# Patient Record
Sex: FEMALE | Race: WHITE | Employment: OTHER
[De-identification: names, ages, dates, MRNs, and addresses within clinical notes are randomized per-mention and may not be internally consistent; named-entity substitution may affect disease eponyms.]

---

## 2017-02-23 ENCOUNTER — CARE COORDINATION (OUTPATIENT)
Dept: CARE COORDINATION | Facility: CLINIC | Age: 82
End: 2017-02-23

## 2017-03-13 ENCOUNTER — OFFICE VISIT (OUTPATIENT)
Dept: FAMILY MEDICINE CLINIC | Age: 82
End: 2017-03-13
Payer: MEDICARE

## 2017-03-13 VITALS
DIASTOLIC BLOOD PRESSURE: 82 MMHG | BODY MASS INDEX: 24.07 KG/M2 | TEMPERATURE: 97.7 F | SYSTOLIC BLOOD PRESSURE: 144 MMHG | WEIGHT: 140.2 LBS

## 2017-03-13 DIAGNOSIS — I10 ESSENTIAL HYPERTENSION: Primary | ICD-10-CM

## 2017-03-13 DIAGNOSIS — E78.5 HYPERLIPIDEMIA, UNSPECIFIED HYPERLIPIDEMIA TYPE: ICD-10-CM

## 2017-03-13 DIAGNOSIS — J01.90 ACUTE SINUSITIS, RECURRENCE NOT SPECIFIED, UNSPECIFIED LOCATION: ICD-10-CM

## 2017-03-13 PROCEDURE — 99214 OFFICE O/P EST MOD 30 MIN: CPT

## 2017-03-13 RX ORDER — AMOXICILLIN 500 MG/1
500 CAPSULE ORAL 3 TIMES DAILY
Qty: 30 CAPSULE | Refills: 0 | Status: SHIPPED | OUTPATIENT
Start: 2017-03-13 | End: 2017-03-23

## 2017-03-13 ASSESSMENT — ENCOUNTER SYMPTOMS
HOARSE VOICE: 0
COUGH: 1
SINUS COMPLAINT: 1
SHORTNESS OF BREATH: 0
GASTROINTESTINAL NEGATIVE: 1
BLURRED VISION: 0
SWOLLEN GLANDS: 0
SORE THROAT: 1
SINUS PRESSURE: 1

## 2017-03-22 ENCOUNTER — CARE COORDINATION (OUTPATIENT)
Dept: CARE COORDINATION | Age: 82
End: 2017-03-22

## 2017-04-26 ENCOUNTER — CARE COORDINATION (OUTPATIENT)
Dept: CARE COORDINATION | Age: 82
End: 2017-04-26

## 2017-05-23 RX ORDER — PRAVASTATIN SODIUM 40 MG
TABLET ORAL
Qty: 90 TABLET | Refills: 3 | Status: SHIPPED | OUTPATIENT
Start: 2017-05-23 | End: 2018-07-11 | Stop reason: SDUPTHER

## 2017-06-06 ENCOUNTER — CARE COORDINATION (OUTPATIENT)
Dept: CARE COORDINATION | Age: 82
End: 2017-06-06

## 2017-07-05 LAB
BUN BLDV-MCNC: 16 MG/DL
CALCIUM SERPL-MCNC: 9.3 MG/DL
CHLORIDE BLD-SCNC: 103 MMOL/L
CO2: 27 MMOL/L
CREAT SERPL-MCNC: 0.52 MG/DL
GFR CALCULATED: 90
GLUCOSE BLD-MCNC: 93 MG/DL
POTASSIUM SERPL-SCNC: 3.7 MMOL/L
SODIUM BLD-SCNC: 141 MMOL/L

## 2017-07-12 ENCOUNTER — OFFICE VISIT (OUTPATIENT)
Dept: FAMILY MEDICINE CLINIC | Age: 82
End: 2017-07-12
Payer: MEDICARE

## 2017-07-12 VITALS
WEIGHT: 139.8 LBS | BODY MASS INDEX: 24 KG/M2 | HEART RATE: 72 BPM | DIASTOLIC BLOOD PRESSURE: 82 MMHG | SYSTOLIC BLOOD PRESSURE: 132 MMHG

## 2017-07-12 DIAGNOSIS — I10 ESSENTIAL HYPERTENSION: Chronic | ICD-10-CM

## 2017-07-12 DIAGNOSIS — M81.0 OSTEOPOROSIS: ICD-10-CM

## 2017-07-12 DIAGNOSIS — E78.5 HYPERLIPIDEMIA, UNSPECIFIED HYPERLIPIDEMIA TYPE: Chronic | ICD-10-CM

## 2017-07-12 DIAGNOSIS — K21.9 GASTROESOPHAGEAL REFLUX DISEASE WITHOUT ESOPHAGITIS: Primary | ICD-10-CM

## 2017-07-12 DIAGNOSIS — I48.0 PAROXYSMAL ATRIAL FIBRILLATION (HCC): Chronic | ICD-10-CM

## 2017-07-12 PROCEDURE — 99214 OFFICE O/P EST MOD 30 MIN: CPT

## 2017-07-12 RX ORDER — RANITIDINE HCL 75 MG
75 TABLET ORAL 2 TIMES DAILY
COMMUNITY
End: 2017-11-15

## 2017-07-17 ASSESSMENT — ENCOUNTER SYMPTOMS
SHORTNESS OF BREATH: 0
NAUSEA: 1
SORE THROAT: 0
DIARRHEA: 0
VOMITING: 1
CHOKING: 0
STRIDOR: 0
HEARTBURN: 1
BLURRED VISION: 0
BELCHING: 1
COUGH: 1
ABDOMINAL PAIN: 0
WHEEZING: 0
CONSTIPATION: 0

## 2017-07-19 DIAGNOSIS — E78.5 HYPERLIPIDEMIA, UNSPECIFIED HYPERLIPIDEMIA TYPE: ICD-10-CM

## 2017-07-19 DIAGNOSIS — I10 ESSENTIAL HYPERTENSION: ICD-10-CM

## 2017-07-19 LAB
CHOLESTEROL, TOTAL: 140 MG/DL
CHOLESTEROL/HDL RATIO: 2.4
HDLC SERPL-MCNC: 58 MG/DL (ref 35–70)
LDL CHOLESTEROL CALCULATED: 70 MG/DL (ref 0–160)
TRIGL SERPL-MCNC: 59 MG/DL
VLDLC SERPL CALC-MCNC: 82 MG/DL

## 2017-07-21 ENCOUNTER — CARE COORDINATION (OUTPATIENT)
Dept: CARE COORDINATION | Age: 82
End: 2017-07-21

## 2017-08-09 ENCOUNTER — HOSPITAL ENCOUNTER (OUTPATIENT)
Dept: GENERAL RADIOLOGY | Age: 82
Discharge: HOME | End: 2017-08-09
Payer: MEDICARE

## 2017-08-09 ENCOUNTER — HOSPITAL ENCOUNTER (OUTPATIENT)
Dept: MAMMOGRAPHY | Age: 82
Discharge: HOME | End: 2017-08-09
Payer: MEDICARE

## 2017-08-09 DIAGNOSIS — K21.9 GASTROESOPHAGEAL REFLUX DISEASE WITHOUT ESOPHAGITIS: ICD-10-CM

## 2017-08-09 DIAGNOSIS — M81.0 OSTEOPOROSIS: ICD-10-CM

## 2017-08-09 PROCEDURE — 77080 DXA BONE DENSITY AXIAL: CPT

## 2017-08-14 ENCOUNTER — CARE COORDINATION (OUTPATIENT)
Dept: CARE COORDINATION | Age: 82
End: 2017-08-14

## 2017-08-18 ENCOUNTER — HOSPITAL ENCOUNTER (OUTPATIENT)
Dept: GENERAL RADIOLOGY | Age: 82
Discharge: HOME | End: 2017-08-18
Payer: MEDICARE

## 2017-08-18 PROCEDURE — 74247 FL UGI WITH AIR CONTRAST: CPT

## 2017-08-18 PROCEDURE — 2500000003 HC RX 250 WO HCPCS

## 2017-08-18 RX ADMIN — BARIUM SULFATE 355 ML: 0.6 SUSPENSION ORAL at 08:35

## 2017-08-18 RX ADMIN — BARIUM SULFATE 135 ML: 980 POWDER, FOR SUSPENSION ORAL at 08:35

## 2017-08-22 DIAGNOSIS — K44.9 LARGE HIATAL HERNIA: Primary | ICD-10-CM

## 2017-08-23 ENCOUNTER — CARE COORDINATION (OUTPATIENT)
Dept: CARE COORDINATION | Age: 82
End: 2017-08-23

## 2017-08-29 ENCOUNTER — CARE COORDINATION (OUTPATIENT)
Dept: FAMILY MEDICINE CLINIC | Age: 82
End: 2017-08-29

## 2017-09-06 ENCOUNTER — CARE COORDINATION (OUTPATIENT)
Dept: FAMILY MEDICINE CLINIC | Age: 82
End: 2017-09-06

## 2017-09-19 ENCOUNTER — CARE COORDINATION (OUTPATIENT)
Dept: FAMILY MEDICINE CLINIC | Age: 82
End: 2017-09-19

## 2017-10-09 ENCOUNTER — CARE COORDINATION (OUTPATIENT)
Dept: FAMILY MEDICINE CLINIC | Age: 82
End: 2017-10-09

## 2017-10-23 ENCOUNTER — CARE COORDINATION (OUTPATIENT)
Dept: FAMILY MEDICINE CLINIC | Age: 82
End: 2017-10-23

## 2017-10-23 NOTE — CARE COORDINATION
Pt would like to get flu vaccine, and dtap if possible at next office visit. Appt notes marked accordingly.

## 2017-10-23 NOTE — CARE COORDINATION
Pt will be going to surgery 11/1/17, repair her hiatal hernia, Odalys Boyle will do. She has had vomiting this week. Pre-Op testing is today. We discussed the importance of hydration and nutrional value prior to surgery for optimal recover, discussed possibly Ensure drinks since she has the vomiting with food. Patients chart updated in Care Everywhere. Pt's  will be caring for her post operatively. Pt notified Bailee Foley will be taking over [facility] at [facility].     Yvette Porras RN

## 2017-11-03 ENCOUNTER — CARE COORDINATION (OUTPATIENT)
Dept: CARE COORDINATION | Age: 82
End: 2017-11-03

## 2017-11-03 RX ORDER — TRAMADOL HYDROCHLORIDE 50 MG/1
50 TABLET ORAL
COMMUNITY
Start: 2017-11-02 | End: 2018-05-23

## 2017-11-03 NOTE — CARE COORDINATION
(ATARAX) 25 MG tablet Take 1 tablet by mouth 3 times daily as needed for Itching or Anxiety , Nausea or vomiting. 9/28/16   Tiffanie Foss MD   acetaminophen (TYLENOL) 325 MG tablet Take 2 tablets by mouth every 4 hours as needed for Pain or Fever 9/18/16   David Petty MD   metoprolol (LOPRESSOR) 25 MG tablet Take 25 mg by mouth 2 times daily    Historical Provider, MD   aspirin 81 MG tablet Take 81 mg by mouth daily.     Historical Provider, MD       Future Appointments  Date Time Provider Erick Cora   11/15/2017 2:00 PM MD REZA Castellon  [facility]

## 2017-11-15 ENCOUNTER — OFFICE VISIT (OUTPATIENT)
Dept: FAMILY MEDICINE CLINIC | Age: 82
End: 2017-11-15
Payer: MEDICARE

## 2017-11-15 VITALS
HEART RATE: 56 BPM | DIASTOLIC BLOOD PRESSURE: 60 MMHG | BODY MASS INDEX: 22.88 KG/M2 | WEIGHT: 134 LBS | SYSTOLIC BLOOD PRESSURE: 130 MMHG | HEIGHT: 64 IN

## 2017-11-15 DIAGNOSIS — J01.90 ACUTE BACTERIAL SINUSITIS: ICD-10-CM

## 2017-11-15 DIAGNOSIS — I48.0 PAROXYSMAL ATRIAL FIBRILLATION (HCC): Chronic | ICD-10-CM

## 2017-11-15 DIAGNOSIS — B96.89 ACUTE BACTERIAL SINUSITIS: ICD-10-CM

## 2017-11-15 DIAGNOSIS — I10 ESSENTIAL HYPERTENSION: Primary | Chronic | ICD-10-CM

## 2017-11-15 DIAGNOSIS — Z23 IMMUNIZATION DUE: ICD-10-CM

## 2017-11-15 PROCEDURE — 99213 OFFICE O/P EST LOW 20 MIN: CPT | Performed by: FAMILY MEDICINE

## 2017-11-15 PROCEDURE — G0008 ADMIN INFLUENZA VIRUS VAC: HCPCS | Performed by: FAMILY MEDICINE

## 2017-11-15 PROCEDURE — 90688 IIV4 VACCINE SPLT 0.5 ML IM: CPT | Performed by: FAMILY MEDICINE

## 2017-11-15 PROCEDURE — 1111F DSCHRG MED/CURRENT MED MERGE: CPT | Performed by: FAMILY MEDICINE

## 2017-11-15 RX ORDER — CEFUROXIME AXETIL 250 MG/1
250 TABLET ORAL 2 TIMES DAILY
Qty: 20 TABLET | Refills: 0 | Status: SHIPPED | OUTPATIENT
Start: 2017-11-15 | End: 2017-11-25

## 2017-11-15 ASSESSMENT — ENCOUNTER SYMPTOMS
SINUS PRESSURE: 0
DIARRHEA: 0
SORE THROAT: 0
SHORTNESS OF BREATH: 0
VOMITING: 0
BACK PAIN: 0
NAUSEA: 0
COUGH: 0

## 2017-11-15 ASSESSMENT — PATIENT HEALTH QUESTIONNAIRE - PHQ9
SUM OF ALL RESPONSES TO PHQ9 QUESTIONS 1 & 2: 0
1. LITTLE INTEREST OR PLEASURE IN DOING THINGS: 0
2. FEELING DOWN, DEPRESSED OR HOPELESS: 0
SUM OF ALL RESPONSES TO PHQ QUESTIONS 1-9: 0

## 2017-11-15 NOTE — PROGRESS NOTES
Betzaida Becerra is a [de-identified] y.o. female who presents today for her medical conditions/complaints as noted below. Betzaida Becerra is c/o of Follow-Up from [facility] and Health Maintenance (flu, adacel)    Pt with routine follow up and initial evaluation with me. Sinus sx currently. HPI:     Visit Information    Have you changed or started any medications since your last visit including any over-the-counter medicines, vitamins, or herbal medicines? no   Are you having any side effects from any of your medications? -  no  Have you stopped taking any of your medications? Is so, why? -  no    Have you seen any other physician or provider since your last visit? No  Have you had any other diagnostic tests since your last visit? No  Have you been seen in the emergency room and/or had an admission to a hospital since we last saw you? No  Have you had your routine dental cleaning in the past 6 months? yes -     Have you activated your [employer] account? If not, what are your barriers?  No:      Patient Care Team:  Anupam Pruett MD as PCP - General (Family Medicine)  Ella Mayfield MD as PCP - [facility] Attributed Provider  Carlos Hamm MD as Consulting Physician (General Surgery)  Angel Simeon RN as Care Coordinator    Medical History Review  Past Medical, Family, and Social History reviewed and  contribute to the patient presenting condition    Health Maintenance   Topic Date Due    DTaP/Tdap/Td vaccine (1 - Tdap) 08/21/2011    Zostavax vaccine  Completed    DEXA (modify frequency per FRAX score)  Completed    Flu vaccine  Completed    Pneumococcal low/med risk  Completed       Past Medical History:   Diagnosis Date    GERD (gastroesophageal reflux disease)     Hypertension       Past Surgical History:   Procedure Laterality Date    BLADDER SUSPENSION  2001    CHOLECYSTECTOMY      HYSTERECTOMY  ovaries left    JOINT REPLACEMENT  left total shoulder; 1990     Family History   Problem Relation Age of Onset    Cancer Brother     Cancer Other      bone    Cancer Daughter      breast     Social History   Substance Use Topics    Smoking status: Former Smoker     Quit date: 12/17/1984    Smokeless tobacco: Never Used    Alcohol use No      Current Outpatient Prescriptions   Medication Sig Dispense Refill    cefUROXime (CEFTIN) 250 MG tablet Take 1 tablet by mouth 2 times daily for 10 days 20 tablet 0    traMADol (ULTRAM) 50 MG tablet Take 50 mg by mouth      pravastatin (PRAVACHOL) 40 MG tablet TAKE 1 TABLET DAILY 90 tablet 3    lisinopril (PRINIVIL;ZESTRIL) 20 MG tablet Take 1 tablet by mouth daily      bumetanide (BUMEX) 1 MG tablet Take 2 tablets by mouth daily      dofetilide (TIKOSYN) 250 MCG capsule Take 1 capsule by mouth 2 times daily      apixaban (ELIQUIS) 5 MG TABS tablet Take 5 mg by mouth 2 times daily      acetaminophen (TYLENOL) 325 MG tablet Take 2 tablets by mouth every 4 hours as needed for Pain or Fever 120 tablet 3    metoprolol (LOPRESSOR) 25 MG tablet Take 25 mg by mouth 2 times daily      aspirin 81 MG tablet Take 81 mg by mouth daily. No current facility-administered medications for this visit. Allergies   Allergen Reactions    Bactrim [Sulfamethoxazole-Trimethoprim]     Codeine      dizziness    Norvasc [Amlodipine Besylate]      Edema      Sertraline Hcl     Toradol [Ketorolac Tromethamine]     Morphine Nausea And Vomiting         Subjective:   Review of Systems   Constitutional: Negative for chills, diaphoresis and fever. HENT: Negative for congestion, sinus pressure and sore throat. Eyes: Negative for visual disturbance. Respiratory: Negative for cough and shortness of breath. Cardiovascular: Negative for chest pain and leg swelling. Gastrointestinal: Negative for diarrhea, nausea and vomiting. Genitourinary: Negative for dysuria, menstrual problem, urgency and vaginal discharge.    Musculoskeletal: Negative for arthralgias, back pain and

## 2017-11-17 ENCOUNTER — CARE COORDINATION (OUTPATIENT)
Dept: CARE COORDINATION | Age: 82
End: 2017-11-17

## 2017-11-28 ENCOUNTER — TELEPHONE (OUTPATIENT)
Dept: FAMILY MEDICINE CLINIC | Age: 82
End: 2017-11-28

## 2017-11-28 RX ORDER — NITROFURANTOIN 25; 75 MG/1; MG/1
100 CAPSULE ORAL 2 TIMES DAILY
Qty: 14 CAPSULE | Refills: 0 | Status: SHIPPED | OUTPATIENT
Start: 2017-11-28 | End: 2017-12-05

## 2017-12-01 ENCOUNTER — CARE COORDINATION (OUTPATIENT)
Dept: CARE COORDINATION | Age: 82
End: 2017-12-01

## 2017-12-01 NOTE — CARE COORDINATION
100 MG capsule Take 1 capsule by mouth 2 times daily for 7 days 11/28/17 12/5/17  Bhumika Cazares MD   traMADol [place]) 50 MG tablet Take 50 mg by mouth 11/2/17   Historical Provider, MD   pravastatin (PRAVACHOL) 40 MG tablet TAKE 1 TABLET DAILY 5/23/17   Blanche Forte MD   lisinopril (PRINIVIL;ZESTRIL) 20 MG tablet Take 1 tablet by mouth daily 11/9/16   Historical Provider, MD   bumetanide (BUMEX) 1 MG tablet Take 2 tablets by mouth daily 11/2/16   Historical Provider, MD   dofetilide (TIKOSYN) 250 MCG capsule Take 1 capsule by mouth 2 times daily 10/15/16   Historical Provider, MD   apixaban (ELIQUIS) 5 MG TABS tablet Take 5 mg by mouth 2 times daily    Historical Provider, MD   acetaminophen (TYLENOL) 325 MG tablet Take 2 tablets by mouth every 4 hours as needed for Pain or Fever 9/18/16   Blanche Forte MD   metoprolol (LOPRESSOR) 25 MG tablet Take 25 mg by mouth 2 times daily    Historical Provider, MD   aspirin 81 MG tablet Take 81 mg by mouth daily.     Historical Provider, MD       Future Appointments  Date Time Provider Erick Shepherd   5/18/2018 1:20 PM Halle Valentine MD [place] [id number]

## 2017-12-08 ENCOUNTER — CARE COORDINATION (OUTPATIENT)
Dept: CARE COORDINATION | Age: 82
End: 2017-12-08

## 2017-12-08 NOTE — CARE COORDINATION
5/18/2018 1:20 PM MD REZA Mccormick [facility]     General Assessment    Do you have any symptoms that are causing concern?:  No      and

## 2017-12-19 ENCOUNTER — TELEPHONE (OUTPATIENT)
Dept: FAMILY MEDICINE CLINIC | Age: 82
End: 2017-12-19

## 2017-12-28 ENCOUNTER — TELEPHONE (OUTPATIENT)
Dept: FAMILY MEDICINE CLINIC | Age: 82
End: 2017-12-28

## 2017-12-28 RX ORDER — CLONAZEPAM 0.5 MG/1
TABLET ORAL
Qty: 30 TABLET | Refills: 0 | Status: SHIPPED | OUTPATIENT
Start: 2017-12-28 | End: 2019-03-29 | Stop reason: SDUPTHER

## 2017-12-28 NOTE — TELEPHONE ENCOUNTER
The patient was calling in to see if she can get a RX for Clonazepam 0.5mg take 1 tablet PO PRN for anxiety. She says [facility] rx it for her earlier this year as a written rx and she is currently out. She would like the medication to be sent to [facility] on [address].

## 2018-01-03 ENCOUNTER — CARE COORDINATION (OUTPATIENT)
Dept: CARE COORDINATION | Age: 83
End: 2018-01-03

## 2018-01-12 ENCOUNTER — TELEPHONE (OUTPATIENT)
Dept: FAMILY MEDICINE CLINIC | Age: 83
End: 2018-01-12

## 2018-01-17 ENCOUNTER — CARE COORDINATION (OUTPATIENT)
Dept: CARE COORDINATION | Age: 83
End: 2018-01-17

## 2018-01-17 RX ORDER — M-VIT,TX,IRON,MINS/CALC/FOLIC 27MG-0.4MG
1 TABLET ORAL DAILY
Status: ON HOLD | COMMUNITY

## 2018-01-24 ENCOUNTER — CARE COORDINATION (OUTPATIENT)
Dept: CARE COORDINATION | Age: 83
End: 2018-01-24

## 2018-01-24 NOTE — CARE COORDINATION
Ambulatory Care Coordination Note  1/24/2018  CM Risk Score: 1  Larry Mortality Risk Score: 9.69    ACC: Junie Mendez RN    Summary Note: RNCC reviewed chart and previous encounters, spoke with Vibha Gonzalez regarding multivitamin and energy, Vibha Gonzalez states \" I am feeling pretty good, energy is getting better\", [facility] educated on the universal precautions of prevention of influenza, and Vibha Gonzalez verbalizes understanding, no other questions or concerns at this time. Care Coordination Interventions    Program Enrollment:  Rising Risk  Referral from Primary Care Provider:  No  Suggested Interventions and Community Resources  No Identified Needs  Fall Risk Prevention:  (Comment: Sent education for patient and )         Goals Addressed             Most Recent     Conditions and Symptoms   On track (1/24/2018)             I will schedule office visits, as directed by my provider. I will keep my appointment or reschedule if I have to cancel. I will notify my provider of any barriers to my plan of care. I will notify my provider of any symptoms that indicate a worsening of my condition. Barriers: none  Plan for overcoming my barriers: N/A  Confidence: 7/10  Anticipated Goal Completion Date: 12/6/17         Wellness Goal   On track (1/24/2018)             Patient Self-Management Goal for Health Maintenance  Goal: I will schedule a yearly preventative care visit. Barriers: none  Plan for overcoming my barriers: N/A  Confidence: 9/10  Anticipated Goal Completion Date: 9/6/17            Prior to Admission medications    Medication Sig Start Date End Date Taking? Authorizing Provider   Multiple Vitamins-Minerals (THERAPEUTIC MULTIVITAMIN-MINERALS) tablet Take 1 tablet by mouth daily    Historical Provider, MD   clonazePAM (KLONOPIN) 0.5 MG tablet Take 1 tablet PO QID PRN for Anxiety.  12/28/17   Indra Archibald MD   traMADol (ULTRAM) 50 MG tablet Take 50 mg by mouth 11/2/17   Historical Provider, MD   pravastatin (PRAVACHOL) 40 MG tablet TAKE 1 TABLET DAILY 5/23/17   Cathie Thomson MD   lisinopril (PRINIVIL;ZESTRIL) 20 MG tablet Take 1 tablet by mouth daily 11/9/16   Historical Provider, MD   bumetanide (BUMEX) 1 MG tablet Take 2 tablets by mouth daily 11/2/16   Historical Provider, MD   dofetilide (TIKOSYN) 250 MCG capsule Take 1 capsule by mouth 2 times daily 10/15/16   Historical Provider, MD   apixaban (ELIQUIS) 5 MG TABS tablet Take 5 mg by mouth 2 times daily    Historical Provider, MD   acetaminophen (TYLENOL) 325 MG tablet Take 2 tablets by mouth every 4 hours as needed for Pain or Fever 9/18/16   Cathie Thomson MD   metoprolol (LOPRESSOR) 25 MG tablet Take 25 mg by mouth 2 times daily    Historical Provider, MD   aspirin 81 MG tablet Take 81 mg by mouth daily.     Historical Provider, MD       Future Appointments  Date Time Provider Erick Cora   5/18/2018 1:20 PM MD REZA Fall [facility]     General Assessment    Do you have any symptoms that are causing concern?:  No      and

## 2018-02-26 ENCOUNTER — CARE COORDINATION (OUTPATIENT)
Dept: CARE COORDINATION | Age: 83
End: 2018-02-26

## 2018-03-26 ENCOUNTER — CARE COORDINATION (OUTPATIENT)
Dept: CARE COORDINATION | Age: 83
End: 2018-03-26

## 2018-04-26 ENCOUNTER — CARE COORDINATION (OUTPATIENT)
Dept: CARE COORDINATION | Age: 83
End: 2018-04-26

## 2018-04-30 ENCOUNTER — TELEPHONE (OUTPATIENT)
Dept: FAMILY MEDICINE CLINIC | Age: 83
End: 2018-04-30

## 2018-05-23 ENCOUNTER — OFFICE VISIT (OUTPATIENT)
Dept: FAMILY MEDICINE CLINIC | Age: 83
End: 2018-05-23
Payer: MEDICARE

## 2018-05-23 VITALS
SYSTOLIC BLOOD PRESSURE: 128 MMHG | HEART RATE: 61 BPM | WEIGHT: 139 LBS | DIASTOLIC BLOOD PRESSURE: 70 MMHG | BODY MASS INDEX: 23.86 KG/M2

## 2018-05-23 DIAGNOSIS — B96.89 ACUTE BACTERIAL SINUSITIS: ICD-10-CM

## 2018-05-23 DIAGNOSIS — E78.5 HYPERLIPIDEMIA, UNSPECIFIED HYPERLIPIDEMIA TYPE: Chronic | ICD-10-CM

## 2018-05-23 DIAGNOSIS — J01.90 ACUTE BACTERIAL SINUSITIS: ICD-10-CM

## 2018-05-23 DIAGNOSIS — I10 ESSENTIAL HYPERTENSION: Primary | Chronic | ICD-10-CM

## 2018-05-23 PROCEDURE — 99213 OFFICE O/P EST LOW 20 MIN: CPT | Performed by: FAMILY MEDICINE

## 2018-05-23 RX ORDER — CEFUROXIME AXETIL 250 MG/1
250 TABLET ORAL 2 TIMES DAILY
Qty: 20 TABLET | Refills: 0 | Status: SHIPPED | OUTPATIENT
Start: 2018-05-23 | End: 2018-06-02

## 2018-05-23 ASSESSMENT — ENCOUNTER SYMPTOMS
SORE THROAT: 0
DIARRHEA: 0
BACK PAIN: 0
VOMITING: 0
COUGH: 0
NAUSEA: 0
SHORTNESS OF BREATH: 0
SINUS PRESSURE: 0

## 2018-05-24 ENCOUNTER — CARE COORDINATION (OUTPATIENT)
Dept: CARE COORDINATION | Age: 83
End: 2018-05-24

## 2018-06-21 ENCOUNTER — CARE COORDINATION (OUTPATIENT)
Dept: CARE COORDINATION | Age: 83
End: 2018-06-21

## 2018-07-12 RX ORDER — PRAVASTATIN SODIUM 40 MG
TABLET ORAL
Qty: 90 TABLET | Refills: 3 | Status: SHIPPED | OUTPATIENT
Start: 2018-07-12 | End: 2019-01-11 | Stop reason: SDUPTHER

## 2018-07-12 NOTE — TELEPHONE ENCOUNTER
Health Maintenance   Topic Date Due    Shingles Vaccine (1 of 2 - 2 Dose Series) 06/24/1987    DTaP/Tdap/Td vaccine (1 - Tdap) 08/21/2011    Potassium monitoring  07/05/2018    Creatinine monitoring  07/05/2018    Flu vaccine (1) 09/01/2018    DEXA (modify frequency per FRAX score)  Completed    Pneumococcal low/med risk  Completed       Hemoglobin A1C (%)   Date Value   09/17/2016 5.4   07/08/2014 5.6             ( goal A1C is < 7)   No results found for: LABMICR  LDL Cholesterol (mg/dL)   Date Value   09/17/2016 83     LDL Calculated (mg/dL)   Date Value   07/05/2017 70       (goal LDL is <100)   AST (U/L)   Date Value   09/28/2016 28     ALT (U/L)   Date Value   09/28/2016 20     BUN (mg/dL)   Date Value   07/05/2017 16     BP Readings from Last 3 Encounters:   05/23/18 128/70   11/15/17 130/60   07/12/17 132/82          (goal 120/80)    All Future Testing planned in [facility]  Lab Frequency Next Occurrence       Next Visit Date:  Future Appointments  Date Time Provider Erick Shepherd   11/23/2018 2:00 PM Claus Stone MD [facility] [address] [facility]            Patient Active Problem List:     Hyperlipidemia     Hypertension     Anxiety     Gastroesophageal reflux disease without esophagitis     Atypical chest pain     Paroxysmal atrial fibrillation ([facility] [address]

## 2018-07-19 ENCOUNTER — CARE COORDINATION (OUTPATIENT)
Dept: CARE COORDINATION | Age: 83
End: 2018-07-19

## 2018-07-19 NOTE — CARE COORDINATION
Ambulatory Care Coordination Note  7/19/2018  CM Risk Score: 1  Larry Mortality Risk Score: 10.46    ACC: Nimco Mora RN    Summary Note: RNCC reviewed chart and previous encounters, spoke with Meka Bhatt for CC needs and updates on well-being. Meka Bhatt informs [address] that \" I am doing great, I was pretty sick early last month, and Kalpesh Devan called me in an antibiotic and feeling much better now. \" No other questions or concerns at this time. No changes with current medications. RNCC Plan: Will follow up for outreach for well-being, surveillance potentially            Care Coordination Interventions    Program Enrollment:  Rising Risk  Referral from Primary Care Provider:  No  Suggested Interventions and Community Resources  No Identified Needs  Fall Risk Prevention:  (Comment: Sent education for patient and )         Goals Addressed             Most Recent     Conditions and Symptoms   On track (7/19/2018)             I will schedule office visits, as directed by my provider. I will keep my appointment or reschedule if I have to cancel. I will notify my provider of any barriers to my plan of care. I will notify my provider of any symptoms that indicate a worsening of my condition. Barriers: none  Plan for overcoming my barriers: N/A  Confidence: 7/10  Anticipated Goal Completion Date: 12/6/17         Wellness Goal   On track (7/19/2018)             Patient Self-Management Goal for Health Maintenance  Goal: I will schedule a yearly preventative care visit. Barriers: none  Plan for overcoming my barriers: N/A  Confidence: 9/10  Anticipated Goal Completion Date: 9/6/17            Prior to Admission medications    Medication Sig Start Date End Date Taking?  Authorizing Provider   pravastatin (PRAVACHOL) 40 MG tablet TAKE 1 TABLET DAILY 7/12/18   Lorna Lopez MD   Multiple Vitamins-Minerals (THERAPEUTIC MULTIVITAMIN-MINERALS) tablet Take 1 tablet by mouth daily    Historical Provider, MD   clonazePAM (KLONOPIN) 0.5 MG tablet Take 1 tablet PO QID PRN for Anxiety.  12/28/17   Thiago Carvalho MD   lisinopril (PRINIVIL;ZESTRIL) 20 MG tablet Take 1 tablet by mouth daily 11/9/16   Historical Provider, MD   bumetanide (BUMEX) 1 MG tablet Take 2 tablets by mouth daily 11/2/16   Historical Provider, MD   dofetilide (TIKOSYN) 250 MCG capsule Take 1 capsule by mouth 2 times daily 10/15/16   Historical Provider, MD   apixaban (ELIQUIS) 5 MG TABS tablet Take 5 mg by mouth 2 times daily    Historical Provider, MD   acetaminophen (TYLENOL) 325 MG tablet Take 2 tablets by mouth every 4 hours as needed for Pain or Fever 9/18/16   Thee Rivera MD   metoprolol (LOPRESSOR) 25 MG tablet Take 25 mg by mouth 2 times daily    Historical Provider, MD       Future Appointments  Date Time Provider Erick Shepherd   11/23/2018 2:00 PM [facility]      and   General Assessment    Do you have any symptoms that are causing concern?:  No

## 2018-08-13 ENCOUNTER — TELEPHONE (OUTPATIENT)
Dept: FAMILY MEDICINE CLINIC | Age: 83
End: 2018-08-13

## 2018-08-17 ENCOUNTER — CARE COORDINATION (OUTPATIENT)
Dept: CARE COORDINATION | Age: 83
End: 2018-08-17

## 2018-08-17 NOTE — CARE COORDINATION
Ambulatory Care Coordination Note  8/17/2018  CM Risk Score: Luis Juarez Mortality Risk Score: 7.97    ACC: Mateo Oliveros, XOCHITL    Summary Note: RNCC reviewed chart and previous encounters, spoke with Cam Foster for updates on recent hospital admission for Ileus/SBO on 8/14/18 discharged on 8/15/18 home. Cam Foster informs [facility] that \" I am feeling better with my stuff going on, now it is my , he was in the hospital before me and went to a nursing home and now they want to discharge him, and I cannot take care of him by myself. He has parkinson's and has a hard time walking. So the SW at the facility is trying to help us get Medicaid, so that he can stay at the facility, if not then they might discharge him Sunday, and boy oh boy. What then? \" RNCC inquired of Cam Foster of how she is feeling overall, and if she would like to make an Hospital Follow up with Dr. Yo Rosenberg, and Emilydiana Kristin pleasantly denied, due to the factors surrounding her 's care right now, and would like to get him situated first.     [facility] encourages Cam Foster to call for any worsening symptoms, denies abdominal pain, nausea/vomiting and irregular bowel movements at this time. Dick castaneda. RNCC Plan: Will follow up with outreach next week for updates on Cam Foster and updates on 's disposition. Care Coordination Interventions    Program Enrollment:  Rising Risk  Referral from Primary Care Provider:  No  Suggested Interventions and Community Resources  No Identified Needs  Fall Risk Prevention:  (Comment: Sent education for patient and )         Goals Addressed             Most Recent     Conditions and Symptoms   On track (8/17/2018)             I will schedule office visits, as directed by my provider. I will keep my appointment or reschedule if I have to cancel. I will notify my provider of any barriers to my plan of care.   I will notify my provider of any symptoms that indicate a worsening of my

## 2018-08-24 ENCOUNTER — CARE COORDINATION (OUTPATIENT)
Dept: CARE COORDINATION | Age: 83
End: 2018-08-24

## 2018-09-04 ENCOUNTER — OFFICE VISIT (OUTPATIENT)
Dept: FAMILY MEDICINE CLINIC | Age: 83
End: 2018-09-04
Payer: MEDICARE

## 2018-09-04 VITALS
OXYGEN SATURATION: 99 % | SYSTOLIC BLOOD PRESSURE: 112 MMHG | HEART RATE: 77 BPM | TEMPERATURE: 99 F | DIASTOLIC BLOOD PRESSURE: 74 MMHG | WEIGHT: 137 LBS | RESPIRATION RATE: 16 BRPM | BODY MASS INDEX: 23.52 KG/M2

## 2018-09-04 DIAGNOSIS — J06.9 VIRAL URI: Primary | ICD-10-CM

## 2018-09-04 DIAGNOSIS — J04.0 LARYNGITIS: ICD-10-CM

## 2018-09-04 DIAGNOSIS — H61.22 IMPACTED CERUMEN OF LEFT EAR: ICD-10-CM

## 2018-09-04 PROCEDURE — 69210 REMOVE IMPACTED EAR WAX UNI: CPT | Performed by: NURSE PRACTITIONER

## 2018-09-04 PROCEDURE — 99213 OFFICE O/P EST LOW 20 MIN: CPT | Performed by: NURSE PRACTITIONER

## 2018-09-04 ASSESSMENT — ENCOUNTER SYMPTOMS
EYES NEGATIVE: 1
CHANGE IN BOWEL HABIT: 0
SORE THROAT: 1
ALLERGIC/IMMUNOLOGIC NEGATIVE: 1
COLOR CHANGE: 0
VOICE CHANGE: 1
VOMITING: 0
COUGH: 1
NAUSEA: 0
FACIAL SWELLING: 0
TROUBLE SWALLOWING: 1
SHORTNESS OF BREATH: 0
RHINORRHEA: 1
GASTROINTESTINAL NEGATIVE: 1
WHEEZING: 0

## 2018-09-04 NOTE — PROGRESS NOTES
impaction - removed    Eyes: Pupils are equal, round, and reactive to light. Conjunctivae are normal. Right eye exhibits no discharge. Left eye exhibits no discharge. No scleral icterus. Neck: Normal range of motion. Neck supple. No JVD present. No tracheal deviation present. Cardiovascular: Normal rate, regular rhythm, normal heart sounds and intact distal pulses. Exam reveals no gallop and no friction rub. No murmur heard. Pulmonary/Chest: Effort normal and breath sounds normal. No accessory muscle usage or stridor. No tachypnea. No respiratory distress. She has no decreased breath sounds. She has no wheezes. She has no rhonchi. She has no rales. Abdominal: Soft. Musculoskeletal: Normal range of motion. She exhibits no edema or deformity. Lymphadenopathy:     She has no cervical adenopathy. Neurological: She is alert and oriented to person, place, and time. She has normal strength. Gait normal. GCS eye subscore is 4. GCS verbal subscore is 5. GCS motor subscore is 6. Skin: Skin is warm, dry and intact. No rash noted. She is not diaphoretic. No erythema. No pallor. Psychiatric: She has a normal mood and affect. Her speech is normal and behavior is normal. Judgment and thought content normal. Cognition and memory are normal.         Assessment:         1. Viral URI    2. Impacted cerumen of left ear    3. Laryngitis        Plan:     1. Viral URI    Sx treatment including  Anti-histamine such as claritin or Zyrtec  Cough drops, cough syrup, throat sprays, tylenol  Push fluids including warm fluids  Sx should self resolve   Call if worsened or questions/concerns    2. Impacted cerumen of left ear    - ME REMOVAL IMPACTED CERUMEN INSTRUMENTATION UNILAT    3. Laryngitis    R/t URI      Discussed use, benefit, and side effects of prescribed medications. All patient questions answered. Pt voiced understanding. Reviewed health maintenance.   Instructed to continue current medications, diet and

## 2018-09-04 NOTE — PATIENT INSTRUCTIONS
Patient Education        Viral Respiratory Infection: Care Instructions  Your Care Instructions    Viruses are very small organisms. They grow in number after they enter your body. There are many types that cause different illnesses, such as colds and the mumps. The symptoms of a viral respiratory infection often start quickly. They include a fever, sore throat, and runny nose. You may also just not feel well. Or you may not want to eat much. Most viral respiratory infections are not serious. They usually get better with time and self-care. Antibiotics are not used to treat a viral infection. That's because antibiotics will not help cure a viral illness. In some cases, antiviral medicine can help your body fight a serious viral infection. Follow-up care is a key part of your treatment and safety. Be sure to make and go to all appointments, and call your doctor if you are having problems. It's also a good idea to know your test results and keep a list of the medicines you take. How can you care for yourself at home? · Rest as much as possible until you feel better. · Be safe with medicines. Take your medicine exactly as prescribed. Call your doctor if you think you are having a problem with your medicine. You will get more details on the specific medicine your doctor prescribes. · Take an over-the-counter pain medicine, such as acetaminophen (Tylenol), ibuprofen (Advil, Motrin), or naproxen (Aleve), as needed for pain and fever. Read and follow all instructions on the label. Do not give aspirin to anyone younger than 20. It has been linked to Reye syndrome, a serious illness. · Drink plenty of fluids, enough so that your urine is light yellow or clear like water. Hot fluids, such as tea or soup, may help relieve congestion in your nose and throat. If you have kidney, heart, or liver disease and have to limit fluids, talk with your doctor before you increase the amount of fluids you drink.   · Try to clear Patient Education        Laryngitis: Care Instructions  Your Care Instructions    Laryngitis is an inflammation of the voice box (larynx) that causes your voice to become raspy or hoarse. It can be short-lived or long-lasting. Most of the time, laryngitis comes on quickly and lasts as long as 2 weeks. It is caused by overuse, irritation, or infection of the vocal cords inside the larynx. Some of the most common causes are a cold, the flu, or allergies. Loud talking, shouting, cheering, or singing also can cause laryngitis. Stomach acid that backs up into the throat also can make you lose your voice. Resting your voice and taking other steps at home can help you get your voice back. Follow-up care is a key part of your treatment and safety. Be sure to make and go to all appointments, and call your doctor if you are having problems. It's also a good idea to know your test results and keep a list of the medicines you take. How can you care for yourself at home? · Follow your doctor's directions for treating the condition that caused you to lose your voice. If your doctor prescribed antibiotics, take them as directed. Do not stop taking them just because you feel better. You need to take the full course of antibiotics. · Before you use cough and cold medicines, check the label. They may not be safe for young children or for people with certain health problems. · Try to keep stomach acid from backing up into your throat. Do not eat just before bedtime. Reduce the amount of coffee and alcohol you drink, and eat healthy foods. Taking over-the-counter acid reducers can help when these steps are not enough. In some cases, you may need prescription medicine. · Rest your voice. You do not have to stop speaking, but use your voice as little as possible. Speak softly but do not whisper; whispering can bother your larynx more than speaking softly. Avoid talking on the telephone or trying to speak loudly.   · Try not to

## 2018-09-05 ENCOUNTER — TELEPHONE (OUTPATIENT)
Dept: PHARMACY | Facility: CLINIC | Age: 83
End: 2018-09-05

## 2018-09-05 DIAGNOSIS — Z79.899 ENCOUNTER FOR MEDICATION REVIEW: Primary | ICD-10-CM

## 2018-09-05 RX ORDER — POTASSIUM CHLORIDE 1500 MG/1
40 TABLET, FILM COATED, EXTENDED RELEASE ORAL
COMMUNITY
End: 2022-10-31

## 2018-09-05 RX ORDER — OYSTER SHELL CALCIUM WITH VITAMIN D 500; 200 MG/1; [IU]/1
1 TABLET, FILM COATED ORAL 2 TIMES DAILY
COMMUNITY
End: 2022-10-31 | Stop reason: SDUPTHER

## 2018-09-05 NOTE — TELEPHONE ENCOUNTER
CLINICAL PHARMACY NOTE  Post-Discharge Transitions of Care (MARIAELENA)    Non-face-to-face services provided:  Assessment and support for treatment adherence and medication management-- med rec    Subjective/Objective:  Ishan Liu is a 82 y.o. female. Patient was discharged from [facility] on 8/14/18 with a diagnosis of ileus. Patient outreach to review discharge medications and provide medication review and management. Spoke with patient    Allergies   Allergen Reactions    Bactrim [Sulfamethoxazole-Trimethoprim]     Codeine      dizziness    Magnesium-Containing Compounds      Mag ox 500 ?  Naproxen      On allergy list ? Unsure of reaction    Norvasc [Amlodipine Besylate]      Edema      Percocet [Oxycodone-Acetaminophen]     Sertraline Hcl     Toradol [Ketorolac Tromethamine]     Tramadol     Morphine Nausea And Vomiting       Discharge Medications (as per discharging medication list found in Epic):  Medications at Time of Discharge  - as of this encounter  Medication Sig.   acetaminophen (TYLENOL) 500 mg tablet   Take 250 mg by mouth every 6 (six) hours as needed for pain. Takes half of a 500 mg tablet as needed   apixaban (ELIQUIS) 5 mg tablet   Take by mouth 2 (two) times a day. bumetanide (BUMEX) 1 mg tablet   Take 1 mg by mouth daily. calcium carbonate-vitamin D3 (CALCIUM 500 + D) 500 mg(1,250mg) -200 units per tablet   Take 1 tablet by mouth 2 (two) times a day with meals. clonazePAM (KlonoPIN) 0.5 mg tablet   Take 0.5 mg by mouth nightly as needed. dofetilide (TIKOSYN) 250 MCG capsule   Take 1 capsule by mouth 2 times daily   lisinopril-hydrochlorothiazide (PRINZIDE,ZESTORETIC) 20-12.5 mg per tablet   Take 1 tablet by mouth daily. ** pt takes lisinopril 20 mg (not the combo with HCTZ)   metoprolol tartrate (LOPRESSOR) 50 mg tablet   Take 50 mg by mouth 2 (two) times a day.  - pt is taking 25 mg BID   potassium chloride (K-DUR) 10 MEQ CR tablet   Take 20 mEq by mouth 2 (two) times a day. **pt takes 40 meq once daily   pravastatin (PRAVACHOL) 20 mg tablet   Take 1 tablet by mouth nightly. **pt takes 40 mg daily   traMADol (ULTRAM) 50 mg tablet   1-2 tabs po q6h prn pain  - is not taking   metoclopramide (REGLAN) 10 mg tablet   Take 1 tablet (10 mg total) by mouth 3 (three) times a day for 14 days. - is not taking       Additional Medications:  multivitamin      CrCl cannot be calculated (Patient's most recent lab result is older than the maximum 120 days allowed. ). Assessment/Plan:    - Pt is taking medications as directed by discharging physician. Number of discrepancies: 1. Instructions per discharge list provided except per below documentation. Identified medication discrepancies/issues:     · Category 4 (1):  1. Updated med list- see above     - Identified Potential Medication Interactions: No clinically significant interactions identified via [facility] Interaction Analysis as category D or higher.    - Renal Dosing: No renal adjustments necessary.       Thank you,    Janak Mace, PharmD, [facility] Pharmacist  Direct: [phone]  Toll Free: [phone], Option 7  =============================================  CLINICAL PHARMACY NOTE   POST-DISCHARGE TELEPHONE FOLLOW-UP ADDENDUM    For Pharmacy Admin Tracking Only    TCM Call Made?: No  [facility]) Select Patient?: Yes  Total # of Interventions Recommended: 1 - Updated Order #: 1  Total # Interventions Accepted: 1  Intervention Severity:   - Level 1 Intervention Present?: No   - Level 2 #: 0   - Level 3 #: 0  Outreach Status: Review Complete  Care Coordinator Outreach to Patient?: No  Provider Contacted?: No  Time Spent (min): 30

## 2018-09-07 ENCOUNTER — CARE COORDINATION (OUTPATIENT)
Dept: CARE COORDINATION | Age: 83
End: 2018-09-07

## 2018-09-28 ENCOUNTER — CARE COORDINATION (OUTPATIENT)
Dept: CARE COORDINATION | Age: 83
End: 2018-09-28

## 2018-10-23 ENCOUNTER — CARE COORDINATION (OUTPATIENT)
Dept: CARE COORDINATION | Age: 83
End: 2018-10-23

## 2018-11-07 ENCOUNTER — CARE COORDINATION (OUTPATIENT)
Dept: CARE COORDINATION | Age: 83
End: 2018-11-07

## 2018-11-07 NOTE — CARE COORDINATION
RNCC reviewed chart and previous encounters, placed call to [facility] for CC needs, recent SBO and well-being, no answer and LVM to return call to [phone] or [phone] for CC updates, questions or concerns.

## 2018-12-03 ENCOUNTER — OFFICE VISIT (OUTPATIENT)
Dept: FAMILY MEDICINE CLINIC | Age: 83
End: 2018-12-03
Payer: MEDICARE

## 2018-12-03 VITALS
DIASTOLIC BLOOD PRESSURE: 80 MMHG | WEIGHT: 143 LBS | HEART RATE: 65 BPM | BODY MASS INDEX: 24.55 KG/M2 | SYSTOLIC BLOOD PRESSURE: 130 MMHG

## 2018-12-03 DIAGNOSIS — E78.5 HYPERLIPIDEMIA, UNSPECIFIED HYPERLIPIDEMIA TYPE: Chronic | ICD-10-CM

## 2018-12-03 DIAGNOSIS — B96.89 ACUTE BACTERIAL SINUSITIS: ICD-10-CM

## 2018-12-03 DIAGNOSIS — Z23 IMMUNIZATION DUE: ICD-10-CM

## 2018-12-03 DIAGNOSIS — J01.90 ACUTE BACTERIAL SINUSITIS: ICD-10-CM

## 2018-12-03 DIAGNOSIS — I48.0 PAROXYSMAL ATRIAL FIBRILLATION (HCC): ICD-10-CM

## 2018-12-03 DIAGNOSIS — I10 ESSENTIAL HYPERTENSION: Primary | Chronic | ICD-10-CM

## 2018-12-03 PROCEDURE — 90688 IIV4 VACCINE SPLT 0.5 ML IM: CPT | Performed by: FAMILY MEDICINE

## 2018-12-03 PROCEDURE — 99213 OFFICE O/P EST LOW 20 MIN: CPT | Performed by: FAMILY MEDICINE

## 2018-12-03 PROCEDURE — G0008 ADMIN INFLUENZA VIRUS VAC: HCPCS | Performed by: FAMILY MEDICINE

## 2018-12-03 RX ORDER — AMOXICILLIN 500 MG/1
500 CAPSULE ORAL 3 TIMES DAILY
Qty: 30 CAPSULE | Refills: 0 | Status: SHIPPED | OUTPATIENT
Start: 2018-12-03 | End: 2018-12-13

## 2018-12-03 ASSESSMENT — ENCOUNTER SYMPTOMS
DIARRHEA: 0
COUGH: 1
SORE THROAT: 1
SINUS PAIN: 1
BACK PAIN: 0
SHORTNESS OF BREATH: 0
NAUSEA: 0
SINUS PRESSURE: 1
VOMITING: 0

## 2018-12-03 ASSESSMENT — PATIENT HEALTH QUESTIONNAIRE - PHQ9
SUM OF ALL RESPONSES TO PHQ QUESTIONS 1-9: 0
SUM OF ALL RESPONSES TO PHQ9 QUESTIONS 1 & 2: 0
1. LITTLE INTEREST OR PLEASURE IN DOING THINGS: 0
2. FEELING DOWN, DEPRESSED OR HOPELESS: 0
SUM OF ALL RESPONSES TO PHQ QUESTIONS 1-9: 0

## 2018-12-10 ENCOUNTER — CARE COORDINATION (OUTPATIENT)
Dept: CARE COORDINATION | Age: 83
End: 2018-12-10

## 2019-01-08 ENCOUNTER — CARE COORDINATION (OUTPATIENT)
Dept: FAMILY MEDICINE CLINIC | Age: 84
End: 2019-01-08

## 2019-01-11 RX ORDER — PRAVASTATIN SODIUM 40 MG
TABLET ORAL
Qty: 90 TABLET | Refills: 3 | Status: SHIPPED | OUTPATIENT
Start: 2019-01-11 | End: 2019-07-09 | Stop reason: SDUPTHER

## 2019-03-01 ENCOUNTER — OFFICE VISIT (OUTPATIENT)
Dept: FAMILY MEDICINE CLINIC | Age: 84
End: 2019-03-01
Payer: MEDICARE

## 2019-03-01 VITALS
WEIGHT: 149 LBS | DIASTOLIC BLOOD PRESSURE: 70 MMHG | BODY MASS INDEX: 25.58 KG/M2 | TEMPERATURE: 98.6 F | SYSTOLIC BLOOD PRESSURE: 122 MMHG | HEART RATE: 71 BPM

## 2019-03-01 DIAGNOSIS — J01.90 ACUTE BACTERIAL SINUSITIS: Primary | ICD-10-CM

## 2019-03-01 DIAGNOSIS — B96.89 ACUTE BACTERIAL SINUSITIS: Primary | ICD-10-CM

## 2019-03-01 DIAGNOSIS — I48.0 PAROXYSMAL ATRIAL FIBRILLATION (HCC): ICD-10-CM

## 2019-03-01 DIAGNOSIS — R39.9 UTI SYMPTOMS: ICD-10-CM

## 2019-03-01 LAB
BILIRUBIN, POC: NORMAL
BLOOD URINE, POC: NORMAL
CLARITY, POC: CLEAR
COLOR, POC: YELLOW
GLUCOSE URINE, POC: NORMAL
KETONES, POC: NORMAL
LEUKOCYTE EST, POC: NORMAL
NITRITE, POC: NORMAL
PH, POC: 5
PROTEIN, POC: NORMAL
SPECIFIC GRAVITY, POC: 1.01
UROBILINOGEN, POC: 0.2

## 2019-03-01 PROCEDURE — 81003 URINALYSIS AUTO W/O SCOPE: CPT | Performed by: FAMILY MEDICINE

## 2019-03-01 PROCEDURE — 99213 OFFICE O/P EST LOW 20 MIN: CPT | Performed by: FAMILY MEDICINE

## 2019-03-01 RX ORDER — AMOXICILLIN 500 MG/1
500 CAPSULE ORAL 3 TIMES DAILY
Qty: 30 CAPSULE | Refills: 0 | Status: SHIPPED | OUTPATIENT
Start: 2019-03-01 | End: 2019-03-11

## 2019-03-01 ASSESSMENT — ENCOUNTER SYMPTOMS
SINUS PAIN: 1
SHORTNESS OF BREATH: 0
VOMITING: 0
DIARRHEA: 0
SINUS PRESSURE: 1
BACK PAIN: 0
SORE THROAT: 1
COUGH: 1
NAUSEA: 0
FACIAL SWELLING: 1

## 2019-03-01 ASSESSMENT — PATIENT HEALTH QUESTIONNAIRE - PHQ9
SUM OF ALL RESPONSES TO PHQ QUESTIONS 1-9: 0
1. LITTLE INTEREST OR PLEASURE IN DOING THINGS: 0
2. FEELING DOWN, DEPRESSED OR HOPELESS: 0
SUM OF ALL RESPONSES TO PHQ9 QUESTIONS 1 & 2: 0
SUM OF ALL RESPONSES TO PHQ QUESTIONS 1-9: 0

## 2019-03-11 ENCOUNTER — TELEPHONE (OUTPATIENT)
Dept: FAMILY MEDICINE CLINIC | Age: 84
End: 2019-03-11

## 2019-03-11 RX ORDER — AZITHROMYCIN 250 MG/1
TABLET, FILM COATED ORAL
Qty: 1 PACKET | Refills: 0 | Status: SHIPPED | OUTPATIENT
Start: 2019-03-11 | End: 2019-03-21

## 2019-03-29 DIAGNOSIS — F41.9 ANXIETY: Primary | Chronic | ICD-10-CM

## 2019-03-29 RX ORDER — CLONAZEPAM 0.5 MG/1
TABLET ORAL
Qty: 30 TABLET | Refills: 0 | Status: SHIPPED | OUTPATIENT
Start: 2019-03-29 | End: 2020-01-03 | Stop reason: SDUPTHER

## 2019-06-03 ENCOUNTER — OFFICE VISIT (OUTPATIENT)
Dept: FAMILY MEDICINE CLINIC | Age: 84
End: 2019-06-03
Payer: MEDICARE

## 2019-06-03 VITALS
DIASTOLIC BLOOD PRESSURE: 70 MMHG | HEART RATE: 76 BPM | SYSTOLIC BLOOD PRESSURE: 128 MMHG | BODY MASS INDEX: 25.92 KG/M2 | HEIGHT: 64 IN | OXYGEN SATURATION: 97 % | WEIGHT: 151.8 LBS

## 2019-06-03 DIAGNOSIS — I10 ESSENTIAL HYPERTENSION: Chronic | ICD-10-CM

## 2019-06-03 DIAGNOSIS — B96.89 ACUTE BACTERIAL SINUSITIS: Primary | ICD-10-CM

## 2019-06-03 DIAGNOSIS — J01.90 ACUTE BACTERIAL SINUSITIS: Primary | ICD-10-CM

## 2019-06-03 PROCEDURE — 99213 OFFICE O/P EST LOW 20 MIN: CPT | Performed by: FAMILY MEDICINE

## 2019-06-03 RX ORDER — AZITHROMYCIN 250 MG/1
TABLET, FILM COATED ORAL
Qty: 1 PACKET | Refills: 0 | Status: SHIPPED | OUTPATIENT
Start: 2019-06-03 | End: 2019-12-11 | Stop reason: ALTCHOICE

## 2019-06-03 ASSESSMENT — ENCOUNTER SYMPTOMS
NAUSEA: 0
SINUS PAIN: 1
SHORTNESS OF BREATH: 0
DIARRHEA: 0
SINUS PRESSURE: 1
FACIAL SWELLING: 1
BACK PAIN: 0
SORE THROAT: 1
VOMITING: 0
COUGH: 1

## 2019-06-03 NOTE — PROGRESS NOTES
Ben James is a 82 y.o. female who presents todayfor her medical conditions/complaints as noted below. Ben James is here today c/o6 Month Follow-Up and Sinus Problem (x1 week)  Routine follow up but for the past week having \"sinus\" problems.    :     Visit Information    Have you changed or started any medications since your last visit including any over-the-counter medicines, vitamins, or herbal medicines? no   Are you having any side effects from any of your medications? -  no  Have you stopped taking any of your medications? Is so, why? -  no    Have you seen any other physician or provider since your last visit? No  Have you had any other diagnostic tests since your last visit? No  Have you been seen in the emergency room and/or had an admission to a hospital since we last saw you? No  Have you had your routine dental cleaning in the past 6 months? no    Have you activated your Voice[employer] account? If not, what are your barriers?  No     Patient Care Team:  Lakesha Thomas MD as PCP - General (Family Medicine)  Lakesha Thomas MD as PCP - [facility]  Danita Russell MD as Consulting Physician (General Surgery)    Medical History Review  Past Medical, Family, and Social History reviewed and does not contribute to the patient presenting condition    Health Maintenance   Topic Date Due    Shingles Vaccine (1 of 2) 06/24/1987    DTaP/Tdap/Td vaccine (1 - Tdap) 08/21/2011    Potassium monitoring  07/05/2018    Creatinine monitoring  07/05/2018    DEXA (modify frequency per FRAX score)  Completed    Flu vaccine  Completed    Pneumococcal 65+ years Vaccine  Completed           HPI        Past Medical History:   Diagnosis Date    GERD (gastroesophageal reflux disease)     Hypertension       Past Surgical History:   Procedure Laterality Date    BLADDER SUSPENSION  2001    CHOLECYSTECTOMY      HYSTERECTOMY  ovaries left    JOINT REPLACEMENT  left total shoulder; 1990     Family Diagnosis Orders   1. Acute bacterial sinusitis     2. Essential hypertension           Plan:      Return in about 6 months (around 12/3/2019). No orders of the defined types were placed in this encounter. Orders Placed This Encounter   Medications    azithromycin (ZITHROMAX Z-JAMAL) 250 MG tablet     Sig: Take 2 pills on day one then one pill daily til gone.      Dispense:  1 packet     Refill:  0

## 2019-07-09 RX ORDER — PRAVASTATIN SODIUM 40 MG
TABLET ORAL
Qty: 90 TABLET | Refills: 3 | Status: SHIPPED | OUTPATIENT
Start: 2019-07-09 | End: 2020-06-05 | Stop reason: SDUPTHER

## 2019-09-27 ENCOUNTER — OFFICE VISIT (OUTPATIENT)
Dept: FAMILY MEDICINE CLINIC | Age: 84
End: 2019-09-27
Payer: MEDICARE

## 2019-09-27 VITALS
WEIGHT: 156.4 LBS | BODY MASS INDEX: 26.85 KG/M2 | HEART RATE: 74 BPM | DIASTOLIC BLOOD PRESSURE: 70 MMHG | OXYGEN SATURATION: 96 % | SYSTOLIC BLOOD PRESSURE: 102 MMHG

## 2019-09-27 DIAGNOSIS — Z23 IMMUNIZATION DUE: ICD-10-CM

## 2019-09-27 DIAGNOSIS — S63.501D SPRAIN OF RIGHT WRIST, SUBSEQUENT ENCOUNTER: ICD-10-CM
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
## 2019-09-27 DIAGNOSIS — I10 ESSENTIAL HYPERTENSION: Chronic | ICD-10-CM

## 2019-09-27 PROCEDURE — 90653 IIV ADJUVANT VACCINE IM: CPT | Performed by: FAMILY MEDICINE

## 2019-09-27 PROCEDURE — G0008 ADMIN INFLUENZA VIRUS VAC: HCPCS | Performed by: FAMILY MEDICINE

## 2019-09-27 PROCEDURE — 99213 OFFICE O/P EST LOW 20 MIN: CPT | Performed by: FAMILY MEDICINE

## 2019-09-27 ASSESSMENT — ENCOUNTER SYMPTOMS
SORE THROAT: 0
NAUSEA: 0
BACK PAIN: 0
SINUS PRESSURE: 0
DIARRHEA: 0
VOMITING: 0
SHORTNESS OF BREATH: 0
COUGH: 0

## 2019-09-27 NOTE — PROGRESS NOTES
has no rales. She exhibits no tenderness. Musculoskeletal: She exhibits tenderness (over thumb and wrist on right ) and deformity (abrasion above left eye healing well). She exhibits no edema. Lymphadenopathy:     She has no cervical adenopathy. Neurological: She is alert and oriented to person, place, and time. No cranial nerve deficit. Skin: No rash noted. She is not diaphoretic. Psychiatric: She has a normal mood and affect. Her behavior is normal. Judgment and thought content normal.       Assessment:       Diagnosis Orders   1. Fall, subsequent encounter     2. Immunization due  INFLUENZA, TRIV, INACTIVATED, SUBUNIT, ADJUVANTED, 65 YRS AND OLDER, IM, PREFILL SYR, 0.5ML (FLUAD TRIV)   3. Essential hypertension     4. Sprain of right wrist, subsequent encounter           Plan:      No follow-ups on file. Orders Placed This Encounter   Procedures    INFLUENZA, TRIV, INACTIVATED, SUBUNIT, ADJUVANTED, 65 YRS AND OLDER, IM, PREFILL SYR, 0.5ML (FLUAD TRIV)     No orders of the defined types were placed in this encounter. Update flu vaccine. Fall precautions discussed.

## 2019-10-16 ENCOUNTER — TELEPHONE (OUTPATIENT)
Dept: FAMILY MEDICINE CLINIC | Age: 84
End: 2019-10-16

## 2019-10-16 RX ORDER — AZITHROMYCIN 250 MG/1
TABLET, FILM COATED ORAL
Qty: 1 PACKET | Refills: 0 | Status: SHIPPED | OUTPATIENT
Start: 2019-10-16 | End: 2019-12-11 | Stop reason: ALTCHOICE

## 2019-11-18 ENCOUNTER — TELEPHONE (OUTPATIENT)
Dept: FAMILY MEDICINE CLINIC | Age: 84
End: 2019-11-18

## 2019-11-18 RX ORDER — AMOXICILLIN 875 MG/1
875 TABLET, COATED ORAL 2 TIMES DAILY
Qty: 20 TABLET | Refills: 0 | Status: SHIPPED | OUTPATIENT
Start: 2019-11-18 | End: 2019-11-28

## 2019-12-11 ENCOUNTER — OFFICE VISIT (OUTPATIENT)
Dept: FAMILY MEDICINE CLINIC | Age: 84
End: 2019-12-11
Payer: MEDICARE

## 2019-12-11 VITALS
DIASTOLIC BLOOD PRESSURE: 62 MMHG | HEART RATE: 68 BPM | OXYGEN SATURATION: 98 % | BODY MASS INDEX: 26.98 KG/M2 | SYSTOLIC BLOOD PRESSURE: 116 MMHG | WEIGHT: 157.2 LBS

## 2019-12-11 DIAGNOSIS — N30.00 ACUTE CYSTITIS WITHOUT HEMATURIA: Primary | ICD-10-CM

## 2019-12-11 DIAGNOSIS — I10 ESSENTIAL HYPERTENSION: Chronic | ICD-10-CM

## 2019-12-11 LAB
BILIRUBIN, POC: NEGATIVE
BLOOD URINE, POC: NEGATIVE
CLARITY, POC: NORMAL
COLOR, POC: YELLOW
GLUCOSE URINE, POC: NEGATIVE
KETONES, POC: NEGATIVE
LEUKOCYTE EST, POC: NORMAL
NITRITE, POC: NEGATIVE
PH, POC: 5
PROTEIN, POC: NEGATIVE
SPECIFIC GRAVITY, POC: 1.01
UROBILINOGEN, POC: 0.2

## 2019-12-11 PROCEDURE — 99213 OFFICE O/P EST LOW 20 MIN: CPT | Performed by: FAMILY MEDICINE

## 2019-12-11 PROCEDURE — 81003 URINALYSIS AUTO W/O SCOPE: CPT | Performed by: FAMILY MEDICINE

## 2019-12-11 RX ORDER — CIPROFLOXACIN 250 MG/1
250 TABLET, FILM COATED ORAL 2 TIMES DAILY
Qty: 14 TABLET | Refills: 0 | Status: SHIPPED | OUTPATIENT
Start: 2019-12-11 | End: 2019-12-18

## 2019-12-11 ASSESSMENT — ENCOUNTER SYMPTOMS
DIARRHEA: 0
SORE THROAT: 0
NAUSEA: 0
SINUS PRESSURE: 0
COUGH: 0
SHORTNESS OF BREATH: 0
VOMITING: 0
BACK PAIN: 0

## 2020-01-03 RX ORDER — CLONAZEPAM 0.5 MG/1
TABLET ORAL
Qty: 30 TABLET | Refills: 0 | Status: SHIPPED | OUTPATIENT
Start: 2020-01-03 | End: 2021-03-24

## 2020-01-03 NOTE — TELEPHONE ENCOUNTER
Next Visit Date:  Future Appointments   Date Time Provider Erick Shepherd   6/15/2020  1:00 PM Sharyle Nanny,  [address] [facility] Maintenance   Topic Date Due    Shingles Vaccine (2 of 3) 11/05/2010    Lipid screen  07/05/2018    Potassium monitoring  07/05/2018    Creatinine monitoring  07/05/2018    Annual Wellness Visit (AWV)  05/29/2019    DTaP/Tdap/Td vaccine (2 - Td) 09/20/2029    DEXA (modify frequency per FRAX score)  Completed    Flu vaccine  Completed    Pneumococcal 65+ years Vaccine  Completed       Hemoglobin A1C (%)   Date Value   09/17/2016 5.4   07/08/2014 5.6             ( goal A1C is < 7)   No results found for: LABMICR  LDL Cholesterol (mg/dL)   Date Value   09/17/2016 83     LDL Calculated (mg/dL)   Date Value   07/05/2017 70   09/14/2016 110       (goal LDL is <100)   AST (U/L)   Date Value   09/28/2016 28     ALT (U/L)   Date Value   09/28/2016 20     BUN (mg/dL)   Date Value   07/05/2017 16     BP Readings from Last 3 Encounters:   12/11/19 116/62   09/27/19 102/70   06/03/19 128/70          (goal 120/80)    All Future Testing planned in [facility]  Lab Frequency Next Occurrence               Patient Active Problem List:     Hyperlipidemia     Hypertension     Anxiety     Gastroesophageal reflux disease without esophagitis     Atypical chest pain     Paroxysmal atrial fibrillation ([facility] [address]

## 2020-01-08 RX ORDER — BUMETANIDE 1 MG/1
1 TABLET ORAL DAILY
Qty: 30 TABLET | Status: CANCELLED | OUTPATIENT
Start: 2020-01-08

## 2020-01-09 ENCOUNTER — TELEPHONE (OUTPATIENT)
Dept: FAMILY MEDICINE CLINIC | Age: 85
End: 2020-01-09

## 2020-01-09 NOTE — TELEPHONE ENCOUNTER
[facility], lvm to let her know I have called them and they will be mailing out her medication

## 2020-01-13 ENCOUNTER — TELEPHONE (OUTPATIENT)
Dept: ADMINISTRATIVE | Age: 85
End: 2020-01-13

## 2020-01-13 RX ORDER — AMOXICILLIN 500 MG/1
500 CAPSULE ORAL 3 TIMES DAILY
Qty: 30 CAPSULE | Refills: 0 | Status: SHIPPED | OUTPATIENT
Start: 2020-01-13 | End: 2020-01-23

## 2020-03-21 ENCOUNTER — APPOINTMENT (OUTPATIENT)
Dept: GENERAL RADIOLOGY | Age: 85
End: 2020-03-21
Payer: MEDICARE

## 2020-03-21 ENCOUNTER — APPOINTMENT (OUTPATIENT)
Dept: CT IMAGING | Age: 85
End: 2020-03-21
Payer: MEDICARE

## 2020-03-21 ENCOUNTER — HOSPITAL ENCOUNTER (EMERGENCY)
Age: 85
Discharge: HOME | End: 2020-03-21
Attending: EMERGENCY MEDICINE
Payer: MEDICARE

## 2020-03-21 VITALS
HEART RATE: 90 BPM | RESPIRATION RATE: 18 BRPM | DIASTOLIC BLOOD PRESSURE: 57 MMHG | TEMPERATURE: 97.8 F | SYSTOLIC BLOOD PRESSURE: 175 MMHG | BODY MASS INDEX: 24.99 KG/M2 | HEIGHT: 65 IN | OXYGEN SATURATION: 100 % | WEIGHT: 150 LBS

## 2020-03-21 LAB
ABSOLUTE EOS #: 0.1 K/UL (ref 0–0.4)
ABSOLUTE IMMATURE GRANULOCYTE: 0 K/UL (ref 0–0.3)
ABSOLUTE LYMPH #: 0.92 K/UL (ref 1–4.8)
ABSOLUTE MONO #: 0.26 K/UL (ref 0.2–0.8)
ANION GAP SERPL CALCULATED.3IONS-SCNC: 12 MMOL/L (ref 9–17)
BASOPHILS # BLD: 1 %
BASOPHILS ABSOLUTE: 0.05 K/UL (ref 0–0.2)
BUN BLDV-MCNC: 17 MG/DL (ref 8–23)
BUN/CREAT BLD: 26 (ref 9–20)
CALCIUM SERPL-MCNC: 9.4 MG/DL (ref 8.6–10.4)
CHLORIDE BLD-SCNC: 102 MMOL/L (ref 98–107)
CO2: 23 MMOL/L (ref 20–31)
CREAT SERPL-MCNC: 0.65 MG/DL (ref 0.5–0.9)
DIFFERENTIAL TYPE: ABNORMAL
DIRECT EXAM: NORMAL
EOSINOPHILS RELATIVE PERCENT: 2 % (ref 1–4)
GFR AFRICAN AMERICAN: >60 ML/MIN
GFR NON-AFRICAN AMERICAN: >60 ML/MIN
GFR SERPL CREATININE-BSD FRML MDRD: ABNORMAL ML/MIN/{1.73_M2}
GFR SERPL CREATININE-BSD FRML MDRD: ABNORMAL ML/MIN/{1.73_M2}
GLUCOSE BLD-MCNC: 107 MG/DL (ref 70–99)
HCT VFR BLD CALC: 37.2 % (ref 36.3–47.1)
HEMOGLOBIN: 11.4 G/DL (ref 11.9–15.1)
IMMATURE GRANULOCYTES: 0 %
LYMPHOCYTES # BLD: 18 % (ref 24–44)
Lab: NORMAL
MCH RBC QN AUTO: 23.1 PG (ref 25.2–33.5)
MCHC RBC AUTO-ENTMCNC: 30.6 G/DL (ref 28.4–34.8)
MCV RBC AUTO: 75.5 FL (ref 82.6–102.9)
MONOCYTES # BLD: 5 % (ref 1–7)
NRBC AUTOMATED: 0 PER 100 WBC
PDW BLD-RTO: 21.2 % (ref 11.8–14.4)
PLATELET # BLD: 176 K/UL (ref 138–453)
PLATELET ESTIMATE: ABNORMAL
PMV BLD AUTO: 10 FL (ref 8.1–13.5)
POTASSIUM SERPL-SCNC: 4.1 MMOL/L (ref 3.7–5.3)
RBC # BLD: 4.93 M/UL (ref 3.95–5.11)
RBC # BLD: ABNORMAL 10*6/UL
SEG NEUTROPHILS: 74 % (ref 36–66)
SEGMENTED NEUTROPHILS ABSOLUTE COUNT: 3.77 K/UL (ref 1.8–7.7)
SODIUM BLD-SCNC: 137 MMOL/L (ref 135–144)
SPECIMEN DESCRIPTION: NORMAL
TROPONIN INTERP: NORMAL
TROPONIN T: NORMAL NG/ML
TROPONIN, HIGH SENSITIVITY: 14 NG/L (ref 0–14)
WBC # BLD: 5.1 K/UL (ref 3.5–11.3)
WBC # BLD: ABNORMAL 10*3/UL

## 2020-03-21 PROCEDURE — 85025 COMPLETE CBC W/AUTO DIFF WBC: CPT

## 2020-03-21 PROCEDURE — 80048 BASIC METABOLIC PNL TOTAL CA: CPT

## 2020-03-21 PROCEDURE — 87804 INFLUENZA ASSAY W/OPTIC: CPT

## 2020-03-21 PROCEDURE — 93005 ELECTROCARDIOGRAM TRACING: CPT | Performed by: EMERGENCY MEDICINE

## 2020-03-21 PROCEDURE — 6360000002 HC RX W HCPCS: Performed by: EMERGENCY MEDICINE

## 2020-03-21 PROCEDURE — 70450 CT HEAD/BRAIN W/O DYE: CPT

## 2020-03-21 PROCEDURE — 84484 ASSAY OF TROPONIN QUANT: CPT

## 2020-03-21 PROCEDURE — 96374 THER/PROPH/DIAG INJ IV PUSH: CPT

## 2020-03-21 PROCEDURE — 99285 EMERGENCY DEPT VISIT HI MDM: CPT

## 2020-03-21 PROCEDURE — 71045 X-RAY EXAM CHEST 1 VIEW: CPT

## 2020-03-21 RX ORDER — ONDANSETRON 2 MG/ML
4 INJECTION INTRAMUSCULAR; INTRAVENOUS ONCE
Status: COMPLETED | OUTPATIENT
Start: 2020-03-21 | End: 2020-03-21

## 2020-03-21 RX ORDER — ONDANSETRON 4 MG/1
4 TABLET, ORALLY DISINTEGRATING ORAL 3 TIMES DAILY PRN
Qty: 21 TABLET | Refills: 0 | Status: SHIPPED | OUTPATIENT
Start: 2020-03-21 | End: 2022-02-01

## 2020-03-21 RX ADMIN — ONDANSETRON 4 MG: 2 INJECTION INTRAMUSCULAR; INTRAVENOUS at 12:17

## 2020-03-21 ASSESSMENT — ENCOUNTER SYMPTOMS
ABDOMINAL DISTENTION: 0
CHEST TIGHTNESS: 0
NAUSEA: 1
BACK PAIN: 0
SHORTNESS OF BREATH: 0
EYE PAIN: 0
ABDOMINAL PAIN: 0
DIARRHEA: 0
FACIAL SWELLING: 0
EYE DISCHARGE: 0
VOMITING: 0

## 2020-03-21 ASSESSMENT — PAIN SCALES - GENERAL: PAINLEVEL_OUTOF10: 0

## 2020-03-21 NOTE — ED NOTES
Patient comes in from EMS from home and presents with nausea, dizziness and \"doesn't feel good\". Patient states that she has only been out of the house yesterday and has been in house for two weeks. Patient states that her granddaughter was to see her two days ago and gave her some premade meals of chicken and beef that she ate yesterday. Patient states that she did not take any medications this morning d/t not feeling good. Patient is alert and oriented and denies any SOB, cough and is afebrile. Patient has unlabored respirations and does not appear to be in distress at this time.        Ousmane Hunter RN  03/21/20 [id number]

## 2020-03-21 NOTE — ED PROVIDER NOTES
EMERGENCY DEPARTMENT ENCOUNTER    Pt Name: Job Fraga  MRN: [id number]  [place] 1935  Date of evaluation: 3/21/20  CHIEF COMPLAINT       Chief Complaint   Patient presents with    Fatigue    Headache    Nausea     HISTORY OF PRESENT ILLNESS   HPI   The patient is a 57-year-old female who presented to the emergency department by EMS from home with a 1 day history of fatigue headache and nausea. Patient been home not going out however yesterday she went to run a few errands dropped mail at the post office. Her granddaughter brought her food that she ate yesterday. This morning she woke up she felt nauseous several episodes of dry heaving queasy feeling in her abdomen. Subjective fevers and chills. Patient denies chest pain, shortness of breath. She denied abdominal pain, diarrhea or recent antibiotic use or travel outside the country. No sick contacts with travel or COVID-like symptoms. REVIEW OF SYSTEMS     Review of Systems   Constitutional: Positive for chills and fatigue. Negative for diaphoresis and fever. HENT: Negative for congestion, ear pain and facial swelling. Eyes: Negative for pain, discharge and visual disturbance. Respiratory: Negative for chest tightness and shortness of breath. Cardiovascular: Negative for chest pain and palpitations. Gastrointestinal: Positive for nausea. Negative for abdominal distention, abdominal pain, diarrhea and vomiting. Genitourinary: Negative for difficulty urinating and flank pain. Musculoskeletal: Negative for back pain. Skin: Negative for wound. Neurological: Positive for weakness. Negative for dizziness, light-headedness and headaches.      PASTMEDICAL HISTORY     Past Medical History:   Diagnosis Date    GERD (gastroesophageal reflux disease)     Hypertension      SURGICAL HISTORY       Past Surgical History:   Procedure Laterality Date    BLADDER SUSPENSION  2001    CHOLECYSTECTOMY      HYSTERECTOMY  ovaries left    JOINT Pulse 90   Temp 97.8 °F (36.6 °C) (Oral)   Resp 18   Ht 5' 5\" (1.651 m)   Wt 150 lb (68 kg)   SpO2 100%   BMI 24.96 kg/m²    Physical Exam  Vitals signs and nursing note reviewed. Constitutional:       General: She is not in acute distress. Appearance: She is well-developed. She is not diaphoretic. HENT:      Head: Normocephalic and atraumatic. Eyes:      Pupils: Pupils are equal, round, and reactive to light. Neck:      Musculoskeletal: Normal range of motion and neck supple. Cardiovascular:      Rate and Rhythm: Normal rate and regular rhythm. Pulmonary:      Effort: Pulmonary effort is normal.      Breath sounds: Normal breath sounds. Abdominal:      General: Bowel sounds are normal.      Palpations: Abdomen is soft. Musculoskeletal: Normal range of motion. Skin:     General: Skin is warm. Capillary Refill: Capillary refill takes less than 2 seconds. Neurological:      Mental Status: She is alert and oriented to person, place, and time. MEDICAL DECISION MAKING:   Patient was seen and examined. Patient is 61-year-old female who presented to the emergency department secondary to fever, fatigue and chills. Patient afebrile on arrival in the emergency department no acute respiratory distress. IV access obtained, EKG labs chest x-ray CT head patient received Zofran and will be reevaluated. No infiltrate dissection pneumothorax, EKG was sinus rhythm no acute ST wave depression elevations or findings consistent with STEMI. Laboratory analysis including troponin negative. CT head also no acute findings. After receiving Zofran patient was able to tolerate oral.  Given the patient was afebrile nontoxic-appearing able to tolerate oral deemed stable for outpatient follow-up and discharge home. She is amenable to this treatment plan. Discharged home with prescription for Zofran outpatient follow-up and parameters to return to the emergency department.          All patient's question's and concerns were answered prior to disposition and patient and/or family expressed understanding and agreement of treatment plan. CRITICAL CARE:              NIH STROKE SCALE:            PROCEDURES:    Procedures    DIAGNOSTIC RESULTS   EKG:All EKG's are interpreted by the Emergency Department Physician who either signs or Co-signs this chart in the absence of a cardiologist.        RADIOLOGY:All plain film, CT, MRI, and formal ultrasound images (except ED bedside ultrasound) are read by the radiologist, see reports below, unless otherwisenoted in MDM or here. CT Head WO Contrast   Final Result   No acute intracranial abnormality. Moderate chronic microvascular ischemic changes. XR CHEST PORTABLE   Final Result   Cardiomegaly without acute cardiopulmonary process. LABS: All lab results were reviewed by myself, and all abnormals are listed below.   Labs Reviewed   CBC WITH AUTO DIFFERENTIAL - Abnormal; Notable for the following components:       Result Value    Hemoglobin 11.4 (*)     MCV 75.5 (*)     MCH 23.1 (*)     RDW 21.2 (*)     All other components within normal limits   BASIC METABOLIC PANEL W/ REFLEX TO MG FOR LOW K - Abnormal; Notable for the following components:    Glucose 107 (*)     Bun/Cre Ratio 26 (*)     All other components within normal limits   RAPID INFLUENZA A/B ANTIGENS   TROPONIN   URINALYSIS WITH MICROSCOPIC       EMERGENCY DEPARTMENTCOURSE:         Vitals:    Vitals:    03/21/20 1141   BP: (!) 175/57   Pulse: 90   Resp: 18   Temp: 97.8 °F (36.6 °C)   TempSrc: Oral   SpO2: 100%   Weight: 150 lb (68 kg)   Height: 5' 5\" (1.651 m)       The patient was given the following medications while in the emergency department:  Orders Placed This Encounter   Medications    ondansetron (ZOFRAN) injection 4 mg    ondansetron (ZOFRAN-ODT) 4 MG disintegrating tablet     Sig: Take 1 tablet by mouth 3 times daily as needed for Nausea or Vomiting Dispense:  21 tablet     Refill:  0     CONSULTS:  None    FINAL IMPRESSION      1. Nausea    2. General weakness          DISPOSITION/PLAN   DISPOSITION        PATIENT REFERRED TO:  Duke Stokes MD  [address] [place] [address]  [phone]          DISCHARGE MEDICATIONS:  New Prescriptions    ONDANSETRON (ZOFRAN-ODT) 4 MG DISINTEGRATING TABLET    Take 1 tablet by mouth 3 times daily as needed for Nausea or Vomiting     Rah Hernández MD  Attending Emergency Physician    This note was created with the assistance of a speech-recognition program. While intending to generate a document that actually reflects the content of the visit, no guarantees can be provided that every mistake has been identified and corrected by editing.                     Rah Hernández MD  14/27/47 6995

## 2020-03-22 LAB
EKG ATRIAL RATE: 77 BPM
EKG P AXIS: 78 DEGREES
EKG P-R INTERVAL: 192 MS
EKG Q-T INTERVAL: 402 MS
EKG QRS DURATION: 98 MS
EKG QTC CALCULATION (BAZETT): 454 MS
EKG R AXIS: -2 DEGREES
EKG T AXIS: 34 DEGREES
EKG VENTRICULAR RATE: 77 BPM

## 2020-06-05 RX ORDER — PRAVASTATIN SODIUM 40 MG
TABLET ORAL
Qty: 90 TABLET | Refills: 3 | Status: SHIPPED | OUTPATIENT
Start: 2020-06-05 | End: 2021-08-05 | Stop reason: SDUPTHER

## 2020-06-11 ENCOUNTER — TELEPHONE (OUTPATIENT)
Dept: FAMILY MEDICINE CLINIC | Age: 85
End: 2020-06-11

## 2020-06-11 NOTE — TELEPHONE ENCOUNTER
Mariama Wood was contacted to set up an annual wellness visit    Spoke with: Patient    Patient educated on purpose of AWV    Patient was agreeable to schedule AWV       Angela Farooq

## 2020-06-19 ENCOUNTER — OFFICE VISIT (OUTPATIENT)
Dept: FAMILY MEDICINE CLINIC | Age: 85
End: 2020-06-19
Payer: MEDICARE

## 2020-06-19 VITALS
HEART RATE: 78 BPM | BODY MASS INDEX: 25.16 KG/M2 | SYSTOLIC BLOOD PRESSURE: 130 MMHG | TEMPERATURE: 97.2 F | OXYGEN SATURATION: 98 % | DIASTOLIC BLOOD PRESSURE: 70 MMHG | WEIGHT: 151.2 LBS

## 2020-06-19 PROCEDURE — 99213 OFFICE O/P EST LOW 20 MIN: CPT | Performed by: FAMILY MEDICINE

## 2020-06-19 RX ORDER — LISINOPRIL AND HYDROCHLOROTHIAZIDE 12.5; 1 MG/1; MG/1
1 TABLET ORAL DAILY
Qty: 90 TABLET | Refills: 3 | Status: SHIPPED | OUTPATIENT
Start: 2020-06-19 | End: 2020-06-19 | Stop reason: CLARIF

## 2020-06-19 RX ORDER — LISINOPRIL 10 MG/1
10 TABLET ORAL DAILY
Qty: 90 TABLET | Refills: 3 | Status: SHIPPED | OUTPATIENT
Start: 2020-06-19 | End: 2021-04-15 | Stop reason: SDUPTHER

## 2020-06-19 ASSESSMENT — ENCOUNTER SYMPTOMS
VOMITING: 0
SINUS PRESSURE: 0
COUGH: 0
SHORTNESS OF BREATH: 0
NAUSEA: 0
BACK PAIN: 0
DIARRHEA: 0
SORE THROAT: 0

## 2020-06-19 ASSESSMENT — PATIENT HEALTH QUESTIONNAIRE - PHQ9
2. FEELING DOWN, DEPRESSED OR HOPELESS: 0
SUM OF ALL RESPONSES TO PHQ QUESTIONS 1-9: 0
SUM OF ALL RESPONSES TO PHQ QUESTIONS 1-9: 0
1. LITTLE INTEREST OR PLEASURE IN DOING THINGS: 0
SUM OF ALL RESPONSES TO PHQ9 QUESTIONS 1 & 2: 0

## 2020-06-19 NOTE — PROGRESS NOTES
Екатерина العراقي is a 82 y.o. female who presents todayfor her medical conditions/complaints as noted below. Екатерина العراقي is here today c/oHypertension (6 month check up )      :     HPI    Routine follow up she tells me she is always tired. Past Medical History:   Diagnosis Date    GERD (gastroesophageal reflux disease)     Hypertension       Past Surgical History:   Procedure Laterality Date    BLADDER SUSPENSION      CHOLECYSTECTOMY      HYSTERECTOMY  ovaries left    JOINT REPLACEMENT  left total shoulder;      Family History   Problem Relation Age of Onset    Cancer Brother     Cancer Other         bone    Cancer Daughter         breast     Social History     Tobacco Use    Smoking status: Former Smoker     Last attempt to quit: 1984     Years since quittin.5    Smokeless tobacco: Never Used   Substance Use Topics    Alcohol use: No      Current Outpatient Medications   Medication Sig Dispense Refill    lisinopril (PRINIVIL;ZESTRIL) 10 MG tablet Take 1 tablet by mouth daily 90 tablet 3    pravastatin (PRAVACHOL) 40 MG tablet TAKE 1 TABLET DAILY 90 tablet 3    ondansetron (ZOFRAN-ODT) 4 MG disintegrating tablet Take 1 tablet by mouth 3 times daily as needed for Nausea or Vomiting 21 tablet 0    nystatin-triamcinolone (MYCOLOG II) 820253-1.1 UNIT/GM-% cream Apply topically 2 times daily.  15 g 0    potassium chloride (KLOR-CON M) 20 MEQ TBCR extended release tablet Take 40 mEq by mouth daily (with breakfast)      calcium-vitamin D (OSCAL-500) 500-200 MG-UNIT per tablet Take 1 tablet by mouth daily      Multiple Vitamins-Minerals (THERAPEUTIC MULTIVITAMIN-MINERALS) tablet Take 1 tablet by mouth daily      bumetanide (BUMEX) 1 MG tablet Take 1 mg by mouth daily       dofetilide (TIKOSYN) 250 MCG capsule Take 1 capsule by mouth 2 times daily      apixaban (ELIQUIS) 5 MG TABS tablet Take 5 mg by mouth 2 times daily      acetaminophen (TYLENOL) 325 MG tablet Take 2 tablets by mouth every 4 hours as needed for Pain or Fever 120 tablet 3    metoprolol (LOPRESSOR) 25 MG tablet Take 25 mg by mouth 2 times daily      clonazePAM (KLONOPIN) 0.5 MG tablet Take 1 tablet PO QID PRN for Anxiety 30 tablet 0     No current facility-administered medications for this visit. Allergies   Allergen Reactions    Bactrim [Sulfamethoxazole-Trimethoprim]     Codeine      dizziness    Magnesium-Containing Compounds      Mag ox 500 ?  Naproxen      On allergy list ? Unsure of reaction    Norvasc [Amlodipine Besylate]      Edema      Percocet [Oxycodone-Acetaminophen]     Sertraline Hcl     Toradol [Ketorolac Tromethamine]     Tramadol     Morphine Nausea And Vomiting         Subjective:   Review of Systems   Constitutional: Negative for chills, diaphoresis and fever. HENT: Negative for congestion, sinus pressure and sore throat. Eyes: Negative for visual disturbance. Respiratory: Negative for cough and shortness of breath. Cardiovascular: Negative for chest pain and leg swelling. Gastrointestinal: Negative for diarrhea, nausea and vomiting. Genitourinary: Negative for dysuria, menstrual problem, urgency and vaginal discharge. Musculoskeletal: Negative for arthralgias, back pain and myalgias. Skin: Negative for rash. Neurological: Positive for weakness and light-headedness. Negative for numbness and headaches. Psychiatric/Behavioral: Negative for sleep disturbance.       :   /70   Pulse 78   Temp 97.2 °F (36.2 °C) (Temporal)   Wt 151 lb 3.2 oz (68.6 kg)   SpO2 98%   BMI 25.16 kg/m²     Physical Exam  Constitutional:       General: She is not in acute distress. Appearance: She is well-developed. She is not diaphoretic. HENT:      Head: Normocephalic and atraumatic. Mouth/Throat:      Pharynx: No oropharyngeal exudate. Eyes:      General: No scleral icterus. Neck:      Musculoskeletal: Neck supple. Vascular: No carotid bruit.

## 2020-07-07 ENCOUNTER — TELEPHONE (OUTPATIENT)
Dept: FAMILY MEDICINE CLINIC | Age: 85
End: 2020-07-07

## 2020-09-21 ENCOUNTER — OFFICE VISIT (OUTPATIENT)
Dept: FAMILY MEDICINE CLINIC | Age: 85
End: 2020-09-21
Payer: MEDICARE

## 2020-09-21 VITALS
TEMPERATURE: 97.9 F | HEART RATE: 88 BPM | DIASTOLIC BLOOD PRESSURE: 77 MMHG | WEIGHT: 154.8 LBS | OXYGEN SATURATION: 97 % | SYSTOLIC BLOOD PRESSURE: 114 MMHG | BODY MASS INDEX: 26.43 KG/M2 | HEIGHT: 64 IN

## 2020-09-21 PROCEDURE — 99213 OFFICE O/P EST LOW 20 MIN: CPT | Performed by: FAMILY MEDICINE

## 2020-09-21 PROCEDURE — 90694 VACC AIIV4 NO PRSRV 0.5ML IM: CPT | Performed by: FAMILY MEDICINE

## 2020-09-21 PROCEDURE — G0008 ADMIN INFLUENZA VIRUS VAC: HCPCS | Performed by: FAMILY MEDICINE

## 2020-09-21 RX ORDER — HYDROXYZINE HYDROCHLORIDE 25 MG/1
25 TABLET, FILM COATED ORAL NIGHTLY PRN
Qty: 30 TABLET | Refills: 0 | Status: SHIPPED | OUTPATIENT
Start: 2020-09-21 | End: 2020-10-21

## 2020-09-21 ASSESSMENT — ENCOUNTER SYMPTOMS
BACK PAIN: 0
VOMITING: 0
SINUS PRESSURE: 0
SORE THROAT: 0
DIARRHEA: 0
SHORTNESS OF BREATH: 0
COUGH: 0
NAUSEA: 0

## 2020-09-21 NOTE — PROGRESS NOTES
Otto Lin is a 82 y.o. female who presents todayfor her medical conditions/complaints as noted below. Otto Lin is here today c/o3 Month Follow-Up and Discuss Medications      :     HPI    Routine follow up she is doing pretty well currently wishes alternative to help her relax and sleep on an occasional basis. Past Medical History:   Diagnosis Date    GERD (gastroesophageal reflux disease)     Hypertension       Past Surgical History:   Procedure Laterality Date    BLADDER SUSPENSION      CHOLECYSTECTOMY      HYSTERECTOMY  ovaries left    JOINT REPLACEMENT  left total shoulder;      Family History   Problem Relation Age of Onset    Cancer Brother     Cancer Other         bone    Cancer Daughter         breast     Social History     Tobacco Use    Smoking status: Former Smoker     Last attempt to quit: 1984     Years since quittin.7    Smokeless tobacco: Never Used   Substance Use Topics    Alcohol use: No      Current Outpatient Medications   Medication Sig Dispense Refill    hydrOXYzine (ATARAX) 25 MG tablet Take 1 tablet by mouth nightly as needed for Itching Or to aid sleep. 30 tablet 0    lisinopril (PRINIVIL;ZESTRIL) 10 MG tablet Take 1 tablet by mouth daily 90 tablet 3    pravastatin (PRAVACHOL) 40 MG tablet TAKE 1 TABLET DAILY 90 tablet 3    ondansetron (ZOFRAN-ODT) 4 MG disintegrating tablet Take 1 tablet by mouth 3 times daily as needed for Nausea or Vomiting 21 tablet 0    clonazePAM (KLONOPIN) 0.5 MG tablet Take 1 tablet PO QID PRN for Anxiety 30 tablet 0    nystatin-triamcinolone (MYCOLOG II) 218217-0.1 UNIT/GM-% cream Apply topically 2 times daily.  15 g 0    potassium chloride (KLOR-CON M) 20 MEQ TBCR extended release tablet Take 40 mEq by mouth daily (with breakfast)      calcium-vitamin D (OSCAL-500) 500-200 MG-UNIT per tablet Take 1 tablet by mouth daily      Multiple Vitamins-Minerals (THERAPEUTIC MULTIVITAMIN-MINERALS) tablet Take 1 diaphoretic. HENT:      Head: Normocephalic and atraumatic. Mouth/Throat:      Pharynx: No oropharyngeal exudate. Eyes:      General: No scleral icterus. Neck:      Musculoskeletal: Neck supple. Vascular: No carotid bruit. Cardiovascular:      Heart sounds: No murmur. No friction rub. No gallop. Pulmonary:      Effort: No respiratory distress. Breath sounds: No wheezing or rales. Chest:      Chest wall: No tenderness. Musculoskeletal:      Right lower leg: No edema. Left lower leg: No edema. Lymphadenopathy:      Cervical: No cervical adenopathy. Skin:     Findings: No rash. Neurological:      Mental Status: She is alert and oriented to person, place, and time. Cranial Nerves: No cranial nerve deficit. Psychiatric:         Behavior: Behavior normal.         Thought Content: Thought content normal.         Judgment: Judgment normal.         Assessment:       Diagnosis Orders   1. Essential hypertension     2. Paroxysmal atrial fibrillation (HCC)     3. Anxiety           Plan:      Return in about 6 months (around 3/21/2021). Orders Placed This Encounter   Procedures    INFLUENZA, QUADV, 0.5ML, 6 MO AND OLDER, IM, MDV, (FLUZONE QUADV)     Orders Placed This Encounter   Medications    hydrOXYzine (ATARAX) 25 MG tablet     Sig: Take 1 tablet by mouth nightly as needed for Itching Or to aid sleep. Dispense:  30 tablet     Refill:  0       She will see Dr Roopa Ward in six months.

## 2020-09-24 ENCOUNTER — IMMUNIZATION (OUTPATIENT)
Dept: FAMILY MEDICINE CLINIC | Age: 85
End: 2020-09-24

## 2020-09-28 ENCOUNTER — TELEPHONE (OUTPATIENT)
Dept: FAMILY MEDICINE CLINIC | Age: 85
End: 2020-09-28

## 2020-09-28 ENCOUNTER — IMMUNIZATION (OUTPATIENT)
Dept: FAMILY MEDICINE CLINIC | Age: 85
End: 2020-09-28
Payer: MEDICARE

## 2020-09-28 NOTE — TELEPHONE ENCOUNTER
Patient called and said [facility] called and said her script for Atarax was incomplete it wasn't totally filled out. Please contact [facility] for more information.

## 2020-10-01 ENCOUNTER — TELEPHONE (OUTPATIENT)
Dept: FAMILY MEDICINE CLINIC | Age: 85
End: 2020-10-01

## 2020-10-01 RX ORDER — BUSPIRONE HYDROCHLORIDE 10 MG/1
10 TABLET ORAL 2 TIMES DAILY
Qty: 60 TABLET | Refills: 3 | Status: SHIPPED | OUTPATIENT
Start: 2020-10-01 | End: 2020-10-31

## 2020-10-01 NOTE — TELEPHONE ENCOUNTER
[facility] called with a drug interaction for Atarax and Tikosyn. Patient is getting Tikosyn from Dr. Li Drake.    Please advise    Call back number [phone]  Ref# [id number]

## 2020-10-01 NOTE — TELEPHONE ENCOUNTER
Yisel Rivas is calling to request a refill on the following medication(s):    Medication Request:  Requested Prescriptions     Pending Prescriptions Disp Refills    busPIRone (BUSPAR) 10 MG tablet 60 tablet 3     Sig: Take 1 tablet by mouth 2 times daily       Last Visit Date (If Applicable):  3/15/4462    Next Visit Date:    Visit date not found

## 2020-10-05 ENCOUNTER — TELEPHONE (OUTPATIENT)
Dept: FAMILY MEDICINE CLINIC | Age: 85
End: 2020-10-05

## 2020-10-05 DIAGNOSIS — B96.89 ACUTE BACTERIAL SINUSITIS: Primary | ICD-10-CM

## 2020-10-05 DIAGNOSIS — J01.90 ACUTE BACTERIAL SINUSITIS: Primary | ICD-10-CM

## 2020-10-05 RX ORDER — AZITHROMYCIN 250 MG/1
TABLET, FILM COATED ORAL
Qty: 1 PACKET | Refills: 0 | Status: SHIPPED | OUTPATIENT
Start: 2020-10-05 | End: 2020-11-09

## 2020-11-09 ENCOUNTER — OFFICE VISIT (OUTPATIENT)
Dept: FAMILY MEDICINE CLINIC | Age: 85
End: 2020-11-09
Payer: MEDICARE

## 2020-11-09 VITALS
TEMPERATURE: 98.4 F | HEIGHT: 64 IN | WEIGHT: 148.9 LBS | SYSTOLIC BLOOD PRESSURE: 124 MMHG | OXYGEN SATURATION: 97 % | HEART RATE: 73 BPM | BODY MASS INDEX: 25.42 KG/M2 | DIASTOLIC BLOOD PRESSURE: 72 MMHG

## 2020-11-09 PROCEDURE — G8510 SCR DEP NEG, NO PLAN REQD: HCPCS | Performed by: FAMILY MEDICINE

## 2020-11-09 PROCEDURE — 99213 OFFICE O/P EST LOW 20 MIN: CPT | Performed by: FAMILY MEDICINE

## 2020-11-09 ASSESSMENT — PATIENT HEALTH QUESTIONNAIRE - PHQ9
2. FEELING DOWN, DEPRESSED OR HOPELESS: 0
SUM OF ALL RESPONSES TO PHQ QUESTIONS 1-9: 0
1. LITTLE INTEREST OR PLEASURE IN DOING THINGS: 0
SUM OF ALL RESPONSES TO PHQ9 QUESTIONS 1 & 2: 0
SUM OF ALL RESPONSES TO PHQ QUESTIONS 1-9: 0
SUM OF ALL RESPONSES TO PHQ QUESTIONS 1-9: 0

## 2020-11-09 ASSESSMENT — ENCOUNTER SYMPTOMS
SINUS PRESSURE: 0
SORE THROAT: 0
SHORTNESS OF BREATH: 0
COUGH: 0
VOMITING: 0
DIARRHEA: 0
BACK PAIN: 0
NAUSEA: 0

## 2020-11-09 NOTE — PROGRESS NOTES
 acetaminophen (TYLENOL) 325 MG tablet Take 2 tablets by mouth every 4 hours as needed for Pain or Fever 120 tablet 3    metoprolol (LOPRESSOR) 25 MG tablet Take 25 mg by mouth 2 times daily      clonazePAM (KLONOPIN) 0.5 MG tablet Take 1 tablet PO QID PRN for Anxiety 30 tablet 0     No current facility-administered medications for this visit. Allergies   Allergen Reactions    Bactrim [Sulfamethoxazole-Trimethoprim]     Codeine      dizziness    Magnesium-Containing Compounds      Mag ox 500 ?  Naproxen      On allergy list ? Unsure of reaction    Norvasc [Amlodipine Besylate]      Edema      Percocet [Oxycodone-Acetaminophen]     Sertraline Hcl     Toradol [Ketorolac Tromethamine]     Tramadol     Morphine Nausea And Vomiting         Subjective:   Review of Systems   Constitutional: Negative for chills, diaphoresis and fever. HENT: Negative for congestion, sinus pressure and sore throat. Eyes: Negative for visual disturbance. Respiratory: Negative for cough and shortness of breath. Cardiovascular: Negative for chest pain and leg swelling. Gastrointestinal: Negative for diarrhea, nausea and vomiting. Genitourinary: Negative for dysuria, menstrual problem, urgency and vaginal discharge. Musculoskeletal: Negative for arthralgias, back pain and myalgias. Skin: Negative for rash. Neurological: Negative for weakness, numbness and headaches. Psychiatric/Behavioral: Negative for sleep disturbance.       :   /72   Pulse 73   Temp 98.4 °F (36.9 °C)   Ht 5' 4.02\" (1.626 m)   Wt 148 lb 14.4 oz (67.5 kg)   SpO2 97%   BMI 25.55 kg/m²     Physical Exam  Constitutional:       General: She is not in acute distress. Appearance: She is well-developed. She is not diaphoretic. HENT:      Head: Normocephalic and atraumatic. Eyes:      General: No scleral icterus. Neck:      Musculoskeletal: Neck supple. Vascular: No carotid bruit.    Cardiovascular:      Heart sounds: No murmur. No friction rub. No gallop. Pulmonary:      Effort: No respiratory distress. Breath sounds: No wheezing or rales. Chest:      Chest wall: No tenderness. Musculoskeletal:      Right lower leg: No edema. Left lower leg: No edema. Lymphadenopathy:      Cervical: No cervical adenopathy. Skin:     Findings: Lesion (sebacous cyst behing left ear.) present. No rash. Neurological:      Mental Status: She is alert and oriented to person, place, and time. Cranial Nerves: No cranial nerve deficit. Psychiatric:         Behavior: Behavior normal.         Thought Content: Thought content normal.         Judgment: Judgment normal.         Assessment:       Diagnosis Orders   1. Sebaceous cyst     2. Essential hypertension     3. Hyperlipidemia, unspecified hyperlipidemia type           Plan:      Return if symptoms worsen or fail to improve. No orders of the defined types were placed in this encounter. No orders of the defined types were placed in this encounter. Reassurence that she does not have anything infectious and she may be around grandchildren.

## 2020-11-12 ENCOUNTER — TELEPHONE (OUTPATIENT)
Dept: FAMILY MEDICINE CLINIC | Age: 85
End: 2020-11-12

## 2020-11-13 ENCOUNTER — OFFICE VISIT (OUTPATIENT)
Dept: FAMILY MEDICINE CLINIC | Age: 85
End: 2020-11-13
Payer: MEDICARE

## 2020-11-13 ENCOUNTER — HOSPITAL ENCOUNTER (OUTPATIENT)
Age: 85
Setting detail: SPECIMEN
Discharge: HOME | End: 2020-11-13
Payer: MEDICARE

## 2020-11-13 VITALS
BODY MASS INDEX: 24.88 KG/M2 | TEMPERATURE: 97.2 F | DIASTOLIC BLOOD PRESSURE: 74 MMHG | HEART RATE: 91 BPM | SYSTOLIC BLOOD PRESSURE: 128 MMHG | WEIGHT: 145 LBS | OXYGEN SATURATION: 95 %

## 2020-11-13 LAB
INFLUENZA A ANTIBODY: NEGATIVE
INFLUENZA B ANTIBODY: NEGATIVE

## 2020-11-13 PROCEDURE — 99214 OFFICE O/P EST MOD 30 MIN: CPT | Performed by: FAMILY MEDICINE

## 2020-11-13 PROCEDURE — 87804 INFLUENZA ASSAY W/OPTIC: CPT | Performed by: FAMILY MEDICINE

## 2020-11-13 NOTE — PROGRESS NOTES
Subjective:  Gina Pina presents for   Chief Complaint   Patient presents with    Chest Congestion     started yesterday    Cough       Place of employment: retired  Exposure history to COVID-19: no  Length of symptoms? 24 - 48 hours    SOB: yes  Dry Cough: slight    Nasal congestion/rhinorrhea: yes  Sinus pressure: yes  Sore throat: no    Any GI sx? diarrhea     Fever: no  Myalgias: yes  Fatigue: yes    Fluid intake: yes  Appetite: yes        Risk Factors  Smoker?: no  COPD/underlying lung disease?: no  CAD/CHF?: no  DM2?: no  CKD?: no  Liver disease?: no  Immunosuppressed or current chemotherapy use?: no  Steroid use?no  Travel recently/Where?: no      Objective:  Physical Exam   Vitals:   Vitals:    11/13/20 1320   BP: 128/74   Pulse: 91   Temp: 97.2 °F (36.2 °C)   TempSrc: Temporal   SpO2: 95%   Weight: 145 lb (65.8 kg)     Wt Readings from Last 3 Encounters:   11/13/20 145 lb (65.8 kg)   11/09/20 148 lb 14.4 oz (67.5 kg)   09/21/20 154 lb 12.8 oz (70.2 kg)     Ht Readings from Last 3 Encounters:   11/09/20 5' 4.02\" (1.626 m)   09/21/20 5' 4\" (1.626 m)   03/21/20 5' 5\" (1.651 m)     Body mass index is 24.88 kg/m². Constitutional: She is oriented to person, place, and time. She appears well-developed and well-nourished and in no acute distress. Answers all my questions appropriately. Head: Normocephalic and atraumatic. Eyes:conjunctiva appear normal.  Right Ear: External ear normal. TM is clear  Left Ear: External ear normal. TM is clear  Nose: pink, non-edematous mucosa. No polyps. No septal deviation  Throat: no erythema, tonsillar hypertrophy or exudate. No ulcerations noted. Lips/Teeth/Gums all appear normal.  Neck: Normal range of motion. Neck supple. No tracheal deviation present. No abnormal lymphadenopathy. Heart - RRR w/o murmur. No S3/S4 noted  Chest: Clear to auscultation bilaterally. Good breath sounds noted. No rales, wheezes, or rhonchi noted. No respiratory retractions noted. Wall has symmetrical movement with respirations. POCT Influenza A: neg  POCT Influenza B: neg  COVID-19 pcr:  Pending  Assessment:   Encounter Diagnosis   Name Primary?  Flu-like symptoms Yes         Plan:   There are no discontinued medications. THE ABOVE NOTED DISCONTINUED MEDS MAY ONLY BE FROM 'CLEANING UP' THE MED LIST AND WERE NOT ACTUALLY CANCELED;  SEE CHART FOR DETAILS! No orders of the defined types were placed in this encounter. Orders Placed This Encounter   Procedures    Covid-19 Ambulatory     Standing Status:   Future     Standing Expiration Date:   11/13/2021     Scheduling Instructions:      Saline media preferred given current shortage of viral transport media but both acceptable     Order Specific Question:   Is this test for diagnosis or screening? Answer:   Diagnosis of ill patient     Order Specific Question:   Symptomatic for COVID-19 as defined by CDC? Answer:   Yes     Order Specific Question:   Date of Symptom Onset     Answer:   11/11/2020     Order Specific Question:   Hospitalized for COVID-19? Answer:   No     Order Specific Question:   Admitted to ICU for COVID-19? Answer:   No     Order Specific Question:   Employed in healthcare setting? Answer:   No     Order Specific Question:   Resident in a congregate (group) care setting? Answer:   No     Order Specific Question:   Pregnant? Answer:   No    POCT Influenza A/B     No follow-ups on file. Patient Instructions   The COVID-19 test that was done today can take 1-6 days for results. Until then you should assume you have this disease and adhere to home isolation as described below. When we get the test results back, one of the following readings will be obtained. 1. A positive test means you have the virus. 2.  An inconclusive test means it wasn't sure if you have the virus or not.    An inconclusive test result is treated as a positive result and recommendations  are the same as a positive test result. We may ask you to repeat this test in this circumstance. 3.  A negative test means you probably do not have the virus, but it is not conclusive. Prevention steps for People with positive or inconclusive test results or suspected  COVID-19 (including persons under investigation) who do not need to be hospitalized  and   People with confirmed COVID-19 who were hospitalized and determined to be medically stable to go home    Contacts who are NOT healthcare providers or first responders and are asymptomatic (no fever,  cough, shortness of breath, or difficulty breathing) should self-quarantine for 14 days from the last  date of exposure to confirmed COVID-19. Your healthcare provider and public health staff will evaluate whether you can be cared for at home. If it is determined that you do not need to be hospitalized and can be isolated at home, you will be monitored by staff from your health department. You should follow the prevention steps below until a healthcare provider or local or state health department says you can return to your normal activities. Stay home except to get medical care  People who are mildly ill with COVID-19 are able to isolate at home during their illness. You should restrict activities outside your home, except for getting medical care. Do not go to work, school, or public areas. Avoid using public transportation, ride-sharing, or taxis. Separate yourself from other people and animals in your home  People: As much as possible, you should stay in a specific room and away from other people in your home. Also, you should use a separate bathroom, if available. Animals: You should restrict contact with pets and other animals while you are sick with COVID-19, just like you would around other people. When possible, have another member of your household care for your animals while you are sick.  If you are sick with COVID-19, avoid contact with your pet, including petting, snuggling, being kissed or licked, and sharing food. If you must care for your pet or be around animals while you are sick, wash your hands before and after you interact with pets and wear a facemask. Call ahead before visiting your doctor  If you have a medical appointment, call the healthcare provider and tell them that you have or may have COVID-19. This will help the healthcare providers office take steps to keep other people from getting infected or exposed. Wear a facemask  You should wear a facemask when you are around other people (e.g., sharing a room or vehicle) or pets and before you enter a healthcare providers office. If you are not able to wear a facemask (for example, because it causes trouble breathing), then people who live with you should not stay in the same room with you; they should also wear a facemask if they enter your room. Cover your coughs and sneezes  Cover your mouth and nose with a tissue when you cough or sneeze. Throw used tissues in a lined trash can. Immediately wash your hands with soap and water for at least 20 seconds or, if soap and water are not available, clean your hands with an alcohol-based hand  that contains at least 60% alcohol. Clean your hands often  Wash your hands often with soap and water for at least 20 seconds, especially after blowing your nose, coughing, or sneezing; going to the bathroom; and before eating or preparing food. If soap and water are not readily available, use an alcohol-based hand  with at least 60% alcohol, covering all surfaces of your hands and rubbing them together until they feel dry. Soap and water are the best option if hands are visibly dirty. Avoid touching your eyes, nose, and mouth with unwashed hands. Avoid sharing personal household items  You should not share dishes, drinking glasses, cups, eating utensils, towels, or bedding with other people or pets in your home.  After using these items, they should be washed thoroughly with soap and water. Clean all high-touch surfaces everyday  High touch surfaces include counters, tabletops, doorknobs, bathroom fixtures, toilets, phones, keyboards, tablets, and bedside tables. Also, clean any surfaces that may have blood, stool, or body fluids on them. Use a household cleaning spray or wipe, according to the label instructions. Labels contain instructions for safe and effective use of the cleaning product including precautions you should take when applying the product, such as wearing gloves and making sure you have good ventilation during use of the product. Monitor your symptoms  Seek prompt medical attention if your illness is worsening (e.g., difficulty breathing). Before seeking care, call your healthcare provider and tell them that you have, or are being evaluated for, COVID-19. Put on a facemask before you enter the facility. These steps will help the healthcare providers office to keep other people in the office or waiting room from getting infected or exposed. Persons who are placed under active monitoring or facilitated self-monitoring should follow instructions provided by their local health department or occupational health professionals, as appropriate. When working with your local health department check their available hours. If you have a medical emergency and need to call 911, notify the dispatch personnel that you have, or are being evaluated for COVID-19. If possible, put on a facemask before emergency medical services arrive. Discontinuing home isolation  Patients with confirmed COVID-19 should remain under home isolation precautions until the risk of secondary transmission to others is thought to be low. The decision to discontinue home isolation precautions should be made on a case-by-case basis, in consultation with your physician and the health department. Please do NOT make this decision on your own.       If your results of the

## 2020-11-13 NOTE — PATIENT INSTRUCTIONS
The COVID-19 test that was done today can take 1-6 days for results. Until then you should assume you have this disease and adhere to home isolation as described below. When we get the test results back, one of the following readings will be obtained. 1. A positive test means you have the virus. 2.  An inconclusive test means it wasn't sure if you have the virus or not. An inconclusive test result is treated as a positive result and recommendations  are the same as a positive test result. We may ask you to repeat this test in this circumstance. 3.  A negative test means you probably do not have the virus, but it is not conclusive. Prevention steps for People with positive or inconclusive test results or suspected  COVID-19 (including persons under investigation) who do not need to be hospitalized  and   People with confirmed COVID-19 who were hospitalized and determined to be medically stable to go home    Contacts who are NOT healthcare providers or first responders and are asymptomatic (no fever,  cough, shortness of breath, or difficulty breathing) should self-quarantine for 14 days from the last  date of exposure to confirmed COVID-19. Your healthcare provider and public health staff will evaluate whether you can be cared for at home. If it is determined that you do not need to be hospitalized and can be isolated at home, you will be monitored by staff from your health department. You should follow the prevention steps below until a healthcare provider or local or state health department says you can return to your normal activities. Stay home except to get medical care  People who are mildly ill with COVID-19 are able to isolate at home during their illness. You should restrict activities outside your home, except for getting medical care. Do not go to work, school, or public areas. Avoid using public transportation, ride-sharing, or taxis.   Separate yourself from other people and available, use an alcohol-based hand  with at least 60% alcohol, covering all surfaces of your hands and rubbing them together until they feel dry. Soap and water are the best option if hands are visibly dirty. Avoid touching your eyes, nose, and mouth with unwashed hands. Avoid sharing personal household items  You should not share dishes, drinking glasses, cups, eating utensils, towels, or bedding with other people or pets in your home. After using these items, they should be washed thoroughly with soap and water. Clean all high-touch surfaces everyday  High touch surfaces include counters, tabletops, doorknobs, bathroom fixtures, toilets, phones, keyboards, tablets, and bedside tables. Also, clean any surfaces that may have blood, stool, or body fluids on them. Use a household cleaning spray or wipe, according to the label instructions. Labels contain instructions for safe and effective use of the cleaning product including precautions you should take when applying the product, such as wearing gloves and making sure you have good ventilation during use of the product. Monitor your symptoms  Seek prompt medical attention if your illness is worsening (e.g., difficulty breathing). Before seeking care, call your healthcare provider and tell them that you have, or are being evaluated for, COVID-19. Put on a facemask before you enter the facility. These steps will help the healthcare providers office to keep other people in the office or waiting room from getting infected or exposed. Persons who are placed under active monitoring or facilitated self-monitoring should follow instructions provided by their local health department or occupational health professionals, as appropriate. When working with your local health department check their available hours. If you have a medical emergency and need to call 911, notify the dispatch personnel that you have, or are being evaluated for COVID-19.  If possible, put on a facemask before emergency medical services arrive. Discontinuing home isolation  Patients with confirmed COVID-19 should remain under home isolation precautions until the risk of secondary transmission to others is thought to be low. The decision to discontinue home isolation precautions should be made on a case-by-case basis, in consultation with your physician and the health department. Please do NOT make this decision on your own. If your results of the COVID-19 test is NEGATIVE -     The patient may stop isolation, in consultation with your health care provider, typically when: Your healthcare provider has determined that the cause of the illness is NOT COVID-19 and approves your return to work. OR  Ten (10) days have passed since onset of symptoms AND one day (24 hours) have passed with no fever without taking medication (like Tylenol) to reduce fever,  respiratory symptoms have resolved and you have been evaluated by your health care provider. Please follow up with your physician for evaluation about this. The following websites are the best places for up to date information on this fluid situation. [email or web address]

## 2020-11-18 LAB — SARS-COV-2, NAA: NOT DETECTED

## 2021-02-01 ENCOUNTER — APPOINTMENT (OUTPATIENT)
Dept: CT IMAGING | Age: 86
End: 2021-02-01
Payer: MEDICARE

## 2021-02-01 ENCOUNTER — HOSPITAL ENCOUNTER (EMERGENCY)
Age: 86
Discharge: HOME | End: 2021-02-01
Attending: EMERGENCY MEDICINE
Payer: MEDICARE

## 2021-02-01 ENCOUNTER — APPOINTMENT (OUTPATIENT)
Dept: GENERAL RADIOLOGY | Age: 86
End: 2021-02-01
Payer: MEDICARE

## 2021-02-01 VITALS
BODY MASS INDEX: 25.61 KG/M2 | DIASTOLIC BLOOD PRESSURE: 83 MMHG | RESPIRATION RATE: 18 BRPM | HEART RATE: 85 BPM | WEIGHT: 150 LBS | SYSTOLIC BLOOD PRESSURE: 209 MMHG | HEIGHT: 64 IN | TEMPERATURE: 98.6 F | OXYGEN SATURATION: 97 %

## 2021-02-01 DIAGNOSIS — S60.511A ABRASION OF RIGHT HAND, INITIAL ENCOUNTER: ICD-10-CM

## 2021-02-01 DIAGNOSIS — S01.81XA LACERATION OF FOREHEAD, INITIAL ENCOUNTER: Primary | ICD-10-CM

## 2021-02-01 PROCEDURE — 72125 CT NECK SPINE W/O DYE: CPT

## 2021-02-01 PROCEDURE — 12051 INTMD RPR FACE/MM 2.5 CM/<: CPT

## 2021-02-01 PROCEDURE — 6370000000 HC RX 637 (ALT 250 FOR IP): Performed by: EMERGENCY MEDICINE

## 2021-02-01 PROCEDURE — 90471 IMMUNIZATION ADMIN: CPT | Performed by: EMERGENCY MEDICINE

## 2021-02-01 PROCEDURE — 99285 EMERGENCY DEPT VISIT HI MDM: CPT

## 2021-02-01 PROCEDURE — 70450 CT HEAD/BRAIN W/O DYE: CPT

## 2021-02-01 PROCEDURE — 90715 TDAP VACCINE 7 YRS/> IM: CPT | Performed by: EMERGENCY MEDICINE

## 2021-02-01 PROCEDURE — 6360000002 HC RX W HCPCS: Performed by: EMERGENCY MEDICINE

## 2021-02-01 PROCEDURE — 73130 X-RAY EXAM OF HAND: CPT

## 2021-02-01 RX ORDER — CEPHALEXIN 500 MG/1
500 CAPSULE ORAL 2 TIMES DAILY
Qty: 10 CAPSULE | Refills: 0 | Status: SHIPPED | OUTPATIENT
Start: 2021-02-01 | End: 2021-02-06

## 2021-02-01 RX ORDER — ACETAMINOPHEN 325 MG/1
650 TABLET ORAL ONCE
Status: COMPLETED | OUTPATIENT
Start: 2021-02-01 | End: 2021-02-01

## 2021-02-01 RX ADMIN — ACETAMINOPHEN 650 MG: 325 TABLET ORAL at 08:09

## 2021-02-01 RX ADMIN — TETANUS TOXOID, REDUCED DIPHTHERIA TOXOID AND ACELLULAR PERTUSSIS VACCINE, ADSORBED 0.5 ML: 5; 2.5; 8; 8; 2.5 SUSPENSION INTRAMUSCULAR at 08:10

## 2021-02-01 ASSESSMENT — PAIN SCALES - GENERAL
PAINLEVEL_OUTOF10: 5
PAINLEVEL_OUTOF10: 2
PAINLEVEL_OUTOF10: 5

## 2021-02-01 ASSESSMENT — VISUAL ACUITY: OU: 1

## 2021-02-01 ASSESSMENT — ENCOUNTER SYMPTOMS
TROUBLE SWALLOWING: 0
SHORTNESS OF BREATH: 0
VOMITING: 0
ABDOMINAL PAIN: 0

## 2021-02-01 NOTE — ED PROVIDER NOTES
ACETAMINOPHEN (TYLENOL) 325 MG TABLET    Take 2 tablets by mouth every 4 hours as needed for Pain or Fever    APIXABAN (ELIQUIS) 5 MG TABS TABLET    Take 5 mg by mouth 2 times daily    BUMETANIDE (BUMEX) 1 MG TABLET    Take 1 mg by mouth daily     CALCIUM-VITAMIN D (OSCAL-500) 500-200 MG-UNIT PER TABLET    Take 1 tablet by mouth daily    CLONAZEPAM (KLONOPIN) 0.5 MG TABLET    Take 1 tablet PO QID PRN for Anxiety    DOFETILIDE (TIKOSYN) 250 MCG CAPSULE    Take 1 capsule by mouth 2 times daily    LISINOPRIL (PRINIVIL;ZESTRIL) 10 MG TABLET    Take 1 tablet by mouth daily    METOPROLOL (LOPRESSOR) 25 MG TABLET    Take 25 mg by mouth 2 times daily    MULTIPLE VITAMINS-MINERALS (THERAPEUTIC MULTIVITAMIN-MINERALS) TABLET    Take 1 tablet by mouth daily    NYSTATIN-TRIAMCINOLONE (MYCOLOG II) 330630-9.1 UNIT/GM-% CREAM    Apply topically 2 times daily. ONDANSETRON (ZOFRAN-ODT) 4 MG DISINTEGRATING TABLET    Take 1 tablet by mouth 3 times daily as needed for Nausea or Vomiting    POTASSIUM CHLORIDE (KLOR-CON M) 20 MEQ TBCR EXTENDED RELEASE TABLET    Take 40 mEq by mouth daily (with breakfast)    PRAVASTATIN (PRAVACHOL) 40 MG TABLET    TAKE 1 TABLET DAILY     ALLERGIES     is allergic to bactrim [sulfamethoxazole-trimethoprim]; codeine; magnesium-containing compounds; naproxen; norvasc [amlodipine besylate]; percocet [oxycodone-acetaminophen]; sertraline hcl; toradol [ketorolac tromethamine]; tramadol; and morphine. FAMILY HISTORY     She indicated that her mother is . She indicated that her father is . She indicated that her brother is . She indicated that the status of her daughter is unknown. She indicated that her other is .      SOCIAL HISTORY       Social History     Tobacco Use    Smoking status: Former Smoker     Quit date: 1984     Years since quittin.1    Smokeless tobacco: Never Used   Substance Use Topics    Alcohol use: No    Drug use: No     PHYSICAL EXAM INITIAL VITALS: BP (!) 209/83   Pulse 85   Temp 98.6 °F (37 °C) (Oral)   Resp 18   Ht 5' 4\" (1.626 m)   Wt 150 lb (68 kg)   SpO2 97%   BMI 25.75 kg/m²    Physical Exam  Vitals signs and nursing note reviewed. Constitutional:       General: She is not in acute distress. HENT:      Head: Normocephalic. No raccoon eyes or Kay's sign. Jaw: There is normal jaw occlusion. No trismus. Comments: Approximate 2 cm laceration vertically left forehead eyebrow area bleeding has stopped no step-offs in the area there is some soft tissue swelling. Mouth/Throat:      Mouth: Mucous membranes are moist.      Pharynx: Oropharynx is clear. Eyes:      General: Vision grossly intact. Extraocular Movements: Extraocular movements intact. Pupils: Pupils are equal, round, and reactive to light. Neck:      Musculoskeletal: Normal range of motion and neck supple. No neck rigidity. Cardiovascular:      Rate and Rhythm: Normal rate and regular rhythm. Pulses: Normal pulses. Heart sounds: Normal heart sounds. Pulmonary:      Effort: Pulmonary effort is normal. No respiratory distress. Abdominal:      General: There is no distension. Palpations: Abdomen is soft. There is no mass. Tenderness: There is no abdominal tenderness. Musculoskeletal: Normal range of motion. General: No deformity. Hands:       Right lower leg: No edema. Left lower leg: No edema. Comments: No cervical thoracic lumbar bony midline tenderness step-offs or deformities. No tenderness to the shoulders elbows or wrists bilaterally. No tenderness to the hips knees or ankles bilaterally. Skin:     General: Skin is warm and dry. Capillary Refill: Capillary refill takes less than 2 seconds. Findings: No rash. Neurological:      General: No focal deficit present. Mental Status: She is alert and oriented to person, place, and time. GCS: GCS eye subscore is 4.  GCS verbal subscore is 5. GCS motor subscore is 6. Cranial Nerves: No cranial nerve deficit, dysarthria or facial asymmetry. Sensory: Sensation is intact. Motor: Motor function is intact. No pronator drift. Coordination: Finger-Nose-Finger Test normal.   Psychiatric:         Thought Content: Thought content normal.         MEDICAL DECISION MAKING:          Please see ED Course below for MDM/ED course. DDx: Head injury, laceration, neck injury    All patient's question's and concerns were answered prior to disposition and patient and/or family expressed understanding and agreement of treatment plan     NIH STROKE SCALE:            PROCEDURES:    Procedures    DIAGNOSTIC RESULTS   EKG:All EKG's are interpreted by the Emergency Department Physician who either signs or Co-signs this chart in the absence of a cardiologist.    RADIOLOGY:All plain film, CT, MRI, and formal ultrasound images (except ED bedside ultrasound) are read by the radiologist, see reports below, unless otherwisenoted in MDM or here. XR HAND RIGHT (MIN 3 VIEWS)   Preliminary Result   No acute osseous abnormality. CT HEAD WO CONTRAST   Final Result   No acute intracranial abnormality. No acute cervical spine fracture. CT CERVICAL SPINE WO CONTRAST   Final Result   No acute intracranial abnormality. No acute cervical spine fracture. LABS: All lab results were reviewed by myself, and all abnormals are listed below. Labs Reviewed - No data to display    EMERGENCY DEPARTMENTCOURSE:     Patient is a 32-year-old female here with laceration to left eyebrow area after mechanical fall. She is on Eliquis. Tetanus not up-to-date. She is neurologically intact. Primary survey intact. She has an abrasion to her right hand. Will CT head and neck, x-ray of the right hand, give tetanus, Tylenol, plan for wound cleaning and repair. CT imaging and x-ray of the right hand negative.   Laceration repaired by Dr. Julita Vilchis resident with my supervision. Please see his procedure note. The wound was irrigated but there was some debris in the wound and the wound was deep there for a few observable sutures were placed and then superficial Prolene on top. Due to concern for contaminated wound will place patient on antibiotics for 5 days. Instructed to follow-up with her PCP. Return if symptoms worsen. Strict return precautions given as well for head injury. Vitals:    Vitals:    02/01/21 0724   BP: (!) 209/83   Pulse: 85   Resp: 18   Temp: 98.6 °F (37 °C)   TempSrc: Oral   SpO2: 97%   Weight: 150 lb (68 kg)   Height: 5' 4\" (1.626 m)       The patient was given the following medications while in the emergency department:  Orders Placed This Encounter   Medications    Tetanus-Diphth-Acell Pertussis (BOOSTRIX) injection 0.5 mL    acetaminophen (TYLENOL) tablet 650 mg    cephALEXin (KEFLEX) 500 MG capsule     Sig: Take 1 capsule by mouth 2 times daily for 5 days     Dispense:  10 capsule     Refill:  0     CONSULTS:  None    FINAL IMPRESSION      1. Laceration of forehead, initial encounter    2.  Abrasion of right hand, initial encounter          DISPOSITION/PLAN   DISPOSITION Decision To Discharge 02/01/2021 09:46:00 AM      PATIENT REFERRED TO:  Chaitanya Perry DO  [address]  [address]  [phone]    Schedule an appointment as soon as possible for a visit       [facility] ED  [address] [facility]  [phone]    If symptoms worsen    DISCHARGE MEDICATIONS:  New Prescriptions    CEPHALEXIN (KEFLEX) 500 MG CAPSULE    Take 1 capsule by mouth 2 times daily for 5 days     Peter Vasquez MD  Attending Emergency Physician    This note was created with the assistance of a speech-recognition program. While intending to generate a document that actually reflects the content of the visit, no guarantees can be provided that every mistake has been identified and corrected

## 2021-02-01 NOTE — ED NOTES
Bed: 23  Expected date: 2/1/21  Expected time: 7:04 AM  Means of arrival: [address]  Comments:  Medic 18     [facility] [address]  02/01/21 6597

## 2021-02-08 ENCOUNTER — OFFICE VISIT (OUTPATIENT)
Dept: FAMILY MEDICINE CLINIC | Age: 86
End: 2021-02-08
Payer: MEDICARE

## 2021-02-08 VITALS
DIASTOLIC BLOOD PRESSURE: 76 MMHG | TEMPERATURE: 97.1 F | BODY MASS INDEX: 27.12 KG/M2 | OXYGEN SATURATION: 97 % | SYSTOLIC BLOOD PRESSURE: 137 MMHG | HEART RATE: 93 BPM | WEIGHT: 158 LBS

## 2021-02-08 DIAGNOSIS — S01.81XD LACERATION OF BROW WITHOUT COMPLICATION, SUBSEQUENT ENCOUNTER: Primary | ICD-10-CM

## 2021-02-08 PROCEDURE — 99213 OFFICE O/P EST LOW 20 MIN: CPT | Performed by: NURSE PRACTITIONER

## 2021-02-08 ASSESSMENT — PATIENT HEALTH QUESTIONNAIRE - PHQ9
SUM OF ALL RESPONSES TO PHQ9 QUESTIONS 1 & 2: 0
SUM OF ALL RESPONSES TO PHQ QUESTIONS 1-9: 0

## 2021-02-08 NOTE — PROGRESS NOTES
DIEGO Howard[place]WESTON  [address]  [address] [facility] [place]. [place]  [facility]  [phone]  [phone]    George Jiménez is a 82 y.o. female who is here with c/o of:    Chief Complaint: Wound Check (suture removal)      Patient Accompanied by: n/a    HPI - George Jiménez is here today for f/u    Patient was seen in the ED for injury sustained from fall out of bed requiring sutures to left brow  Patient reports she took a nerve pill and it made her dizzy. She states she was in bed and thought she was in the middle of the bed, rolled over and fell out of bed striking her left brow on the night stand. She was seen and treated in the ED - notes reviewed, double layer sutures per note  Patient denies any other complaints from the fall and feels that the wound is healing well  She reports completing prescribed antibiotic          Patient Active Problem List:     Hyperlipidemia     Hypertension     Anxiety     Gastroesophageal reflux disease without esophagitis     Atypical chest pain     Paroxysmal atrial fibrillation (HCC)     Past Medical History:   Diagnosis Date    GERD (gastroesophageal reflux disease)     Hypertension       Past Surgical History:   Procedure Laterality Date    BLADDER SUSPENSION      CHOLECYSTECTOMY      HYSTERECTOMY  ovaries left    JOINT REPLACEMENT  left total shoulder;      Family History   Problem Relation Age of Onset    Cancer Brother     Cancer Other         bone    Cancer Daughter         breast     Social History     Tobacco Use    Smoking status: Former Smoker     Quit date: 1984     Years since quittin.1    Smokeless tobacco: Never Used   Substance Use Topics    Alcohol use: No     ALLERGIES:    Allergies   Allergen Reactions    Bactrim [Sulfamethoxazole-Trimethoprim]     Codeine      dizziness    Magnesium-Containing Compounds      Mag ox 500 ?     Naproxen      On allergy list ? Unsure of reaction    Norvasc [Amlodipine Besylate]      Edema      Percocet [Oxycodone-Acetaminophen]     Sertraline Hcl     Toradol [Ketorolac Tromethamine]     Tramadol     Morphine Nausea And Vomiting          Subjective     · Constitutional:  Negative for activity change, appetite change,unexpected weight change, chills, fever, and fatigue. · HENT: Negative for ear pain, sore throat,  Rhinorrhea, sinus pain, sinus pressure, congestion. · Eyes:  Negative for pain and discharge. · Respiratory:  Negative for chest tightness, shortness of breath, wheezing, and cough. · Cardiovascular:  Negative for chest pain, palpitations and leg swelling. · Gastrointestinal: Negative for abdominal pain, blood in stool, constipation,diarrhea, nausea and vomiting. · Endocrine: Negative for cold intolerance, heat intolerance, polydipsia, polyphagia and polyuria. · Genitourinary: Negative for difficulty urinating, dysuria, flank pain, frequency, hematuria and urgency. · Musculoskeletal: Negative for arthralgias, back pain, joint swelling, myalgias, neck pain and neck stiffness. · Skin: Negative for rash and Positive for wound. · Allergic/Immunologic: Negative for environmental allergies and food allergies. · Neurological:  Negative for dizziness, light-headedness, numbness and headaches. · Hematological:  Negative for adenopathy. Does not bruise/bleed easily. · Psychiatric/Behavioral: Negative for self-injury, sleep disturbance and suicidal ideas. Objective     PHYSICAL EXAM:   · Constitutional: Alyssa Sigala is oriented to person, place, and time. Vital signs are normal. Appears well-developed and well-nourished. · HEENT:   · Head: Normocephalic and atraumatic. Right Ear: Hearing and external ear normal.     · Left Ear: Hearing and external ear normal.    · Eyes:PERRL, EOMI, Conjunctiva normal, No discharge. · Neck: Full passive range of motion.    · Cardiovascular: Normal rate, regular rhythm, S1, S2, no murmur, no gallop, no friction medication compliance addressed. All patient questions answered. Pt voiced understanding. 5.  Reviewed prior labs, imaging, consultation, follow up, and health maintenance  6. Continue current medications, diet and exercise. 7. Discussed use, benefit, and side effects of prescribed medications. Barriers to medication compliance addressed. All her questions were answered. Pt voiced understanding. Bernie Cain will continue current medications, diet and exercise. Patient given educational materials on wound care    Of the 20 minute duration appointment visit, Misael Richards CNP spent at least 50% of the face-to-face time in counseling, explanation of diagnosis, planning of further management, and answering all questions. Signed:  Misael Richards CNP    This note is created with the assistance of a speech-recognition program.  While intending to generate a document that actually reflects the content of the visit, no guarantees can be provided that every mistake has been identified and corrected by editing.

## 2021-03-24 ENCOUNTER — OFFICE VISIT (OUTPATIENT)
Dept: FAMILY MEDICINE CLINIC | Age: 86
End: 2021-03-24
Payer: MEDICARE

## 2021-03-24 VITALS
HEART RATE: 78 BPM | OXYGEN SATURATION: 97 % | HEIGHT: 64 IN | TEMPERATURE: 97.3 F | BODY MASS INDEX: 27.01 KG/M2 | WEIGHT: 158.2 LBS | SYSTOLIC BLOOD PRESSURE: 120 MMHG | DIASTOLIC BLOOD PRESSURE: 70 MMHG

## 2021-03-24 DIAGNOSIS — R94.6 ABNORMAL RESULTS OF THYROID FUNCTION STUDIES: ICD-10-CM

## 2021-03-24 DIAGNOSIS — I10 ESSENTIAL HYPERTENSION: Chronic | ICD-10-CM

## 2021-03-24 DIAGNOSIS — I48.0 PAROXYSMAL ATRIAL FIBRILLATION (HCC): ICD-10-CM

## 2021-03-24 DIAGNOSIS — F41.9 ANXIETY: Chronic | ICD-10-CM

## 2021-03-24 DIAGNOSIS — E78.5 HYPERLIPIDEMIA, UNSPECIFIED HYPERLIPIDEMIA TYPE: Chronic | ICD-10-CM

## 2021-03-24 DIAGNOSIS — R60.0 BILATERAL EDEMA OF LOWER EXTREMITY: ICD-10-CM

## 2021-03-24 DIAGNOSIS — R79.89 ABNORMAL TSH: ICD-10-CM

## 2021-03-24 DIAGNOSIS — Z00.00 ROUTINE GENERAL MEDICAL EXAMINATION AT A HEALTH CARE FACILITY: ICD-10-CM

## 2021-03-24 DIAGNOSIS — Z91.81 AT HIGH RISK FOR FALLS: Primary | ICD-10-CM

## 2021-03-24 PROBLEM — Z87.891 FORMER SMOKER: Status: ACTIVE | Noted: 2021-03-24

## 2021-03-24 PROBLEM — I51.7 MILD CONCENTRIC LEFT VENTRICULAR HYPERTROPHY (LVH): Status: ACTIVE | Noted: 2021-03-24

## 2021-03-24 PROBLEM — I51.89 DIASTOLIC DYSFUNCTION: Status: ACTIVE | Noted: 2021-03-24

## 2021-03-24 PROBLEM — I35.1 MILD AORTIC REGURGITATION: Status: ACTIVE | Noted: 2021-03-24

## 2021-03-24 PROBLEM — K44.9 PARAESOPHAGEAL HERNIA: Status: ACTIVE | Noted: 2017-10-01

## 2021-03-24 PROBLEM — I47.19 PAROXYSMAL ATRIAL TACHYCARDIA: Status: ACTIVE | Noted: 2021-03-24

## 2021-03-24 PROBLEM — I47.1 PAROXYSMAL ATRIAL TACHYCARDIA (HCC): Status: RESOLVED | Noted: 2021-03-24 | Resolved: 2021-03-24

## 2021-03-24 PROBLEM — I47.19 PAROXYSMAL ATRIAL TACHYCARDIA: Status: RESOLVED | Noted: 2021-03-24 | Resolved: 2021-03-24

## 2021-03-24 PROBLEM — I83.90 ASYMPTOMATIC VARICOSE VEINS: Status: ACTIVE | Noted: 2021-03-24

## 2021-03-24 PROBLEM — I47.1 PAROXYSMAL ATRIAL TACHYCARDIA (HCC): Status: ACTIVE | Noted: 2021-03-24

## 2021-03-24 PROBLEM — Z79.01 CHRONIC ANTICOAGULATION: Status: ACTIVE | Noted: 2021-03-24

## 2021-03-24 PROCEDURE — 99214 OFFICE O/P EST MOD 30 MIN: CPT | Performed by: FAMILY MEDICINE

## 2021-03-24 PROCEDURE — G0438 PPPS, INITIAL VISIT: HCPCS | Performed by: FAMILY MEDICINE

## 2021-03-24 RX ORDER — BUSPIRONE HYDROCHLORIDE 10 MG/1
10 TABLET ORAL PRN
COMMUNITY
End: 2021-03-24 | Stop reason: SINTOL

## 2021-03-24 RX ORDER — AMIODARONE HYDROCHLORIDE 100 MG/1
100 TABLET ORAL DAILY
COMMUNITY
End: 2022-02-01

## 2021-03-24 ASSESSMENT — ENCOUNTER SYMPTOMS
BLURRED VISION: 0
ALLERGIC/IMMUNOLOGIC NEGATIVE: 1
EYES NEGATIVE: 1
ORTHOPNEA: 0
RESPIRATORY NEGATIVE: 1
SHORTNESS OF BREATH: 0
GASTROINTESTINAL NEGATIVE: 1

## 2021-03-24 ASSESSMENT — PATIENT HEALTH QUESTIONNAIRE - PHQ9
2. FEELING DOWN, DEPRESSED OR HOPELESS: 0
SUM OF ALL RESPONSES TO PHQ QUESTIONS 1-9: 0

## 2021-03-24 ASSESSMENT — LIFESTYLE VARIABLES: HOW OFTEN DO YOU HAVE A DRINK CONTAINING ALCOHOL: 0

## 2021-03-24 NOTE — PROGRESS NOTES
On the basis of positive falls risk screening, assessment and plan is as follows: home safety tips provided. Medicare Annual Wellness Visit  Name: Perri Pass Date: 3/24/2021   MRN: [id number] Sex: Female   Age: 82 y.o. Ethnicity: Non-/Non    : 1935 Race: Shilo Mary is here for Establish Care, 6 Month Follow-Up, and Medicare AWV    Screenings for behavioral, psychosocial and functional/safety risks, and cognitive dysfunction are all negative except as indicated below. These results, as well as other patient data from the [address] form, are documented in Flowsheets linked to this Encounter. Allergies   Allergen Reactions    Bactrim [Sulfamethoxazole-Trimethoprim]     Codeine      dizziness    Magnesium-Containing Compounds      Mag ox 500 ?  Naproxen      On allergy list ? Unsure of reaction    Norvasc [Amlodipine Besylate]      Edema      Percocet [Oxycodone-Acetaminophen]     Sertraline Hcl     Toradol [Ketorolac Tromethamine]     Tramadol     Morphine Nausea And Vomiting       Prior to Visit Medications    Medication Sig Taking? Authorizing Provider   amiodarone (PACERONE) 100 MG tablet Take 100 mg by mouth daily Yes Historical Provider, MD   busPIRone (BUSPAR) 10 MG tablet Take 10 mg by mouth as needed (anxiety) Yes Historical Provider, MD   lisinopril (PRINIVIL;ZESTRIL) 10 MG tablet Take 1 tablet by mouth daily Yes Annette Humphrey MD   pravastatin (PRAVACHOL) 40 MG tablet TAKE 1 TABLET DAILY Yes Shar Hartman MD   ondansetron (ZOFRAN-ODT) 4 MG disintegrating tablet Take 1 tablet by mouth 3 times daily as needed for Nausea or Vomiting Yes Cee Carpenter MD   nystatin-triamcinolone [facility]) 019100-8.9 UNIT/GM-% cream Apply topically 2 times daily.  Yes Annette Humphrey MD   potassium chloride (KLOR-CON M) 20 MEQ TBCR extended release tablet Take 40 mEq by mouth daily (with breakfast) Yes Historical Provider, MD calcium-vitamin D (OSCAL-500) 500-200 MG-UNIT per tablet Take 1 tablet by mouth daily Yes Historical Provider, MD   Multiple Vitamins-Minerals (THERAPEUTIC MULTIVITAMIN-MINERALS) tablet Take 1 tablet by mouth daily Yes Historical Provider, MD   bumetanide (BUMEX) 1 MG tablet Take 1 mg by mouth daily  Yes Historical Provider, MD   apixaban (ELIQUIS) 5 MG TABS tablet Take 5 mg by mouth 2 times daily Yes Historical Provider, MD   acetaminophen (TYLENOL) 325 MG tablet Take 2 tablets by mouth every 4 hours as needed for Pain or Fever Yes Adam Barry MD   metoprolol (LOPRESSOR) 25 MG tablet Take 25 mg by mouth 2 times daily Yes Historical Provider, MD       Past Medical History:   Diagnosis Date    GERD (gastroesophageal reflux disease)     Hypertension        Past Surgical History:   Procedure Laterality Date    BLADDER SUSPENSION  2001    CHOLECYSTECTOMY      HYSTERECTOMY  ovaries left    JOINT REPLACEMENT  left total shoulder; 1990       Family History   Problem Relation Age of Onset    Cancer Brother     Cancer Other         bone    Cancer Daughter         breast       CareTeam (Including outside providers/suppliers regularly involved in providing care):   Patient Care Team:  Stephani Murphy DO as PCP - General (Family Medicine)  Stephani Murphy DO as PCP - [facility] Empaneled Provider  Billy Bear MD as Consulting Physician (General Surgery)    Wt Readings from Last 3 Encounters:   03/24/21 158 lb 3.2 oz (71.8 kg)   02/08/21 158 lb (71.7 kg)   02/01/21 150 lb (68 kg)     Vitals:    03/24/21 1043   BP: 120/70   Pulse: 78   Temp: 97.3 °F (36.3 °C)   SpO2: 97%   Weight: 158 lb 3.2 oz (71.8 kg)   Height: 5' 4.02\" (1.626 m)     Body mass index is 27.14 kg/m². Based upon direct observation of the patient, evaluation of cognition reveals recent and remote memory intact. Patient's complete Health Risk Assessment and screening values have been reviewed and are found in Flowsheets.  The following problems were reviewed today and where indicated follow up appointments were made and/or referrals ordered. Positive Risk Factor Screenings with Interventions:     Fall Risk:  2 or more falls in past year?: (!) yes  Fall with injury in past year?: (!) yes  Fall Risk Interventions:    · Home safety tips provided        General Health and ACP:  General  In general, how would you say your health is?: Very Good  In the past 7 days, have you experienced any of the following?  New or Increased Pain, New or Increased Fatigue, Loneliness, Social Isolation, Stress or Anger?: None of These  Do you get the social and emotional support that you need?: Yes  Do you have a Living Will?: Yes  Advance Directives     Power of [address] [place] [address] Will ACP-Advance Directive ACP-Power of     Not on File Not on File Not on File Not on File      General Health Risk Interventions:  · none    Health Habits/Nutrition:  Health Habits/Nutrition  Do you exercise for at least 20 minutes 2-3 times per week?: (!) No  Have you lost any weight without trying in the past 3 months?: No  Do you eat only one meal per day?: No  Have you seen the dentist within the past year?: N/A - wear dentures  Body mass index: (!) 27.14  Health Habits/Nutrition Interventions:  · Inadequate physical activity:  patient is not ready to increase his/her physical activity level at this time     Safety:  Safety  Do you have working smoke detectors?: Yes  Have all throw rugs been removed or fastened?: (!) No  Do you have non-slip mats or surfaces in all bathtubs/showers?: Yes  Do all of your stairways have a railing or banister?: Yes  Are your doorways, halls and stairs free of clutter?: Yes  Do you always fasten your seatbelt when you are in a car?: Yes  Safety Interventions:  · Home safety tips provided     Personalized Preventive Plan   Current Health Maintenance Status  Immunization History   Administered Date(s) Administered    IQRAID-19, DIVYA [place], 30mcg/0.3mL 01/23/2021    Influenza Virus Vaccine 09/22/2014, 09/16/2015, 09/21/2016, 11/15/2017, 12/03/2018    Influenza, Intradermal, Preservative free 10/13/2009    Influenza, Dede [place], IM, (6 mo and older Fluzone, Flulaval, Fluarix and 3 yrs and older Afluria) 09/21/2016, 11/15/2017, 12/03/2018    Influenza, Quadv, adjuvanted, 65 yrs +, IM, PF (Fluad) 09/21/2020    Influenza, Triv, inactivated, subunit, adjuvanted, IM (Fluad 65 yrs and older) 09/27/2019    Pneumococcal Conjugate 13-valent ([id number]) 10/29/2015    Pneumococcal Polysaccharide ([id number]) 11/02/2006, 10/01/2010    Td, unspecified formulation 08/20/2011    Tdap (Boostrix, Adacel) 09/20/2019, 02/01/2021    Zoster Live (Zostavax) 09/10/2010        Health Maintenance   Topic Date Due    TSH testing  Never done    Lipid screen  07/05/2018    Annual Wellness Visit (AWV)  Never done    Potassium monitoring  03/21/2021    Creatinine monitoring  03/21/2021    Shingles Vaccine (2 of 3) 06/19/2021 (Originally 11/5/2010)    DTaP/Tdap/Td vaccine (3 - Td) 02/01/2031    DEXA (modify frequency per FRAX score)  Completed    Flu vaccine  Completed    Pneumococcal 65+ years Vaccine  Completed    COVID-19 Vaccine  Completed    Hepatitis A vaccine  Aged Out    Hepatitis B vaccine  Aged Out    Hib vaccine  Aged Out    Meningococcal (ACWY) vaccine  Aged Out     Recommendations for [facility] Due: see orders and patient instructions/AVS.  . Recommended screening schedule for the next 5-10 years is provided to the patient in written form: see Patient Instructions/AVS.    Eleonora Nagel was seen today for [facility] care, 6 month follow-up and medicare awv.     Diagnoses and all orders for this visit:    At high risk for falls    Routine general medical examination at a health care facility

## 2021-03-24 NOTE — PROGRESS NOTES
[facility] Progress Note    Date:3/24/2021         Patient Elle Daniels     YOB: 1935     Age:85 y.o. Assessment/Plan        Problem List Items Addressed This Visit        Circulatory    Hypertension (Chronic)     Controlled on current regimen         Paroxysmal atrial fibrillation (HCC) (Chronic)     Sees Dr. Yari Rios  Controlled on current regimen  On Eliquis            Other    Hyperlipidemia (Chronic)     Compliant with Pravastatin         Relevant Medications    amiodarone (PACERONE) 100 MG tablet    Other Relevant Orders    Comprehensive Metabolic Panel    Lipid Panel    Anxiety (Chronic)     Buspar not working for patient so she stopped         Bilateral edema of lower extremity     Stable on diuretic  Has cardiology           Other Visit Diagnoses     At high risk for falls    -  Primary    Given home safety tip handout    Routine general medical examination at a health care facility        Abnormal TSH        Relevant Orders    TSH with Reflex    Abnormal results of thyroid function studies         Relevant Orders    TSH with Reflex           Return in 3 months (on 6/24/2021) for Medicare Annual Wellness Visit in 1 year. Electronically signed by Patience Rooney DO on 3/24/21         Subjective     Pedrito Henry is a 82 y.o. female presenting today for   Chief Complaint   Patient presents with   [facility]    6 Month Follow-Up    Medicare AWV   . Pedrito Henry is a 82 y.o. female who presents for follow-up of atrial fibrillation. Onset was several years ago, and patient reports symptoms have stabilized since that time. Recently, the patient has had no other symptoms. See Dr. Anabell Lentz is a 82 y.o. female who presents for follow up of anxiety disorder. Current symptoms: none she is stable. She denies current suicidal and homicidal ideation.  She complains of the following side effects from the treatment: buspar does not agree with her she doesn't want to take it anymore. Hypertension  This is a chronic problem. The current episode started more than 1 year ago. The problem has been gradually improving since onset. The problem is controlled. Associated symptoms include anxiety and peripheral edema. Pertinent negatives include no blurred vision, chest pain, headaches, malaise/fatigue, neck pain, orthopnea, palpitations, PND, shortness of breath or sweats. Past treatments include ACE inhibitors and beta blockers. The current treatment provides significant improvement. There is no history of chronic renal disease. Hyperlipidemia  This is a chronic problem. The current episode started more than 1 year ago. The problem is controlled. Recent lipid tests were reviewed and are normal. She has no history of chronic renal disease, diabetes, hypothyroidism, liver disease, obesity or nephrotic syndrome. Pertinent negatives include no chest pain, focal sensory loss, focal weakness, leg pain, myalgias or shortness of breath. Current antihyperlipidemic treatment includes statins. The current treatment provides significant improvement of lipids. Review of Systems   Review of Systems   Constitutional: Negative. Negative for malaise/fatigue. HENT: Negative. Eyes: Negative. Negative for blurred vision. Respiratory: Negative. Negative for shortness of breath. Cardiovascular: Negative. Negative for chest pain, palpitations, orthopnea and PND. Gastrointestinal: Negative. Endocrine: Negative. Genitourinary: Negative. Musculoskeletal: Negative. Negative for myalgias and neck pain. Skin: Negative. Allergic/Immunologic: Negative. Neurological: Negative. Negative for focal weakness and headaches. Hematological: Negative. Psychiatric/Behavioral: Negative. All other systems reviewed and are negative.       Medications     Current Outpatient Medications   Medication Sig Dispense Refill    amiodarone (PACERONE) 100 MG tablet Take 100 mg by mouth daily      lisinopril (PRINIVIL;ZESTRIL) 10 MG tablet Take 1 tablet by mouth daily 90 tablet 3    pravastatin (PRAVACHOL) 40 MG tablet TAKE 1 TABLET DAILY 90 tablet 3    ondansetron (ZOFRAN-ODT) 4 MG disintegrating tablet Take 1 tablet by mouth 3 times daily as needed for Nausea or Vomiting 21 tablet 0    nystatin-triamcinolone (MYCOLOG II) 283875-4.1 UNIT/GM-% cream Apply topically 2 times daily. 15 g 0    potassium chloride (KLOR-CON M) 20 MEQ TBCR extended release tablet Take 40 mEq by mouth daily (with breakfast)      calcium-vitamin D (OSCAL-500) 500-200 MG-UNIT per tablet Take 1 tablet by mouth daily      Multiple Vitamins-Minerals (THERAPEUTIC MULTIVITAMIN-MINERALS) tablet Take 1 tablet by mouth daily      bumetanide (BUMEX) 1 MG tablet Take 1 mg by mouth daily       apixaban (ELIQUIS) 5 MG TABS tablet Take 5 mg by mouth 2 times daily      acetaminophen (TYLENOL) 325 MG tablet Take 2 tablets by mouth every 4 hours as needed for Pain or Fever 120 tablet 3    metoprolol (LOPRESSOR) 25 MG tablet Take 25 mg by mouth 2 times daily       No current facility-administered medications for this visit. Past History    Past Medical History:   has a past medical history of GERD (gastroesophageal reflux disease) and Hypertension. Social History:   reports that she quit smoking about 36 years ago. She has never used smokeless tobacco. She reports that she does not drink alcohol or use drugs.      Family History:   Family History   Problem Relation Age of Onset    Cancer Brother     Cancer Other         bone    Cancer Daughter         breast       Surgical History:   Past Surgical History:   Procedure Laterality Date    BLADDER SUSPENSION  2001    CHOLECYSTECTOMY      HYSTERECTOMY  ovaries left    JOINT REPLACEMENT  left total shoulder; 1990        Physical Examination      Vitals:  /70   Pulse 78   Temp 97.3 °F (36.3 °C)   Ht 5' 4.02\" (1.626 m)   Wt 158 lb 3.2 oz (71.8 kg) Degenerative changes of the  hand and wrist.  Suspect old fractures of the distal radius and ulna with  mild regional bony deformity and remodeling. Impression: No acute osseous abnormality. CT CERVICAL SPINE WO CONTRAST  Narrative: EXAMINATION:  CT OF THE HEAD WITHOUT CONTRAST; CT OF THE CERVICAL SPINE WITHOUT CONTRAST  2/1/2021 7:56 am    TECHNIQUE:  CT of the head was performed without the administration of intravenous  contrast. Dose modulation, iterative reconstruction, and/or weight based  adjustment of the mA/kV was utilized to reduce the radiation dose to as low  as reasonably achievable.; CT of the cervical spine was performed without the  administration of intravenous contrast. Multiplanar reformatted images are  provided for review. Dose modulation, iterative reconstruction, and/or weight  based adjustment of the mA/kV was utilized to reduce the radiation dose to as  low as reasonably achievable. COMPARISON:  CT brain March 21, 2020. HISTORY:  ORDERING SYSTEM PROVIDED HISTORY: fall, on eliquis, lac left eyebrow area  TECHNOLOGIST PROVIDED HISTORY:  fall, on eliquis, lac left eyebrow area    Decision Support Exception->Emergency Medical Condition (MA)  Reason for Exam: fall, on eliquis, lac left eyebrow area  Acuity: Acute  Type of Exam: Initial    FINDINGS:  CT brain: BRAIN/VENTRICLES: There is no acute intracranial hemorrhage, mass  effect or midline shift. No abnormal extra-axial fluid collection. The  gray-white differentiation is maintained without evidence of an acute  infarct. There is no evidence of hydrocephalus. Cortical atrophy with  moderately severe chronic microvascular ischemic changes, grossly similar to  the prior study. ORBITS: The visualized portion of the orbits demonstrate no acute abnormality. SINUSES: The visualized paranasal sinuses and mastoid air cells demonstrate  no acute abnormality.     SOFT TISSUES/SKULL:  No acute abnormality of the visualized skull or soft  tissues. CT cervical spine: No acute fracture. Approximately 2 mm of posterior  subluxation of C3 on C4 which appears to be due to degenerative change. Diffuse facet joint degenerative changes. Moderate disc space narrowing  C3-4 and C6-7. Vertebral body heights appear well maintained. No  prevertebral soft tissue swelling. Visualized soft tissues surrounding the  cervical spine demonstrate no acute findings. No pneumothorax. Impression: No acute intracranial abnormality. No acute cervical spine fracture. CT HEAD WO CONTRAST  Narrative: EXAMINATION:  CT OF THE HEAD WITHOUT CONTRAST; CT OF THE CERVICAL SPINE WITHOUT CONTRAST  2/1/2021 7:56 am    TECHNIQUE:  CT of the head was performed without the administration of intravenous  contrast. Dose modulation, iterative reconstruction, and/or weight based  adjustment of the mA/kV was utilized to reduce the radiation dose to as low  as reasonably achievable.; CT of the cervical spine was performed without the  administration of intravenous contrast. Multiplanar reformatted images are  provided for review. Dose modulation, iterative reconstruction, and/or weight  based adjustment of the mA/kV was utilized to reduce the radiation dose to as  low as reasonably achievable. COMPARISON:  CT brain March 21, 2020. HISTORY:  ORDERING SYSTEM PROVIDED HISTORY: fall, on eliquis, lac left eyebrow area  TECHNOLOGIST PROVIDED HISTORY:  fall, on eliquis, lac left eyebrow area    Decision Support Exception->Emergency Medical Condition (MA)  Reason for Exam: fall, on eliquis, lac left eyebrow area  Acuity: Acute  Type of Exam: Initial    FINDINGS:  CT brain: BRAIN/VENTRICLES: There is no acute intracranial hemorrhage, mass  effect or midline shift. No abnormal extra-axial fluid collection. The  gray-white differentiation is maintained without evidence of an acute  infarct. There is no evidence of hydrocephalus.  Cortical atrophy with  moderately severe chronic microvascular ischemic changes, grossly similar to  the prior study. ORBITS: The visualized portion of the orbits demonstrate no acute abnormality. SINUSES: The visualized paranasal sinuses and mastoid air cells demonstrate  no acute abnormality. SOFT TISSUES/SKULL:  No acute abnormality of the visualized skull or soft  tissues. CT cervical spine: No acute fracture. Approximately 2 mm of posterior  subluxation of C3 on C4 which appears to be due to degenerative change. Diffuse facet joint degenerative changes. Moderate disc space narrowing  C3-4 and C6-7. Vertebral body heights appear well maintained. No  prevertebral soft tissue swelling. Visualized soft tissues surrounding the  cervical spine demonstrate no acute findings. No pneumothorax. Impression: No acute intracranial abnormality. No acute cervical spine fracture.

## 2021-03-24 NOTE — LETTER
[facility]  [address] [facility] [address]  Phone: [phone]  Fax: [phone]    Daniel Nathan DO        March 24, 2021     Patient: Nikky Veloz   YOB: 1935   Date of Visit: 3/24/2021       To Whom It May Concern: It is my medical opinion that Kasandra Jeffery requires a disability parking placard for the following reasons:  She has limited walking ability due to an arthritic and an orthopedic condition. Duration of need: permanent    If you have any questions or concerns, please don't hesitate to call.     Sincerely,        Daniel Nathan DO

## 2021-03-24 NOTE — PATIENT INSTRUCTIONS
Personalized Preventive Plan for Ramón Lei - 3/24/2021  Medicare offers a range of preventive health benefits. Some of the tests and screenings are paid in full while other may be subject to a deductible, co-insurance, and/or copay. Some of these benefits include a comprehensive review of your medical history including lifestyle, illnesses that may run in your family, and various assessments and screenings as appropriate. After reviewing your medical record and screening and assessments performed today your provider may have ordered immunizations, labs, imaging, and/or referrals for you. A list of these orders (if applicable) as well as your Preventive Care list are included within your After Visit Summary for your review. Other Preventive Recommendations:    · A preventive eye exam performed by an eye specialist is recommended every 1-2 years to screen for glaucoma; cataracts, macular degeneration, and other eye disorders. · A preventive dental visit is recommended every 6 months. · Try to get at least 150 minutes of exercise per week or 10,000 steps per day on a pedometer . · Order or download the FREE \"Exercise & Physical Activity: Your Everyday Guide\" from [employer]. Call 3-722.237.2269 or search [employer] online. · You need 7047-3685 mg of calcium and 1284-5057 IU of vitamin D per day. It is possible to meet your calcium requirement with diet alone, but a vitamin D supplement is usually necessary to meet this goal.  · When exposed to the sun, use a sunscreen that protects against both UVA and UVB radiation with an SPF of 30 or greater. Reapply every 2 to 3 hours or after sweating, drying off with a towel, or swimming. · Always wear a seat belt when traveling in a car. Always wear a helmet when riding a bicycle or motorcycle.

## 2021-03-25 ENCOUNTER — TELEPHONE (OUTPATIENT)
Dept: FAMILY MEDICINE CLINIC | Age: 86
End: 2021-03-25

## 2021-03-25 DIAGNOSIS — G47.00 INSOMNIA, UNSPECIFIED TYPE: Primary | ICD-10-CM

## 2021-03-25 RX ORDER — TRAZODONE HYDROCHLORIDE 50 MG/1
50 TABLET ORAL NIGHTLY
Qty: 30 TABLET | Refills: 1 | Status: SHIPPED | OUTPATIENT
Start: 2021-03-25 | End: 2021-08-13 | Stop reason: SDUPTHER

## 2021-03-30 ENCOUNTER — TELEPHONE (OUTPATIENT)
Dept: FAMILY MEDICINE CLINIC | Age: 86
End: 2021-03-30

## 2021-03-30 NOTE — TELEPHONE ENCOUNTER
Patient called back was confused and I verified what Yolette Hickey told her to take what Dr. Truong Morales prescribed.     Arely Means

## 2021-03-30 NOTE — TELEPHONE ENCOUNTER
Called patient. The other doctor is Margarita Foss which she currently sees. Told patient to go with that prescription since Margarita Foss is following her for this medication.

## 2021-04-02 RX ORDER — LISINOPRIL 20 MG/1
20 TABLET ORAL DAILY
Qty: 30 TABLET | Refills: 2 | OUTPATIENT
Start: 2021-04-02

## 2021-04-02 NOTE — TELEPHONE ENCOUNTER
Jordana Street is calling to request a refill on the following medication(s):    Medication Request:  Requested Prescriptions     Pending Prescriptions Disp Refills    lisinopril (PRINIVIL;ZESTRIL) 20 MG tablet 30 tablet 2     Sig: Take 1 tablet by mouth daily       Last Visit Date (If Applicable):  9/87/6350    Next Visit Date:    6/24/2021

## 2021-04-15 RX ORDER — LISINOPRIL 10 MG/1
10 TABLET ORAL DAILY
Qty: 90 TABLET | Refills: 0 | Status: SHIPPED | OUTPATIENT
Start: 2021-04-15 | End: 2021-06-11

## 2021-04-15 NOTE — TELEPHONE ENCOUNTER
Delana Phalen is calling to request a refill on the following medication(s):    Medication Request:  Requested Prescriptions     Pending Prescriptions Disp Refills    lisinopril (PRINIVIL;ZESTRIL) 10 MG tablet 90 tablet 0     Sig: Take 1 tablet by mouth daily       Last Visit Date (If Applicable):  3/40/2096    Next Visit Date:    6/24/2021

## 2021-06-24 ENCOUNTER — OFFICE VISIT (OUTPATIENT)
Dept: FAMILY MEDICINE CLINIC | Age: 86
End: 2021-06-24
Payer: MEDICARE

## 2021-06-24 VITALS
SYSTOLIC BLOOD PRESSURE: 120 MMHG | OXYGEN SATURATION: 96 % | WEIGHT: 155 LBS | DIASTOLIC BLOOD PRESSURE: 60 MMHG | BODY MASS INDEX: 26.46 KG/M2 | HEIGHT: 64 IN | HEART RATE: 88 BPM

## 2021-06-24 DIAGNOSIS — H61.21 RIGHT EAR IMPACTED CERUMEN: Primary | ICD-10-CM

## 2021-06-24 DIAGNOSIS — J01.00 ACUTE NON-RECURRENT MAXILLARY SINUSITIS: ICD-10-CM

## 2021-06-24 PROCEDURE — 99213 OFFICE O/P EST LOW 20 MIN: CPT | Performed by: FAMILY MEDICINE

## 2021-06-24 RX ORDER — AMOXICILLIN AND CLAVULANATE POTASSIUM 500; 125 MG/1; MG/1
1 TABLET, FILM COATED ORAL 3 TIMES DAILY
Qty: 21 TABLET | Refills: 0 | Status: SHIPPED | OUTPATIENT
Start: 2021-06-24 | End: 2021-07-01

## 2021-06-24 SDOH — ECONOMIC STABILITY: FOOD INSECURITY: WITHIN THE PAST 12 MONTHS, THE FOOD YOU BOUGHT JUST DIDN'T LAST AND YOU DIDN'T HAVE MONEY TO GET MORE.: NEVER TRUE

## 2021-06-24 SDOH — ECONOMIC STABILITY: FOOD INSECURITY: WITHIN THE PAST 12 MONTHS, YOU WORRIED THAT YOUR FOOD WOULD RUN OUT BEFORE YOU GOT MONEY TO BUY MORE.: NEVER TRUE

## 2021-06-24 ASSESSMENT — PATIENT HEALTH QUESTIONNAIRE - PHQ9
2. FEELING DOWN, DEPRESSED OR HOPELESS: 0
SUM OF ALL RESPONSES TO PHQ QUESTIONS 1-9: 1
SUM OF ALL RESPONSES TO PHQ9 QUESTIONS 1 & 2: 1
1. LITTLE INTEREST OR PLEASURE IN DOING THINGS: 1
SUM OF ALL RESPONSES TO PHQ QUESTIONS 1-9: 1
SUM OF ALL RESPONSES TO PHQ QUESTIONS 1-9: 1

## 2021-06-24 ASSESSMENT — LIFESTYLE VARIABLES: HOW OFTEN DO YOU HAVE A DRINK CONTAINING ALCOHOL: 0

## 2021-06-24 ASSESSMENT — SOCIAL DETERMINANTS OF HEALTH (SDOH): HOW HARD IS IT FOR YOU TO PAY FOR THE VERY BASICS LIKE FOOD, HOUSING, MEDICAL CARE, AND HEATING?: NOT HARD AT ALL

## 2021-06-24 NOTE — PATIENT INSTRUCTIONS
Personalized Preventive Plan for Rosario Bello - 6/24/2021  Medicare offers a range of preventive health benefits. Some of the tests and screenings are paid in full while other may be subject to a deductible, co-insurance, and/or copay. Some of these benefits include a comprehensive review of your medical history including lifestyle, illnesses that may run in your family, and various assessments and screenings as appropriate. After reviewing your medical record and screening and assessments performed today your provider may have ordered immunizations, labs, imaging, and/or referrals for you. A list of these orders (if applicable) as well as your Preventive Care list are included within your After Visit Summary for your review. Other Preventive Recommendations:    · A preventive eye exam performed by an eye specialist is recommended every 1-2 years to screen for glaucoma; cataracts, macular degeneration, and other eye disorders. · A preventive dental visit is recommended every 6 months. · Try to get at least 150 minutes of exercise per week or 10,000 steps per day on a pedometer . · Order or download the FREE \"Exercise & Physical Activity: Your Everyday Guide\" from [employer]. Call 1-896.676.5854 or search [employer] online. · You need 1648-4868 mg of calcium and 9078-3941 IU of vitamin D per day. It is possible to meet your calcium requirement with diet alone, but a vitamin D supplement is usually necessary to meet this goal.  · When exposed to the sun, use a sunscreen that protects against both UVA and UVB radiation with an SPF of 30 or greater. Reapply every 2 to 3 hours or after sweating, drying off with a towel, or swimming. · Always wear a seat belt when traveling in a car. Always wear a helmet when riding a bicycle or motorcycle.

## 2021-06-24 NOTE — PROGRESS NOTES
APSO Progress Note    Date:6/24/2021         Patient Geri Whiting     YOB: 1935     Age:86 y.o. Assessment/Plan        Problem List Items Addressed This Visit     None      Visit Diagnoses     Right ear impacted cerumen    -  Primary    Relevant Medications    carbamide peroxide (DEBROX) 6.5 % otic solution    Acute non-recurrent maxillary sinusitis        Relevant Medications    amoxicillin-clavulanate (AUGMENTIN) 500-125 MG per tablet           Return if symptoms worsen or fail to improve. Electronically signed by Reina Davis DO on 7/1/21         Subjective     Elif Sánchez is a 82 y.o. female presenting today for   Chief Complaint   Patient presents with   [place] [employer] AWV   . Cerumen Impaction  Ashlie Conklin presents for evaluation of a plugged ear. She noticed the symptoms in both ears, several days ago. She does have a prior history of cerumen impaction. Ashlie Conklin denies ear pain. She was not using ear drops to loosen wax immediately prior to this visit. Review of Systems   Review of Systems   All other systems reviewed and are negative. Medications     Current Outpatient Medications   Medication Sig Dispense Refill    amoxicillin-clavulanate (AUGMENTIN) 500-125 MG per tablet Take 1 tablet by mouth 3 times daily for 7 days 21 tablet 0    carbamide peroxide (DEBROX) 6.5 % otic solution Place 5 drops in ear(s) 2 times daily 15 mL 0    lisinopril (PRINIVIL;ZESTRIL) 10 MG tablet TAKE 1 TABLET DAILY 90 tablet 1    traZODone (DESYREL) 50 MG tablet Take 1 tablet by mouth nightly 30 tablet 1    amiodarone (PACERONE) 100 MG tablet Take 100 mg by mouth daily      pravastatin (PRAVACHOL) 40 MG tablet TAKE 1 TABLET DAILY 90 tablet 3    ondansetron (ZOFRAN-ODT) 4 MG disintegrating tablet Take 1 tablet by mouth 3 times daily as needed for Nausea or Vomiting 21 tablet 0    nystatin-triamcinolone (MYCOLOG II) 621377-8.1 UNIT/GM-% cream Apply topically 2 times daily.  15 g 0  potassium chloride (KLOR-CON M) 20 MEQ TBCR extended release tablet Take 40 mEq by mouth daily (with breakfast)      calcium-vitamin D (OSCAL-500) 500-200 MG-UNIT per tablet Take 1 tablet by mouth daily      Multiple Vitamins-Minerals (THERAPEUTIC MULTIVITAMIN-MINERALS) tablet Take 1 tablet by mouth daily      bumetanide (BUMEX) 1 MG tablet Take 1 mg by mouth daily       apixaban (ELIQUIS) 5 MG TABS tablet Take 5 mg by mouth 2 times daily      acetaminophen (TYLENOL) 325 MG tablet Take 2 tablets by mouth every 4 hours as needed for Pain or Fever 120 tablet 3    metoprolol (LOPRESSOR) 25 MG tablet Take 25 mg by mouth 2 times daily       No current facility-administered medications for this visit. Past History    Past Medical History:   has a past medical history of GERD (gastroesophageal reflux disease) and Hypertension. Social History:   reports that she quit smoking about 36 years ago. She has never used smokeless tobacco. She reports that she does not drink alcohol and does not use drugs. Family History:   Family History   Problem Relation Age of Onset    Cancer Brother     Cancer Other         bone    Cancer Daughter         breast       Surgical History:   Past Surgical History:   Procedure Laterality Date    BLADDER SUSPENSION  2001    CHOLECYSTECTOMY      HYSTERECTOMY  ovaries left    JOINT REPLACEMENT  left total shoulder; 1990        Physical Examination      Vitals:  /60 (Site: Right Upper Arm, Position: Sitting, Cuff Size: Small Adult)   Pulse 88   Ht 5' 4\" (1.626 m)   Wt 155 lb (70.3 kg)   SpO2 96%   BMI 26.61 kg/m²     Physical Exam  Vitals and nursing note reviewed. Constitutional:       General: She is not in acute distress. Appearance: Normal appearance. She is normal weight. She is not ill-appearing, toxic-appearing or diaphoretic. HENT:      Head: Normocephalic and atraumatic. Right Ear: There is impacted cerumen. Left Ear:  There is impacted cerumen. Eyes:      General: No scleral icterus. Right eye: No discharge. Left eye: No discharge. Extraocular Movements: Extraocular movements intact. Conjunctiva/sclera: Conjunctivae normal.   Cardiovascular:      Rate and Rhythm: Normal rate and regular rhythm. Pulses: Normal pulses. Heart sounds: Normal heart sounds. No murmur heard. No friction rub. No gallop. Pulmonary:      Effort: Pulmonary effort is normal. No respiratory distress. Breath sounds: Normal breath sounds. No stridor. No wheezing, rhonchi or rales. Chest:      Chest wall: No tenderness. Neurological:      Mental Status: She is alert and oriented to person, place, and time. Mental status is at baseline. Gait: Gait abnormal (walks with asssitance devicce). Psychiatric:         Mood and Affect: Mood normal.         Behavior: Behavior normal.         Thought Content: Thought content normal.         Judgment: Judgment normal.         Labs/Imaging/Diagnostics   Labs:  Hemoglobin A1C   Date Value Ref Range Status   09/17/2016 5.4 4.0 - 6.0 % Final       Imaging Last 24 Hours:  XR HAND RIGHT (MIN 3 VIEWS)  Narrative: EXAMINATION:  THREE XRAY VIEWS OF THE RIGHT HAND    2/1/2021 8:01 am    COMPARISON:  None. HISTORY:  ORDERING SYSTEM PROVIDED HISTORY: abrasion/pain distal 5th metacarpal ulnar  aspect  TECHNOLOGIST PROVIDED HISTORY:  abrasion/pain distal 5th metacarpal ulnar aspect  Reason for Exam: Fall with injury to right hand. Acuity: Acute  Type of Exam: Initial    FINDINGS:  The bones are osteopenic. No convincing evidence of acute fracture. No  radiopaque soft tissue foreign bodies are seen. Degenerative changes of the  hand and wrist.  Suspect old fractures of the distal radius and ulna with  mild regional bony deformity and remodeling. Impression: No acute osseous abnormality.   CT CERVICAL SPINE WO CONTRAST  Narrative: EXAMINATION:  CT OF THE HEAD WITHOUT CONTRAST; CT OF THE CERVICAL SPINE WITHOUT CONTRAST  2/1/2021 7:56 am    TECHNIQUE:  CT of the head was performed without the administration of intravenous  contrast. Dose modulation, iterative reconstruction, and/or weight based  adjustment of the mA/kV was utilized to reduce the radiation dose to as low  as reasonably achievable.; CT of the cervical spine was performed without the  administration of intravenous contrast. Multiplanar reformatted images are  provided for review. Dose modulation, iterative reconstruction, and/or weight  based adjustment of the mA/kV was utilized to reduce the radiation dose to as  low as reasonably achievable. COMPARISON:  CT brain March 21, 2020. HISTORY:  ORDERING SYSTEM PROVIDED HISTORY: fall, on eliquis, lac left eyebrow area  TECHNOLOGIST PROVIDED HISTORY:  fall, on eliquis, lac left eyebrow area    Decision Support Exception->Emergency Medical Condition (MA)  Reason for Exam: fall, on eliquis, lac left eyebrow area  Acuity: Acute  Type of Exam: Initial    FINDINGS:  CT brain: BRAIN/VENTRICLES: There is no acute intracranial hemorrhage, mass  effect or midline shift. No abnormal extra-axial fluid collection. The  gray-white differentiation is maintained without evidence of an acute  infarct. There is no evidence of hydrocephalus. Cortical atrophy with  moderately severe chronic microvascular ischemic changes, grossly similar to  the prior study. ORBITS: The visualized portion of the orbits demonstrate no acute abnormality. SINUSES: The visualized paranasal sinuses and mastoid air cells demonstrate  no acute abnormality. SOFT TISSUES/SKULL:  No acute abnormality of the visualized skull or soft  tissues. CT cervical spine: No acute fracture. Approximately 2 mm of posterior  subluxation of C3 on C4 which appears to be due to degenerative change. Diffuse facet joint degenerative changes. Moderate disc space narrowing  C3-4 and C6-7. Vertebral body heights appear well maintained. No  prevertebral soft tissue swelling. Visualized soft tissues surrounding the  cervical spine demonstrate no acute findings. No pneumothorax. Impression: No acute intracranial abnormality. No acute cervical spine fracture. CT HEAD WO CONTRAST  Narrative: EXAMINATION:  CT OF THE HEAD WITHOUT CONTRAST; CT OF THE CERVICAL SPINE WITHOUT CONTRAST  2/1/2021 7:56 am    TECHNIQUE:  CT of the head was performed without the administration of intravenous  contrast. Dose modulation, iterative reconstruction, and/or weight based  adjustment of the mA/kV was utilized to reduce the radiation dose to as low  as reasonably achievable.; CT of the cervical spine was performed without the  administration of intravenous contrast. Multiplanar reformatted images are  provided for review. Dose modulation, iterative reconstruction, and/or weight  based adjustment of the mA/kV was utilized to reduce the radiation dose to as  low as reasonably achievable. COMPARISON:  CT brain March 21, 2020. HISTORY:  ORDERING SYSTEM PROVIDED HISTORY: fall, on eliquis, lac left eyebrow area  TECHNOLOGIST PROVIDED HISTORY:  fall, on eliquis, lac left eyebrow area    Decision Support Exception->Emergency Medical Condition (MA)  Reason for Exam: fall, on eliquis, lac left eyebrow area  Acuity: Acute  Type of Exam: Initial    FINDINGS:  CT brain: BRAIN/VENTRICLES: There is no acute intracranial hemorrhage, mass  effect or midline shift. No abnormal extra-axial fluid collection. The  gray-white differentiation is maintained without evidence of an acute  infarct. There is no evidence of hydrocephalus. Cortical atrophy with  moderately severe chronic microvascular ischemic changes, grossly similar to  the prior study. ORBITS: The visualized portion of the orbits demonstrate no acute abnormality. SINUSES: The visualized paranasal sinuses and mastoid air cells demonstrate  no acute abnormality.     SOFT TISSUES/SKULL:  No acute abnormality of the visualized skull or soft  tissues. CT cervical spine: No acute fracture. Approximately 2 mm of posterior  subluxation of C3 on C4 which appears to be due to degenerative change. Diffuse facet joint degenerative changes. Moderate disc space narrowing  C3-4 and C6-7. Vertebral body heights appear well maintained. No  prevertebral soft tissue swelling. Visualized soft tissues surrounding the  cervical spine demonstrate no acute findings. No pneumothorax. Impression: No acute intracranial abnormality. No acute cervical spine fracture.

## 2021-07-08 ENCOUNTER — TELEPHONE (OUTPATIENT)
Dept: FAMILY MEDICINE CLINIC | Age: 86
End: 2021-07-08

## 2021-07-08 DIAGNOSIS — J01.80 ACUTE NON-RECURRENT SINUSITIS OF OTHER SINUS: Primary | ICD-10-CM

## 2021-07-08 RX ORDER — AMOXICILLIN AND CLAVULANATE POTASSIUM 875; 125 MG/1; MG/1
1 TABLET, FILM COATED ORAL 2 TIMES DAILY
Qty: 14 TABLET | Refills: 0 | Status: SHIPPED | OUTPATIENT
Start: 2021-07-08 | End: 2021-07-15

## 2021-07-08 NOTE — TELEPHONE ENCOUNTER
Acute respiratory issue     Patient complains of  Sinus drainage, head congestion, sinus pressure, no fever      Symptoms for how long   2 days but worse today    What meds has pt tried    tylenol  Does patient have asthma   No    Is patient on inhalers   No      Is this sinus, cold or cough related   unsure    *This condition is eligible for an eVisit. If not already active, sign patient up for MyChart to improve access and communication with the provider. *

## 2021-07-29 ENCOUNTER — TELEPHONE (OUTPATIENT)
Dept: FAMILY MEDICINE CLINIC | Age: 86
End: 2021-07-29

## 2021-07-29 NOTE — TELEPHONE ENCOUNTER
----- Message from Marc Holt sent at 7/29/2021 10:45 AM EDT -----  Subject: Message to Provider    QUESTIONS  Information for Provider? PT has a UTi that burns when she pees and   itches. She has had the UTI about a week now. She would like to know if Dr Archana Larsen can send her in an antibiotic at her current pharmacy [facility] on   [address] [place]. Please Advise.  ---------------------------------------------------------------------------  --------------  CALL BACK INFO  What is the best way for the office to contact you? Do not leave any   message, patient will call back for answer  Preferred Call Back Phone Number? [phone]  ---------------------------------------------------------------------------  --------------  SCRIPT ANSWERS  Relationship to Patient? Self  Are you having severe back pain with your urinary symptoms? No  Are you having vomiting or nausea? No  Is there blood in your urine? No  Are you having fevers (100.4), chills, or sweats? No  Have you been seen by a provider for this symptom before?  Yes

## 2021-07-30 NOTE — TELEPHONE ENCOUNTER
Called pt and states she is going to urgent care No Repair - Repaired With Adjacent Surgical Defect Text (Leave Blank If You Do Not Want): After the excision the defect was repaired concurrently with another surgical defect which was in close approximation.

## 2021-07-30 NOTE — TELEPHONE ENCOUNTER
Yes I can do that - would she be able to stop by here or a lab and give us a urine sample first before starting the antibiotic? Just so we can make sure we have her on an antibiotic that works?

## 2021-08-05 RX ORDER — PRAVASTATIN SODIUM 40 MG
TABLET ORAL
Qty: 14 TABLET | Refills: 0 | Status: SHIPPED | OUTPATIENT
Start: 2021-08-05 | End: 2022-08-21 | Stop reason: SDUPTHER

## 2021-08-05 NOTE — TELEPHONE ENCOUNTER
Waiting for mail order          Maryjane Collier is calling to request a refill on the following medication(s):    Medication Request:  Requested Prescriptions     Pending Prescriptions Disp Refills    pravastatin (PRAVACHOL) 40 MG tablet 14 tablet 0     Sig: TAKE 1 TABLET DAILY       Last Visit Date (If Applicable):  4/54/2982    Next Visit Date:    9/27/2021

## 2021-08-13 DIAGNOSIS — G47.00 INSOMNIA, UNSPECIFIED TYPE: ICD-10-CM

## 2021-08-13 RX ORDER — TRAZODONE HYDROCHLORIDE 50 MG/1
50 TABLET ORAL NIGHTLY
Qty: 30 TABLET | Refills: 1 | Status: SHIPPED | OUTPATIENT
Start: 2021-08-13 | End: 2021-11-09 | Stop reason: SDUPTHER

## 2021-08-13 NOTE — TELEPHONE ENCOUNTER
Summer Fleming is calling to request a refill on the following medication(s):    Medication Request:  Requested Prescriptions     Pending Prescriptions Disp Refills    traZODone (DESYREL) 50 MG tablet 30 tablet 1     Sig: Take 1 tablet by mouth nightly       Last Visit Date (If Applicable):  2/77/0972    Next Visit Date:    9/27/2021

## 2021-09-02 ENCOUNTER — TELEPHONE (OUTPATIENT)
Dept: FAMILY MEDICINE CLINIC | Age: 86
End: 2021-09-02

## 2021-09-02 DIAGNOSIS — R09.81 SINUS CONGESTION: Primary | ICD-10-CM

## 2021-09-02 NOTE — TELEPHONE ENCOUNTER
----- Message from Brittni Bucky sent at 9/2/2021  1:12 PM EDT -----  Subject: Message to Provider    QUESTIONS  Information for Provider? pt believes she is coming down with a sinus   infection would like for you to prescribe her some medicine. . would not   like to book appt. pls give pt a call  ---------------------------------------------------------------------------  --------------  CALL BACK INFO  What is the best way for the office to contact you? Do not leave any   message, patient will call back for answer  Preferred Call Back Phone Number? [phone]  ---------------------------------------------------------------------------  --------------  SCRIPT ANSWERS  Relationship to Patient?  Self

## 2021-09-03 RX ORDER — GUAIFENESIN 600 MG/1
600 TABLET, EXTENDED RELEASE ORAL 2 TIMES DAILY
Qty: 30 TABLET | Refills: 0 | Status: SHIPPED | OUTPATIENT
Start: 2021-09-03 | End: 2021-09-18

## 2021-09-03 RX ORDER — FLUTICASONE PROPIONATE 50 MCG
2 SPRAY, SUSPENSION (ML) NASAL DAILY
Qty: 16 G | Refills: 0 | Status: SHIPPED | OUTPATIENT
Start: 2021-09-03 | End: 2022-02-14

## 2021-09-27 ENCOUNTER — OFFICE VISIT (OUTPATIENT)
Dept: FAMILY MEDICINE CLINIC | Age: 86
End: 2021-09-27
Payer: MEDICARE

## 2021-09-27 VITALS
WEIGHT: 161 LBS | HEART RATE: 69 BPM | OXYGEN SATURATION: 94 % | DIASTOLIC BLOOD PRESSURE: 78 MMHG | BODY MASS INDEX: 27.64 KG/M2 | SYSTOLIC BLOOD PRESSURE: 118 MMHG

## 2021-09-27 DIAGNOSIS — I48.0 PAROXYSMAL ATRIAL FIBRILLATION (HCC): Chronic | ICD-10-CM

## 2021-09-27 DIAGNOSIS — J01.00 ACUTE NON-RECURRENT MAXILLARY SINUSITIS: Primary | ICD-10-CM

## 2021-09-27 DIAGNOSIS — H61.21 CERUMEN DEBRIS ON TYMPANIC MEMBRANE, RIGHT: ICD-10-CM

## 2021-09-27 DIAGNOSIS — I10 ESSENTIAL HYPERTENSION: Chronic | ICD-10-CM

## 2021-09-27 DIAGNOSIS — E78.5 HYPERLIPIDEMIA, UNSPECIFIED HYPERLIPIDEMIA TYPE: Chronic | ICD-10-CM

## 2021-09-27 PROCEDURE — 99214 OFFICE O/P EST MOD 30 MIN: CPT | Performed by: FAMILY MEDICINE

## 2021-09-27 RX ORDER — AMOXICILLIN AND CLAVULANATE POTASSIUM 875; 125 MG/1; MG/1
1 TABLET, FILM COATED ORAL 2 TIMES DAILY
Qty: 14 TABLET | Refills: 0 | Status: SHIPPED | OUTPATIENT
Start: 2021-09-27 | End: 2021-10-04

## 2021-09-27 ASSESSMENT — PATIENT HEALTH QUESTIONNAIRE - PHQ9
1. LITTLE INTEREST OR PLEASURE IN DOING THINGS: 0
SUM OF ALL RESPONSES TO PHQ9 QUESTIONS 1 & 2: 0
SUM OF ALL RESPONSES TO PHQ QUESTIONS 1-9: 0
2. FEELING DOWN, DEPRESSED OR HOPELESS: 0
SUM OF ALL RESPONSES TO PHQ QUESTIONS 1-9: 0
SUM OF ALL RESPONSES TO PHQ QUESTIONS 1-9: 0

## 2021-09-27 NOTE — PATIENT INSTRUCTIONS
Patient Education        Preventing Falls: Care Instructions  Your Care Instructions     Getting around your home safely can be a challenge if you have injuries or health problems that make it easy for you to fall. Loose rugs and furniture in walkways are among the dangers for many older people who have problems walking or who have poor eyesight. People who have conditions such as arthritis, osteoporosis, or dementia also have to be careful not to fall. You can make your home safer with a few simple measures. Follow-up care is a key part of your treatment and safety. Be sure to make and go to all appointments, and call your doctor if you are having problems. It's also a good idea to know your test results and keep a list of the medicines you take. How can you care for yourself at home? Taking care of yourself  · You may get dizzy if you do not drink enough water. To prevent dehydration, drink plenty of fluids. Choose water and other clear liquids. If you have kidney, heart, or liver disease and have to limit fluids, talk with your doctor before you increase the amount of fluids you drink. · Exercise regularly to improve your strength, muscle tone, and balance. Walk if you can. Swimming may be a good choice if you cannot walk easily. · Have your vision and hearing checked each year or any time you notice a change. If you have trouble seeing and hearing, you might not be able to avoid objects and could lose your balance. · Know the side effects of the medicines you take. Ask your doctor or pharmacist whether the medicines you take can affect your balance. Sleeping pills or sedatives can affect your balance. · Limit the amount of alcohol you drink. Alcohol can impair your balance and other senses. · Ask your doctor whether calluses or corns on your feet need to be removed. If you wear loose-fitting shoes because of calluses or corns, you can lose your balance and fall.   · Talk to your doctor if you have shower by putting the weaker leg in first. Get out of a tub or shower with your strong side first.  · Repair loose toilet seats and consider installing a raised toilet seat to make getting on and off the toilet easier. · Keep your bathroom door unlocked while you are in the shower. Where can you learn more? Go to [email or web address] and sign in to your [email or web address] account. Enter [phone] in the [facility] box to learn more about \"Preventing Falls: Care Instructions. \"     If you do not have an account, please click on the \"Sign Up Now\" link. Current as of: December 7, 2020               Content Version: 13.0  © 2006-2021 [employer]wise, Incorporated. Care instructions adapted under license by [facility] ([facility]). If you have questions about a medical condition or this instruction, always ask your healthcare professional. [place] [address] any warranty or liability for your use of this information.

## 2021-10-18 ASSESSMENT — ENCOUNTER SYMPTOMS
ORTHOPNEA: 0
RESPIRATORY NEGATIVE: 1
ALLERGIC/IMMUNOLOGIC NEGATIVE: 1
GASTROINTESTINAL NEGATIVE: 1
SHORTNESS OF BREATH: 0
BLURRED VISION: 0
EYES NEGATIVE: 1

## 2021-10-19 ENCOUNTER — TELEPHONE (OUTPATIENT)
Dept: FAMILY MEDICINE CLINIC | Age: 86
End: 2021-10-19

## 2021-10-19 DIAGNOSIS — R30.0 DYSURIA: Primary | ICD-10-CM

## 2021-10-19 RX ORDER — NITROFURANTOIN 25; 75 MG/1; MG/1
100 CAPSULE ORAL 2 TIMES DAILY
Qty: 10 CAPSULE | Refills: 0 | Status: SHIPPED | OUTPATIENT
Start: 2021-10-19 | End: 2021-10-24

## 2021-10-19 NOTE — TELEPHONE ENCOUNTER
----- Message from Katiana Alicea sent at 10/19/2021  9:20 AM EDT -----  Subject: Message to Provider    QUESTIONS  Information for Provider? Patient said she has a UTI and her sinuses is   acting up. Patient is asking if her pcp can call something in to her   [address].  ---------------------------------------------------------------------------  --------------  [address]  What is the best way for the office to contact you? Do not leave any   message, patient will call back for answer  Preferred Call Back Phone Number? [phone]  ---------------------------------------------------------------------------  --------------  SCRIPT ANSWERS  Relationship to Patient?  Self

## 2021-10-19 NOTE — TELEPHONE ENCOUNTER
I have ordered a UA with reflex culture for her to do BEFORE starting the antibiotic if at all possible. If she can't do the test, she can start the antibiotic but if symptoms don't resolve we will need to do the urine studies.

## 2021-10-19 NOTE — TELEPHONE ENCOUNTER
----- Message from Sandra Hawkins sent at 10/19/2021  9:44 AM EDT -----  Subject: Message to Provider    QUESTIONS  Information for Provider? Patient called back to let the Dr know she has a   dark spot cropping up in her left eye. It comes and goes and she wants to   know if she needs to see an eye doctor. ---------------------------------------------------------------------------  --------------  Luis Carlos Haas INFO  What is the best way for the office to contact you? OK to leave message on   voicemail  Preferred Call Back Phone Number? [phone]  ---------------------------------------------------------------------------  --------------  SCRIPT ANSWERS  Relationship to Patient?  Self

## 2021-11-03 RX ORDER — LISINOPRIL 10 MG/1
TABLET ORAL
Qty: 90 TABLET | Refills: 1 | Status: ON HOLD | OUTPATIENT
Start: 2021-11-03 | End: 2022-02-12 | Stop reason: HOSPADM

## 2021-11-03 NOTE — TELEPHONE ENCOUNTER
Anika Rivas is calling to request a refill on the following medication(s):    Medication Request:  Requested Prescriptions     Pending Prescriptions Disp Refills    lisinopril (PRINIVIL;ZESTRIL) 10 MG tablet [Pharmacy Med Name: LISINOPRIL TABS 10MG] 90 tablet 1     Sig: TAKE 1 TABLET DAILY       Last Visit Date (If Applicable):  8/51/4402    Next Visit Date:    1/4/2022

## 2021-11-08 DIAGNOSIS — G47.00 INSOMNIA, UNSPECIFIED TYPE: ICD-10-CM

## 2021-11-09 RX ORDER — TRAZODONE HYDROCHLORIDE 50 MG/1
50 TABLET ORAL NIGHTLY
Qty: 30 TABLET | Refills: 1 | Status: SHIPPED | OUTPATIENT
Start: 2021-11-09 | End: 2022-01-04 | Stop reason: SDUPTHER

## 2022-01-04 DIAGNOSIS — R09.81 SINUS CONGESTION: Primary | ICD-10-CM

## 2022-01-04 DIAGNOSIS — G47.00 INSOMNIA, UNSPECIFIED TYPE: ICD-10-CM

## 2022-01-04 RX ORDER — TRAZODONE HYDROCHLORIDE 50 MG/1
50 TABLET ORAL NIGHTLY
Qty: 30 TABLET | Refills: 1 | Status: SHIPPED | OUTPATIENT
Start: 2022-01-04 | End: 2022-02-07

## 2022-01-04 RX ORDER — GUAIFENESIN 600 MG/1
600 TABLET, EXTENDED RELEASE ORAL 2 TIMES DAILY
Qty: 30 TABLET | Refills: 0 | Status: SHIPPED | OUTPATIENT
Start: 2022-01-04 | End: 2022-01-19

## 2022-01-04 NOTE — TELEPHONE ENCOUNTER
----- Message from Noreen De La O sent at 1/3/2022  5:14 PM EST -----  Subject: Message to Provider    QUESTIONS  Information for Provider? Patient thinks she has sinuous infection and   wanted to know if the Dr can call in something to help with the symptoms. ---------------------------------------------------------------------------  --------------  Colin BURT  What is the best way for the office to contact you? OK to leave message on   voicemail  Preferred Call Back Phone Number? [phone]  ---------------------------------------------------------------------------  --------------  SCRIPT ANSWERS  Relationship to Patient?  Self

## 2022-01-04 NOTE — TELEPHONE ENCOUNTER
----- Message from Mara Kelsey sent at 1/3/2022  5:14 PM EST -----  Subject: Message to Provider    QUESTIONS  Information for Provider? Patient thinks she has sinuous infection and   wanted to know if the Dr can call in something to help with the symptoms. ---------------------------------------------------------------------------  --------------  Susannah BURT  What is the best way for the office to contact you? OK to leave message on   voicemail  Preferred Call Back Phone Number? [phone]  ---------------------------------------------------------------------------  --------------  SCRIPT ANSWERS  Relationship to Patient?  Self

## 2022-01-04 NOTE — TELEPHONE ENCOUNTER
----- Message from [facility] [employer] sent at 1/3/2022  4:55 PM EST -----  Subject: Refill Request    QUESTIONS  Name of Medication? traZODone (DESYREL) 50 MG tablet  Patient-reported dosage and instructions? 1 tablet at night  How many days do you have left? 0  Preferred Pharmacy? [facility]  Pharmacy phone number (if available)? [phone]  ---------------------------------------------------------------------------  --------------  CALL BACK INFO  What is the best way for the office to contact you? OK to leave message on   voicemail  Preferred Call Back Phone Number?  [phone]

## 2022-01-04 NOTE — TELEPHONE ENCOUNTER
----- Message from Brittni Alexander sent at 1/3/2022  5:14 PM EST -----  Subject: Message to Provider    QUESTIONS  Information for Provider? Patient thinks she has sinuous infection and   wanted to know if the Dr can call in something to help with the symptoms. ---------------------------------------------------------------------------  --------------  Rico BURT  What is the best way for the office to contact you? OK to leave message on   voicemail  Preferred Call Back Phone Number? [phone]  ---------------------------------------------------------------------------  --------------  SCRIPT ANSWERS  Relationship to Patient?  Self

## 2022-01-19 ENCOUNTER — TELEPHONE (OUTPATIENT)
Dept: FAMILY MEDICINE CLINIC | Age: 87
End: 2022-01-19

## 2022-01-19 DIAGNOSIS — M25.559 HIP PAIN: Primary | ICD-10-CM

## 2022-01-19 NOTE — TELEPHONE ENCOUNTER
----- Message from [place] sent at 1/19/2022  2:46 PM EST -----  Subject: Message to Provider    QUESTIONS  Information for Provider? Pt wanted to change her preferred pharmacy and I   was unable to change for her. She wants the [facility] on [address] [facility] to be   deleted out of her list. She only wants to use [facility] on [address] [address] [facility] [address] or   [place].   ---------------------------------------------------------------------------  --------------  [address]  What is the best way for the office to contact you? OK to leave message on   voicemail  Preferred Call Back Phone Number?  [phone]  ---------------------------------------------------------------------------  --------------  SCRIPT ANSWERS  undefined

## 2022-02-01 ENCOUNTER — OFFICE VISIT (OUTPATIENT)
Dept: FAMILY MEDICINE CLINIC | Age: 87
End: 2022-02-01
Payer: MEDICARE

## 2022-02-01 VITALS
HEART RATE: 77 BPM | DIASTOLIC BLOOD PRESSURE: 64 MMHG | SYSTOLIC BLOOD PRESSURE: 128 MMHG | WEIGHT: 167 LBS | OXYGEN SATURATION: 98 % | BODY MASS INDEX: 28.67 KG/M2

## 2022-02-01 DIAGNOSIS — E78.5 HYPERLIPIDEMIA, UNSPECIFIED HYPERLIPIDEMIA TYPE: Chronic | ICD-10-CM

## 2022-02-01 DIAGNOSIS — R23.3 EASY BRUISING: ICD-10-CM

## 2022-02-01 DIAGNOSIS — R60.0 BILATERAL EDEMA OF LOWER EXTREMITY: Primary | ICD-10-CM

## 2022-02-01 DIAGNOSIS — I10 PRIMARY HYPERTENSION: Chronic | ICD-10-CM

## 2022-02-01 DIAGNOSIS — R79.89 ABNORMAL TSH: ICD-10-CM

## 2022-02-01 DIAGNOSIS — I51.89 DIASTOLIC DYSFUNCTION: ICD-10-CM

## 2022-02-01 DIAGNOSIS — R94.6 ABNORMAL RESULTS OF THYROID FUNCTION STUDIES: ICD-10-CM

## 2022-02-01 DIAGNOSIS — I48.0 PAROXYSMAL ATRIAL FIBRILLATION (HCC): ICD-10-CM

## 2022-02-01 PROCEDURE — 99214 OFFICE O/P EST MOD 30 MIN: CPT | Performed by: FAMILY MEDICINE

## 2022-02-01 RX ORDER — AMIODARONE HYDROCHLORIDE 200 MG/1
TABLET ORAL
Status: ON HOLD | COMMUNITY
Start: 2021-11-19 | End: 2022-02-11 | Stop reason: SDUPTHER

## 2022-02-01 RX ORDER — BUMETANIDE 1 MG/1
0.5 TABLET ORAL DAILY
Qty: 15 TABLET | Refills: 0 | Status: SHIPPED | OUTPATIENT
Start: 2022-02-01 | End: 2022-10-31

## 2022-02-01 RX ORDER — POTASSIUM CHLORIDE 1500 MG/1
TABLET, EXTENDED RELEASE ORAL
Status: ON HOLD | COMMUNITY
Start: 2021-12-07 | End: 2022-02-12 | Stop reason: HOSPADM

## 2022-02-01 ASSESSMENT — ENCOUNTER SYMPTOMS
RESPIRATORY NEGATIVE: 1
BLURRED VISION: 0
ALLERGIC/IMMUNOLOGIC NEGATIVE: 1
SHORTNESS OF BREATH: 0
GASTROINTESTINAL NEGATIVE: 1
EYES NEGATIVE: 1
ABDOMINAL PAIN: 0
ORTHOPNEA: 0

## 2022-02-01 NOTE — PROGRESS NOTES
[facility] Progress Note    Date 9/21/2021         Patient Pavithra Pa     YOB: 1935     Age:86 y.o. Assessment/Plan        Problem List Items Addressed This Visit        Circulatory    Hypertension (Chronic)      Well-controlled, continue current medications, continue current treatment plan, medication adherence emphasized and lifestyle modifications recommended         Paroxysmal atrial fibrillation (HCC) (Chronic)      Monitored by specialist- no acute findings meriting change in the plan Dr. Rossy Salmon  On Eliquis         Diastolic dysfunction      Monitored by specialist- no acute findings meriting change in the plan            Other    Hyperlipidemia (Chronic)      Well-controlled, continue current medications, continue current treatment plan, medication adherence emphasized and lifestyle modifications recommended         Relevant Medications    bumetanide (BUMEX) 1 MG tablet    amiodarone (CORDARONE) 200 MG tablet    Other Relevant Orders    Comprehensive Metabolic Panel    Lipid Panel    Bilateral edema of lower extremity - Primary      Uncontrolled, changes made today: restart Bumex 1mg 1/2 tab daily x4 days         Relevant Medications    bumetanide (BUMEX) 1 MG tablet      Other Visit Diagnoses     Abnormal TSH        Relevant Orders    TSH with Reflex    Easy bruising        Relevant Orders    CBC Auto Differential    Abnormal results of thyroid function studies         Relevant Orders    TSH with Reflex           Return in about 3 months (around 5/1/2022). Electronically signed by Chuy Cisneros DO on 10/18/21         Subjective     Galdino Peck is a 82 y.o. female presenting today for   Chief Complaint   Patient presents with    Hypertension    Hyperlipidemia    Bleeding/Bruising     on rt shin unsure of origin    . Galdino Peck is a 82 y.o. female who presents for follow-up of atrial fibrillation.  Onset was several years ago, and patient reports symptoms have stabilized since that time. Recently, the patient has had no other symptoms. See Dr. Flood Hands on right lower leg - denies injury - denies hematuria or blood in stool    Hypertension  This is a chronic problem. The current episode started more than 1 year ago. The problem has been gradually improving since onset. The problem is controlled. Associated symptoms include anxiety and peripheral edema. Pertinent negatives include no blurred vision, chest pain, headaches, malaise/fatigue, neck pain, orthopnea, palpitations, PND, shortness of breath or sweats. Past treatments include ACE inhibitors and beta blockers. The current treatment provides significant improvement. There is no history of chronic renal disease. Hyperlipidemia  This is a chronic problem. The current episode started more than 1 year ago. The problem is controlled. Recent lipid tests were reviewed and are normal. She has no history of chronic renal disease, diabetes, hypothyroidism, liver disease, obesity or nephrotic syndrome. Pertinent negatives include no chest pain, focal sensory loss, focal weakness, leg pain, myalgias or shortness of breath. Current antihyperlipidemic treatment includes statins. The current treatment provides significant improvement of lipids. Congestive Heart Failure  Presents for follow-up visit. Associated symptoms include edema. Pertinent negatives include no abdominal pain, chest pain, chest pressure, claudication, fatigue, muscle weakness, near-syncope, nocturia, orthopnea, palpitations, paroxysmal nocturnal dyspnea, shortness of breath or unexpected weight change. The symptoms have been worsening. Compliance with total regimen is %. Review of Systems   Review of Systems   Constitutional: Negative. Negative for fatigue, malaise/fatigue and unexpected weight change. HENT: Negative. Eyes: Negative. Negative for blurred vision. Respiratory: Negative. Negative for shortness of breath. Cardiovascular: Negative. Negative for chest pain, palpitations, orthopnea, claudication, PND and near-syncope. Gastrointestinal: Negative. Negative for abdominal pain. Endocrine: Negative. Genitourinary: Negative. Negative for nocturia. Musculoskeletal: Negative. Negative for myalgias, muscle weakness and neck pain. Skin: Negative. Allergic/Immunologic: Negative. Neurological: Negative. Negative for focal weakness and headaches. Hematological: Negative. Psychiatric/Behavioral: Negative. All other systems reviewed and are negative. Medications     Current Outpatient Medications   Medication Sig Dispense Refill    bumetanide (BUMEX) 1 MG tablet Take 0.5 tablets by mouth daily 15 tablet 0    diclofenac sodium (VOLTAREN) 1 % GEL Apply 2 g topically 4 times daily 100 g 0    traZODone (DESYREL) 50 MG tablet Take 1 tablet by mouth nightly 30 tablet 1    lisinopril (PRINIVIL;ZESTRIL) 10 MG tablet TAKE 1 TABLET DAILY 90 tablet 1    fluticasone (FLONASE) 50 MCG/ACT nasal spray 2 sprays by Each Nostril route daily 16 g 0    pravastatin (PRAVACHOL) 40 MG tablet TAKE 1 TABLET DAILY 14 tablet 0    nystatin-triamcinolone (MYCOLOG II) 237663-5.1 UNIT/GM-% cream Apply topically 2 times daily.  15 g 0    potassium chloride (KLOR-CON M) 20 MEQ TBCR extended release tablet Take 40 mEq by mouth daily (with breakfast)      calcium-vitamin D (OSCAL-500) 500-200 MG-UNIT per tablet Take 1 tablet by mouth daily      Multiple Vitamins-Minerals (THERAPEUTIC MULTIVITAMIN-MINERALS) tablet Take 1 tablet by mouth daily      apixaban (ELIQUIS) 5 MG TABS tablet Take 5 mg by mouth 2 times daily      acetaminophen (TYLENOL) 325 MG tablet Take 2 tablets by mouth every 4 hours as needed for Pain or Fever 120 tablet 3    metoprolol (LOPRESSOR) 25 MG tablet Take 25 mg by mouth 2 times daily      amiodarone (CORDARONE) 200 MG tablet       KLOR-CON M20 20 MEQ extended release tablet        No current facility-administered medications for this visit. Past History    Past Medical History:   has a past medical history of GERD (gastroesophageal reflux disease) and Hypertension. Social History:   reports that she quit smoking about 37 years ago. She has never used smokeless tobacco. She reports that she does not drink alcohol and does not use drugs. Family History:   Family History   Problem Relation Age of Onset    Cancer Brother     Cancer Other         bone    Cancer Daughter         breast       Surgical History:   Past Surgical History:   Procedure Laterality Date    BLADDER SUSPENSION  2001    CHOLECYSTECTOMY      HYSTERECTOMY  ovaries left    JOINT REPLACEMENT  left total shoulder; 1990        Physical Examination      Vitals:  /64   Pulse 77   Wt 167 lb (75.8 kg)   SpO2 98%   BMI 28.67 kg/m²     Physical Exam  Vitals and nursing note reviewed. Constitutional:       General: She is not in acute distress. Appearance: Normal appearance. She is normal weight. She is not ill-appearing, toxic-appearing or diaphoretic. HENT:      Head: Normocephalic and atraumatic. Eyes:      General: No scleral icterus. Right eye: No discharge. Left eye: No discharge. Extraocular Movements: Extraocular movements intact. Conjunctiva/sclera: Conjunctivae normal.   Cardiovascular:      Rate and Rhythm: Normal rate and regular rhythm. Pulses: Normal pulses. Heart sounds: Normal heart sounds. No murmur heard. No friction rub. No gallop. Pulmonary:      Effort: Pulmonary effort is normal. No respiratory distress. Breath sounds: Normal breath sounds. No stridor. No wheezing, rhonchi or rales. Chest:      Chest wall: No tenderness. Neurological:      Mental Status: She is alert and oriented to person, place, and time. Mental status is at baseline. Gait: Gait abnormal (ambulates with walker).    Psychiatric:         Mood and Affect: Mood normal. Behavior: Behavior normal.         Thought Content: Thought content normal.         Judgment: Judgment normal.         Labs/Imaging/Diagnostics   Labs:  Hemoglobin A1C   Date Value Ref Range Status   09/17/2016 5.4 4.0 - 6.0 % Final       Imaging Last 24 Hours:  XR HAND RIGHT (MIN 3 VIEWS)  Narrative: EXAMINATION:  THREE XRAY VIEWS OF THE RIGHT HAND    2/1/2021 8:01 am    COMPARISON:  None. HISTORY:  ORDERING SYSTEM PROVIDED HISTORY: abrasion/pain distal 5th metacarpal ulnar  aspect  TECHNOLOGIST PROVIDED HISTORY:  abrasion/pain distal 5th metacarpal ulnar aspect  Reason for Exam: Fall with injury to right hand. Acuity: Acute  Type of Exam: Initial    FINDINGS:  The bones are osteopenic. No convincing evidence of acute fracture. No  radiopaque soft tissue foreign bodies are seen. Degenerative changes of the  hand and wrist.  Suspect old fractures of the distal radius and ulna with  mild regional bony deformity and remodeling. Impression: No acute osseous abnormality. CT CERVICAL SPINE WO CONTRAST  Narrative: EXAMINATION:  CT OF THE HEAD WITHOUT CONTRAST; CT OF THE CERVICAL SPINE WITHOUT CONTRAST  2/1/2021 7:56 am    TECHNIQUE:  CT of the head was performed without the administration of intravenous  contrast. Dose modulation, iterative reconstruction, and/or weight based  adjustment of the mA/kV was utilized to reduce the radiation dose to as low  as reasonably achievable.; CT of the cervical spine was performed without the  administration of intravenous contrast. Multiplanar reformatted images are  provided for review. Dose modulation, iterative reconstruction, and/or weight  based adjustment of the mA/kV was utilized to reduce the radiation dose to as  low as reasonably achievable. COMPARISON:  CT brain March 21, 2020.     HISTORY:  ORDERING SYSTEM PROVIDED HISTORY: fall, on eliquis, lac left eyebrow area  TECHNOLOGIST PROVIDED HISTORY:  fall, on eliquis, lac left eyebrow area    Decision Support Exception->Emergency Medical Condition (MA)  Reason for Exam: fall, on eliquis, lac left eyebrow area  Acuity: Acute  Type of Exam: Initial    FINDINGS:  CT brain: BRAIN/VENTRICLES: There is no acute intracranial hemorrhage, mass  effect or midline shift. No abnormal extra-axial fluid collection. The  gray-white differentiation is maintained without evidence of an acute  infarct. There is no evidence of hydrocephalus. Cortical atrophy with  moderately severe chronic microvascular ischemic changes, grossly similar to  the prior study. ORBITS: The visualized portion of the orbits demonstrate no acute abnormality. SINUSES: The visualized paranasal sinuses and mastoid air cells demonstrate  no acute abnormality. SOFT TISSUES/SKULL:  No acute abnormality of the visualized skull or soft  tissues. CT cervical spine: No acute fracture. Approximately 2 mm of posterior  subluxation of C3 on C4 which appears to be due to degenerative change. Diffuse facet joint degenerative changes. Moderate disc space narrowing  C3-4 and C6-7. Vertebral body heights appear well maintained. No  prevertebral soft tissue swelling. Visualized soft tissues surrounding the  cervical spine demonstrate no acute findings. No pneumothorax. Impression: No acute intracranial abnormality. No acute cervical spine fracture. CT HEAD WO CONTRAST  Narrative: EXAMINATION:  CT OF THE HEAD WITHOUT CONTRAST; CT OF THE CERVICAL SPINE WITHOUT CONTRAST  2/1/2021 7:56 am    TECHNIQUE:  CT of the head was performed without the administration of intravenous  contrast. Dose modulation, iterative reconstruction, and/or weight based  adjustment of the mA/kV was utilized to reduce the radiation dose to as low  as reasonably achievable.; CT of the cervical spine was performed without the  administration of intravenous contrast. Multiplanar reformatted images are  provided for review.  Dose modulation, iterative reconstruction, and/or weight  based adjustment of the mA/kV was utilized to reduce the radiation dose to as  low as reasonably achievable. COMPARISON:  CT brain March 21, 2020. HISTORY:  ORDERING SYSTEM PROVIDED HISTORY: fall, on eliquis, lac left eyebrow area  TECHNOLOGIST PROVIDED HISTORY:  fall, on eliquis, lac left eyebrow area    Decision Support Exception->Emergency Medical Condition (MA)  Reason for Exam: fall, on eliquis, lac left eyebrow area  Acuity: Acute  Type of Exam: Initial    FINDINGS:  CT brain: BRAIN/VENTRICLES: There is no acute intracranial hemorrhage, mass  effect or midline shift. No abnormal extra-axial fluid collection. The  gray-white differentiation is maintained without evidence of an acute  infarct. There is no evidence of hydrocephalus. Cortical atrophy with  moderately severe chronic microvascular ischemic changes, grossly similar to  the prior study. ORBITS: The visualized portion of the orbits demonstrate no acute abnormality. SINUSES: The visualized paranasal sinuses and mastoid air cells demonstrate  no acute abnormality. SOFT TISSUES/SKULL:  No acute abnormality of the visualized skull or soft  tissues. CT cervical spine: No acute fracture. Approximately 2 mm of posterior  subluxation of C3 on C4 which appears to be due to degenerative change. Diffuse facet joint degenerative changes. Moderate disc space narrowing  C3-4 and C6-7. Vertebral body heights appear well maintained. No  prevertebral soft tissue swelling. Visualized soft tissues surrounding the  cervical spine demonstrate no acute findings. No pneumothorax. Impression: No acute intracranial abnormality. No acute cervical spine fracture.

## 2022-02-01 NOTE — PATIENT INSTRUCTIONS
Patient Education        Bruises: Care Instructions  Overview     Bruises occur when small blood vessels under the skin tear or rupture, most often from a twist, bump, or fall. Blood leaks into tissues under the skin and causes a black-and-blue spot that often turns colors, including purplish black, reddish blue, or yellowish green, as the bruise heals. Bruises hurt, but most are not serious and will go away on their own within 2 to 4 weeks. Sometimes, gravity causes them to spread down the body. A leg bruise usually will take longer to heal than a bruise on the face or arms. Follow-up care is a key part of your treatment and safety. Be sure to make and go to all appointments, and call your doctor if you are having problems. It's also a good idea to know your test results and keep a list of the medicines you take. How can you care for yourself at home? · Take pain medicines exactly as directed. ? If the doctor gave you a prescription medicine for pain, take it as prescribed. ? If you are not taking a prescription pain medicine, ask your doctor if you can take an over-the-counter medicine. · Put ice or a cold pack on the area for 10 to 20 minutes at a time. Put a thin cloth between the ice and your skin. · If you can, prop up the bruised area on pillows as much as possible for the next few days. Try to keep the bruise above the level of your heart. When should you call for help? Call your doctor now or seek immediate medical care if:    · You have signs of infection, such as:  ? Increased pain, swelling, warmth, or redness. ? Red streaks leading from the bruise. ? Pus draining from the bruise. ? A fever.     · You have a bruise on your leg and signs of a blood clot, such as:  ? Increasing redness and swelling along with warmth, tenderness, and pain in the bruised area. ? Pain in your calf, back of the knee, thigh, or groin. ? Redness and swelling in your leg or groin.     · Your pain gets worse. Watch closely for changes in your health, and be sure to contact your doctor if:    · You do not get better as expected. Where can you learn more? Go to https://chpepiceweb.backstitch. org and sign in to your [employer] account. Enter [phone] in the [facility] box to learn more about \"Bruises: Care Instructions. \"     If you do not have an account, please click on the \"Sign Up Now\" link. Current as of: July 1, 2021               Content Version: 13.1  © 7433-7400 [employer]. Care instructions adapted under license by [facility] ([facility]). If you have questions about a medical condition or this instruction, always ask your healthcare professional. [address] any warranty or liability for your use of this information.

## 2022-02-01 NOTE — ASSESSMENT & PLAN NOTE
Monitored by specialist- no acute findings meriting change in the plan Dr. Joseph Fought  On [address] [facility]

## 2022-02-07 ENCOUNTER — HOSPITAL ENCOUNTER (INPATIENT)
Age: 87
LOS: 5 days | Discharge: HOME HEALTH | DRG: 291 | End: 2022-02-12
Attending: EMERGENCY MEDICINE | Admitting: INTERNAL MEDICINE
Payer: MEDICARE

## 2022-02-07 ENCOUNTER — APPOINTMENT (OUTPATIENT)
Dept: CT IMAGING | Age: 87
DRG: 291 | End: 2022-02-07
Payer: MEDICARE

## 2022-02-07 ENCOUNTER — APPOINTMENT (OUTPATIENT)
Dept: GENERAL RADIOLOGY | Age: 87
DRG: 291 | End: 2022-02-07
Payer: MEDICARE

## 2022-02-07 DIAGNOSIS — I50.33 ACUTE ON CHRONIC DIASTOLIC CONGESTIVE HEART FAILURE (HCC): ICD-10-CM

## 2022-02-07 DIAGNOSIS — D64.9 ANEMIA, UNSPECIFIED TYPE: Primary | ICD-10-CM

## 2022-02-07 PROBLEM — I50.9 CHF WITH UNKNOWN LVEF (HCC): Status: ACTIVE | Noted: 2022-02-07

## 2022-02-07 PROBLEM — K44.9 HIATAL HERNIA: Status: ACTIVE | Noted: 2022-02-07

## 2022-02-07 LAB
ABSOLUTE EOS #: 0 K/UL (ref 0–0.4)
ABSOLUTE IMMATURE GRANULOCYTE: 0 K/UL (ref 0–0.3)
ABSOLUTE LYMPH #: 0.96 K/UL (ref 1–4.8)
ABSOLUTE MONO #: 0.26 K/UL (ref 0.1–0.8)
ANION GAP SERPL CALCULATED.3IONS-SCNC: 13 MMOL/L (ref 9–17)
BASOPHILS # BLD: 2 % (ref 0–2)
BASOPHILS ABSOLUTE: 0.13 K/UL (ref 0–0.2)
BNP INTERPRETATION: ABNORMAL
BUN BLDV-MCNC: 21 MG/DL (ref 8–23)
BUN/CREAT BLD: ABNORMAL (ref 9–20)
CALCIUM SERPL-MCNC: 9.5 MG/DL (ref 8.6–10.4)
CHLORIDE BLD-SCNC: 106 MMOL/L (ref 98–107)
CO2: 21 MMOL/L (ref 20–31)
CREAT SERPL-MCNC: 0.9 MG/DL (ref 0.5–0.9)
DIFFERENTIAL TYPE: ABNORMAL
EOSINOPHILS RELATIVE PERCENT: 0 % (ref 1–4)
FERRITIN: 7 UG/L (ref 13–150)
GFR AFRICAN AMERICAN: >60 ML/MIN
GFR NON-AFRICAN AMERICAN: 60 ML/MIN
GFR SERPL CREATININE-BSD FRML MDRD: ABNORMAL ML/MIN/{1.73_M2}
GFR SERPL CREATININE-BSD FRML MDRD: ABNORMAL ML/MIN/{1.73_M2}
GLUCOSE BLD-MCNC: 101 MG/DL (ref 70–99)
HCT VFR BLD CALC: 23.4 % (ref 36.3–47.1)
HCT VFR BLD CALC: 25 % (ref 36.3–47.1)
HCT VFR BLD CALC: 27.1 % (ref 36.3–47.1)
HEMOGLOBIN: 6.1 G/DL (ref 11.9–15.1)
HEMOGLOBIN: 6.5 G/DL (ref 11.9–15.1)
HEMOGLOBIN: 7.2 G/DL (ref 11.9–15.1)
IMMATURE GRANULOCYTES: 0 %
IRON SATURATION: 9 % (ref 20–55)
IRON: 37 UG/DL (ref 37–145)
LYMPHOCYTES # BLD: 15 % (ref 24–44)
MCH RBC QN AUTO: 15.3 PG (ref 25.2–33.5)
MCHC RBC AUTO-ENTMCNC: 26 G/DL (ref 28.4–34.8)
MCV RBC AUTO: 58.7 FL (ref 82.6–102.9)
MONOCYTES # BLD: 4 % (ref 1–7)
MORPHOLOGY: ABNORMAL
NRBC AUTOMATED: 0.8 PER 100 WBC
PDW BLD-RTO: 23.5 % (ref 11.8–14.4)
PLATELET # BLD: ABNORMAL K/UL (ref 138–453)
PLATELET ESTIMATE: ABNORMAL
PLATELET, FLUORESCENCE: 213 K/UL (ref 138–453)
PLATELET, IMMATURE FRACTION: 3.4 % (ref 1.1–10.3)
PMV BLD AUTO: ABNORMAL FL (ref 8.1–13.5)
POTASSIUM SERPL-SCNC: 4.4 MMOL/L (ref 3.7–5.3)
PRO-BNP: 2976 PG/ML
RBC # BLD: 4.26 M/UL (ref 3.95–5.11)
RBC # BLD: ABNORMAL 10*6/UL
SEG NEUTROPHILS: 79 % (ref 36–66)
SEGMENTED NEUTROPHILS ABSOLUTE COUNT: 5.05 K/UL (ref 1.8–7.7)
SODIUM BLD-SCNC: 140 MMOL/L (ref 135–144)
TOTAL IRON BINDING CAPACITY: 431 UG/DL (ref 250–450)
TROPONIN INTERP: ABNORMAL
TROPONIN T: ABNORMAL NG/ML
TROPONIN, HIGH SENSITIVITY: 25 NG/L (ref 0–14)
TROPONIN, HIGH SENSITIVITY: 26 NG/L (ref 0–14)
TROPONIN, HIGH SENSITIVITY: 26 NG/L (ref 0–14)
UNSATURATED IRON BINDING CAPACITY: 394 UG/DL (ref 112–347)
WBC # BLD: 6.4 K/UL (ref 3.5–11.3)
WBC # BLD: ABNORMAL 10*3/UL

## 2022-02-07 PROCEDURE — 86850 RBC ANTIBODY SCREEN: CPT

## 2022-02-07 PROCEDURE — 71275 CT ANGIOGRAPHY CHEST: CPT

## 2022-02-07 PROCEDURE — 74174 CTA ABD&PLVS W/CONTRAST: CPT

## 2022-02-07 PROCEDURE — 93005 ELECTROCARDIOGRAM TRACING: CPT | Performed by: STUDENT IN AN ORGANIZED HEALTH CARE EDUCATION/TRAINING PROGRAM

## 2022-02-07 PROCEDURE — 6360000002 HC RX W HCPCS: Performed by: NURSE PRACTITIONER

## 2022-02-07 PROCEDURE — 2580000003 HC RX 258: Performed by: INTERNAL MEDICINE

## 2022-02-07 PROCEDURE — 85025 COMPLETE CBC W/AUTO DIFF WBC: CPT

## 2022-02-07 PROCEDURE — 86901 BLOOD TYPING SEROLOGIC RH(D): CPT

## 2022-02-07 PROCEDURE — C9113 INJ PANTOPRAZOLE SODIUM, VIA: HCPCS | Performed by: INTERNAL MEDICINE

## 2022-02-07 PROCEDURE — 99222 1ST HOSP IP/OBS MODERATE 55: CPT | Performed by: NURSE PRACTITIONER

## 2022-02-07 PROCEDURE — 80048 BASIC METABOLIC PNL TOTAL CA: CPT

## 2022-02-07 PROCEDURE — 36430 TRANSFUSION BLD/BLD COMPNT: CPT

## 2022-02-07 PROCEDURE — 83880 ASSAY OF NATRIURETIC PEPTIDE: CPT

## 2022-02-07 PROCEDURE — 99284 EMERGENCY DEPT VISIT MOD MDM: CPT

## 2022-02-07 PROCEDURE — 83540 ASSAY OF IRON: CPT

## 2022-02-07 PROCEDURE — 2580000003 HC RX 258: Performed by: NURSE PRACTITIONER

## 2022-02-07 PROCEDURE — 84484 ASSAY OF TROPONIN QUANT: CPT

## 2022-02-07 PROCEDURE — 36415 COLL VENOUS BLD VENIPUNCTURE: CPT

## 2022-02-07 PROCEDURE — P9016 RBC LEUKOCYTES REDUCED: HCPCS

## 2022-02-07 PROCEDURE — 85055 RETICULATED PLATELET ASSAY: CPT

## 2022-02-07 PROCEDURE — 6360000002 HC RX W HCPCS: Performed by: INTERNAL MEDICINE

## 2022-02-07 PROCEDURE — 85018 HEMOGLOBIN: CPT

## 2022-02-07 PROCEDURE — 1200000000 HC SEMI PRIVATE

## 2022-02-07 PROCEDURE — 6370000000 HC RX 637 (ALT 250 FOR IP): Performed by: INTERNAL MEDICINE

## 2022-02-07 PROCEDURE — 86920 COMPATIBILITY TEST SPIN: CPT

## 2022-02-07 PROCEDURE — 82728 ASSAY OF FERRITIN: CPT

## 2022-02-07 PROCEDURE — 83550 IRON BINDING TEST: CPT

## 2022-02-07 PROCEDURE — 85014 HEMATOCRIT: CPT

## 2022-02-07 PROCEDURE — 93005 ELECTROCARDIOGRAM TRACING: CPT | Performed by: NURSE PRACTITIONER

## 2022-02-07 PROCEDURE — 71045 X-RAY EXAM CHEST 1 VIEW: CPT

## 2022-02-07 PROCEDURE — 6370000000 HC RX 637 (ALT 250 FOR IP): Performed by: NURSE PRACTITIONER

## 2022-02-07 PROCEDURE — 86900 BLOOD TYPING SEROLOGIC ABO: CPT

## 2022-02-07 PROCEDURE — 6360000004 HC RX CONTRAST MEDICATION: Performed by: STUDENT IN AN ORGANIZED HEALTH CARE EDUCATION/TRAINING PROGRAM

## 2022-02-07 RX ORDER — ONDANSETRON 2 MG/ML
4 INJECTION INTRAMUSCULAR; INTRAVENOUS EVERY 6 HOURS PRN
Status: DISCONTINUED | OUTPATIENT
Start: 2022-02-07 | End: 2022-02-12 | Stop reason: HOSPADM

## 2022-02-07 RX ORDER — ACETAMINOPHEN 650 MG/1
650 SUPPOSITORY RECTAL EVERY 6 HOURS PRN
Status: DISCONTINUED | OUTPATIENT
Start: 2022-02-07 | End: 2022-02-12 | Stop reason: HOSPADM

## 2022-02-07 RX ORDER — PRAVASTATIN SODIUM 20 MG
40 TABLET ORAL NIGHTLY
Status: DISCONTINUED | OUTPATIENT
Start: 2022-02-07 | End: 2022-02-12 | Stop reason: HOSPADM

## 2022-02-07 RX ORDER — M-VIT,TX,IRON,MINS/CALC/FOLIC 27MG-0.4MG
1 TABLET ORAL DAILY
Status: DISCONTINUED | OUTPATIENT
Start: 2022-02-08 | End: 2022-02-12 | Stop reason: HOSPADM

## 2022-02-07 RX ORDER — ACETAMINOPHEN 325 MG/1
650 TABLET ORAL EVERY 6 HOURS PRN
Status: DISCONTINUED | OUTPATIENT
Start: 2022-02-07 | End: 2022-02-12 | Stop reason: HOSPADM

## 2022-02-07 RX ORDER — SODIUM CHLORIDE 9 MG/ML
10 INJECTION INTRAVENOUS DAILY
Status: DISCONTINUED | OUTPATIENT
Start: 2022-02-07 | End: 2022-02-12 | Stop reason: HOSPADM

## 2022-02-07 RX ORDER — PANTOPRAZOLE SODIUM 40 MG/10ML
40 INJECTION, POWDER, LYOPHILIZED, FOR SOLUTION INTRAVENOUS 2 TIMES DAILY
Status: DISCONTINUED | OUTPATIENT
Start: 2022-02-07 | End: 2022-02-12 | Stop reason: HOSPADM

## 2022-02-07 RX ORDER — FUROSEMIDE 10 MG/ML
20 INJECTION INTRAMUSCULAR; INTRAVENOUS ONCE
Status: COMPLETED | OUTPATIENT
Start: 2022-02-07 | End: 2022-02-07

## 2022-02-07 RX ORDER — LISINOPRIL 10 MG/1
10 TABLET ORAL DAILY
Status: DISCONTINUED | OUTPATIENT
Start: 2022-02-07 | End: 2022-02-11

## 2022-02-07 RX ORDER — SODIUM CHLORIDE 0.9 % (FLUSH) 0.9 %
5-40 SYRINGE (ML) INJECTION EVERY 12 HOURS SCHEDULED
Status: DISCONTINUED | OUTPATIENT
Start: 2022-02-07 | End: 2022-02-12 | Stop reason: HOSPADM

## 2022-02-07 RX ORDER — SODIUM CHLORIDE 9 MG/ML
INJECTION, SOLUTION INTRAVENOUS PRN
Status: DISCONTINUED | OUTPATIENT
Start: 2022-02-07 | End: 2022-02-12 | Stop reason: HOSPADM

## 2022-02-07 RX ORDER — SODIUM CHLORIDE 9 MG/ML
25 INJECTION, SOLUTION INTRAVENOUS PRN
Status: DISCONTINUED | OUTPATIENT
Start: 2022-02-07 | End: 2022-02-12 | Stop reason: HOSPADM

## 2022-02-07 RX ORDER — ONDANSETRON 4 MG/1
4 TABLET, ORALLY DISINTEGRATING ORAL EVERY 8 HOURS PRN
Status: DISCONTINUED | OUTPATIENT
Start: 2022-02-07 | End: 2022-02-12 | Stop reason: HOSPADM

## 2022-02-07 RX ORDER — AMIODARONE HYDROCHLORIDE 200 MG/1
200 TABLET ORAL DAILY
Status: DISCONTINUED | OUTPATIENT
Start: 2022-02-08 | End: 2022-02-12 | Stop reason: HOSPADM

## 2022-02-07 RX ORDER — SODIUM CHLORIDE 0.9 % (FLUSH) 0.9 %
10 SYRINGE (ML) INJECTION PRN
Status: DISCONTINUED | OUTPATIENT
Start: 2022-02-07 | End: 2022-02-12 | Stop reason: HOSPADM

## 2022-02-07 RX ORDER — LIDOCAINE 4 G/G
1 PATCH TOPICAL DAILY
Status: DISCONTINUED | OUTPATIENT
Start: 2022-02-07 | End: 2022-02-12 | Stop reason: HOSPADM

## 2022-02-07 RX ORDER — TRAZODONE HYDROCHLORIDE 50 MG/1
50 TABLET ORAL NIGHTLY PRN
Status: DISCONTINUED | OUTPATIENT
Start: 2022-02-07 | End: 2022-02-12 | Stop reason: HOSPADM

## 2022-02-07 RX ORDER — POLYETHYLENE GLYCOL 3350 17 G/17G
17 POWDER, FOR SOLUTION ORAL DAILY PRN
Status: DISCONTINUED | OUTPATIENT
Start: 2022-02-07 | End: 2022-02-12 | Stop reason: HOSPADM

## 2022-02-07 RX ADMIN — SODIUM CHLORIDE, PRESERVATIVE FREE 10 ML: 5 INJECTION INTRAVENOUS at 23:15

## 2022-02-07 RX ADMIN — IRON SUCROSE 200 MG: 20 INJECTION, SOLUTION INTRAVENOUS at 23:15

## 2022-02-07 RX ADMIN — IOPAMIDOL 100 ML: 755 INJECTION, SOLUTION INTRAVENOUS at 13:06

## 2022-02-07 RX ADMIN — METOPROLOL TARTRATE 25 MG: 25 TABLET ORAL at 23:14

## 2022-02-07 RX ADMIN — LISINOPRIL 10 MG: 10 TABLET ORAL at 23:14

## 2022-02-07 RX ADMIN — PRAVASTATIN SODIUM 40 MG: 20 TABLET ORAL at 23:14

## 2022-02-07 RX ADMIN — SODIUM CHLORIDE, PRESERVATIVE FREE 10 ML: 5 INJECTION INTRAVENOUS at 23:31

## 2022-02-07 RX ADMIN — FUROSEMIDE 20 MG: 10 INJECTION, SOLUTION INTRAMUSCULAR; INTRAVENOUS at 16:48

## 2022-02-07 RX ADMIN — ACETAMINOPHEN 650 MG: 325 TABLET ORAL at 23:35

## 2022-02-07 RX ADMIN — PANTOPRAZOLE SODIUM 40 MG: 40 INJECTION, POWDER, FOR SOLUTION INTRAVENOUS at 23:15

## 2022-02-07 ASSESSMENT — ENCOUNTER SYMPTOMS
SORE THROAT: 0
DIARRHEA: 0
BACK PAIN: 0
PHOTOPHOBIA: 0
VOMITING: 0
COUGH: 0
RECTAL PAIN: 0
SHORTNESS OF BREATH: 1
NAUSEA: 0
RHINORRHEA: 0
ABDOMINAL PAIN: 0
CONSTIPATION: 0
EYE REDNESS: 0

## 2022-02-07 ASSESSMENT — PAIN DESCRIPTION - LOCATION
LOCATION: CHEST
LOCATION: CHEST
LOCATION: SHOULDER
LOCATION: SHOULDER

## 2022-02-07 ASSESSMENT — PAIN SCALES - GENERAL
PAINLEVEL_OUTOF10: 10
PAINLEVEL_OUTOF10: 1
PAINLEVEL_OUTOF10: 3
PAINLEVEL_OUTOF10: 2
PAINLEVEL_OUTOF10: 10

## 2022-02-07 ASSESSMENT — PAIN DESCRIPTION - ORIENTATION
ORIENTATION: LEFT
ORIENTATION: INNER
ORIENTATION: INNER
ORIENTATION: LEFT

## 2022-02-07 NOTE — ED NOTES
[address] female external catheter placed by Tech at this time per pt request.      Oziel Lynch RN  02/07/22 [id number]

## 2022-02-07 NOTE — ED NOTES
Nursing report called to [address] [place] [address] on 3B. Pt assigned to room [facility]. Jen Cintron RN verbalized understanding and denies questions for this nurse.      Sarah Guerrero RN  02/07/22 [id number]

## 2022-02-07 NOTE — PROGRESS NOTES
Dr. Georgie Mckinnon in to evaluate patient with chest pain and shortness of breath. ECG completed and orders written.

## 2022-02-07 NOTE — ED PROVIDER NOTES
FACULTY SIGN-OUT  ADDENDUM     Care of this patient was assumed from previous attending physician. The patient's initial evaluation and plan have been discussed with the prior provider who initially evaluated the patient. Attestation  I was available and discussed any additional care issues that arose and coordinated the management plans with the resident(s) caring for the patient during my duty period. Any areas of disagreement with resident's documentation of care or procedures are noted on the chart. I was personally present for the key portions of any/all procedures, during my duty period. I have documented in the chart those procedures where I was not present during the key portions. ED COURSE      The patient was given the following medications:  Orders Placed This Encounter   Medications    iopamidol (ISOVUE-370) 76 % injection 100 mL    0.9 % sodium chloride infusion       RECENT VITALS:   Temp: 98.6 °F (37 °C), Pulse: 76, Resp: 18, BP: (!) 167/59    MEDICAL DECISION MAKING        Roselyn Lee is a 82 y.o. female who presents to the Emergency Department with complaints of back pain to shoulder. Also, SOB. CTA unremarkable. Hgb 6.1, hemoccult neg. Getting transfusion. Admit.       Waqar Bran MD  Attending Emergency Physician    (Please note that portions of this note were completed with a voice recognition program.  Efforts were made to edit the dictations but occasionally words are mis-transcribed.)          Waqar Bran MD  02/07/22 [id number]

## 2022-02-07 NOTE — ED PROVIDER NOTES
[facility] ED  Emergency Department Encounter  Emergency Medicine Resident     Pt Name: Nora Nicole  QNA:[id number]  [place] 1935  Date of evaluation: 22  PCP:  Jeanmarie Meade DO    CHIEF COMPLAINT       Chief Complaint   Patient presents with    Shortness of Breath    Other     L shoulder pain     HISTORY OF PRESENT ILLNESS  (Location/Symptom, Timing/Onset, Context/Setting, Quality, Duration, ModifyingFactors, Severity.)      Nora Nicole is a 82 y.o. female with PMH of atrial fibrillation on eliquis, hypertension, hyperlipidemia failure, diastolic heart failure, aortic regurgitation who presents for evaluation of shortness of breath and backleft shoulder pain since waking up this morning. Patient does have history of left shoulder replacement. Denies recent injury or fall. Patient seen 1 week ago in clinic at which time Bumex 0.5 milligrams x4 days was started. No chest pain, palpitations, abdominal pain, nausea, vomiting, diaphoresis. No history of blood clots, no recent travel or surgery, no hormone use. PAST MEDICAL / SURGICAL / SOCIAL /FAMILY HISTORY      has a past medical history of GERD (gastroesophageal reflux disease) and Hypertension. No other pertinent PMH on review with patient/guardian. has a past surgical history that includes joint replacement (left total shoulder; ); bladder suspension (); Hysterectomy (ovaries left); and Cholecystectomy. No other pertinent PSH on review with patient/guardian.   Social History     Socioeconomic History    Marital status:      Spouse name: Not on file    Number of children: Not on file    Years of education: Not on file    Highest education level: Not on file   Occupational History    Not on file   Tobacco Use    Smoking status: Former Smoker     Quit date: 1984     Years since quittin.1    Smokeless tobacco: Never Used   Substance and Sexual Activity    Alcohol use: No    Drug use: No    Sexual activity: Not on file   Other Topics Concern    Not on file   Social History Narrative    Not on file     Social Determinants of Health     Financial Resource Strain: Low Risk     Difficulty of Paying Living Expenses: Not hard at all   Food Insecurity: No Food Insecurity    Worried About Running Out of Food in the Last Year: Never true    [address] in the Last Year: Never true   Transportation Needs:     Lack of Transportation (Medical): Not on file    Lack of Transportation (Non-Medical): Not on file   Physical Activity:     Days of Exercise per Week: Not on file    Minutes of Exercise per Session: Not on file   Stress:     Feeling of Stress : Not on file   Social Connections:     Frequency of Communication with Friends and Family: Not on file    Frequency of Social Gatherings with Friends and Family: Not on file    Attends Rastafari Services: Not on file    Active Member of [address] [place] [address] [employer] or Organizations: Not on file    Attends Club or Organization Meetings: Not on file    Marital Status: Not on file   Intimate Partner Violence:     Fear of Current or Ex-Partner: Not on file    Emotionally Abused: Not on file    Physically Abused: Not on file    Sexually Abused: Not on file   Housing Stability:     Unable to Pay for Housing in the Last Year: Not on file    Number of [place] in the Last Year: Not on file    Unstable Housing in the Last Year: Not on file     I counseled the patient against using tobacco products. Family History   Problem Relation Age of Onset    Cancer Brother     Cancer Other         bone    Cancer Daughter         breast     No other pertinent FamHx on review with patient/guardian.     Allergies:  Bactrim [sulfamethoxazole-trimethoprim], Codeine, Magnesium-containing compounds, Naproxen, Norvasc [amlodipine besylate], Percocet [oxycodone-acetaminophen], Sertraline hcl, Toradol [ketorolac tromethamine], Tramadol, and Morphine    Home Medications:  Prior to Admission medications    Medication Sig Start Date End Date Taking? Authorizing Provider   traZODone (DESYREL) 50 MG tablet TAKE ONE TABLET BY MOUTH ONCE NIGHTLY 2/7/22   Aniceto Kim DO   bumetanide (BUMEX) 1 MG tablet Take 0.5 tablets by mouth daily 2/1/22 3/3/22  Aniceto Kim DO   amiodarone (CORDARONE) 200 MG tablet  11/19/21   Historical Provider, MD   KLOR-CON M20 20 MEQ extended release tablet  12/7/21   Historical Provider, MD   diclofenac sodium (VOLTAREN) 1 % GEL Apply 2 g topically 4 times daily 1/19/22   Aniceto Kim DO   lisinopril (PRINIVIL;ZESTRIL) 10 MG tablet TAKE 1 TABLET DAILY 11/3/21   Aniceto Kim,    fluticasone Donnamarie Needle) 50 MCG/ACT nasal spray 2 sprays by Each Nostril route daily 9/3/21   Aniceto Kim DO   pravastatin (PRAVACHOL) 40 MG tablet TAKE 1 TABLET DAILY 8/5/21   Aniceto Kim DO   nystatin-triamcinolone [facility]) 364865-0.7 UNIT/GM-% cream Apply topically 2 times daily. 12/11/19   Mike Whyte MD   potassium chloride (KLOR-CON M) 20 MEQ TBCR extended release tablet Take 40 mEq by mouth daily (with breakfast)    Historical Provider, MD   calcium-vitamin D (OSCAL-500) 500-200 MG-UNIT per tablet Take 1 tablet by mouth daily    Historical Provider, MD   Multiple Vitamins-Minerals (THERAPEUTIC MULTIVITAMIN-MINERALS) tablet Take 1 tablet by mouth daily    Historical Provider, MD   apixaban (ELIQUIS) 5 MG TABS tablet Take 5 mg by mouth 2 times daily    Historical Provider, MD   acetaminophen (TYLENOL) 325 MG tablet Take 2 tablets by mouth every 4 hours as needed for Pain or Fever 9/18/16   Jo Iqbal MD   metoprolol (LOPRESSOR) 25 MG tablet Take 25 mg by mouth 2 times daily    Historical Provider, MD       REVIEW OF SYSTEMS    (2-9 systems for level 4, 10 ormore for level 5)      Review of Systems   Constitutional: Negative for chills and fever. Eyes: Negative for photophobia and visual disturbance. Respiratory: Positive for shortness of breath. Negative for cough. Cardiovascular: Positive for leg swelling. Negative for chest pain and palpitations. Gastrointestinal: Negative for abdominal pain, nausea and vomiting. Musculoskeletal: Negative for neck pain and neck stiffness. Skin: Negative for rash. Allergic/Immunologic: Negative for immunocompromised state. Neurological: Negative for dizziness, weakness, numbness and headaches. Hematological: Does not bruise/bleed easily. PHYSICAL EXAM   (up to 7 for level 4, 8 or more for level 5)      INITIAL VITALS:   BP (!) 175/63   Pulse 79   Temp 98.2 °F (36.8 °C) (Oral)   Resp 18   Ht 5' 4\" (1.626 m)   Wt 165 lb (74.8 kg)   SpO2 96%   BMI 28.32 kg/m²     Physical Exam  Constitutional:       General: She is not in acute distress. Appearance: Normal appearance. She is not ill-appearing, toxic-appearing or diaphoretic. HENT:      Head: Normocephalic and atraumatic. Right Ear: External ear normal.      Left Ear: External ear normal.   Eyes:      General:         Right eye: No discharge. Left eye: No discharge. Cardiovascular:      Rate and Rhythm: Normal rate and regular rhythm. Pulses: Normal pulses. Heart sounds: No murmur heard. Pulmonary:      Effort: Pulmonary effort is normal. No respiratory distress. Breath sounds: Normal breath sounds. No wheezing, rhonchi or rales. Abdominal:      Palpations: Abdomen is soft. Tenderness: There is no abdominal tenderness. Musculoskeletal:      Right lower leg: Edema present. Left lower leg: Edema present. Comments: 3+ Pitting edema bilaterally with edema of legs. Normal overlying erythema/warmth. Calves symmetric. Nontender. Negative Cesilia's. No palpable cords   Skin:     Capillary Refill: Capillary refill takes less than 2 seconds. Neurological:      General: No focal deficit present. Mental Status: She is alert. DIFFERENTIAL  DIAGNOSIS     PLAN (LABS / IMAGING / EKG):  Orders Placed This Encounter   Procedures    XR CHEST PORTABLE    CTA ABDOMEN PELVIS W CONTRAST    CTA CHEST W WO CONTRAST    CBC Auto Differential    Troponin    Brain Natriuretic Peptide    Basic Metabolic Panel w/ Reflex to MG    Immature Platelet Fraction    HEMOGLOBIN AND HEMATOCRIT, BLOOD    Hemoglobin and Hematocrit, Blood, Post Transfusion    VITAL SIGNS PER TRANSFUSION PROTOCOL    Verify hospital blood consent form has been signed and witnessed    TRANSFUSION REACTION MANAGEMENT    Telemetry monitoring - continuous duration    Inpatient consult to Hospitalist    EKG 12 Lead    TYPE AND SCREEN    PREPARE RBC (CROSSMATCH), 1 Units    PATIENT STATUS (FROM ED OR OR/PROCEDURAL) Inpatient       MEDICATIONS ORDERED:  Orders Placed This Encounter   Medications    iopamidol (ISOVUE-370) 76 % injection 100 mL    0.9 % sodium chloride infusion       DIAGNOSTIC RESULTS / EMERGENCY DEPARTMENT COURSE / MDM     LABS:  Results for orders placed or performed during the hospital encounter of 02/07/22   CBC Auto Differential   Result Value Ref Range    WBC 6.4 3.5 - 11.3 k/uL    RBC 4.26 3.95 - 5.11 m/uL    Hemoglobin 6.5 (LL) 11.9 - 15.1 g/dL    Hematocrit 25.0 (L) 36.3 - 47.1 %    MCV 58.7 (L) 82.6 - 102.9 fL    MCH 15.3 (L) 25.2 - 33.5 pg    MCHC 26.0 (L) 28.4 - 34.8 g/dL    RDW 23.5 (H) 11.8 - 14.4 %    Platelets See Reflexed IPF Result 138 - 453 k/uL    MPV NOT REPORTED 8.1 - 13.5 fL    NRBC Automated 0.8 (H) 0.0 per 100 WBC    Differential Type NOT REPORTED     WBC Morphology NOT REPORTED     RBC Morphology NOT REPORTED     Platelet Estimate NOT REPORTED     Immature Granulocytes 0 0 %    Seg Neutrophils 79 (H) 36 - 66 %    Lymphocytes 15 (L) 24 - 44 %    Monocytes 4 1 - 7 %    Eosinophils % 0 (L) 1 - 4 %    Basophils 2 0 - 2 %    Absolute Immature Granulocyte 0.00 0.00 - 0.30 k/uL    Segs Absolute 5.05 1.8 - 7.7 k/uL    Absolute Lymph # 0.96 (L) 1.0 - 4.8 k/uL    Absolute Mono # 0.26 0.1 - 0.8 k/uL    Absolute Eos # 0.00 0.0 - 0.4 k/uL    Basophils Absolute 0.13 0.0 - 0.2 k/uL    Morphology ANISOCYTOSIS PRESENT     Morphology MICROCYTOSIS PRESENT     Morphology HYPOCHROMIA PRESENT     Morphology 1+ POLYCHROMASIA     Morphology 1+ ACANTHOCYTES    Troponin   Result Value Ref Range    Troponin, High Sensitivity 25 (H) 0 - 14 ng/L    Troponin T NOT REPORTED <0.03 ng/mL    Troponin Interp NOT REPORTED    Troponin   Result Value Ref Range    Troponin, High Sensitivity 26 (H) 0 - 14 ng/L    Troponin T NOT REPORTED <0.03 ng/mL    Troponin Interp NOT REPORTED    Brain Natriuretic Peptide   Result Value Ref Range    Pro-BNP 2,976 (H) <300 pg/mL    BNP Interpretation NOT REPORTED    Basic Metabolic Panel w/ Reflex to MG   Result Value Ref Range    Glucose 101 (H) 70 - 99 mg/dL    BUN 21 8 - 23 mg/dL    CREATININE 0.90 0.50 - 0.90 mg/dL    Bun/Cre Ratio NOT REPORTED 9 - 20    Calcium 9.5 8.6 - 10.4 mg/dL    Sodium 140 135 - 144 mmol/L    Potassium 4.4 3.7 - 5.3 mmol/L    Chloride 106 98 - 107 mmol/L    CO2 21 20 - 31 mmol/L    Anion Gap 13 9 - 17 mmol/L    GFR Non-African American 60 (L) >60 mL/min    GFR African American >60 >60 mL/min    GFR Comment          GFR Staging NOT REPORTED    Immature Platelet Fraction   Result Value Ref Range    Platelet, Immature Fraction 3.4 1.1 - 10.3 %    Platelet, Fluorescence 213 138 - 453 k/uL   HEMOGLOBIN AND HEMATOCRIT, BLOOD   Result Value Ref Range    Hemoglobin 6.1 (LL) 11.9 - 15.1 g/dL    Hematocrit 23.4 (L) 36.3 - 47.1 %   TYPE AND SCREEN   Result Value Ref Range    Expiration Date 02/10/2022,2359     Arm Band Number [id number]     ABO/Rh A POSITIVE     Antibody Screen NEGATIVE     Unit Number [id number]     Product Code Leukocyte Reduced Red Cell     Unit Divison 00     Dispense Status ISSUED     Transfusion Status OK TO TRANSFUSE     Crossmatch Result COMPATIBLE        IMPRESSION/MDM/ED COURSE:  82 y.o. female presented with shortness of breath and back pain radiating to left shoulder. /68, patient afebrile and vitals otherwise WNL. On exam patient resting complaint no acute distress, nontoxic-appearing. Heart RRR, lungs clear. 3+ pitting edema bilaterally with weeping of legs. No overlying cellulitis. Low risk for DVT given Wells criteria, also patient is anticoagulated. Will obtain cardiac work-up including BNP. CTA to evaluate for dissection. I did not order a pregnancy test because patient is postmenopausal.    ED Course as of 02/07/22 1521   Mon Feb 07, 2022   1219 CBC Auto Differential(!!):    WBC 6.4   RBC 4.26   Hemoglobin Quant 6.5(!!)   Hematocrit 25.0(!)   MCV 58.7(!)   MCH 15.3(!)   MCHC 26.0(!)   RDW 23.5(!)   Platelet Count See Reflexed IPF Result   MPV NOT REPORTED   NRBC Automated 0.8(!)   Differential Type NOT REPORTED   Seg Neutrophils PENDING   Lymphocytes PENDING   Monocytes PENDING   Eosinophils % PENDING   Basophils PENDING   Immature Granulocytes PENDING   Segs Absolute PENDING   Absolute Lymph # PENDING   Absolute Mono # PENDING   Absolute Eos # PENDING   Basophils Absolute PENDING   Absolute Immature Granulocyte PENDING   WBC Morphology NOT REPORTED   RBC Morphology NOT REPORTED   Platelet Estimate NOT REPORTED [AF]   1230 Brain Natriuretic Peptide(!):    Pro-BNP 2,976(!)   BNP Interpretation NOT REPORTED [AF]   1230 Troponin(!):    Troponin, High Sensitivity 25(!)   Troponin T NOT REPORTED   Troponin Interp NOT REPORTED  Willl repeat [AF]   1231 IMPRESSION:  No acute cardiopulmonary disease. Cardiomegaly. Hiatus hernia. [AF]   1337 HEMOGLOBIN AND HEMATOCRIT, BLOOD(!!):    Hemoglobin Quant 6.1(!!)   Hematocrit 23.4(!)  Will transfuse with 1 unit PRBC. Risks/benefits of transfusion were discussed.   Patient is agreeable to plan [AF]   1763 IMPRESSION:  No thoracoabdominal aortic aneurysm or dissection.     No acute finding in the chest, abdomen, and pelvis.     Multiple chronic findings as above. [AF]   1430 Spoke to [facility] who accepted patient for admission. [AF]   1511 Troponin, High Sensitivity(!): 26  Repeat troponin within margin of error for the test [AF]      ED Course User Index  [AF] Joyce Esquivel DO     RADIOLOGY:  CTA ABDOMEN PELVIS W CONTRAST   Final Result   No thoracoabdominal aortic aneurysm or dissection. No acute finding in the chest, abdomen, and pelvis. Multiple chronic findings as above. CTA CHEST W WO CONTRAST   Final Result   No thoracoabdominal aortic aneurysm or dissection. No acute finding in the chest, abdomen, and pelvis. Multiple chronic findings as above. XR CHEST PORTABLE   Final Result   No acute cardiopulmonary disease. Cardiomegaly. Hiatus hernia. EKG  Normal sinus rhythm with first-degree AV block. RBBB. No ST elevation or depression. Nonspecific T wave changes    All EKG's are interpreted by the Emergency Department Physician who either signs or Co-signs this chart in the absence of a cardiologist.    PROCEDURES:  None    CONSULTS:  IP CONSULT TO HOSPITALIST  IP CONSULT TO DIETITIAN  IP CONSULT TO CARDIOLOGY    FINAL IMPRESSION      1. Anemia, unspecified type    2. Acute on chronic diastolic congestive heart failure ([address]        DISPOSITION / PLAN     DISPOSITION Admitted 02/07/2022 02:43:59 PM      PATIENT REFERREDTO:  No follow-up provider specified.     DISCHARGE MEDICATIONS:  New Prescriptions    No medications on file       Saskia Belle DO  PGY 2  Resident Physician Emergency Medicine  02/07/22 3:21 PM    (Please note that portions of this note were completed with a voice recognition program.Efforts were made to edit the dictations but occasionally words are mis-transcribed.)        Joyce Esquivel DO  Resident  02/07/22 1527 No

## 2022-02-07 NOTE — ED PROVIDER NOTES
[facility]     Emergency Department     Faculty Attestation    I performed a history and physical examination of the patient and discussed management with the resident. I have reviewed and agree with the residents findings including all diagnostic interpretations, and treatment plans as written. Any areas of disagreement are noted on the chart. I was personally present for the key portions of any procedures. I have documented in the chart those procedures where I was not present during the key portions. I have reviewed the emergency nurses triage note. I agree with the chief complaint, past medical history, past surgical history, allergies, medications, social and family history as documented unless otherwise noted below. Documentation of the HPI, Physical Exam and Medical Decision Making performed by wade is based on my personal performance of the HPI, PE and MDM. For Physician Assistant/ Nurse Practitioner cases/documentation I have personally evaluated this patient and have completed at least one if not all key elements of the E/M (history, physical exam, and MDM). Additional findings are as noted. In with back pain that started this morning when she woke up also having some left-sided shoulder pain. Denies any trauma. Patient has a history of paroxysmal A. fib is on Eliquis 5 mg twice daily. Does have a history of diastolic dysfunction. No chest pain no shortness of breath. Now complaining of a headache and left-sided neck pain. Patient on exam is well-appearing nontoxic speaking in full sentences moving all extremities equally. Radial pulses are equal bilaterally. Patient denies any URI symptoms or infectious etiology. She is complaining of back pain neck pain and headache. Concern for possible ACS versus dissection. Will check cardiac labs EKG troponins, anticipate CTA of her chest given concern with the back pain.   She is anticoagulated already on Eliquis a low concern for PE causing her symptoms. She does not describe shortness of breath. EKG shows normal sinus rhythm, first-degree AV block with a ventricular rate of 83 and a LA interval of 270, there is a right bundle branch block which is noted, and a Q-wave noted in the septal lead V1 with septal infarct of age undetermined. anticipate admission.     Reece Broussard D.O, M.P.H  Attending Emergency Medicine Physician         Reece Broussard DO  02/07/22 [id number]

## 2022-02-07 NOTE — ED NOTES
Bed: 25  Expected date:   Expected time:   Means of arrival:   Comments:   [address], RN  02/07/22 [id number]

## 2022-02-07 NOTE — ED NOTES
Dr. Gulshan Amaya at bedside to give pt and granddaughter update about admission. Pt and granddaughter are agreeable to admission and denies questions for nurse or provider.       Keaton Smith RN  02/07/22 049

## 2022-02-07 NOTE — ED TRIAGE NOTES
Pt to ED via EMS with c/o SOB and L shoulder pain. Pt denies fall or injury to the shoulder. SOB started this AM, denies CP. Pt denies pulm. Disease or cardiac issues. B/l lower legs are swollen. Denies CHF history. Pt is slightly Lummi.

## 2022-02-07 NOTE — H&P
[facility]  Office: [address], DO, Olecharlotte Burak, DO, Leonila [place], DO, Jovany Rell Gunn, DO, Sandra Singh MD, Clara Houser MD, Екатерина Murphy MD, Janel Rivera MD, Chago Back MD, Saurav Collins MD, Diallo Castorena MD, Michaelle Tucker, DO, Kelvin Meier, DO, Elizabeth Mathias MD,  Tone Corbett, DO, Cristi Monroy MD, Bandar Henriquez MD, Sebastián Parker MD, Garry Magaña MD, Greg Hoang MD, Kavita Herron, [facility], [facility] Yuan, CNP, Walker Phoenix, CNP, Candida Carter, CNS, Mady Viveros, CNP, Minal Schmid, CNP, Isiah Dill, CNP, Yamileth Garvey, CNP, Luzma Dawson, CNP, Brigette Felipe PA-C, Ladi Andersen, [facility], Jonh Cardenas [facility], Sunitha Parker, CNP, Lasandra Runner, CNP, Kadeem Rogers, CNP, Apollo Carrasquillo, CNP, Rose Mejia, CNP, Avtar Landa, [address]    HISTORY AND PHYSICAL EXAMINATION            Date:   2/7/2022  Patient name:  Gricelda Marrero  Date of admission:  2/7/2022 11:33 AM  MRN:   [id number]  Account:  [de-identified]  YOB: 1935  PCP:    Margo Lees DO  Room:   0317/0317-01  Code Status:    Full Code    Chief Complaint:     Chief Complaint   Patient presents with    Shortness of Breath    Other     L shoulder pain       History Obtained From:     patient, electronic medical record    History of Present Illness:     Gricelda Marrero is a 82 y.o. Non- / non  female who presents with Shortness of Breath and Other (L shoulder pain)   and is admitted to the hospital for the management of CHF with unknown LVEF ([facility] [address] Montserrat Carreon is a pleasant, cooperative 82year old female who presented for evaluation of bilateral leg swelling, left shoulder pain and progressive SOB which began 2 days ago. She denies any history of dyspnea, COPD or known heart failure. She denies and fever/chills, N/V constipation or diarrhea. She denies hematuria or melanotic stools.  She denies chronic NSAID use. She states that she follows regularly with her PCP (last 6 days ago) who recommended cardiology evaluation for her bilateral leg weeping edema and was ordered daily Bumex which she has not picked up from the pharmacy yet. She does have a history of A-fib and is on home Eliquis. She also has a medical history of GERD and HTN. She states that she is independent in her care and takes all her prescribed medications. She denies use of alcohol, tobacco or recreational drugs. Her granddaughter is at bedside and expresses concern for some confusion/forgetfulness over the past several months. ED workup showed pBNP 2976. There are no comparative values. Troponin 25-->26. Hgb 6.5. Hemocult (-). CT chest/abd/pelvis (-) for acute findings. Past Medical History:     Past Medical History:   Diagnosis Date    GERD (gastroesophageal reflux disease)     Hypertension         Past Surgical History:     Past Surgical History:   Procedure Laterality Date    BLADDER SUSPENSION  2001    CHOLECYSTECTOMY      HYSTERECTOMY  ovaries left    JOINT REPLACEMENT  left total shoulder; 1990        Medications Prior to Admission:     Prior to Admission medications    Medication Sig Start Date End Date Taking?  Authorizing Provider   traZODone (DESYREL) 50 MG tablet TAKE ONE TABLET BY MOUTH ONCE NIGHTLY 2/7/22   Scooby Arthur, DO   bumetanide (BUMEX) 1 MG tablet Take 0.5 tablets by mouth daily 2/1/22 3/3/22  Scooby Arthur, DO   amiodarone (CORDARONE) 200 MG tablet  11/19/21   Historical Provider, MD   KLOR-CON M20 20 MEQ extended release tablet  12/7/21   Historical Provider, MD   diclofenac sodium (VOLTAREN) 1 % GEL Apply 2 g topically 4 times daily 1/19/22   Scooby Arthur, DO   lisinopril (PRINIVIL;ZESTRIL) 10 MG tablet TAKE 1 TABLET DAILY 11/3/21   Scooby Arthur, DO   fluticasone [facility]) 50 MCG/ACT nasal spray 2 sprays by Each Nostril route daily 9/3/21   Scooby Arthur, DO pravastatin (PRAVACHOL) 40 MG tablet TAKE 1 TABLET DAILY 8/5/21   Shanonalvino Castano DO   nystatin-triamcinolone [facility]) 107971-1.1 UNIT/GM-% cream Apply topically 2 times daily. 12/11/19   Zach You MD   potassium chloride (KLOR-CON M) 20 MEQ TBCR extended release tablet Take 40 mEq by mouth daily (with breakfast)    Historical Provider, MD   calcium-vitamin D (OSCAL-500) 500-200 MG-UNIT per tablet Take 1 tablet by mouth daily    Historical Provider, MD   Multiple Vitamins-Minerals (THERAPEUTIC MULTIVITAMIN-MINERALS) tablet Take 1 tablet by mouth daily    Historical Provider, MD   apixaban (ELIQUIS) 5 MG TABS tablet Take 5 mg by mouth 2 times daily    Historical Provider, MD   acetaminophen (TYLENOL) 325 MG tablet Take 2 tablets by mouth every 4 hours as needed for Pain or Fever 9/18/16   Paola Cooper MD   metoprolol (LOPRESSOR) 25 MG tablet Take 25 mg by mouth 2 times daily    Historical Provider, MD        Allergies:     Codeine, Bactrim [sulfamethoxazole-trimethoprim], Naproxen, Norvasc [amlodipine besylate], Sertraline hcl, Toradol [ketorolac tromethamine], Tramadol, Magnesium-containing compounds, Morphine, and Percocet [oxycodone-acetaminophen]    Social History:     Tobacco:    reports that she quit smoking about 37 years ago. She has never used smokeless tobacco.  Alcohol:      reports no history of alcohol use. Drug Use:  reports no history of drug use. Family History:     Family History   Problem Relation Age of Onset    Cancer Brother     Cancer Other         bone    Cancer Daughter         breast       Review of Systems:     Positive and Negative as described in HPI. Review of Systems   Constitutional: Negative for appetite change, fatigue and fever. HENT: Negative for congestion, rhinorrhea and sore throat. Eyes: Negative for photophobia, redness and visual disturbance. Respiratory: Positive for shortness of breath. Negative for cough.     Cardiovascular: Positive for leg swelling. Negative for chest pain and palpitations. Gastrointestinal: Negative for constipation, diarrhea, nausea and rectal pain. Endocrine: Negative for polydipsia, polyphagia and polyuria. Genitourinary: Negative for decreased urine volume, difficulty urinating, dysuria and urgency. Musculoskeletal: Negative for arthralgias, back pain and neck pain. Skin: Negative for pallor, rash and wound. Allergic/Immunologic: Negative for environmental allergies, food allergies and immunocompromised state. Neurological: Negative for dizziness, weakness and headaches. Hematological: Negative for adenopathy. Does not bruise/bleed easily. Psychiatric/Behavioral: Negative for agitation, confusion and sleep disturbance. The patient is not nervous/anxious. Physical Exam:   BP (!) 114/55   Pulse 75   Temp 98.1 °F (36.7 °C) (Oral)   Resp 26   Ht 5' 4\" (1.626 m)   Wt 148 lb 5.9 oz (67.3 kg)   SpO2 100%   BMI 25.47 kg/m²   Temp (24hrs), Av.4 °F (36.9 °C), Min:98.1 °F (36.7 °C), Max:98.7 °F (37.1 °C)    No results for input(s): POCGLU in the last 72 hours. Intake/Output Summary (Last 24 hours) at 2022  Last data filed at 2022 1904  Gross per 24 hour   Intake 945 ml   Output 600 ml   Net 345 ml       Physical Exam  Vitals and nursing note reviewed. Constitutional:       General: She is not in acute distress. Appearance: Normal appearance. She is normal weight. She is not ill-appearing. HENT:      Nose: Nose normal.      Mouth/Throat:      Mouth: Mucous membranes are moist.      Pharynx: Oropharynx is clear. No oropharyngeal exudate. Eyes:      Extraocular Movements: Extraocular movements intact. Pupils: Pupils are equal, round, and reactive to light. Cardiovascular:      Rate and Rhythm: Normal rate and regular rhythm. Pulses: Normal pulses. Heart sounds: Murmur heard.        Pulmonary:      Effort: Pulmonary effort is normal.      Breath sounds: 24 - 44 %    Monocytes 4 1 - 7 %    Eosinophils % 0 (L) 1 - 4 %    Basophils 2 0 - 2 %    Absolute Immature Granulocyte 0.00 0.00 - 0.30 k/uL    Segs Absolute 5.05 1.8 - 7.7 k/uL    Absolute Lymph # 0.96 (L) 1.0 - 4.8 k/uL    Absolute Mono # 0.26 0.1 - 0.8 k/uL    Absolute Eos # 0.00 0.0 - 0.4 k/uL    Basophils Absolute 0.13 0.0 - 0.2 k/uL    Morphology ANISOCYTOSIS PRESENT     Morphology MICROCYTOSIS PRESENT     Morphology HYPOCHROMIA PRESENT     Morphology 1+ POLYCHROMASIA     Morphology 1+ ACANTHOCYTES    Troponin    Collection Time: 02/07/22 11:48 AM   Result Value Ref Range    Troponin, High Sensitivity 25 (H) 0 - 14 ng/L    Troponin T NOT REPORTED <0.03 ng/mL    Troponin Interp NOT REPORTED    Brain Natriuretic Peptide    Collection Time: 02/07/22 11:48 AM   Result Value Ref Range    Pro-BNP 2,976 (H) <300 pg/mL    BNP Interpretation NOT REPORTED    Basic Metabolic Panel w/ Reflex to MG    Collection Time: 02/07/22 11:48 AM   Result Value Ref Range    Glucose 101 (H) 70 - 99 mg/dL    BUN 21 8 - 23 mg/dL    CREATININE 0.90 0.50 - 0.90 mg/dL    Bun/Cre Ratio NOT REPORTED 9 - 20    Calcium 9.5 8.6 - 10.4 mg/dL    Sodium 140 135 - 144 mmol/L    Potassium 4.4 3.7 - 5.3 mmol/L    Chloride 106 98 - 107 mmol/L    CO2 21 20 - 31 mmol/L    Anion Gap 13 9 - 17 mmol/L    GFR Non-African American 60 (L) >60 mL/min    GFR African American >60 >60 mL/min    GFR Comment          GFR Staging NOT REPORTED    Immature Platelet Fraction    Collection Time: 02/07/22 11:48 AM   Result Value Ref Range    Platelet, Immature Fraction 3.4 1.1 - 10.3 %    Platelet, Fluorescence 213 138 - 453 k/uL   HEMOGLOBIN AND HEMATOCRIT, BLOOD    Collection Time: 02/07/22 12:52 PM   Result Value Ref Range    Hemoglobin 6.1 (LL) 11.9 - 15.1 g/dL    Hematocrit 23.4 (L) 36.3 - 47.1 %   Troponin    Collection Time: 02/07/22  1:43 PM   Result Value Ref Range    Troponin, High Sensitivity 26 (H) 0 - 14 ng/L    Troponin T NOT REPORTED <0.03 ng/mL Troponin Interp NOT REPORTED    TYPE AND SCREEN    Collection Time: 02/07/22  1:45 PM   Result Value Ref Range    Expiration Date 02/10/2022,2359     Arm Band Number [id number]     ABO/Rh A POSITIVE     Antibody Screen NEGATIVE     Unit Number [id number]     Product Code Leukocyte Reduced Red Cell     Unit Divison 00     Dispense Status ISSUED     Transfusion Status OK TO TRANSFUSE     Crossmatch Result COMPATIBLE    Troponin    Collection Time: 02/07/22  5:19 PM   Result Value Ref Range    Troponin, High Sensitivity 26 (H) 0 - 14 ng/L    Troponin T NOT REPORTED <0.03 ng/mL    Troponin Interp NOT REPORTED    Hemoglobin and Hematocrit, Blood, Post Transfusion    Collection Time: 02/07/22  5:19 PM   Result Value Ref Range    Hemoglobin 7.2 (L) 11.9 - 15.1 g/dL    Hematocrit 27.1 (L) 36.3 - 47.1 %   Iron and TIBC    Collection Time: 02/07/22  5:19 PM   Result Value Ref Range    Iron 37 37 - 145 ug/dL    TIBC 431 250 - 450 ug/dL    Iron Saturation 9 (L) 20 - 55 %    UIBC 394 (H) 112 - 347 ug/dL   Ferritin    Collection Time: 02/07/22  5:19 PM   Result Value Ref Range    Ferritin 7 (L) 13 - 150 ug/L   EKG 12 Lead    Collection Time: 02/07/22  6:44 PM   Result Value Ref Range    Ventricular Rate 73 BPM    Atrial Rate 73 BPM    P-R Interval 326 ms    QRS Duration 146 ms    Q-T Interval 458 ms    QTc Calculation (Bazett) 504 ms    P Axis 63 degrees    R Axis -3 degrees    T Axis 13 degrees       Imaging/Diagnostics:  CTA CHEST W WO CONTRAST    Result Date: 2/7/2022  No thoracoabdominal aortic aneurysm or dissection. No acute finding in the chest, abdomen, and pelvis. Multiple chronic findings as above. XR CHEST PORTABLE    Result Date: 2/7/2022  No acute cardiopulmonary disease. Cardiomegaly. Hiatus hernia. CTA ABDOMEN PELVIS W CONTRAST    Result Date: 2/7/2022  No thoracoabdominal aortic aneurysm or dissection. No acute finding in the chest, abdomen, and pelvis. Multiple chronic findings as above. Assessment :      Hospital Problems           Last Modified POA    * (Principal) CHF with unknown LVEF ([facility] [address] 2/7/2022 Yes    Hyperlipidemia (Chronic) 2/7/2022 Yes    Hypertension (Chronic) 2/7/2022 Yes    Gastroesophageal reflux disease without esophagitis 2/7/2022 Yes    Paroxysmal atrial fibrillation (HCC) (Chronic) 2/7/2022 Yes    Bilateral edema of lower extremity 2/7/2022 Yes    Chronic anticoagulation 2/7/2022 Yes    Anemia 2/7/2022 Yes    Hiatal hernia 2/7/2022 Yes          Plan:     Patient status inpatient in the Med/Surge    Admit to Intermed  CHF: Inpatient consult to cardiology. Appreciate input. Will trend BNP. Lasix after blood administration. Echo pending. Anemia: Iron studies. Receiving one unit PRBC. Check Hgb post-transfusion. Transfuse for < 7.0 or noted tachycardia/hypotension. Consult GI for hiatal hernia. Hold Eliquis today. PAF: Continue rate control with home lopressor. HTN: VS per unit. Continue home antihypertensives. HLD: Continue home statin  DVT prophylaxis: EPC's while in bed. GI prophylaxis: Protonix BID  PT/OT eval and treat  Full Code    Consultations:   IP CONSULT TO HOSPITALIST  IP CONSULT TO DIETITIAN  IP CONSULT TO CARDIOLOGY  IP CONSULT TO GI    Patient is admitted as inpatient status because of co-morbidities listed above, severity of signs and symptoms as outlined, requirement for current medical therapies and most importantly because of direct risk to patient if care not provided in a hospital setting. Expected length of stay > 48 hours.     MALVIN Perea NP  2/7/2022  8:31 PM    Copy sent to Dr. Uma Guerrero DO

## 2022-02-08 PROBLEM — S22.000D CLOSED WEDGE FRACTURE OF THORACIC VERTEBRA WITH ROUTINE HEALING: Status: ACTIVE | Noted: 2022-02-08

## 2022-02-08 PROBLEM — D50.9 IRON DEFICIENCY ANEMIA: Status: ACTIVE | Noted: 2022-02-08

## 2022-02-08 PROBLEM — Z98.890 HISTORY OF NISSEN FUNDOPLICATION: Status: ACTIVE | Noted: 2022-02-08

## 2022-02-08 PROBLEM — I50.30 (HFPEF) HEART FAILURE WITH PRESERVED EJECTION FRACTION (HCC): Status: ACTIVE | Noted: 2022-02-08

## 2022-02-08 LAB
BNP INTERPRETATION: ABNORMAL
CHOLESTEROL/HDL RATIO: 2.3
CHOLESTEROL: 97 MG/DL
EKG ATRIAL RATE: 73 BPM
EKG ATRIAL RATE: 83 BPM
EKG P AXIS: 63 DEGREES
EKG P AXIS: 79 DEGREES
EKG P-R INTERVAL: 270 MS
EKG P-R INTERVAL: 326 MS
EKG Q-T INTERVAL: 414 MS
EKG Q-T INTERVAL: 458 MS
EKG QRS DURATION: 146 MS
EKG QRS DURATION: 148 MS
EKG QTC CALCULATION (BAZETT): 486 MS
EKG QTC CALCULATION (BAZETT): 504 MS
EKG R AXIS: -3 DEGREES
EKG R AXIS: 5 DEGREES
EKG T AXIS: 13 DEGREES
EKG T AXIS: 32 DEGREES
EKG VENTRICULAR RATE: 73 BPM
EKG VENTRICULAR RATE: 83 BPM
HCT VFR BLD CALC: 24 % (ref 36.3–47.1)
HCT VFR BLD CALC: 29.2 % (ref 36.3–47.1)
HDLC SERPL-MCNC: 43 MG/DL
HEMOGLOBIN: 6.7 G/DL (ref 11.9–15.1)
HEMOGLOBIN: 8.2 G/DL (ref 11.9–15.1)
LDL CHOLESTEROL: 43 MG/DL (ref 0–130)
MAGNESIUM: 1.9 MG/DL (ref 1.6–2.6)
MCH RBC QN AUTO: 16.3 PG (ref 25.2–33.5)
MCHC RBC AUTO-ENTMCNC: 27.9 G/DL (ref 28.4–34.8)
MCV RBC AUTO: 58.5 FL (ref 82.6–102.9)
NRBC AUTOMATED: 1.6 PER 100 WBC
PDW BLD-RTO: 25.6 % (ref 11.8–14.4)
PLATELET # BLD: ABNORMAL K/UL (ref 138–453)
PLATELET, FLUORESCENCE: 212 K/UL (ref 138–453)
PLATELET, IMMATURE FRACTION: 3 % (ref 1.1–10.3)
PMV BLD AUTO: ABNORMAL FL (ref 8.1–13.5)
PRO-BNP: 3998 PG/ML
RBC # BLD: 4.1 M/UL (ref 3.95–5.11)
TRIGL SERPL-MCNC: 54 MG/DL
TSH SERPL DL<=0.05 MIU/L-ACNC: 1.47 MIU/L (ref 0.3–5)
VLDLC SERPL CALC-MCNC: NORMAL MG/DL (ref 1–30)
WBC # BLD: 7.1 K/UL (ref 3.5–11.3)

## 2022-02-08 PROCEDURE — 76937 US GUIDE VASCULAR ACCESS: CPT

## 2022-02-08 PROCEDURE — P9016 RBC LEUKOCYTES REDUCED: HCPCS

## 2022-02-08 PROCEDURE — 6370000000 HC RX 637 (ALT 250 FOR IP): Performed by: INTERNAL MEDICINE

## 2022-02-08 PROCEDURE — C9113 INJ PANTOPRAZOLE SODIUM, VIA: HCPCS | Performed by: INTERNAL MEDICINE

## 2022-02-08 PROCEDURE — 6360000002 HC RX W HCPCS: Performed by: INTERNAL MEDICINE

## 2022-02-08 PROCEDURE — 80061 LIPID PANEL: CPT

## 2022-02-08 PROCEDURE — 85014 HEMATOCRIT: CPT

## 2022-02-08 PROCEDURE — 6360000002 HC RX W HCPCS: Performed by: NURSE PRACTITIONER

## 2022-02-08 PROCEDURE — 86900 BLOOD TYPING SEROLOGIC ABO: CPT

## 2022-02-08 PROCEDURE — 2580000003 HC RX 258: Performed by: NURSE PRACTITIONER

## 2022-02-08 PROCEDURE — 99232 SBSQ HOSP IP/OBS MODERATE 35: CPT | Performed by: INTERNAL MEDICINE

## 2022-02-08 PROCEDURE — 36415 COLL VENOUS BLD VENIPUNCTURE: CPT

## 2022-02-08 PROCEDURE — 1200000000 HC SEMI PRIVATE

## 2022-02-08 PROCEDURE — 2580000003 HC RX 258: Performed by: INTERNAL MEDICINE

## 2022-02-08 PROCEDURE — 84443 ASSAY THYROID STIM HORMONE: CPT

## 2022-02-08 PROCEDURE — 85018 HEMOGLOBIN: CPT

## 2022-02-08 PROCEDURE — 99221 1ST HOSP IP/OBS SF/LOW 40: CPT | Performed by: INTERNAL MEDICINE

## 2022-02-08 PROCEDURE — 83880 ASSAY OF NATRIURETIC PEPTIDE: CPT

## 2022-02-08 PROCEDURE — 85027 COMPLETE CBC AUTOMATED: CPT

## 2022-02-08 PROCEDURE — 83735 ASSAY OF MAGNESIUM: CPT

## 2022-02-08 PROCEDURE — 6370000000 HC RX 637 (ALT 250 FOR IP): Performed by: NURSE PRACTITIONER

## 2022-02-08 PROCEDURE — 85055 RETICULATED PLATELET ASSAY: CPT

## 2022-02-08 RX ORDER — BUTALBITAL, ACETAMINOPHEN AND CAFFEINE 50; 325; 40 MG/1; MG/1; MG/1
1 TABLET ORAL ONCE
Status: DISCONTINUED | OUTPATIENT
Start: 2022-02-08 | End: 2022-02-12 | Stop reason: HOSPADM

## 2022-02-08 RX ORDER — SODIUM CHLORIDE 9 MG/ML
INJECTION, SOLUTION INTRAVENOUS PRN
Status: DISCONTINUED | OUTPATIENT
Start: 2022-02-08 | End: 2022-02-12 | Stop reason: HOSPADM

## 2022-02-08 RX ADMIN — SODIUM CHLORIDE, PRESERVATIVE FREE 10 ML: 5 INJECTION INTRAVENOUS at 12:48

## 2022-02-08 RX ADMIN — METOPROLOL TARTRATE 12.5 MG: 25 TABLET ORAL at 21:19

## 2022-02-08 RX ADMIN — ONDANSETRON 4 MG: 2 INJECTION INTRAMUSCULAR; INTRAVENOUS at 04:52

## 2022-02-08 RX ADMIN — Medication 1 TABLET: at 12:44

## 2022-02-08 RX ADMIN — SODIUM CHLORIDE, PRESERVATIVE FREE 10 ML: 5 INJECTION INTRAVENOUS at 13:57

## 2022-02-08 RX ADMIN — IRON SUCROSE 200 MG: 20 INJECTION, SOLUTION INTRAVENOUS at 23:47

## 2022-02-08 RX ADMIN — ONDANSETRON 4 MG: 4 TABLET, ORALLY DISINTEGRATING ORAL at 17:26

## 2022-02-08 RX ADMIN — ACETAMINOPHEN 650 MG: 325 TABLET ORAL at 05:37

## 2022-02-08 RX ADMIN — POLYETHYLENE GLYCOL 3350, SODIUM SULFATE ANHYDROUS, SODIUM BICARBONATE, SODIUM CHLORIDE, POTASSIUM CHLORIDE 4000 ML: 236; 22.74; 6.74; 5.86; 2.97 POWDER, FOR SOLUTION ORAL at 18:23

## 2022-02-08 RX ADMIN — ACETAMINOPHEN 650 MG: 325 TABLET ORAL at 14:26

## 2022-02-08 RX ADMIN — PANTOPRAZOLE SODIUM 40 MG: 40 INJECTION, POWDER, FOR SOLUTION INTRAVENOUS at 13:53

## 2022-02-08 RX ADMIN — PANTOPRAZOLE SODIUM 40 MG: 40 INJECTION, POWDER, FOR SOLUTION INTRAVENOUS at 23:47

## 2022-02-08 RX ADMIN — PRAVASTATIN SODIUM 40 MG: 20 TABLET ORAL at 21:19

## 2022-02-08 RX ADMIN — AMIODARONE HYDROCHLORIDE 200 MG: 200 TABLET ORAL at 12:44

## 2022-02-08 ASSESSMENT — PAIN SCALES - GENERAL
PAINLEVEL_OUTOF10: 3
PAINLEVEL_OUTOF10: 3

## 2022-02-08 ASSESSMENT — ENCOUNTER SYMPTOMS
SHORTNESS OF BREATH: 1
NAUSEA: 1
COUGH: 0
VOMITING: 0
BLOOD IN STOOL: 0
ABDOMINAL PAIN: 0

## 2022-02-08 NOTE — PROGRESS NOTES
[facility]  Documentation Note                Admission Dx: Acute on chronic diastolic congestive heart failure ([facility] [address] [I50.33]  CHF with unknown LVEF (HCC) [I50.9]  Anemia, unspecified type [D64.9]    Past Medical History:   has a past medical history of GERD (gastroesophageal reflux disease) and Hypertension. Previous Testing:     EVENT MONITOR 3/4/2020: Prevailing rhythm is SR with short runs of non-sustained supraventricular tachycardia of 15 beats in duration at an average rate of 130 bpm. No significant other sustained tachy or bradyarrhythmias are seen. No significant pauses are noted. ECHO 10/7/16: EF 50-55%, mild LVH, RV is moderately dilated, LA/RA is severely enlarged, trace MR, moderate TR, RVSP is 30-35 mmHg, mild AR, trace MA. Previous office/hospital visit:   Dr. Germain Rangel 9/18/16:   1. Atypical chest pain without signs of AMI, probably GI in nature and after emesis, no further episodes, still has tenderness in the epigastric area. 2. HTN- better control  3. Hyperlipidemia - treated  4. Hiatal hernia - managed by others  5. History of GERD  6. Allergy to NSAIDS    Plan --   1. Continue current medications. 2. OK for discharge home from cardiac stand point, she will have her GI work up as outpatient.   3. Follow up with Dr. Governor Ruvalcaba, her cardiologist in 1-3 weeks     Wojciech Carmona [facility]

## 2022-02-08 NOTE — CONSULTS
[facility]  Consultation Note     . REASON FOR CONSULTATION:        HISTORY OF PRESENT ILLNESS:     This is a 82 y.o. female with PMH including hypertension, hyperlipidemia, atrial fibrillation on Eliquis at home, HFpEF, moderate TR and bilateral lower extremity edema presented to the ED with worsening of shortness of breath, back pain radiating to left shoulder and lower extremity swelling for the past couple of days. Patient his shortness of breath with exertion but denies any chest pain, fever, chills, cough, orthopnea, PND, palpitation, current smoking, wheezing or abnormal chest sounds. On arrival patient was hypertensive but afebrile with other vitals WNL. CTA chest, abdomen and pelvis is done to rule out aortic dissection. Patient is admitted to Intermed services. Cardiology is consulted for concern of heart failure and echo is ordered. GI is consulted because the patient has an microcytic hypochromic anemia and history of paraesophageal hiatal hernia with a laparoscopic Nissen fundoplication 38/7182. On evaluation patient is AAO x4, afebrile, vitally stable, saturating on room air. Patient denies dysphagia, odynophagia, persistent vomiting, weight loss, diarrhea, hematochezia, constipation family history of gastric or colon cancer, any lumps in the abdomen or neck. Patient never underwent colonoscopy for colon cancer screening. Abdominal examination shows swelling in paraumbilical region but is soft, nontender and nondistended abdomen. Perianal and rectal exam shows no ulcers/bleeding, normal anal tone, no external or internal hemorrhoids, stool in the rectal vault which is brownish/yellowish. Pertinent labs are proBNP 3998, TSH 1.47, , Hb 6.7, hematocrit 24, MCV 58.5, MCH 16.3, platelets 722.       Previous GI history: Paraesophageal hiatal hernia, laparoscopic Nissen fundoplication 53/6447      Past Medical/Social/Family History:  Past Medical History: Diagnosis Date    GERD (gastroesophageal reflux disease)     Hypertension      Past Surgical History:   Procedure Laterality Date    BLADDER SUSPENSION  2001    CHOLECYSTECTOMY      HYSTERECTOMY  ovaries left    JOINT REPLACEMENT  left total shoulder; 1990     Family History   Problem Relation Age of Onset    Cancer Brother     Cancer Other         bone    Cancer Daughter         breast     Previous records/history/ and notes were reviewed    Allergies: Allergies   Allergen Reactions    Codeine Nausea Only     dizziness    Bactrim [Sulfamethoxazole-Trimethoprim] Hives    Naproxen      On allergy list ? Unsure of reaction    Norvasc [Amlodipine Besylate]      Edema-she thinks      Sertraline Hcl      unsure    Toradol [Ketorolac Tromethamine]      unsure    Tramadol      unsure    Magnesium-Containing Compounds Diarrhea     Mag ox 500 ?  Morphine Nausea And Vomiting    Percocet [Oxycodone-Acetaminophen] Nausea And Vomiting       Home medications:  Prior to Admission medications    Medication Sig Start Date End Date Taking?  Authorizing Provider   traZODone (DESYREL) 50 MG tablet TAKE ONE TABLET BY MOUTH ONCE NIGHTLY 2/7/22   Sameera Hernandez DO   bumetanide (BUMEX) 1 MG tablet Take 0.5 tablets by mouth daily 2/1/22 3/3/22  Sameera Hernandez DO   amiodarone (CORDARONE) 200 MG tablet  11/19/21   Historical Provider, MD   KLOR-CON M20 20 MEQ extended release tablet  12/7/21   Historical Provider, MD   diclofenac sodium (VOLTAREN) 1 % GEL Apply 2 g topically 4 times daily 1/19/22   Sameera Hernandez DO   lisinopril (PRINIVIL;ZESTRIL) 10 MG tablet TAKE 1 TABLET DAILY 11/3/21   Sameera Hernandez DO   fluticasone [facility]) 50 MCG/ACT nasal spray 2 sprays by Each Nostril route daily 9/3/21   Sameera Hernandez DO   pravastatin (PRAVACHOL) 40 MG tablet TAKE 1 TABLET DAILY 8/5/21   Sameera Hernandez DO   nystatin-triamcinolone [facility]) 877640-8.0 UNIT/GM-% cream Apply topically 2 times daily. 12/11/19   Jesus Angulo MD   potassium chloride (KLOR-CON M) 20 MEQ TBCR extended release tablet Take 40 mEq by mouth daily (with breakfast)    Historical Provider, MD   calcium-vitamin D (OSCAL-500) 500-200 MG-UNIT per tablet Take 1 tablet by mouth daily    Historical Provider, MD   Multiple Vitamins-Minerals (THERAPEUTIC MULTIVITAMIN-MINERALS) tablet Take 1 tablet by mouth daily    Historical Provider, MD   apixaban (ELIQUIS) 5 MG TABS tablet Take 5 mg by mouth 2 times daily    Historical Provider, MD   acetaminophen (TYLENOL) 325 MG tablet Take 2 tablets by mouth every 4 hours as needed for Pain or Fever 9/18/16   Buzz Hernandez MD   metoprolol (LOPRESSOR) 25 MG tablet Take 25 mg by mouth 2 times daily    Historical Provider, MD     .  Current Medications:  Scheduled Meds:   amiodarone  200 mg Oral Daily    lisinopril  10 mg Oral Daily    metoprolol tartrate  25 mg Oral BID    therapeutic multivitamin-minerals  1 tablet Oral Daily    pravastatin  40 mg Oral Nightly    sodium chloride flush  5-40 mL IntraVENous 2 times per day    iron sucrose  200 mg IntraVENous Q24H    lidocaine  1 patch TransDERmal Daily    pantoprazole  40 mg IntraVENous BID    And    sodium chloride (PF)  10 mL IntraVENous Daily     . Continuous Infusions:   sodium chloride      sodium chloride      sodium chloride       . PRN Meds:sodium chloride, sodium chloride, traZODone, sodium chloride flush, sodium chloride, ondansetron **OR** ondansetron, polyethylene glycol, acetaminophen **OR** acetaminophen    REVIEW OF SYSTEMS:     Constitutional: No fever, no chills, no lethargy, no weakness, no weight loss  Cardiac:  No chest pain, dyspnea, orthopnea or PND. Chest:   Shortness of breath, no cough, phlegm or wheezing. Abdomen:  No abdominal pain, nausea, vomiting, constipation, diarrhea, hematochezia/melena  Neuro:  No focal weakness, abnormal movements or seizure like activity.   Skin:   No rashes, no itching. :   No hematuria, no pyuria, no dysuria, no flank pain. Extremities:  Swelling of bilateral lower extremities  ROS was otherwise negative except as mentioned in the [address] PHYSICAL EXAM:    BP (!) 144/51   Pulse 63   Temp 97.3 °F (36.3 °C) (Oral)   Resp 16   Ht 5' 4\" (1.626 m)   Wt 148 lb 5.9 oz (67.3 kg)   SpO2 97%   BMI 25.47 kg/m²   . TMAX[24]    General: Well developed, Well nourished, No apparent distress. Skin pallor  Head:  Normocephalic, Atraumatic  EENT: EOMI, Sclera not icteric, Oropharynx moist  Neck:  Supple, Trachea midline  Lungs:CTA Bilaterally  Heart: RRR, No murmur, No rub, No gallop, PMI nondisplaced. Abdomen:Soft, Non tender, Not distended, BS WNL, paraumbilical lump noted. Ext:No clubbing. No cyanosis. Bilateral pedal edema. Skin: No rashes. No jaundice. No stigmata of liver disease. Neuro:  A&O x Three, No focal neurological deficits    Imaging:       Hemotological labs: Anemia studies:  Recent Labs     02/07/22  1719   LABIRON 9*   TIBC 431   FERRITIN 7*       CBC:  Recent Labs     02/07/22  1148 02/07/22  1252 02/07/22  1719 02/08/22  0514   WBC 6.4  --   --  7.1   HGB 6.5* 6.1* 7.2* 6.7*   MCV 58.7*  --   --  58.5*   RDW 23.5*  --   --  25.6*   PLT See Reflexed IPF Result  --   --  See Reflexed IPF Result       PT/INR:  No results for input(s): PROTIME, INR in the last 72 hours. BMP:  Recent Labs     02/07/22  1148      K 4.4      CO2 21   BUN 21   CREATININE 0.90   GLUCOSE 101*   CALCIUM 9.5       Liver work up:  Hepatitis Functional Panel:  No results for input(s): ALKPHOS, ALT, AST, PROT, BILITOT, BILIDIR, LABALBU in the last 72 hours. Amylase/Lipase/Ammonia:  No results for input(s): AMYLASE, LIPASE, AMMONIA in the last 72 hours. Acute Hepatitis Panel:  No results found for: HEPBSAG, HEPCAB, HEPBIGM, HEPAIGM    GI impression:  1. Microcytic hypochromic anemia  2.  Paraesophageal hiatal hernia       GI Impression and recommendations and plan:  1. Microcytic hypochromic anemia. Monitor H&H. Transfuse if needed, keep Hb>7. Plan for upper GI endoscopy and colonoscopy tomorrow a.m. start on clear liquid diet. Golycolytely  4000 ml once. Keep n.p.o from midnight  2.  Rest of management per primary team and cardiology          Alida Mejia MD.  Internal Medicine Resident PGY [address]   2/8/2022, 7:55 AM

## 2022-02-08 NOTE — PLAN OF CARE
Problem: Falls - Risk of:  Goal: Will remain free from falls  Description: Will remain free from falls  2/8/2022 0220 by Jeferson Galindo RN  Outcome: Ongoing  2/7/2022 1630 by Candida Bowens  Outcome: Ongoing  Goal: Absence of physical injury  Description: Absence of physical injury  2/8/2022 0220 by Jeferson Galindo RN  Outcome: Ongoing  2/7/2022 1630 by Candida Bowens  Outcome: Ongoing     Problem: Safety:  Goal: Free from accidental physical injury  Description: Free from accidental physical injury  2/8/2022 0220 by Jeferson Galindo RN  Outcome: Ongoing  2/7/2022 1630 by Candida Bowens  Outcome: Ongoing  Goal: Free from intentional harm  Description: Free from intentional harm  2/8/2022 0220 by Jeferson Galindo RN  Outcome: Ongoing  2/7/2022 1630 by Candida Bowens  Outcome: Ongoing     Problem: Daily Care:  Goal: Daily care needs are met  Description: Daily care needs are met  2/8/2022 0220 by Jeferson Galindo RN  Outcome: Ongoing  2/7/2022 1630 by Candida Bowens  Outcome: Ongoing

## 2022-02-08 NOTE — CONSULTS
Attestation signed by      Attending Physician Statement:    I have discussed the care of  Roselyn Lee , including pertinent history and exam findings, with the Cardiology fellow/resident. I have seen and examined the patient and the key elements of all parts of the encounter have been performed by me. I agree with the assessment, plan and orders as documented by the fellow/resident, after I modified exam findings and plan of treatments, and the final version is my approved version of the assessment. Additional Comments: The patient seen and examined, agree with below. Dizziness and SOB due to anemia. Ok to hold Eliquis. Will hold Lisinopril due to low BP and reduce Metoprolol. Will hold on Diuresis. Compression stockings of lower extremities. Will follow on Echo. Anemia work up per primary service. [facility]    Consult / H&P               Today's Date: 2/8/2022  Patient Name: Roselyn Lee  Date of admission: 2/7/2022 11:33 AM  Patient's age: 82 y.o., 1935  Admission Dx: Acute on chronic diastolic congestive heart failure ([address] [I50.33]  CHF with unknown LVEF ([address] [I50.9]  Anemia, unspecified type [D64.9]    Reason for Consult:  Cardiac evaluation    Requesting Physician: Verito Kelly DO    CHIEF COMPLAINT:  SOB, Left Shoulder Pain     History Obtained From:  patient, electronic medical record    HISTORY OF PRESENT ILLNESS:      The patient is a 82 y.o. female who presented with bilateral leg swelling, left shoulder pain, and progressive shortness of breath which occurred 2 days ago. She has no past medical history of COPD or known heart failure. She follows up with her PCP regularly and most recently had a visit 6 days ago when her provider recommended cardiac evaluation due to chronic bilateral lower leg edema and swelling. Her PCP started her on Bumex which has not been picked up from the pharmacy yet.   She has a history of atrial fibrillation on MTM Initial Encounter  Assessment & Plan                                                       Anxiety: Uncontrolled. Reviewed that we should give Pristiq at least 4 weeks before reassessing efficacy. Can increase to 100 mg if needed, will decide at follow up. I will contact Dr. Martinez for her [facility] results. I also sent a message to specialty schedule about psych. Recommended for now to try hydroxyzine PRN which would be safer than lorazepam. Appears that she was given this inpatient -- recommended to take 25-50 mg PRN restlessness/anxiety. She is also wondering about the medication for the fast heartbeat, likely atenolol. Reviewed that the prescription is at her pharmacy and she can take it if fast heartbeat occurs.   PLAN  1. Start hydroxyzine 25-50 mg PRN restlessness/anxiety    Insomnia: Reviewed long terms risks of sedative hypnotics including CNS depression and dependence. Appears that she is sleeping longer than before. Recommended that she not take a second dose of zolpidem in the middle of the night unless she has 4 hours at least of sleep left. This will hopefully overall lessen her zolpidem use and eventually will work on lessening once anxiety has improved. Asked Dr. Martinez for a refill.     Migraine Headaches: Patient to continue to see neurology and Botox injections. Recommended to continue. Migraines have improved since she has been off duloxetine.     Mitochondrial Myopathy: Stable. Recommended to continue current regimen.     Fibromyalgia: Pain is overall well controlled at this time.     GERD/Nausea: Stable. Recommended to continue current regimen.     shortness of breath: patient is using minimally, continue to use albuterol PRN.     Supplements: Stable. Ok to continue current regimen.     Follow Up  Friday 7/12/19 phone follow up    Subjective & Objective                                                     Sherryadiel Sweeney is a 41 y.o. female called for an initial visit for Medication Therapy  "Management. She was referred to me from Layla Martinez MD for anxiety on 6/21/19.     Chief Complaint: anxiety and general medication questions.     Medical History: Reports that she started down this path years ago (2006?). Extreme pain and fatigue, diagnosed with fibromyalgia + chronic fatigue. Also having really bad migraines. Was not able to get out of bed for a while, was in a wheelchair, not able to drive. [facility] thought she had mitochondrial myopathy (MM). That doctor passed away. Seen at the  after, but went back to [facility] and they left it with MM. Currently on social security disability.    Hx of hysterectomy. Would bedridden from her periods. Hysterectomy improved things, but was told that she was given less anesthesia. She is sensitive to medications.     Anxiety: Saw Dr. Martinez on 6/21. Was referred to a therapist but was not covered, therefore will need to see alternative. Is taking Pristiq 50 mg daily -- started 6/19/19. Reports she is sill \"freaking out about life.\" starting to get an appetite back. She is not taking lorazepam right now. Denies panic attacks, but is restless when alone and cloudy thinking. Is able to do more things now however.  Is worried about being on medication that would contribute to dependency.  Stopped duloxetine in January, was starting to feel better and went into massive panic attack.    Reports she memory has been terrible lately. Psych told her that duloxetine put her in a state of sher, but she does no have bipolar. Was online shopping while on it. Now that she is off of it she is thinking more clearly.   Has [facility] results -- Dr. Martinez has a copy.   Would like to see a new psychiatrist. Did not think he was listening or paying attention.   Would like to be educated on her medications.   Atenolol was prescribed for a fast heartbeat -- heart palpitations from being on the wrong meds. Does not have symptoms righ now, but " home Eliquis as well as GERD and benign essential hypertension. She denies any other complaints and cardiology was consulted over concerns of new onset CHF. Past Medical History:   has a past medical history of GERD (gastroesophageal reflux disease) and Hypertension. Past Surgical History:   has a past surgical history that includes joint replacement (left total shoulder; 1990); bladder suspension (2001); Hysterectomy (ovaries left); and Cholecystectomy. Home Medications:    Prior to Admission medications    Medication Sig Start Date End Date Taking? Authorizing Provider   traZODone (DESYREL) 50 MG tablet TAKE ONE TABLET BY MOUTH ONCE NIGHTLY 2/7/22   Sara Garcia,    bumetanide (BUMEX) 1 MG tablet Take 0.5 tablets by mouth daily 2/1/22 3/3/22  Sara Garcia DO   amiodarone (CORDARONE) 200 MG tablet  11/19/21   Historical Provider, MD   KLOR-CON M20 20 MEQ extended release tablet  12/7/21   Historical Provider, MD   diclofenac sodium (VOLTAREN) 1 % GEL Apply 2 g topically 4 times daily 1/19/22   Sara Garcia DO   lisinopril (PRINIVIL;ZESTRIL) 10 MG tablet TAKE 1 TABLET DAILY 11/3/21   Sara Garcia DO   fluticasone [facility]) 50 MCG/ACT nasal spray 2 sprays by Each Nostril route daily 9/3/21   Sara Garcia DO   pravastatin (PRAVACHOL) 40 MG tablet TAKE 1 TABLET DAILY 8/5/21   Sara Garcia DO   nystatin-triamcinolone [facility]) 729368-7.2 UNIT/GM-% cream Apply topically 2 times daily.  12/11/19   Carmenza Hartman MD   potassium chloride (KLOR-CON M) 20 MEQ TBCR extended release tablet Take 40 mEq by mouth daily (with breakfast)    Historical Provider, MD   calcium-vitamin D (OSCAL-500) 500-200 MG-UNIT per tablet Take 1 tablet by mouth daily    Historical Provider, MD   Multiple Vitamins-Minerals (THERAPEUTIC MULTIVITAMIN-MINERALS) tablet Take 1 tablet by mouth daily    Historical Provider, MD   apixaban (ELIQUIS) 5 MG TABS tablet Take 5 mg by mouth 2 alecia like to have it in case.     Insomnia: Currently taking zolpidem 5 mg HS. will take a second dose in the middle the night if needed.  Reports she is worried about a dependency on Ambien -- tried to decrease to 5 mg.  For a week would take after 3 hours and now getting 6 hours of sleep. Denies parasomnia, but it runs in her family.  Used to sleep 10 to 12 hours a night, now sleeping 6 to 7 hours.  Wakes up only once.    Migraine Headaches: sees neurology. Botox every 3 months - next tomorrow.  Uses Maxalt PRN -- not needed for a while.  Wonders if Botox is good for her and whether she should continue. Wonders about OTC for migraines. Were really bad before and would last for days. Now since off duloxetine she has not had a migraine.     Mitochondrial Myopathy: currently taking no medication. Will get twitches sometimes, but not taking cyclobenzaprine 5-10 mg PRN right now.     Fibromyalgia: Thinks duloxetine was helping with her pain. Reports that her pain is ok. When she gets anxious and afraid her arms start to burn.     GERD/Nausea: No longer taking omeprazole 40 mg two times a day or ondansetron.  Has not needed omeprazole, but might in the future.  Was taking ondansetron at the time when she was on other medications, but has not needed since being off duloxetine.    shortness of breath: currently taking albuterol HFA PRN. Has not used for a while. Wonders if she could take an antihistamine for allergies.     Supplements: Currently taking riboflavin B2 400 mg two times a day and MVI daily. started from mitochondrial myopathy doctor.       PMH: reviewed in EPIC   Allergies/ADRs: reviewed in EPIC   Alcohol: Reviewed in epic  Tobacco:   Social History     Tobacco Use   Smoking Status Never Smoker   Smokeless Tobacco Never Used     Today's Vitals: There were no vitals filed for this visit.  ----------------    Much or all of the text in this note was generated through the use of Dragon Dictate voice-to-text  times daily    Historical Provider, MD   acetaminophen (TYLENOL) 325 MG tablet Take 2 tablets by mouth every 4 hours as needed for Pain or Fever 9/18/16   Deborr MD Liz   metoprolol (LOPRESSOR) 25 MG tablet Take 25 mg by mouth 2 times daily    Historical Provider, MD      Current Facility-Administered Medications: 0.9 % sodium chloride infusion, , IntraVENous, PRN  butalbital-acetaminophen-caffeine (FIORICET, ESGIC) per tablet 1 tablet, 1 tablet, Oral, Once  0.9 % sodium chloride infusion, , IntraVENous, PRN  amiodarone (CORDARONE) tablet 200 mg, 200 mg, Oral, Daily  lisinopril (PRINIVIL;ZESTRIL) tablet 10 mg, 10 mg, Oral, Daily  metoprolol tartrate (LOPRESSOR) tablet 25 mg, 25 mg, Oral, BID  therapeutic multivitamin-minerals 1 tablet, 1 tablet, Oral, Daily  pravastatin (PRAVACHOL) tablet 40 mg, 40 mg, Oral, Nightly  traZODone (DESYREL) tablet 50 mg, 50 mg, Oral, Nightly PRN  sodium chloride flush 0.9 % injection 5-40 mL, 5-40 mL, IntraVENous, 2 times per day  sodium chloride flush 0.9 % injection 10 mL, 10 mL, IntraVENous, PRN  0.9 % sodium chloride infusion, 25 mL, IntraVENous, PRN  ondansetron (ZOFRAN-ODT) disintegrating tablet 4 mg, 4 mg, Oral, Q8H PRN **OR** ondansetron (ZOFRAN) injection 4 mg, 4 mg, IntraVENous, Q6H PRN  polyethylene glycol (GLYCOLAX) packet 17 g, 17 g, Oral, Daily PRN  acetaminophen (TYLENOL) tablet 650 mg, 650 mg, Oral, Q6H PRN **OR** acetaminophen (TYLENOL) suppository 650 mg, 650 mg, Rectal, Q6H PRN  iron sucrose (VENOFER) 200 mg in sodium chloride 0.9 % 100 mL IVPB, 200 mg, IntraVENous, Q24H  lidocaine 4 % external patch 1 patch, 1 patch, TransDERmal, Daily  pantoprazole (PROTONIX) injection 40 mg, 40 mg, IntraVENous, BID **AND** sodium chloride (PF) 0.9 % injection 10 mL, 10 mL, IntraVENous, Daily    Allergies:  Codeine, Bactrim [sulfamethoxazole-trimethoprim], Naproxen, Norvasc [amlodipine besylate], Sertraline hcl, Toradol [ketorolac tromethamine], Tramadol, Magnesium-containing compounds, Morphine, and Percocet [oxycodone-acetaminophen]    Social History:   reports that she quit smoking about 37 years ago. She has never used smokeless tobacco. She reports that she does not drink alcohol and does not use drugs. Family History: family history includes Cancer in her brother, daughter, and another family member. REVIEW OF SYSTEMS:    Constitutional: Negative for appetite change, fatigue and fever. HENT: Negative for congestion, rhinorrhea and sore throat. Eyes: Negative for photophobia, redness and visual disturbance. Respiratory: Positive for shortness of breath. Negative for cough. Cardiovascular: Positive for leg swelling. Negative for chest pain and palpitations. Gastrointestinal: Negative for constipation, diarrhea, nausea and rectal pain. Endocrine: Negative for polydipsia, polyphagia and polyuria. Genitourinary: Negative for decreased urine volume, difficulty urinating, dysuria and urgency. Musculoskeletal: Negative for arthralgias, back pain and neck pain. Skin: Negative for pallor, rash and wound. Allergic/Immunologic: Negative for environmental allergies, food allergies and immunocompromised state. Neurological: Negative for dizziness, weakness and headaches. Hematological: Negative for adenopathy. Does not bruise/bleed easily. Psychiatric/Behavioral: Negative for agitation, confusion and sleep disturbance. The patient is not nervous/anxious. PHYSICAL EXAM:      BP (!) 107/43   Pulse 63   Temp 97.3 °F (36.3 °C) (Oral)   Resp 16   Ht 5' 4\" (1.626 m)   Wt 148 lb 5.9 oz (67.3 kg)   SpO2 97%   BMI 25.47 kg/m²    Vitals and nursing note reviewed. Constitutional:       General: She is not in acute distress. Appearance: Normal appearance. She is normal weight. She is not ill-appearing. HENT:      Nose: Nose normal.      Mouth/Throat:      Mouth: Mucous membranes are moist.      Pharynx: Oropharynx is clear. No oropharyngeal exudate. software. Errors in spelling or words which seem out of context are unintentional. Sound alike errors, in particular, may have escaped editing.    The patient was given a summary of these recommendations via [place]    I spent 60 minutes with this patient today.   All changes were made via collaborative practice agreement with Layla Martinez MD. A copy of the visit note was provided to the patient's provider.     Jossy PfeifferD., Holy Cross HospitalCP  Medication Therapy Management Pharmacist  [facility]     Current Outpatient Medications   Medication Sig Dispense Refill     albuterol (PROAIR HFA;PROVENTIL HFA;VENTOLIN HFA) 90 mcg/actuation inhaler Inhale 1-2 puffs every 4 (four) hours as needed for wheezing or shortness of breath (or coughing). 1 Inhaler 2     ascorbic acid, vitamin C, (ASCORBIC ACID WITH MELI HIPS) 500 MG tablet Take 500 mg by mouth daily.       atenolol (TENORMIN) 25 MG tablet Take 1 tablet (25 mg total) by mouth 2 (two) times a day as needed. 14 tablet 1     BORIC ACID, BULK, MISC Use As Directed.       clindamycin-benzoyl peroxide (BENZACLIN) gel APPLY TO ACNE ONCE DAILY 50 g 1     cyclobenzaprine (FLEXERIL) 10 MG tablet Take 0.5-1 tablets (5-10 mg total) by mouth every 8 (eight) hours as needed for muscle spasms. 30 tablet 2     desvenlafaxine succinate (PRISTIQ) 50 MG 24 hr tablet        DULoxetine (CYMBALTA) 30 MG capsule Take 1 capsule (30 mg total) by mouth daily. 30 capsule 1     DULoxetine (CYMBALTA) 30 MG capsule Take 1 capsule (30 mg total) by mouth daily. 30 capsule 1     ibuprofen (ADVIL,MOTRIN) 400 MG tablet Take 1.5 tablets (600 mg total) by mouth every 6 (six) hours as needed. 30 tablet 0     lidocaine (LIDODERM) 5 % On for 12 hours then off for 12 hours 90 patch 1     LORazepam (ATIVAN) 0.5 MG tablet Take 1 tablet (0.5 mg total) by mouth 3 (three) times a day. 21 tablet 1     multivitamin therapeutic tablet Take 1 tablet by mouth daily.       naproxen  (NAPROSYN) 500 MG tablet TAKE 1 TABLET BY MOUTH TWICE A DAY WITH FOOD 20 tablet 0     nystatin (MYCOSTATIN) powder Apply topically 4 (four) times a day.       omeprazole (PRILOSEC) 40 MG capsule Take 1 capsule (40 mg total) by mouth 2 (two) times a day before meals. 60 capsule 1     ONABOTULINUMTOXINA (BOTOX INJ) Inject as directed every 3 (three) months.       ondansetron (ZOFRAN-ODT) 4 MG disintegrating tablet TAKE 1 TABLET (4 MG TOTAL) BY MOUTH EVERY 8 (EIGHT) HOURS AS NEEDED FOR NAUSEA. 30 tablet 1     riboflavin, vitamin B2, 400 mg Tab Take 400 mg by mouth 2 (two) times a day.       rizatriptan (MAXALT-MLT) 10 MG disintegrating tablet Take 1 tablet (10 mg total) by mouth as needed for migraine. 10 tablet 3     tretinoin (RETIN-A) 0.025 % gel Apply topically daily. 45 g 2     valACYclovir (VALTREX) 1000 MG tablet Take 2 tablets PO 12 hours apart PRN episode 20 tablet 2     vitamin E 1000 UNIT capsule Take 1,000 Units by mouth daily.       zolpidem (AMBIEN CR) 12.5 MG CR tablet Take 1 tablet (12.5 mg total) by mouth at bedtime as needed for sleep. 30 tablet 1     zolpidem (AMBIEN) 10 mg tablet        No current facility-administered medications for this visit.               Eyes:      Extraocular Movements: Extraocular movements intact. Pupils: Pupils are equal, round, and reactive to light. Cardiovascular:      Rate and Rhythm: Normal rate and regular rhythm. Pulses: Normal pulses. Heart sounds: Murmur heard. Pulmonary:      Effort: Pulmonary effort is normal.      Breath sounds: Examination of the right-lower field reveals rales. Examination of the left-lower field reveals rales. Rales present. Abdominal:      General: Bowel sounds are normal. There is no distension. Palpations: Abdomen is soft. There is mass. Musculoskeletal:         General: Normal range of motion. Cervical back: Normal range of motion. Right lower le+ Pitting Edema present. Left lower le+ Pitting Edema present. Skin:     General: Skin is warm and dry. Capillary Refill: Capillary refill takes less than 2 seconds. Findings: Lesion (open weeping areas to lower legs anteriorly, primarily right. ) present. Neurological:      General: No focal deficit present. Mental Status: She is alert and oriented to person, place, and time. Psychiatric:         Mood and Affect: Mood normal.         Behavior: Behavior normal.     DATA:    Diagnostics:    EKG 2022  Sinus rhythm with 1st degree A-V block  Right bundle branch block  Abnormal ECG    EVENT MONITOR 3/4/2020: Prevailing rhythm is SR with short runs of non-sustained supraventricular tachycardia of 15 beats in duration at an average rate of 130 bpm. No significant other sustained tachy or bradyarrhythmias are seen. No significant pauses are noted.      ECHO 10/7/16: EF 50-55%, mild LVH, RV is moderately dilated, LA/RA is severely enlarged, trace MR, moderate TR, RVSP is 30-35 mmHg, mild AR, trace ID.      Labs:   CBC:   Recent Labs     22  1148 22  1252 22  1719 22  0514   WBC 6.4  --   --  7.1   HGB 6.5*   < > 7.2* 6.7*   HCT 25.0*   < > 27.1* 24.0*   PLT See Reflexed IPF Result  --   --  See Reflexed IPF Result    < > = values in this interval not displayed. BMP:   Recent Labs     02/07/22  1148      K 4.4   CO2 21   BUN 21   CREATININE 0.90   LABGLOM 60*   GLUCOSE 101*     BNP: No results for input(s): BNP in the last 72 hours. PT/INR: No results for input(s): PROTIME, INR in the last 72 hours. APTT:No results for input(s): APTT in the last 72 hours. CARDIAC ENZYMES:No results for input(s): CKTOTAL, CKMB, CKMBINDEX, TROPONINI in the last 72 hours. FASTING LIPID PANEL:  Lab Results   Component Value Date    HDL 43 02/08/2022    LDLCALC 70 07/05/2017    TRIG 54 02/08/2022     LIVER PROFILE:No results for input(s): AST, ALT, LABALBU in the last 72 hours. IMPRESSION:    Patient Active Problem List   Diagnosis    Hyperlipidemia    Hypertension    Anxiety    Gastroesophageal reflux disease without esophagitis    Atypical chest pain    Paroxysmal atrial fibrillation (HCC)    Asymptomatic varicose veins    Bilateral edema of lower extremity    Chronic anticoagulation    Diastolic dysfunction    Former smoker    Mild aortic regurgitation    Mild concentric left ventricular hypertrophy (LVH)    Paraesophageal hernia    CHF with unknown LVEF ([address]    Anemia    Hiatal hernia     RECOMMENDATIONS:  1. Follow up on Echo with further recommendations pending result  2. Will hold Bumex for now as BP is on the lower side   3. Continue to monitor Hemoglobin and transfuse as needed  4. Further management per Primary and sub-specialties  5. Further recommendations after discussion with Dr. Mason Haynes MD  PGY-2, Internal Medicine Resident  [address] [facility]  2/8/2022 9:54 AM      [facility]      [phone]

## 2022-02-08 NOTE — PROGRESS NOTES
[facility])  Occupational Therapy Not Seen Note    DATE: 2022    NAME: Patricio Ruffin  MRN: [id number]   : 1935      Patient not seen this date for Occupational Therapy due to:     Other: low HGB, getting blood soon    Next Scheduled Treatment: check back later as able or 2022    Electronically signed by MACK Weinstein on 2022 at 9:16 AM

## 2022-02-08 NOTE — CARE COORDINATION
Case Management Initial Discharge Plan  Vijay Hopkins,             Met with:patient to discuss discharge plans. Information verified: address, contacts, phone number, , insurance Yes  Insurance Provider: Loteda    Emergency Contact/Next of Kin name & number: daughter Talia Elizondo [phone]  Who are involved in patient's support system? family    PCP: Florence Nieves DO  Date of last visit: last week      Discharge Planning    Living Arrangements:  [address] 1 stories  2 stairs to climb to get into front door, 0 stairs to climb to reach second floor  Location of bedroom/bathroom in home main level    Patient able to perform ADL's:Independent    Current Services (outpatient & in home) N/A  DME equipment: N/A  DME provider: N/A    Is patient receiving oral anticoagulation therapy? YES    If indicated: Eliquis  Physician managing anticoagulation treatment: PCP  Where does patient obtain lab work for ATC treatment? n/a      Potential Assistance Needed:  N/A    Patient agreeable to home care: No  [place] of choice provided:  n/a    Prior SNF/Rehab Placement and Facility: no  Agreeable to SNF/Rehab: No  [place] of choice provided: n/a     Evaluation: n/a    Expected Discharge date:       Patient expects to be discharged to:   home    If home: is the family and/or caregiver wiling & able to provide support at home? yes  Who will be providing this support? family  Follow Up Appointment: Best Day/ Time:      Transportation provider: daughter  Transportation arrangements needed for discharge: No    Readmission Risk              Risk of Unplanned Readmission:  12             Does patient have a readmission risk score greater than 14?: No  If yes, follow-up appointment must be made within 7 days of discharge.      Goals of Care: safety, improve breathing      Educated patient on transitional options, provided freedom of choice and are agreeable with plan      Discharge Plan: home independent with daughter          Electronically signed by Tonio Aguirre RN on 2/8/22 at 9:21 AM EST

## 2022-02-08 NOTE — PROGRESS NOTES
[facility]  Office: [address], DO, Festus Carrizales, DO, Tanya William, DO, Anselmo Smoker Blood, DO, Isaac Sahu MD, Marco Brown MD, Dylon Hoover MD, Julius Crenshaw MD, Daniel Tucker MD, Spencer Gomez MD, Meghna Hightower MD, Tiffanie Silverio, DO, Ermelinda Reynoso, DO, Stacy Villa MD,  Michelle Burgos, DO, Ivette Tony MD, Mikayla Dumont MD, Daquan Lorenzo MD, Genny Washington MD, Anderson Munoz MD, Monique Major, [facility], [facility] Bold[facility], CNP, Jeanne Rangel, CNP, Tushar Lee, CNS, Italia Talley, [facility], Beryle Fix, CNP, Vy Alcantara, CNP, Iggy Malik, CNP, Matt Valenzuela, CNP, Abilio Carter PA-C, Sridhar De La Rosa, [facility], Derrell Benitez, [facility], Perla Lee, CNP, Yaakov Whelan, CNP, Laurie Velazquez, CNP, Amaury Hernandez, CNP, Misael Leblanc, CNP, Mario Arce, [facility]    Progress Note    2/8/2022    3:53 PM    Name:   Josephine Talley  MRN:     [id number]     Acct:      [de-identified]   Room:   [address] [place] [address] Day:  1  Admit Date:  2/7/2022 11:33 AM    PCP:   Nikky Hernandez DO  Code Status:  Full Code    Subjective:     C/C:   Chief Complaint   Patient presents with    Shortness of Breath    Other     L shoulder pain     Interval History Status:   Improved after transfusion   Less SOB   Still reporting left shoulder discomfort  Anterior chest wall pain resolved    Data Base Updates:  WBC7.1k/uL RBC4.10m/uL Hemoglobin 6.7 Low Panic      Ferritin7 Low      Pro-BNP3,998 High      Follow-up EKG:  Sinus rhythm with 1st degree A-V block   Right bundle branch block   Abnormal ECG   When compared with ECG of 07-FEB-2022 11:39,   Criteria for Septal infarct are no longer Present     Troponin, High [place]29 High      Brief History:     As documented in the medical record:  Arianne Odonnell is a pleasant, cooperative 82year old female who presented for evaluation of bilateral leg swelling, left shoulder pain and progressive SOB which began 2 days ago. She denies any history of dyspnea, COPD or known heart failure. She denies and fever/chills, N/V constipation or diarrhea. She denies hematuria or melanotic stools. She denies chronic NSAID use. She states that she follows regularly with her PCP (last 6 days ago) who recommended cardiology evaluation for her bilateral leg weeping edema and was ordered daily Bumex which she has not picked up from the pharmacy yet. She does have a history of A-fib and is on home Eliquis. She also has a medical history of GERD and HTN. She states that she is independent in her care and takes all her prescribed medications. Her granddaughter is at bedside and expresses concern for some confusion/forgetfulness over the past several months. ED workup showed pBNP 2976. There are no comparative values. Troponin 25-->26. Hgb 6.5. Hemocult (-). CT chest/abd/pelvis (-) for acute findings. \"    The intitial assessment and plan included:  \"   * (Principal) CHF with unknown LVEF ([facility] [address] 2/7/2022 Yes     Hyperlipidemia (Chronic) 2/7/2022 Yes     Hypertension (Chronic) 2/7/2022 Yes     Gastroesophageal reflux disease without esophagitis 2/7/2022 Yes     Paroxysmal atrial fibrillation (HCC) (Chronic) 2/7/2022 Yes     Bilateral edema of lower extremity 2/7/2022 Yes     Chronic anticoagulation 2/7/2022 Yes     Anemia 2/7/2022 Yes     Hiatal hernia 2/7/2022 Yes         Plan included:  Patient status inpatient in the Med/Surge     Admit to Intermed  CHF: Inpatient consult to cardiology. Appreciate input. Will trend BNP. Lasix after blood administration. Echo pending. Anemia: Iron studies. Receiving one unit PRBC. Check Hgb post-transfusion. Transfuse for < 7.0 or noted tachycardia/hypotension. Consult GI for hiatal hernia. Hold Eliquis today. PAF: Continue rate control with home lopressor. HTN: VS per unit. Continue home antihypertensives. HLD: Continue home statin  DVT prophylaxis: EPC's while in bed.    GI prophylaxis: Protonix BID  PT/OT eval and treat  Full Code\"     EGD 2017:  Findings: The examined esophagus was normal.        A large paraesophageal hernia was found. The in the duodenum was normal.     Echo :  Results for Zeinab Haider (MRN [id number]) as of 2022 15:45   Ref. Range 2016 05:14 2022 11:48 2022 05:14   Pro-BNP Latest Ref Range: <300 pg/mL 1,545 (H) 2,976 (H) 3,998 (H)     Past Medical History:   has a past medical history of GERD (gastroesophageal reflux disease) and Hypertension. Social History:   reports that she quit smoking about 37 years ago. She has never used smokeless tobacco. She reports that she does not drink alcohol and does not use drugs. Family History:   Family History   Problem Relation Age of Onset    Cancer Brother     Cancer Other         bone    Cancer Daughter         breast       Review of Systems:     Review of Systems   Constitutional: Positive for activity change (Decreased) and fatigue. Respiratory: Positive for shortness of breath (dyspnea on exertion). Negative for cough. Cardiovascular: Positive for leg swelling (Chronic). Negative for chest pain (Resolved) and palpitations. Gastrointestinal: Positive for nausea. Negative for abdominal pain, blood in stool and vomiting. Genitourinary: Negative for flank pain and hematuria. Musculoskeletal: Positive for arthralgias and myalgias. Chronic left shoulder pain and stiffness  She has had prior left shoulder arthroplasty   Skin:        Patient has had a nonhealing \"pimple\" on her chin, suggested outpatient dermatology consultation   Neurological: Positive for headaches (Had frontal headache tonight).          Physical Examination:        Vitals  BP (!) 153/66   Pulse 68   Temp 98.2 °F (36.8 °C) (Oral)   Resp 18   Ht 5' 4\" (1.626 m)   Wt 148 lb 5.9 oz (67.3 kg)   SpO2 97%   BMI 25.47 kg/m²   Temp (24hrs), Av °F (36.7 °C), Min:97.3 °F (36.3 °C), Max:98.7 °F (37.1 °C)    No results for input(s): POCGLU in the last 72 hours. Physical Exam  Vitals reviewed. Constitutional:       General: She is not in acute distress. Appearance: She is not diaphoretic. HENT:      Head: Normocephalic. Nose: Nose normal.   Eyes:      General: No scleral icterus. Conjunctiva/sclera: Conjunctivae normal.   Neck:      Trachea: No tracheal deviation. Cardiovascular:      Rate and Rhythm: Normal rate and regular rhythm. Pulmonary:      Effort: Pulmonary effort is normal. No respiratory distress. Breath sounds: Normal breath sounds. No wheezing or rales. Chest:      Chest wall: No tenderness. Abdominal:      General: There is no distension. Palpations: Abdomen is soft. Tenderness: There is no abdominal tenderness. Musculoskeletal:         General: No tenderness. Cervical back: Neck supple. Comments: Shoulder ROM OK   Skin:     General: Skin is warm and dry. Coloration: Skin is pale. Neurological:      Mental Status: She is alert and oriented to person, place, and time. Medications: Allergies: Allergies   Allergen Reactions    Codeine Nausea Only     dizziness    Bactrim [Sulfamethoxazole-Trimethoprim] Hives    Naproxen      On allergy list ? Unsure of reaction    Norvasc [Amlodipine Besylate]      Edema-she thinks      Sertraline Hcl      unsure    Toradol [Ketorolac Tromethamine]      unsure    Tramadol      unsure    Magnesium-Containing Compounds Diarrhea     Mag ox 500 ?     Morphine Nausea And Vomiting    Percocet [Oxycodone-Acetaminophen] Nausea And Vomiting       Current Meds:   Scheduled Meds:    butalbital-acetaminophen-caffeine  1 tablet Oral Once    metoprolol tartrate  12.5 mg Oral BID    polyethylene glycol  4,000 mL Oral Once    amiodarone  200 mg Oral Daily    [Held by provider] lisinopril  10 mg Oral Daily    therapeutic multivitamin-minerals  1 tablet Oral Daily    pravastatin  40 mg Oral Nightly    sodium chloride flush  5-40 mL IntraVENous 2 times per day    iron sucrose  200 mg IntraVENous Q24H    lidocaine  1 patch TransDERmal Daily    pantoprazole  40 mg IntraVENous BID    And    sodium chloride (PF)  10 mL IntraVENous Daily     Continuous Infusions:    sodium chloride      sodium chloride      sodium chloride       PRN Meds: sodium chloride, sodium chloride, traZODone, sodium chloride flush, sodium chloride, ondansetron **OR** ondansetron, polyethylene glycol, acetaminophen **OR** acetaminophen    Data:     I/O (24Hr): Intake/Output Summary (Last 24 hours) at 2/8/2022 1553  Last data filed at 2/8/2022 1320  Gross per 24 hour   Intake 770 ml   Output 1300 ml   Net -530 ml       Labs:  Hematology:  Recent Labs     02/07/22  1148 02/07/22  1148 02/07/22  1252 02/07/22 1719 02/08/22  0514   WBC 6.4  --   --   --  7.1   RBC 4.26  --   --   --  4.10   HGB 6.5*   < > 6.1* 7.2* 6.7*   HCT 25.0*   < > 23.4* 27.1* 24.0*   MCV 58.7*  --   --   --  58.5*   MCH 15.3*  --   --   --  16.3*   MCHC 26.0*  --   --   --  27.9*   RDW 23.5*  --   --   --  25.6*   PLT See Reflexed IPF Result  --   --   --  See Reflexed IPF Result   MPV NOT REPORTED  --   --   --  NOT REPORTED    < > = values in this interval not displayed.      Chemistry:  Recent Labs     02/07/22  1148 02/07/22  1343 02/07/22  1719 02/08/22  0514     --   --   --    K 4.4  --   --   --      --   --   --    CO2 21  --   --   --    GLUCOSE 101*  --   --   --    BUN 21  --   --   --    CREATININE 0.90  --   --   --    MG  --   --   --  1.9   ANIONGAP 13  --   --   --    LABGLOM 60*  --   --   --    GFRAA >60  --   --   --    CALCIUM 9.5  --   --   --    PROBNP 2,976*  --   --  3,998*   TROPHS 25* 26* 26*  --      Recent Labs     02/08/22  0514   TSH 1.47   CHOL 97   HDL 43   LDLCHOLESTEROL 43   CHOLHDLRATIO 2.3   TRIG 54   VLDL NOT REPORTED     ABG:No results found for: POCPH, PHART, PH, POCPCO2, CMF8RJE, PCO2, POCPO2, PO2ART, PO2, POCHCO3, SOQ2YYE, HCO3, NBEA, PBEA, BEART, BE, THGBART, THB, WNF3RQQ, DNVE7USM, H6SBXMZS, O2SAT, FIO2  Lab Results   Component Value Date/Time    SPECIAL NOT REPORTED 03/21/2020 12:15 PM     Lab Results   Component Value Date/Time    CULTURE NO SIGNIFICANT GROWTH 09/28/2016 07:19 AM    CULTURE  09/28/2016 07:19 AM     Performed at [address] [place] [address], [address] [place] [address] [phone]       Radiology:  CTA CHEST W WO CONTRAST    Result Date: 2/7/2022  No thoracoabdominal aortic aneurysm or dissection. No acute finding in the chest, abdomen, and pelvis. Multiple chronic findings as above. XR CHEST PORTABLE    Result Date: 2/7/2022  No acute cardiopulmonary disease. Cardiomegaly. Hiatus hernia. CTA ABDOMEN PELVIS W CONTRAST    Result Date: 2/7/2022  No thoracoabdominal aortic aneurysm or dissection. No acute finding in the chest, abdomen, and pelvis. Multiple chronic findings as above.        Assessment:        Primary Problem  Acute on chronic diastolic congestive heart failure [facility])    Active Hospital Problems    Diagnosis Date Noted    Iron deficiency anemia [D50.9] 02/08/2022    History of Nissen fundoplication [C91.259] 83/43/5280    Closed wedge fracture of thoracic vertebra with routine healing T5,T8 [S22.000D] 02/08/2022    (HFpEF) heart failure with preserved ejection fraction (HCC) [I50.30] 02/08/2022    Acute on chronic diastolic congestive heart failure (HCC) [I50.33]     Iron deficiency [E61.1]     CHF with unknown LVEF ([facility] [address] [I50.9] 02/07/2022    Symptomatic anemia [D64.9] 02/07/2022    Hiatal hernia [K44.9] 02/07/2022    Chronic anticoagulation [Z79.01] 03/24/2021    Bilateral edema of lower extremity [R60.0] 03/24/2021    Paroxysmal atrial fibrillation ([facility] [address] [I48.0] 10/21/2016    Gastroesophageal reflux disease without esophagitis [K21.9] 09/06/2016    Hyperlipidemia [E78.5] 08/26/2014    Hypertension [I10] 08/26/2014       Plan:        Optimize cardio-pulmonary function  Cardiology evaluation in progress   Blood products prn - will check H&H  Check stools for occult blood  Hold Eliquis  Observe for GI bleed  Iron supplementation  Venofer  Trial of Fioricet for headache  Blood Pressure - Monitor and control  Anemia w/u on outpatient basis is recommended  Suggest follow-up EGD and colonoscopy  Reflux precautions   DVT prophylaxis    IP CONSULT TO HOSPITALIST  IP CONSULT TO DIETITIAN  IP CONSULT TO CARDIOLOGY  IP CONSULT TO GI  IP CONSULT TO IV TEAM    Austin Crocker DO  2/8/2022  3:53 PM

## 2022-02-09 PROBLEM — I07.1 SEVERE TRICUSPID REGURGITATION: Status: ACTIVE | Noted: 2022-02-09

## 2022-02-09 PROBLEM — I27.20 PULMONARY HYPERTENSION (HCC): Status: ACTIVE | Noted: 2022-02-09

## 2022-02-09 LAB
ABO/RH: NORMAL
ANION GAP SERPL CALCULATED.3IONS-SCNC: 14 MMOL/L (ref 9–17)
ANTIBODY SCREEN: NEGATIVE
ARM BAND NUMBER: NORMAL
BLD PROD TYP BPU: NORMAL
BLD PROD TYP BPU: NORMAL
BUN BLDV-MCNC: 12 MG/DL (ref 8–23)
BUN/CREAT BLD: ABNORMAL (ref 9–20)
CALCIUM SERPL-MCNC: 8.6 MG/DL (ref 8.6–10.4)
CHLORIDE BLD-SCNC: 104 MMOL/L (ref 98–107)
CO2: 22 MMOL/L (ref 20–31)
CREAT SERPL-MCNC: 0.86 MG/DL (ref 0.5–0.9)
CROSSMATCH RESULT: NORMAL
CROSSMATCH RESULT: NORMAL
DISPENSE STATUS BLOOD BANK: NORMAL
DISPENSE STATUS BLOOD BANK: NORMAL
EXPIRATION DATE: NORMAL
GFR AFRICAN AMERICAN: >60 ML/MIN
GFR NON-AFRICAN AMERICAN: >60 ML/MIN
GFR SERPL CREATININE-BSD FRML MDRD: ABNORMAL ML/MIN/{1.73_M2}
GFR SERPL CREATININE-BSD FRML MDRD: ABNORMAL ML/MIN/{1.73_M2}
GLUCOSE BLD-MCNC: 87 MG/DL (ref 70–99)
HCT VFR BLD CALC: 29.6 % (ref 36.3–47.1)
HEMOGLOBIN: 8.3 G/DL (ref 11.9–15.1)
LV EF: 58 %
LVEF MODALITY: NORMAL
MCH RBC QN AUTO: 17.7 PG (ref 25.2–33.5)
MCHC RBC AUTO-ENTMCNC: 28 G/DL (ref 28.4–34.8)
MCV RBC AUTO: 63.2 FL (ref 82.6–102.9)
NRBC AUTOMATED: 2 PER 100 WBC
PDW BLD-RTO: 29.3 % (ref 11.8–14.4)
PLATELET # BLD: ABNORMAL K/UL (ref 138–453)
PLATELET, FLUORESCENCE: 181 K/UL (ref 138–453)
PLATELET, IMMATURE FRACTION: 4.2 % (ref 1.1–10.3)
PMV BLD AUTO: ABNORMAL FL (ref 8.1–13.5)
POTASSIUM SERPL-SCNC: 3.6 MMOL/L (ref 3.7–5.3)
RBC # BLD: 4.68 M/UL (ref 3.95–5.11)
SODIUM BLD-SCNC: 140 MMOL/L (ref 135–144)
TRANSFUSION STATUS: NORMAL
TRANSFUSION STATUS: NORMAL
UNIT DIVISION: 0
UNIT DIVISION: 0
UNIT NUMBER: NORMAL
UNIT NUMBER: NORMAL
WBC # BLD: 10.3 K/UL (ref 3.5–11.3)

## 2022-02-09 PROCEDURE — 80048 BASIC METABOLIC PNL TOTAL CA: CPT

## 2022-02-09 PROCEDURE — 85055 RETICULATED PLATELET ASSAY: CPT

## 2022-02-09 PROCEDURE — 6370000000 HC RX 637 (ALT 250 FOR IP): Performed by: INTERNAL MEDICINE

## 2022-02-09 PROCEDURE — 85027 COMPLETE CBC AUTOMATED: CPT

## 2022-02-09 PROCEDURE — 93306 TTE W/DOPPLER COMPLETE: CPT

## 2022-02-09 PROCEDURE — 99232 SBSQ HOSP IP/OBS MODERATE 35: CPT | Performed by: INTERNAL MEDICINE

## 2022-02-09 PROCEDURE — 2580000003 HC RX 258: Performed by: NURSE PRACTITIONER

## 2022-02-09 PROCEDURE — 2580000003 HC RX 258: Performed by: INTERNAL MEDICINE

## 2022-02-09 PROCEDURE — 6370000000 HC RX 637 (ALT 250 FOR IP)

## 2022-02-09 PROCEDURE — 6370000000 HC RX 637 (ALT 250 FOR IP): Performed by: NURSE PRACTITIONER

## 2022-02-09 PROCEDURE — 1200000000 HC SEMI PRIVATE

## 2022-02-09 PROCEDURE — 36415 COLL VENOUS BLD VENIPUNCTURE: CPT

## 2022-02-09 PROCEDURE — 6360000002 HC RX W HCPCS: Performed by: INTERNAL MEDICINE

## 2022-02-09 PROCEDURE — C9113 INJ PANTOPRAZOLE SODIUM, VIA: HCPCS | Performed by: INTERNAL MEDICINE

## 2022-02-09 RX ORDER — BUTALBITAL, ACETAMINOPHEN AND CAFFEINE 50; 325; 40 MG/1; MG/1; MG/1
1 TABLET ORAL EVERY 6 HOURS PRN
Status: DISCONTINUED | OUTPATIENT
Start: 2022-02-09 | End: 2022-02-12 | Stop reason: HOSPADM

## 2022-02-09 RX ORDER — BUSPIRONE HYDROCHLORIDE 10 MG/1
10 TABLET ORAL EVERY EVENING
Status: DISCONTINUED | OUTPATIENT
Start: 2022-02-09 | End: 2022-02-12 | Stop reason: HOSPADM

## 2022-02-09 RX ADMIN — PANTOPRAZOLE SODIUM 40 MG: 40 INJECTION, POWDER, FOR SOLUTION INTRAVENOUS at 20:59

## 2022-02-09 RX ADMIN — METOPROLOL TARTRATE 12.5 MG: 25 TABLET ORAL at 10:31

## 2022-02-09 RX ADMIN — AMIODARONE HYDROCHLORIDE 200 MG: 200 TABLET ORAL at 10:31

## 2022-02-09 RX ADMIN — PANTOPRAZOLE SODIUM 40 MG: 40 INJECTION, POWDER, FOR SOLUTION INTRAVENOUS at 10:47

## 2022-02-09 RX ADMIN — TRAZODONE HYDROCHLORIDE 50 MG: 50 TABLET ORAL at 21:15

## 2022-02-09 RX ADMIN — ONDANSETRON 4 MG: 4 TABLET, ORALLY DISINTEGRATING ORAL at 21:15

## 2022-02-09 RX ADMIN — BUSPIRONE HYDROCHLORIDE 10 MG: 10 TABLET ORAL at 17:18

## 2022-02-09 RX ADMIN — POLYETHYLENE GLYCOL 3350, SODIUM SULFATE ANHYDROUS, SODIUM BICARBONATE, SODIUM CHLORIDE, POTASSIUM CHLORIDE 2000 ML: 236; 22.74; 6.74; 5.86; 2.97 POWDER, FOR SOLUTION ORAL at 16:54

## 2022-02-09 RX ADMIN — SODIUM CHLORIDE, PRESERVATIVE FREE 10 ML: 5 INJECTION INTRAVENOUS at 00:02

## 2022-02-09 RX ADMIN — PRAVASTATIN SODIUM 40 MG: 20 TABLET ORAL at 20:59

## 2022-02-09 RX ADMIN — Medication 1 TABLET: at 10:31

## 2022-02-09 RX ADMIN — IRON SUCROSE 200 MG: 20 INJECTION, SOLUTION INTRAVENOUS at 22:15

## 2022-02-09 RX ADMIN — ACETAMINOPHEN 650 MG: 325 TABLET ORAL at 10:33

## 2022-02-09 RX ADMIN — METOPROLOL TARTRATE 12.5 MG: 25 TABLET ORAL at 20:59

## 2022-02-09 RX ADMIN — SODIUM CHLORIDE, PRESERVATIVE FREE 10 ML: 5 INJECTION INTRAVENOUS at 21:00

## 2022-02-09 RX ADMIN — SODIUM CHLORIDE, PRESERVATIVE FREE 10 ML: 5 INJECTION INTRAVENOUS at 10:45

## 2022-02-09 RX ADMIN — ACETAMINOPHEN 650 MG: 325 TABLET ORAL at 17:18

## 2022-02-09 ASSESSMENT — ENCOUNTER SYMPTOMS
COUGH: 0
NAUSEA: 0
VOMITING: 0
SHORTNESS OF BREATH: 1
ABDOMINAL PAIN: 0
BLOOD IN STOOL: 0

## 2022-02-09 ASSESSMENT — PAIN SCALES - GENERAL
PAINLEVEL_OUTOF10: 3
PAINLEVEL_OUTOF10: 10
PAINLEVEL_OUTOF10: 0
PAINLEVEL_OUTOF10: 3
PAINLEVEL_OUTOF10: 3
PAINLEVEL_OUTOF10: 0
PAINLEVEL_OUTOF10: 10

## 2022-02-09 ASSESSMENT — PAIN DESCRIPTION - LOCATION: LOCATION: HEAD

## 2022-02-09 NOTE — PROGRESS NOTES
[facility] [place] [facility]   Progress Note    Adi Farnsworth is a 82 y.o. female patient. Hospitalization Day:2      Chief consult reason:   Microcytic hypochromic iron deficiency anemia  Paraesophageal hiatal hernia    Subjective:  Patient examined at bedside, labs and chart reviewed  Afebrile, hemodynamically stable  Hb 8.3 s/p 2 units PRBC  Eliquis held due to anemia  Upper and lower GI endoscopy delayed till tomorrow due to inadequate bowel preparation  Patient still passing solid stools. Brief history:  82-year-old female on long-term anticoagulation due to A. fib,HFpEF and pedal edema presented with shortness of breath and back pain. CTA chest, abdomen and pelvis is negative. GI is consulted for microcytic hypochromic anemia and paraesophageal hiatal hernia. Patient denies dysphagia, odynophagia, weight loss, diarrhea, hematochezia, constipation, family history of GI malignancies. Never underwent colonoscopy for colon cancer screening. Perineal and rectal exam: No acute findings. Plan for upper and lower GI endoscopy. Hb 8.3. S/p 2 units PRBC. Platelets 651. VITALS:  BP (!) 172/61   Pulse 75   Temp 97.7 °F (36.5 °C) (Oral)   Resp 20   Ht 5' 4\" (1.626 m)   Wt 148 lb 5.9 oz (67.3 kg)   SpO2 98%   BMI 25.47 kg/m²   TEMPERATURE:  Current - Temp: 97.7 °F (36.5 °C);  Max - Temp  Av.9 °F (36.6 °C)  Min: 97.7 °F (36.5 °C)  Max: 98.2 °F (36.8 °C)    Physical Assessment:  General appearance:  alert, cooperative and no distress  Mental Status:  oriented to person, place and time and normal affect  Lungs:  clear to auscultation bilaterally, normal effort  Heart:  regular rate and rhythm, no murmur  Abdomen:  soft, nontender, nondistended, normal bowel sounds, no masses, hepatomegaly, splenomegaly  Extremities:  no edema, redness, tenderness in the calves  Skin:  no gross lesions, rashes, induration    Data Review:    Labs and Imaging:     CBC:  Recent Labs     22  1148 22  1148 02/07/22  1252 02/07/22  1719 02/08/22  0514 02/08/22  1614 02/09/22  0416   WBC 6.4  --   --   --  7.1  --  10.3   HGB 6.5*   < > 6.1* 7.2* 6.7* 8.2* 8.3*   MCV 58.7*  --   --   --  58.5*  --  63.2*   RDW 23.5*  --   --   --  25.6*  --  29.3*   PLT See Reflexed IPF Result  --   --   --  See Reflexed IPF Result  --  See Reflexed IPF Result    < > = values in this interval not displayed. ANEMIA STUDIES:  Recent Labs     02/07/22 1719   LABIRON 9*   TIBC 431   FERRITIN 7*       BMP:  Recent Labs     02/07/22  1148 02/09/22  0416    140   K 4.4 3.6*    104   CO2 21 22   BUN 21 12   CREATININE 0.90 0.86   GLUCOSE 101* 87   CALCIUM 9.5 8.6       LFTS:  No results for input(s): ALKPHOS, ALT, AST, BILITOT, BILIDIR, LABALBU in the last 72 hours. Amylase/Lipase and Ammonia:  No results for input(s): AMYLASE, LIPASE, AMMONIA in the last 72 hours. Acute Hepatitis Panel:  No results found for: HEPBSAG, HEPCAB, HEPBIGM, HEPAIGM    HCV Genotype:  No results found for: HEPATITISCGENOTYPE    HCV Quantitative:  No results found for: HCVQNT    LIVER WORK UP:    AFP  No results found for: AFP    Alpha 1 antitrypsin   No results found for: A1A    CHARANJIT  No results found for: CHARANJIT    AMA  No results found for: MITOAB    ASMA  No results found for: SMOOTHMUSCAB    PT/INR  No results for input(s): PROTIME, INR in the last 72 hours. Cancer Markers:  CEA:  No results for input(s): CEA in the last 72 hours. Ca 125:  No results for input(s):  in the last 72 hours. Ca 19-9:   Invalid input(s):   AFP: No results for input(s): AFP in the last 72 hours. Lactic acid:Invalid input(s): LACTIC ACID    Radiology Review:    No results found. GI Impression:  Microcytic hypochromic iron deficiency anemia  Paraesophageal hiatal hernia      Plan and Recommendations:    1. Microcytic hypochromic iron deficiency anemia. Monitor H&H. Transfuse if needed, keep Hb>7.  Plan for upper GI endoscopy and colonoscopy tomorrow a.m. continue on clear liquid diet. Keep n.p.o from midnight.   Bowel prep with GoLYTELY 2 L.  2. Rest of management per primary team and cardiology        Alida Mejia MD.  Internal Medicine Resident PGY [address]   2/9/2022, 10:40 AM      '

## 2022-02-09 NOTE — PLAN OF CARE
Problem: Falls - Risk of:  Goal: Will remain free from falls  Description: Will remain free from falls  Outcome: Ongoing  Goal: Absence of physical injury  Description: Absence of physical injury  Outcome: Ongoing     Problem: Safety:  Goal: Free from accidental physical injury  Description: Free from accidental physical injury  Outcome: Ongoing  Goal: Free from intentional harm  Description: Free from intentional harm  Outcome: Ongoing     Problem: Daily Care:  Goal: Daily care needs are met  Description: Daily care needs are met  Outcome: Ongoing     Problem: Pain:  Goal: Patient's pain/discomfort is manageable  Description: Patient's pain/discomfort is manageable  Outcome: Ongoing  Goal: Pain level will decrease  Description: Pain level will decrease  Outcome: Ongoing  Goal: Control of acute pain  Description: Control of acute pain  Outcome: Ongoing  Goal: Control of chronic pain  Description: Control of chronic pain  Outcome: Ongoing     Problem: Skin Integrity:  Goal: Skin integrity will stabilize  Description: Skin integrity will stabilize  Outcome: Ongoing

## 2022-02-09 NOTE — PROGRESS NOTES
[facility]  Office: [address], DO, Aaron Brooke, DO, Audra Boast, DO, Seramanda Christaalicia Blood, DO, Uma Combs MD, Razia Pollock MD, Lul Mercado MD, Phillip Mcduffie MD, Any Bright MD, Jaxson Beltre MD, Brenda Pennington MD, Tae Moore, DO, Patricia Jett, DO, Ofe Olmos MD,  Gerald Li, DO, Philemon Simmonds, MD, Marlow Cowden, MD, Dipti Gorman MD, Karrie Menjivar MD, Shade Jenkins MD, Karyn Hudson, [facility], [facility], [facility], Hannah Chavez, CNP, Salty Khan, CNS, Latia Bates, CNP, Delfino Ennis, CNP, Klarissa Renteria, CNP, Pj Bradford, CNP, Ashlee Moody, CNP, Karen Zepeda PA-C, Ginger Moore, [facility], Tasha Almanza, [facility], Annia Malone, CNP, Baldwin Essex, CNP, Sam Ortega, CNP, Karyn Bardales, CNP, Hector Aquino, CNP, Gaynelle Pallas, [address]    Progress Note    2/9/2022    3:46 PM    Name:   Eliseo Liz  MRN:     [id number]     Acct:      [de-identified]   Room:   [address] [place] [address] Day:  2  Admit Date:  2/7/2022 11:33 AM    PCP:   Mak Cordova DO  Code Status:  Full Code    Subjective:     C/C:   Chief Complaint   Patient presents with    Shortness of Breath    Other     L shoulder pain     Interval History Status:   Improving  Feels less short of breath  Less edema  Still reporting frontal headache    Data Base Updates:  WBC10.3k/uL RBC4.68m/uL Hemoglobin8. 3 Low       Potassium3. 6 Low        Brief History:     As documented in the medical record:  Al Jin is a pleasant, cooperative 82year old female who presented for evaluation of bilateral leg swelling, left shoulder pain and progressive SOB which began 2 days ago. She denies any history of dyspnea, COPD or known heart failure. She denies and fever/chills, N/V constipation or diarrhea. She denies hematuria or melanotic stools. She denies chronic NSAID use.   She states that she follows regularly with her PCP (last 6 days ago) who recommended cardiology evaluation for her bilateral leg weeping edema and was ordered daily Bumex which she has not picked up from the pharmacy yet. She does have a history of A-fib and is on home Eliquis. She also has a medical history of GERD and HTN. She states that she is independent in her care and takes all her prescribed medications. Her granddaughter is at bedside and expresses concern for some confusion/forgetfulness over the past several months. ED workup showed pBNP 2976. There are no comparative values. Troponin 25-->26. Hgb 6.5. Hemocult (-). CT chest/abd/pelvis (-) for acute findings. \"    The intitial assessment and plan included:  \"   * (Principal) CHF with unknown LVEF ([facility] [address] 2/7/2022 Yes     Hyperlipidemia (Chronic) 2/7/2022 Yes     Hypertension (Chronic) 2/7/2022 Yes     Gastroesophageal reflux disease without esophagitis 2/7/2022 Yes     Paroxysmal atrial fibrillation (HCC) (Chronic) 2/7/2022 Yes     Bilateral edema of lower extremity 2/7/2022 Yes     Chronic anticoagulation 2/7/2022 Yes     Anemia 2/7/2022 Yes     Hiatal hernia 2/7/2022 Yes         Plan included:  Patient status inpatient in the Med/Surge     Admit to Intermed  CHF: Inpatient consult to cardiology. Appreciate input. Will trend BNP. Lasix after blood administration. Echo pending. Anemia: Iron studies. Receiving one unit PRBC. Check Hgb post-transfusion. Transfuse for < 7.0 or noted tachycardia/hypotension. Consult GI for hiatal hernia. Hold Eliquis today. PAF: Continue rate control with home lopressor. HTN: VS per unit. Continue home antihypertensives. HLD: Continue home statin  DVT prophylaxis: EPC's while in bed. GI prophylaxis: Protonix BID  PT/OT eval and treat  Full Code\"     EGD 2017:  Findings: The examined esophagus was normal.        A large paraesophageal hernia was found.         The in the duodenum was normal.     Echo 2/8:  Summary   Global left ventricular systolic function is normal. Estimated ejection   fraction is 55-60 % . No obvious wall motion abnormality seen. Grade III (severe) left ventricular diastolic dysfunction. Left atrium is severely dilated. Mildly dilated right ventricular cavity. Calcified aortic valve leaflets with sclerosis, no significant stenosis,   mean gradient 10 mm Hg   Mild aortic insufficiency. Mild mitral regurgitation. Severe tricuspid regurgitation. Moderate pulmonary hypertension with an estimated right ventricular systolic   pressure of 51 mmHg. No pericardial effusion. Results for Adam Mike (MRN [id number]) as of 2/8/2022 15:45   Ref. Range 9/17/2016 05:14 2/7/2022 11:48 2/8/2022 05:14   Pro-BNP Latest Ref Range: <300 pg/mL 1,545 (H) 2,976 (H) 3,998 (H)     Past Medical History:   has a past medical history of GERD (gastroesophageal reflux disease) and Hypertension. Social History:   reports that she quit smoking about 37 years ago. She has never used smokeless tobacco. She reports that she does not drink alcohol and does not use drugs. Family History:   Family History   Problem Relation Age of Onset    Cancer Brother     Cancer Other         bone    Cancer Daughter         breast       Review of Systems:     Review of Systems   Constitutional: Positive for activity change (improved) and fatigue (Exercise capacity somewhat improved). Respiratory: Positive for shortness of breath (dyspnea on exertion decreased). Negative for cough. Cardiovascular: Positive for leg swelling (Chronic improved, ). Negative for chest pain (Resolved) and palpitations. Gastrointestinal: Negative for abdominal pain, blood in stool, nausea and vomiting. Has been belching more than usual    Genitourinary: Negative for flank pain and hematuria. Musculoskeletal: Positive for arthralgias and myalgias.         Chronic left shoulder pain and stiffness  She has had prior left shoulder arthroplasty   Skin:        Patient has had a nonhealing \"pimple\" on her chin, suggested outpatient dermatology consultation   Neurological: Positive for headaches (Still reporting frontal headaches intermittently). Physical Examination:        Vitals  BP (!) 169/66   Pulse 67   Temp 97.9 °F (36.6 °C) (Oral)   Resp 20   Ht 5' 4\" (1.626 m)   Wt 148 lb 5.9 oz (67.3 kg)   SpO2 100%   BMI 25.47 kg/m²   Temp (24hrs), Av.8 °F (36.6 °C), Min:97.7 °F (36.5 °C), Max:97.9 °F (36.6 °C)    No results for input(s): POCGLU in the last 72 hours. Physical Exam  Vitals reviewed. Constitutional:       General: She is not in acute distress. Appearance: She is not diaphoretic. HENT:      Head: Normocephalic. Nose: Nose normal.   Eyes:      General: No scleral icterus. Conjunctiva/sclera: Conjunctivae normal.   Neck:      Trachea: No tracheal deviation. Cardiovascular:      Rate and Rhythm: Normal rate and regular rhythm. Pulmonary:      Effort: Pulmonary effort is normal. No respiratory distress. Breath sounds: Normal breath sounds. No wheezing or rales. Chest:      Chest wall: No tenderness. Abdominal:      General: There is no distension. Palpations: Abdomen is soft. Tenderness: There is no abdominal tenderness. Musculoskeletal:         General: No tenderness. Cervical back: Neck supple. Right lower leg: Edema present. Left lower leg: Edema present. Comments: Shoulder ROM OK  1/4 edema at the ankles     Skin:     General: Skin is warm and dry. Coloration: Skin is pale. Neurological:      Mental Status: She is alert and oriented to person, place, and time. Medications: Allergies:     Allergies   Allergen Reactions    Codeine Nausea Only     dizziness    Bactrim [Sulfamethoxazole-Trimethoprim] Hives    Naproxen      On allergy list ? Unsure of reaction    Norvasc [Amlodipine Besylate]      Edema-she thinks      Sertraline Hcl      unsure    Toradol [Ketorolac Tromethamine]      unsure    Tramadol      unsure    Magnesium-Containing Compounds Diarrhea     Mag ox 500 ?  Morphine Nausea And Vomiting    Percocet [Oxycodone-Acetaminophen] Nausea And Vomiting       Current Meds:   Scheduled Meds:    polyethylene glycol  2,000 mL Oral Once    busPIRone  10 mg Oral QPM    butalbital-acetaminophen-caffeine  1 tablet Oral Once    metoprolol tartrate  12.5 mg Oral BID    amiodarone  200 mg Oral Daily    [Held by provider] lisinopril  10 mg Oral Daily    therapeutic multivitamin-minerals  1 tablet Oral Daily    pravastatin  40 mg Oral Nightly    sodium chloride flush  5-40 mL IntraVENous 2 times per day    iron sucrose  200 mg IntraVENous Q24H    lidocaine  1 patch TransDERmal Daily    pantoprazole  40 mg IntraVENous BID    And    sodium chloride (PF)  10 mL IntraVENous Daily     Continuous Infusions:    sodium chloride      sodium chloride      sodium chloride       PRN Meds: butalbital-acetaminophen-caffeine, sodium chloride, sodium chloride, traZODone, sodium chloride flush, sodium chloride, ondansetron **OR** ondansetron, polyethylene glycol, acetaminophen **OR** acetaminophen    Data:     I/O (24Hr): No intake or output data in the 24 hours ending 02/09/22 1546    Labs:  Hematology:  Recent Labs     02/07/22  1148 02/07/22  1252 02/08/22  0514 02/08/22  1614 02/09/22  0416   WBC 6.4  --  7.1  --  10.3   RBC 4.26  --  4.10  --  4.68   HGB 6.5*   < > 6.7* 8.2* 8.3*   HCT 25.0*   < > 24.0* 29.2* 29.6*   MCV 58.7*  --  58.5*  --  63.2*   MCH 15.3*  --  16.3*  --  17.7*   MCHC 26.0*  --  27.9*  --  28.0*   RDW 23.5*  --  25.6*  --  29.3*   PLT See Reflexed IPF Result  --  See Reflexed IPF Result  --  See Reflexed IPF Result   MPV NOT REPORTED  --  NOT REPORTED  --  NOT REPORTED    < > = values in this interval not displayed.      Chemistry:  Recent Labs     02/07/22  1148 02/07/22  1343 02/07/22  1719 02/08/22  0514 02/09/22  0416     --   --   --  140   K 4.4  --   --   --  3.6*     --   --   --  104 CO2 21  --   --   --  22   GLUCOSE 101*  --   --   --  87   BUN 21  --   --   --  12   CREATININE 0.90  --   --   --  0.86   MG  --   --   --  1.9  --    ANIONGAP 13  --   --   --  14   LABGLOM 60*  --   --   --  >60   GFRAA >60  --   --   --  >60   CALCIUM 9.5  --   --   --  8.6   PROBNP 2,976*  --   --  3,998*  --    TROPHS 25* 26* 26*  --   --      Recent Labs     02/08/22  0514   TSH 1.47   CHOL 97   HDL 43   LDLCHOLESTEROL 43   CHOLHDLRATIO 2.3   TRIG 54   VLDL NOT REPORTED     ABG:No results found for: POCPH, PHART, PH, POCPCO2, CNU6IAB, PCO2, POCPO2, PO2ART, PO2, POCHCO3, TEY1IZC, HCO3, NBEA, PBEA, BEART, BE, THGBART, THB, FVZ5KEJ, PODD4KNX, I0GCKBYJ, O2SAT, FIO2  Lab Results   Component Value Date/Time    SPECIAL NOT REPORTED 03/21/2020 12:15 PM     Lab Results   Component Value Date/Time    CULTURE NO SIGNIFICANT GROWTH 09/28/2016 07:19 AM    CULTURE  09/28/2016 07:19 AM     Performed at [address] [place] [address] [phone]       Radiology:  CTA CHEST W WO CONTRAST    Result Date: 2/7/2022  No thoracoabdominal aortic aneurysm or dissection. No acute finding in the chest, abdomen, and pelvis. Multiple chronic findings as above. XR CHEST PORTABLE    Result Date: 2/7/2022  No acute cardiopulmonary disease. Cardiomegaly. Hiatus hernia. CTA ABDOMEN PELVIS W CONTRAST    Result Date: 2/7/2022  No thoracoabdominal aortic aneurysm or dissection. No acute finding in the chest, abdomen, and pelvis. Multiple chronic findings as above.        Assessment:        Primary Problem  Acute on chronic diastolic congestive heart failure [facility])    Active Hospital Problems    Diagnosis Date Noted    Severe tricuspid regurgitation [I07.1] 02/09/2022    Pulmonary hypertension (HCC) [I27.20] 02/09/2022    Iron deficiency [E61.1]     Iron deficiency anemia [D50.9] 02/08/2022    History of Nissen fundoplication [P75.045] 75/66/2608    Closed wedge fracture of thoracic vertebra with routine healing T5,T8 [S22.000D] 02/08/2022    (HFpEF) heart failure with preserved ejection fraction (HCC) [I50.30] 02/08/2022    Acute on chronic diastolic congestive heart failure (HCC) [I50.33]     Anemia [D64.9] 02/07/2022    Hiatal hernia [K44.9] 02/07/2022    Chronic anticoagulation [Z79.01] 03/24/2021    Bilateral edema of lower extremity [R60.0] 03/24/2021    Paroxysmal atrial fibrillation ([facility] [address] [I48.0] 10/21/2016    Gastroesophageal reflux disease without esophagitis [K21.9] 09/06/2016    Hyperlipidemia [E78.5] 08/26/2014    Hypertension [I10] 08/26/2014       Plan:        Optimize cardio-pulmonary function  Cardiology evaluation in progress   Blood products prn - will check H&H  Check stools for occult blood  Hold Eliquis  Observe for GI bleed  Iron supplementation  Venofer  Trial of Fioricet for headache  Blood Pressure - Monitor and control  Anemia w/u on outpatient basis is recommended  EGD and colonoscopy planned for tomorrow   Reflux precautions   DVT prophylaxis  The patient's status and plan have been discussed with the patient and granddaughter/POA Bianca Montes at the bedside, she feels the patient would benefit from an anxiolytic  Buspar initiated   Bianca Montes has also noted progressive cognitive decline over the last several months      IP CONSULT TO HOSPITALIST  IP CONSULT TO DIETITIAN  IP CONSULT TO CARDIOLOGY  IP CONSULT TO GI  IP CONSULT TO Noemí Figueroa DO  2/9/2022  3:46 PM

## 2022-02-09 NOTE — PROGRESS NOTES
[facility] ([facility])  Occupational Therapy Not Seen Note    DATE: 2022    NAME: Judy Eng  MRN: [id number]   : 1935      Patient not seen this date for Occupational Therapy due to:    Testing: echo then colonoscopy/EGD    Next Scheduled Treatment: check back 2/10/2022    Electronically signed by MACK Hill on 2022 at 10:10 AM

## 2022-02-09 NOTE — PROGRESS NOTES
[facility]  Progress Note                   Date:   2/9/2022  Patient name: Nora Perla  Date of admission:  2/7/2022 11:33 AM  MRN:   [id number]  YOB: 1935  PCP: Daisy Ellis DO    Reason for Admission: Acute on chronic diastolic congestive heart failure ([facility] [address] [I50.33]  CHF with unknown LVEF ([facility] [address] [I50.9]  Anemia, unspecified type [D64.9]    Subjective:       Clinical Changes /Abnormalities: Patient seen and examined. Denies dizziness ,  chest pain or shortness of breath. Feeling better . Scheduled for Colonoscopy/EGD this am per RN . Tele/vitals/labs reviewed . SR on monitor      Review of Systems    Medications:   Scheduled Meds:   butalbital-acetaminophen-caffeine  1 tablet Oral Once    metoprolol tartrate  12.5 mg Oral BID    amiodarone  200 mg Oral Daily    [Held by provider] lisinopril  10 mg Oral Daily    therapeutic multivitamin-minerals  1 tablet Oral Daily    pravastatin  40 mg Oral Nightly    sodium chloride flush  5-40 mL IntraVENous 2 times per day    iron sucrose  200 mg IntraVENous Q24H    lidocaine  1 patch TransDERmal Daily    pantoprazole  40 mg IntraVENous BID    And    sodium chloride (PF)  10 mL IntraVENous Daily     Continuous Infusions:   sodium chloride      sodium chloride      sodium chloride       CBC:   Recent Labs     02/07/22  1148 02/07/22  1252 02/08/22  0514 02/08/22  1614 02/09/22  0416   WBC 6.4  --  7.1  --  10.3   HGB 6.5*   < > 6.7* 8.2* 8.3*   PLT See Reflexed IPF Result  --  See Reflexed IPF Result  --  See Reflexed IPF Result    < > = values in this interval not displayed. BMP:    Recent Labs     02/07/22  1148 02/09/22  0416    140   K 4.4 3.6*    104   CO2 21 22   BUN 21 12   CREATININE 0.90 0.86   GLUCOSE 101* 87     Hepatic:No results for input(s): AST, ALT, ALB, BILITOT, ALKPHOS in the last 72 hours.   Troponin:   Recent Labs     02/07/22  1148 02/07/22  1343 02/07/22  1719   TROPHS 25* 26* 26*     BNP: No results for input(s): BNP in the last 72 hours. Lipids:   Recent Labs     02/08/22  0514   CHOL 97   HDL 43     INR: No results for input(s): INR in the last 72 hours. Previous Testing:      EVENT MONITOR 3/4/2020: Prevailing rhythm is SR with short runs of non-sustained supraventricular tachycardia of 15 beats in duration at an average rate of 130 bpm. No significant other sustained tachy or bradyarrhythmias are seen. No significant pauses are noted.      ECHO 10/7/16: EF 50-55%, mild LVH, RV is moderately dilated, LA/RA is severely enlarged, trace MR, moderate TR, RVSP is 30-35 mmHg, mild AR, trace NC.    EKG 2/8/2022  Sinus rhythm with 1st degree A-V block  Right bundle branch block  Abnormal ECG    Objective:   Vitals: BP (!) 172/61   Pulse 75   Temp 97.7 °F (36.5 °C) (Oral)   Resp 20   Ht 5' 4\" (1.626 m)   Wt 148 lb 5.9 oz (67.3 kg)   SpO2 98%   BMI 25.47 kg/m²   General appearance: alert and cooperative with exam  HEENT: Head: Normocephalic, no lesions, without obvious abnormality. Neck:no JVD, trachea midline, no adenopathy  Lungs: Clear to auscultation, diminished to bases   Heart: Regular rate and rhythm, s1/s2 auscultated,  murmur  Abdomen: soft, non-tender, bowel sounds active  Extremities: edema lower ext   Neurologic: not done        Assessment / Acute Cardiac Problems:   1.  Anemia     Patient Active Problem List:     Hyperlipidemia     Hypertension     Anxiety     Gastroesophageal reflux disease without esophagitis     Atypical chest pain     Paroxysmal atrial fibrillation (HCC)     Asymptomatic varicose veins     Bilateral edema of lower extremity     Chronic anticoagulation     Diastolic dysfunction     Former smoker     Mild aortic regurgitation     Mild concentric left ventricular hypertrophy (LVH)     Paraesophageal hernia     CHF with unknown LVEF (HCC)     Symptomatic anemia     Hiatal hernia     Iron deficiency anemia     History of Nissen fundoplication     Closed wedge fracture of thoracic vertebra with routine healing T5,T8     (HFpEF) heart failure with preserved ejection fraction (HCC)     Acute on chronic diastolic congestive heart failure ([facility] [address]     Iron deficiency      Plan of Treatment:   1. Continue to hold Eliquis due to anemia - received blood yesterday   2. Anemia - GI following   3. Continue BB, statin. 4. ECHO pending.  Will follow     Electronically signed by MALVIN Dugan NP on 2/9/2022 at 9:36 AM  [address]  [phone]

## 2022-02-10 ENCOUNTER — APPOINTMENT (OUTPATIENT)
Dept: CT IMAGING | Age: 87
DRG: 291 | End: 2022-02-10
Payer: MEDICARE

## 2022-02-10 ENCOUNTER — ANESTHESIA (OUTPATIENT)
Dept: ENDOSCOPY | Age: 87
DRG: 291 | End: 2022-02-10
Payer: MEDICARE

## 2022-02-10 ENCOUNTER — APPOINTMENT (OUTPATIENT)
Dept: GENERAL RADIOLOGY | Age: 87
DRG: 291 | End: 2022-02-10
Payer: MEDICARE

## 2022-02-10 ENCOUNTER — ANESTHESIA EVENT (OUTPATIENT)
Dept: ENDOSCOPY | Age: 87
DRG: 291 | End: 2022-02-10
Payer: MEDICARE

## 2022-02-10 VITALS
SYSTOLIC BLOOD PRESSURE: 174 MMHG | RESPIRATION RATE: 19 BRPM | OXYGEN SATURATION: 95 % | DIASTOLIC BLOOD PRESSURE: 67 MMHG

## 2022-02-10 PROBLEM — K63.89 COLONIC MASS: Status: ACTIVE | Noted: 2022-02-10

## 2022-02-10 LAB
BNP INTERPRETATION: ABNORMAL
EKG ATRIAL RATE: 77 BPM
EKG P AXIS: 88 DEGREES
EKG P-R INTERVAL: 280 MS
EKG Q-T INTERVAL: 432 MS
EKG QRS DURATION: 152 MS
EKG QTC CALCULATION (BAZETT): 488 MS
EKG R AXIS: 74 DEGREES
EKG T AXIS: 7 DEGREES
EKG VENTRICULAR RATE: 77 BPM
HCT VFR BLD CALC: 28.5 % (ref 36.3–47.1)
HEMOGLOBIN: 8.1 G/DL (ref 11.9–15.1)
MCH RBC QN AUTO: 18.1 PG (ref 25.2–33.5)
MCHC RBC AUTO-ENTMCNC: 28.4 G/DL (ref 28.4–34.8)
MCV RBC AUTO: 63.6 FL (ref 82.6–102.9)
NRBC AUTOMATED: 3.3 PER 100 WBC
PDW BLD-RTO: 29.2 % (ref 11.8–14.4)
PLATELET # BLD: ABNORMAL K/UL (ref 138–453)
PLATELET, FLUORESCENCE: 156 K/UL (ref 138–453)
PLATELET, IMMATURE FRACTION: 4 % (ref 1.1–10.3)
PMV BLD AUTO: ABNORMAL FL (ref 8.1–13.5)
PRO-BNP: 4617 PG/ML
RBC # BLD: 4.48 M/UL (ref 3.95–5.11)
WBC # BLD: 8.7 K/UL (ref 3.5–11.3)

## 2022-02-10 PROCEDURE — 85027 COMPLETE CBC AUTOMATED: CPT

## 2022-02-10 PROCEDURE — 2700000000 HC OXYGEN THERAPY PER DAY

## 2022-02-10 PROCEDURE — 97166 OT EVAL MOD COMPLEX 45 MIN: CPT

## 2022-02-10 PROCEDURE — 88305 TISSUE EXAM BY PATHOLOGIST: CPT

## 2022-02-10 PROCEDURE — 6370000000 HC RX 637 (ALT 250 FOR IP): Performed by: INTERNAL MEDICINE

## 2022-02-10 PROCEDURE — 73030 X-RAY EXAM OF SHOULDER: CPT

## 2022-02-10 PROCEDURE — 6370000000 HC RX 637 (ALT 250 FOR IP): Performed by: NURSE PRACTITIONER

## 2022-02-10 PROCEDURE — 85055 RETICULATED PLATELET ASSAY: CPT

## 2022-02-10 PROCEDURE — 43235 EGD DIAGNOSTIC BRUSH WASH: CPT | Performed by: INTERNAL MEDICINE

## 2022-02-10 PROCEDURE — 93005 ELECTROCARDIOGRAM TRACING: CPT | Performed by: ANESTHESIOLOGY

## 2022-02-10 PROCEDURE — 3700000001 HC ADD 15 MINUTES (ANESTHESIA): Performed by: INTERNAL MEDICINE

## 2022-02-10 PROCEDURE — 99232 SBSQ HOSP IP/OBS MODERATE 35: CPT | Performed by: INTERNAL MEDICINE

## 2022-02-10 PROCEDURE — 97530 THERAPEUTIC ACTIVITIES: CPT

## 2022-02-10 PROCEDURE — 2580000003 HC RX 258: Performed by: INTERNAL MEDICINE

## 2022-02-10 PROCEDURE — 36415 COLL VENOUS BLD VENIPUNCTURE: CPT

## 2022-02-10 PROCEDURE — 97162 PT EVAL MOD COMPLEX 30 MIN: CPT

## 2022-02-10 PROCEDURE — 0DJ08ZZ INSPECTION OF UPPER INTESTINAL TRACT, VIA NATURAL OR ARTIFICIAL OPENING ENDOSCOPIC: ICD-10-PCS | Performed by: INTERNAL MEDICINE

## 2022-02-10 PROCEDURE — C9113 INJ PANTOPRAZOLE SODIUM, VIA: HCPCS | Performed by: INTERNAL MEDICINE

## 2022-02-10 PROCEDURE — 3609017100 HC EGD: Performed by: INTERNAL MEDICINE

## 2022-02-10 PROCEDURE — 97535 SELF CARE MNGMENT TRAINING: CPT

## 2022-02-10 PROCEDURE — 2580000003 HC RX 258

## 2022-02-10 PROCEDURE — 7100000010 HC PHASE II RECOVERY - FIRST 15 MIN: Performed by: INTERNAL MEDICINE

## 2022-02-10 PROCEDURE — 97116 GAIT TRAINING THERAPY: CPT

## 2022-02-10 PROCEDURE — 1200000000 HC SEMI PRIVATE

## 2022-02-10 PROCEDURE — 6360000002 HC RX W HCPCS: Performed by: INTERNAL MEDICINE

## 2022-02-10 PROCEDURE — 83880 ASSAY OF NATRIURETIC PEPTIDE: CPT

## 2022-02-10 PROCEDURE — 6360000002 HC RX W HCPCS

## 2022-02-10 PROCEDURE — 6360000004 HC RX CONTRAST MEDICATION: Performed by: STUDENT IN AN ORGANIZED HEALTH CARE EDUCATION/TRAINING PROGRAM

## 2022-02-10 PROCEDURE — 0DBL8ZX EXCISION OF TRANSVERSE COLON, VIA NATURAL OR ARTIFICIAL OPENING ENDOSCOPIC, DIAGNOSTIC: ICD-10-PCS | Performed by: INTERNAL MEDICINE

## 2022-02-10 PROCEDURE — 71260 CT THORAX DX C+: CPT

## 2022-02-10 PROCEDURE — 45380 COLONOSCOPY AND BIOPSY: CPT | Performed by: INTERNAL MEDICINE

## 2022-02-10 PROCEDURE — 7100000011 HC PHASE II RECOVERY - ADDTL 15 MIN: Performed by: INTERNAL MEDICINE

## 2022-02-10 PROCEDURE — 2709999900 HC NON-CHARGEABLE SUPPLY: Performed by: INTERNAL MEDICINE

## 2022-02-10 PROCEDURE — 93010 ELECTROCARDIOGRAM REPORT: CPT | Performed by: INTERNAL MEDICINE

## 2022-02-10 PROCEDURE — 3609010300 HC COLONOSCOPY W/BIOPSY SINGLE/MULTIPLE: Performed by: INTERNAL MEDICINE

## 2022-02-10 PROCEDURE — 3700000000 HC ANESTHESIA ATTENDED CARE: Performed by: INTERNAL MEDICINE

## 2022-02-10 PROCEDURE — 6360000002 HC RX W HCPCS: Performed by: ANESTHESIOLOGY

## 2022-02-10 RX ORDER — FENTANYL CITRATE 50 UG/ML
INJECTION, SOLUTION INTRAMUSCULAR; INTRAVENOUS PRN
Status: DISCONTINUED | OUTPATIENT
Start: 2022-02-10 | End: 2022-02-10 | Stop reason: SDUPTHER

## 2022-02-10 RX ORDER — PROPOFOL 10 MG/ML
INJECTION, EMULSION INTRAVENOUS PRN
Status: DISCONTINUED | OUTPATIENT
Start: 2022-02-10 | End: 2022-02-10 | Stop reason: SDUPTHER

## 2022-02-10 RX ORDER — SODIUM CHLORIDE 9 MG/ML
INJECTION, SOLUTION INTRAVENOUS CONTINUOUS PRN
Status: DISCONTINUED | OUTPATIENT
Start: 2022-02-10 | End: 2022-02-10 | Stop reason: SDUPTHER

## 2022-02-10 RX ORDER — FENTANYL CITRATE 50 UG/ML
25 INJECTION, SOLUTION INTRAMUSCULAR; INTRAVENOUS ONCE
Status: COMPLETED | OUTPATIENT
Start: 2022-02-10 | End: 2022-02-10

## 2022-02-10 RX ORDER — SODIUM CHLORIDE 9 MG/ML
INJECTION, SOLUTION INTRAVENOUS CONTINUOUS
Status: DISCONTINUED | OUTPATIENT
Start: 2022-02-10 | End: 2022-02-12 | Stop reason: HOSPADM

## 2022-02-10 RX ADMIN — ACETAMINOPHEN 650 MG: 325 TABLET ORAL at 05:12

## 2022-02-10 RX ADMIN — ACETAMINOPHEN 650 MG: 325 TABLET ORAL at 15:32

## 2022-02-10 RX ADMIN — FENTANYL CITRATE 25 MCG: 50 INJECTION INTRAMUSCULAR; INTRAVENOUS at 09:36

## 2022-02-10 RX ADMIN — METOPROLOL TARTRATE 12.5 MG: 25 TABLET ORAL at 20:42

## 2022-02-10 RX ADMIN — PANTOPRAZOLE SODIUM 40 MG: 40 INJECTION, POWDER, FOR SOLUTION INTRAVENOUS at 12:30

## 2022-02-10 RX ADMIN — AMIODARONE HYDROCHLORIDE 200 MG: 200 TABLET ORAL at 12:23

## 2022-02-10 RX ADMIN — BUTALBITAL, ACETAMINOPHEN AND CAFFEINE 1 TABLET: 50; 325; 40 TABLET ORAL at 12:24

## 2022-02-10 RX ADMIN — SODIUM CHLORIDE, PRESERVATIVE FREE 10 ML: 5 INJECTION INTRAVENOUS at 12:29

## 2022-02-10 RX ADMIN — Medication 1 TABLET: at 12:22

## 2022-02-10 RX ADMIN — PRAVASTATIN SODIUM 40 MG: 20 TABLET ORAL at 20:42

## 2022-02-10 RX ADMIN — Medication 1 TABLET: at 12:24

## 2022-02-10 RX ADMIN — ONDANSETRON 4 MG: 4 TABLET, ORALLY DISINTEGRATING ORAL at 15:05

## 2022-02-10 RX ADMIN — METOPROLOL TARTRATE 12.5 MG: 25 TABLET ORAL at 12:19

## 2022-02-10 RX ADMIN — PROPOFOL 10 MG: 10 INJECTION, EMULSION INTRAVENOUS at 10:20

## 2022-02-10 RX ADMIN — PROPOFOL 20 MG: 10 INJECTION, EMULSION INTRAVENOUS at 10:19

## 2022-02-10 RX ADMIN — SODIUM CHLORIDE: 9 INJECTION, SOLUTION INTRAVENOUS at 09:22

## 2022-02-10 RX ADMIN — PANTOPRAZOLE SODIUM 40 MG: 40 INJECTION, POWDER, FOR SOLUTION INTRAVENOUS at 20:43

## 2022-02-10 RX ADMIN — SODIUM CHLORIDE: 9 INJECTION, SOLUTION INTRAVENOUS at 10:14

## 2022-02-10 RX ADMIN — FENTANYL CITRATE 25 MCG: 50 INJECTION, SOLUTION INTRAMUSCULAR; INTRAVENOUS at 10:20

## 2022-02-10 RX ADMIN — SODIUM CHLORIDE: 9 INJECTION, SOLUTION INTRAVENOUS at 10:49

## 2022-02-10 RX ADMIN — SODIUM CHLORIDE, PRESERVATIVE FREE 10 ML: 5 INJECTION INTRAVENOUS at 12:28

## 2022-02-10 RX ADMIN — FENTANYL CITRATE 25 MCG: 50 INJECTION, SOLUTION INTRAMUSCULAR; INTRAVENOUS at 10:44

## 2022-02-10 RX ADMIN — IOPAMIDOL 100 ML: 755 INJECTION, SOLUTION INTRAVENOUS at 16:08

## 2022-02-10 RX ADMIN — LISINOPRIL 10 MG: 10 TABLET ORAL at 14:39

## 2022-02-10 RX ADMIN — BUSPIRONE HYDROCHLORIDE 10 MG: 10 TABLET ORAL at 17:44

## 2022-02-10 RX ADMIN — SODIUM CHLORIDE, PRESERVATIVE FREE 10 ML: 5 INJECTION INTRAVENOUS at 20:42

## 2022-02-10 RX ADMIN — PROPOFOL 20 MG: 10 INJECTION, EMULSION INTRAVENOUS at 10:18

## 2022-02-10 RX ADMIN — FENTANYL CITRATE 25 MCG: 50 INJECTION, SOLUTION INTRAMUSCULAR; INTRAVENOUS at 10:18

## 2022-02-10 ASSESSMENT — PAIN SCALES - GENERAL
PAINLEVEL_OUTOF10: 4
PAINLEVEL_OUTOF10: 6
PAINLEVEL_OUTOF10: 9
PAINLEVEL_OUTOF10: 5
PAINLEVEL_OUTOF10: 0
PAINLEVEL_OUTOF10: 0
PAINLEVEL_OUTOF10: 10
PAINLEVEL_OUTOF10: 5
PAINLEVEL_OUTOF10: 5
PAINLEVEL_OUTOF10: 4

## 2022-02-10 ASSESSMENT — PAIN DESCRIPTION - FREQUENCY
FREQUENCY: CONTINUOUS
FREQUENCY: INTERMITTENT
FREQUENCY: CONTINUOUS
FREQUENCY: CONTINUOUS

## 2022-02-10 ASSESSMENT — ENCOUNTER SYMPTOMS
COUGH: 0
SHORTNESS OF BREATH: 1
ABDOMINAL PAIN: 0
VOMITING: 0
NAUSEA: 0
BLOOD IN STOOL: 0

## 2022-02-10 ASSESSMENT — PAIN DESCRIPTION - LOCATION
LOCATION: SHOULDER
LOCATION: HEAD
LOCATION: SHOULDER
LOCATION: SHOULDER

## 2022-02-10 ASSESSMENT — PAIN DESCRIPTION - PROGRESSION
CLINICAL_PROGRESSION: NOT CHANGED

## 2022-02-10 ASSESSMENT — PAIN - FUNCTIONAL ASSESSMENT
PAIN_FUNCTIONAL_ASSESSMENT: 0-10

## 2022-02-10 ASSESSMENT — PAIN DESCRIPTION - DESCRIPTORS
DESCRIPTORS: ACHING;STABBING
DESCRIPTORS: ACHING;POUNDING
DESCRIPTORS: JABBING;SHARP;ACHING
DESCRIPTORS: ACHING;STABBING
DESCRIPTORS: ACHING
DESCRIPTORS: JABBING;STABBING;ACHING
DESCRIPTORS: ACHING
DESCRIPTORS: ACHING;STABBING
DESCRIPTORS: SHARP;SHOOTING
DESCRIPTORS: JABBING
DESCRIPTORS: ACHING;JABBING
DESCRIPTORS: ACHING;STABBING
DESCRIPTORS: ACHING;SQUEEZING;STABBING

## 2022-02-10 ASSESSMENT — PAIN DESCRIPTION - ORIENTATION
ORIENTATION: LEFT

## 2022-02-10 ASSESSMENT — PAIN DESCRIPTION - PAIN TYPE
TYPE: ACUTE PAIN
TYPE: CHRONIC PAIN
TYPE: ACUTE PAIN

## 2022-02-10 ASSESSMENT — PAIN DESCRIPTION - ONSET
ONSET: ON-GOING
ONSET: GRADUAL

## 2022-02-10 NOTE — OP NOTE
[id number] [facility]      Patient:   Gigi Durham    :    1935    Referring/PCP: Scooby Arthur DO  Procedure:   Colonoscopy with biopsy; Esophagogastroduodenoscopy  --diagnostic  Facility:  [facility]  Date:     2/10/2022  Endoscopist:  Juli Singh MD, [facility]    Indication:  iron deficiency anemia. Postprocedure diagnosis: Hiatal hernia, transverse colon mass, diverticulosis    Anesthesia:  MAC    Complications: None    EBL: None from the procedure    Specimen: Sent to the lab    Description of Procedure:  Informed consent was obtained from the patient after explanation of the procedure including indications, description of the procedure,  benefits and possible risks and complications of the procedure, and alternatives. Questions were answered. The patient's history was reviewed and a directed physical examination was performed prior to the procedure. Patient was monitored throughout the procedure with pulse oximetry and periodic assessment of vital signs. Patient was sedated as noted above. With the patient in the left lateral decubitus position, the Olympus videoendoscope was placed in the patient's mouth and under direct visualization passed into the esophagus. Visualization of the esophagus, stomach, and duodenum was performed during both introduction and withdrawal of the endoscope and retroflexed view of the proximal stomach was obtained. The scope was passed to the second portion of the duodenum. The patient tolerated the procedure well and was taken to the recovery area in good condition. Findings[de-identified]   Esophagus: normal.   Stomach: Moderate size hiatal hernia. Stomach was otherwise normal.  Duodenum: normal    With the patient in the left lateral decubitus position, a digital rectal examination was performed and revealed negative without mass or lesions.   The Olympus video colonoscope was placed in the patient's rectum and advanced without difficulty  to the cecum, which was identified by the ileocecal valve and appendiceal orifice. The prep was good. Examination of the mucosa was performed during both introduction and withdrawal of the colonoscope. Retroflexed view of the rectum was performed. Findings:   1. A large polypoid mass in the transverse colon close to the hepatic flexure covering about 75% of the circumference about 3 cm in diameter. This was biopsied with cold biopsy forceps. 2.  There was moderate amount of diverticulosis in the sigmoid colon, descending colon and transverse colon. 3.  The rest of the colon was otherwise normal.  4.  Grade 1 internal hemorrhoids on retroflexion. Recommendations:  Await pathology. Repeat colonoscopy is not recommended due to patient age and comorbid conditions. Avoid anticoagulation. Check CEA level. Obtain colorectal surgery consultation.       Electronically signed by Lyn Drew MD, FACG on 2/10/2022 at 10:55 AM

## 2022-02-10 NOTE — PROGRESS NOTES
[facility]  Progress Note                   Date:   2/10/2022  Patient name: Georgia Barajas  Date of admission:  2/7/2022 11:33 AM  MRN:   [id number]  YOB: 1935  PCP: Noam Rasmussen DO    Reason for Admission: Acute on chronic diastolic congestive heart failure ([facility] [address] [I50.33]  CHF with unknown LVEF ([facility] [address] [I50.9]  Anemia, unspecified type [D64.9]    Subjective:       Clinical Changes /Abnormalities: Patient seen and examined in room after returning from EGD. Denies chest pain or shortness of breath. Tele/vitals/labs reviewed . Remains SR on monitor      Review of Systems    Medications:   Scheduled Meds:   [MAR Hold] busPIRone  10 mg Oral QPM    [MAR Hold] butalbital-acetaminophen-caffeine  1 tablet Oral Once    [MAR Hold] metoprolol tartrate  12.5 mg Oral BID    [MAR Hold] amiodarone  200 mg Oral Daily    [Held by provider] lisinopril  10 mg Oral Daily    [MAR Hold] therapeutic multivitamin-minerals  1 tablet Oral Daily    [MAR Hold] pravastatin  40 mg Oral Nightly    [MAR Hold] sodium chloride flush  5-40 mL IntraVENous 2 times per day    [facility] lidocaine  1 patch TransDERmal Daily    [MAR Hold] pantoprazole  40 mg IntraVENous BID    And    [MAR Hold] sodium chloride (PF)  10 mL IntraVENous Daily     Continuous Infusions:   sodium chloride 20 mL/hr at 02/10/22 0922    [MAR Hold] sodium chloride      [MAR Hold] sodium chloride      [MAR Hold] sodium chloride       CBC:   Recent Labs     02/08/22  0514 02/08/22  0514 02/08/22  1614 02/09/22  0416 02/10/22  0455   WBC 7.1  --   --  10.3 8.7   HGB 6.7*   < > 8.2* 8.3* 8.1*   PLT See Reflexed IPF Result  --   --  See Reflexed IPF Result See Reflexed IPF Result    < > = values in this interval not displayed.      BMP:    Recent Labs     02/07/22  1148 02/09/22  0416    140   K 4.4 3.6*    104   CO2 21 22   BUN 21 12   CREATININE 0.90 0.86   GLUCOSE 101* 87     Hepatic:No results for input(s): AST, ALT, ALB, BILITOT, ALKPHOS in the last 72 hours. Troponin:   Recent Labs     02/07/22  1148 02/07/22  1343 02/07/22  1719   TROPHS 25* 26* 26*     BNP: No results for input(s): BNP in the last 72 hours. Lipids:   Recent Labs     02/08/22  0514   CHOL 97   HDL 43     INR: No results for input(s): INR in the last 72 hours. Objective:   Vitals: BP (!) 174/67   Pulse 76   Temp 98 °F (36.7 °C) (Infrared)   Resp 18   Ht 5' 4\" (1.626 m)   Wt 148 lb 5.9 oz (67.3 kg)   SpO2 93%   BMI 25.47 kg/m²   General appearance: alert and cooperative with exam  HEENT: Head: Normocephalic, no lesions, without obvious abnormality. Neck:no JVD, trachea midline, no adenopathy  Lungs: Clear to auscultation, diminished to bases   Heart: Regular rate and rhythm, s1/s2 auscultated,  murmur  Abdomen: soft, non-tender, bowel sounds active  Extremities: edema lower ext   Neurologic: not done      Previous Testing:      EVENT MONITOR 3/4/2020: Prevailing rhythm is SR with short runs of non-sustained supraventricular tachycardia of 15 beats in duration at an average rate of 130 bpm. No significant other sustained tachy or bradyarrhythmias are seen. No significant pauses are noted.      ECHO 10/7/16: EF 50-55%, mild LVH, RV is moderately dilated, LA/RA is severely enlarged, trace MR, moderate TR, RVSP is 30-35 mmHg, mild AR, trace ME.    EKG 2/8/2022  Sinus rhythm with 1st degree A-V block  Right bundle branch block  Abnormal ECG    Echo 2/9/22  Summary  Global left ventricular systolic function is normal. Estimated ejection  fraction is 55-60 % . No obvious wall motion abnormality seen. Grade III (severe) left ventricular diastolic dysfunction. Left atrium is severely dilated. Mildly dilated right ventricular cavity. Calcified aortic valve leaflets with sclerosis, no significant stenosis,  mean gradient 10 mm Hg  Mild aortic insufficiency. Mild mitral regurgitation. Severe tricuspid regurgitation.   Moderate pulmonary hypertension with an estimated right ventricular systolic  pressure of 51 mmHg. No pericardial effusion. EGD 2/10/22  Findings:   1. A large polypoid mass in the transverse colon close to the hepatic flexure covering about 75% of the circumference about 3 cm in diameter. This was biopsied with cold biopsy forceps. 2.  There was moderate amount of diverticulosis in the sigmoid colon, descending colon and transverse colon. 3.  The rest of the colon was otherwise normal.  4.  Grade 1 internal hemorrhoids on retroflexion. Recommendations:  Await pathology. Repeat colonoscopy is not recommended due to patient age and comorbid conditions. Avoid anticoagulation. Check CEA level. Obtain colorectal surgery consultation. Assessment / Acute Cardiac Problems:   1. Anemia  2. Chronic diastolic CHF  3. Mild MR & Severe TR on echo  4. HTN  5. PAF on [facility]     Patient Active Problem List:     Hyperlipidemia     Hypertension     Anxiety     Gastroesophageal reflux disease without esophagitis     Atypical chest pain     Paroxysmal atrial fibrillation (HCC)     Asymptomatic varicose veins     Bilateral edema of lower extremity     Chronic anticoagulation     Diastolic dysfunction     Former smoker     Mild aortic regurgitation     Mild concentric left ventricular hypertrophy (LVH)     Paraesophageal hernia     CHF with unknown LVEF (HCC)     Symptomatic anemia     Hiatal hernia     Iron deficiency anemia     History of Nissen fundoplication     Closed wedge fracture of thoracic vertebra with routine healing T5,T8     (HFpEF) heart failure with preserved ejection fraction (HCC)     Acute on chronic diastolic congestive heart failure ([address]     Iron deficiency      Plan of Treatment:   1. Continue to hold Eliquis due to anemia. 2. Anemia - GI following. EGD as above. Await colo-rectal input  3.  BP elevated - will resume Lisinopril and give BB. Had been NPO so no meds today. 4. Echo as above.  No plans for further invasive testing at this time.     Electronically signed by MALVIN Nolasco CNP on 2/10/2022 at 11:46 AM  [address]  [phone]

## 2022-02-10 NOTE — PROGRESS NOTES
Physical Therapy        Physical Therapy Cancel Note      DATE: 2/10/2022    NAME: Fede Martinez  MRN: [id number]   : 1935      Patient not seen this date for Physical Therapy due [address] off the floor for    Testing: colonscopy and EGD. PT to address as able.       Electronically signed by Racheal Bateman PT on 2/10/2022 at 8:27 AM

## 2022-02-10 NOTE — CARE COORDINATION
Attempts made to see patient today. In procedure and testing. Per RN there is no immediate plan for discharge.

## 2022-02-10 NOTE — PROGRESS NOTES
[facility]  Office: [address], DO, Daxa Obrien, DO, Shira [facility], DO, Murtaza Gao Blood, DO, Kal Montelongo MD, Laxmi Walker MD, Dacia White MD, Kale Jin MD, Isak Mendez MD, Gilmer Trent MD, Noemi Storey MD, Marily Carbajal, DO, Jd Mckeon, DO, Elida Cook MD,  Evans Brown, DO, Moris Henriquez MD, Nancy Randolph MD, Gustabo Rodriguez MD, Debra Trejo MD, Stephanie Rivera MD, Lila Rosas, CNP, Liz Tong, CNP, Adriana Andrade, CNP, Avinash Cordoba, CNS, Herson Akers, CNP, Adrienne Renteria, CNP, Camacho Louis, CNP, Francisca Epley, CNP, James Perry, CNP, Merrill Gay PA-C, Derick Mitchell, DNP, Nilton Jefferson, DNP, Kevin Owens, CNP, Hans Og, CNP, Michael Farias, CNP, Renny Moore, CNP, Claudia Cutler, CNP, Fanta Haynes, [address] [facility]    Progress Note    2/10/2022    3:13 PM    Name:   Elmira Fraser  MRN:     [id number]     Acct:      [de-identified]   Room:   [facility]:  3  Admit Date:  2/7/2022 11:33 AM    PCP:   Alayna Joseph DO  Code Status:  Full Code    Subjective:     C/C:   Chief Complaint   Patient presents with    Shortness of Breath    Other     L shoulder pain     Interval History Status:   Postop EGD  Somnolent  Comfortable  Reporting shoulder pain worse today    Data Base Updates:  Colonoscopy reveals a transverse colon mass        Brief History:     As documented in the medical record:  Mara Cat is a pleasant, cooperative 82year old female who presented for evaluation of bilateral leg swelling, left shoulder pain and progressive SOB which began 2 days ago. She denies any history of dyspnea, COPD or known heart failure. She denies and fever/chills, N/V constipation or diarrhea. She denies hematuria or melanotic stools. She denies chronic NSAID use.   She states that she follows regularly with her PCP (last 6 days ago) who recommended cardiology evaluation for her bilateral leg weeping edema and was ordered daily Bumex which she has not picked up from the pharmacy yet. She does have a history of A-fib and is on home Eliquis. She also has a medical history of GERD and HTN. She states that she is independent in her care and takes all her prescribed medications. Her granddaughter is at bedside and expresses concern for some confusion/forgetfulness over the past several months. ED workup showed pBNP 2976. There are no comparative values. Troponin 25-->26. Hgb 6.5. Hemocult (-). CT chest/abd/pelvis (-) for acute findings. \"    The intitial assessment and plan included:  \"   * (Principal) CHF with unknown LVEF ([facility] [address] 2/7/2022 Yes     Hyperlipidemia (Chronic) 2/7/2022 Yes     Hypertension (Chronic) 2/7/2022 Yes     Gastroesophageal reflux disease without esophagitis 2/7/2022 Yes     Paroxysmal atrial fibrillation (HCC) (Chronic) 2/7/2022 Yes     Bilateral edema of lower extremity 2/7/2022 Yes     Chronic anticoagulation 2/7/2022 Yes     Anemia 2/7/2022 Yes     Hiatal hernia 2/7/2022 Yes         Plan included:  Patient status inpatient in the Med/Surge     Admit to [facility]  CHF: Inpatient consult to cardiology. Appreciate input. Will trend BNP. Lasix after blood administration. Echo pending. Anemia: Iron studies. Receiving one unit PRBC. Check Hgb post-transfusion. Transfuse for < 7.0 or noted tachycardia/hypotension. Consult GI for hiatal hernia. Hold Eliquis today. PAF: Continue rate control with home lopressor. HTN: VS per unit. Continue home antihypertensives. HLD: Continue home statin  DVT prophylaxis: EPC's while in bed. GI prophylaxis: Protonix BID  PT/OT eval and treat  Full Code\"     EGD 2017:  Findings: The examined esophagus was normal.        A large paraesophageal hernia was found. The in the duodenum was normal.     Echo 2/8:  Summary   Global left ventricular systolic function is normal. Estimated ejection   fraction is 55-60 % .    No obvious wall motion abnormality seen. Grade III (severe) left ventricular diastolic dysfunction. Left atrium is severely dilated. Mildly dilated right ventricular cavity. Calcified aortic valve leaflets with sclerosis, no significant stenosis,   mean gradient 10 mm Hg   Mild aortic insufficiency. Mild mitral regurgitation. Severe tricuspid regurgitation. Moderate pulmonary hypertension with an estimated right ventricular systolic   pressure of 51 mmHg. No pericardial effusion. Results for Hiro Rees (MRN [id number]) as of 2/8/2022 15:45   Ref. Range 9/17/2016 05:14 2/7/2022 11:48 2/8/2022 05:14   Pro-BNP Latest Ref Range: <300 pg/mL 1,545 (H) 2,976 (H) 3,998 (H)     2/10:  Patient underwent EGD and colonoscopy:  EGD:  Esophagus: normal.   Stomach: Moderate size hiatal hernia. Stomach was otherwise normal.  Duodenum: normal  Colonoscopy:  1. A large polypoid mass in the transverse colon close to the hepatic flexure covering about 75% of the circumference about 3 cm in diameter. This was biopsied with cold biopsy forceps. 2.  There was moderate amount of diverticulosis in the sigmoid colon, descending colon and transverse colon. 3.  The rest of the colon was otherwise normal.  4.  Grade 1 internal hemorrhoids on retroflexion. Recommendations:  Await pathology. Repeat colonoscopy is not recommended due to patient age and comorbid conditions. Avoid anticoagulation. Check CEA level. Obtain colorectal surgery consultation. Past Medical History:   has a past medical history of GERD (gastroesophageal reflux disease) and Hypertension. Social History:   reports that she quit smoking about 37 years ago. She has never used smokeless tobacco. She reports that she does not drink alcohol and does not use drugs.      Family History:   Family History   Problem Relation Age of Onset    Cancer Brother     Cancer Other         bone    Cancer Daughter         breast       Review of Systems:     Review of Systems   Constitutional: Positive for activity change (improved) and fatigue (Exercise capacity somewhat improved). Respiratory: Positive for shortness of breath (dyspnea on exertion decreased). Negative for cough. Cardiovascular: Positive for leg swelling (Chronic improved, ). Negative for chest pain (Resolved) and palpitations. Gastrointestinal: Negative for abdominal pain, blood in stool, nausea and vomiting. Has been belching more than usual    Genitourinary: Negative for flank pain and hematuria. Musculoskeletal: Positive for arthralgias and myalgias. Chronic left shoulder pain and stiffness  She has had prior left shoulder arthroplasty   Neurological: Positive for headaches (Still reporting frontal headaches intermittently). Physical Examination:        Vitals  BP (!) 158/76   Pulse 77   Temp 97.9 °F (36.6 °C) (Oral)   Resp 20   Ht 5' 4\" (1.626 m)   Wt 148 lb 5.9 oz (67.3 kg)   SpO2 95%   BMI 25.47 kg/m²   Temp (24hrs), Av °F (36.7 °C), Min:97.8 °F (36.6 °C), Max:98.2 °F (36.8 °C)    No results for input(s): POCGLU in the last 72 hours. Physical Exam  Vitals reviewed. Constitutional:       General: She is not in acute distress. Appearance: She is not diaphoretic. HENT:      Head: Normocephalic. Nose: Nose normal.   Eyes:      General: No scleral icterus. Conjunctiva/sclera: Conjunctivae normal.   Neck:      Trachea: No tracheal deviation. Cardiovascular:      Rate and Rhythm: Normal rate and regular rhythm. Pulmonary:      Effort: Pulmonary effort is normal. No respiratory distress. Breath sounds: Normal breath sounds. No wheezing or rales. Chest:      Chest wall: No tenderness. Abdominal:      General: There is no distension. Palpations: Abdomen is soft. Tenderness: There is no abdominal tenderness. Musculoskeletal:         General: No tenderness. Cervical back: Neck supple. Right lower leg: Edema present. Left lower leg: Edema present. Comments: Shoulder ROM OK  1/4 edema at the ankles     Skin:     General: Skin is warm and dry. Coloration: Skin is pale. Neurological:      Mental Status: She is alert and oriented to person, place, and time. Medications: Allergies: Allergies   Allergen Reactions    Codeine Nausea Only     dizziness    Bactrim [Sulfamethoxazole-Trimethoprim] Hives    Naproxen      On allergy list ? Unsure of reaction    Norvasc [Amlodipine Besylate]      Edema-she thinks      Sertraline Hcl      unsure    Toradol [Ketorolac Tromethamine]      unsure    Tramadol      unsure    Magnesium-Containing Compounds Diarrhea     Mag ox 500 ?  Morphine Nausea And Vomiting    Percocet [Oxycodone-Acetaminophen] Nausea And Vomiting       Current Meds:   Scheduled Meds:    busPIRone  10 mg Oral QPM    butalbital-acetaminophen-caffeine  1 tablet Oral Once    metoprolol tartrate  12.5 mg Oral BID    amiodarone  200 mg Oral Daily    lisinopril  10 mg Oral Daily    therapeutic multivitamin-minerals  1 tablet Oral Daily    pravastatin  40 mg Oral Nightly    sodium chloride flush  5-40 mL IntraVENous 2 times per day    lidocaine  1 patch TransDERmal Daily    pantoprazole  40 mg IntraVENous BID    And    sodium chloride (PF)  10 mL IntraVENous Daily     Continuous Infusions:    sodium chloride 20 mL/hr at 02/10/22 4137    sodium chloride      sodium chloride      sodium chloride       PRN Meds: butalbital-acetaminophen-caffeine, sodium chloride, sodium chloride, traZODone, sodium chloride flush, sodium chloride, ondansetron **OR** ondansetron, polyethylene glycol, acetaminophen **OR** acetaminophen    Data:     I/O (24Hr):     Intake/Output Summary (Last 24 hours) at 2/10/2022 1513  Last data filed at 2/10/2022 1049  Gross per 24 hour   Intake 500 ml   Output --   Net 500 ml       Labs:  Hematology:  Recent Labs     02/08/22  0514 02/08/22  0514 02/08/22  1614 02/09/22  0416 02/10/22  0455   WBC 7.1  --   --  10.3 8.7   RBC 4.10  --   --  4.68 4.48   HGB 6.7*   < > 8.2* 8.3* 8.1*   HCT 24.0*   < > 29.2* 29.6* 28.5*   MCV 58.5*  --   --  63.2* 63.6*   MCH 16.3*  --   --  17.7* 18.1*   MCHC 27.9*  --   --  28.0* 28.4   RDW 25.6*  --   --  29.3* 29.2*   PLT See Reflexed IPF Result  --   --  See Reflexed IPF Result See Reflexed IPF Result   MPV NOT REPORTED  --   --  NOT REPORTED NOT REPORTED    < > = values in this interval not displayed. Chemistry:  Recent Labs     02/07/22  1719 02/08/22  0514 02/09/22  0416 02/10/22  0455   NA  --   --  140  --    K  --   --  3.6*  --    CL  --   --  104  --    CO2  --   --  22  --    GLUCOSE  --   --  87  --    BUN  --   --  12  --    CREATININE  --   --  0.86  --    MG  --  1.9  --   --    ANIONGAP  --   --  14  --    LABGLOM  --   --  >60  --    GFRAA  --   --  >60  --    CALCIUM  --   --  8.6  --    PROBNP  --  3,998*  --  4,617*   TROPHS 26*  --   --   --      Recent Labs     02/08/22 0514   TSH 1.47   CHOL 97   HDL 43   LDLCHOLESTEROL 43   CHOLHDLRATIO 2.3   TRIG 54   VLDL NOT REPORTED     ABG:No results found for: POCPH, PHART, PH, POCPCO2, JVH5QLF, PCO2, POCPO2, PO2ART, PO2, POCHCO3, TIZ0MEP, HCO3, NBEA, PBEA, BEART, BE, THGBART, THB, HTD0LUS, HYKO5HEW, E2MPJOPD, O2SAT, FIO2  Lab Results   Component Value Date/Time    SPECIAL NOT REPORTED 03/21/2020 12:15 PM     Lab Results   Component Value Date/Time    CULTURE NO SIGNIFICANT GROWTH 09/28/2016 07:19 AM    CULTURE  09/28/2016 07:19 AM     Performed at [facility], [address] [place] [address] [phone]       Radiology:  CTA CHEST W WO CONTRAST    Result Date: 2/7/2022  No thoracoabdominal aortic aneurysm or dissection. No acute finding in the chest, abdomen, and pelvis. Multiple chronic findings as above. XR CHEST PORTABLE    Result Date: 2/7/2022  No acute cardiopulmonary disease. Cardiomegaly. Hiatus hernia.      CTA ABDOMEN PELVIS W CONTRAST    Result Date: 2/7/2022  No thoracoabdominal aortic aneurysm or dissection. No acute finding in the chest, abdomen, and pelvis. Multiple chronic findings as above.        Assessment:        Primary Problem  Acute on chronic diastolic congestive heart failure [facility])    Active Hospital Problems    Diagnosis Date Noted    Colonic mass - 3 cm transverse colon [K63.89] 02/10/2022    Severe tricuspid regurgitation [I07.1] 02/09/2022    Pulmonary hypertension (HCC) [I27.20] 02/09/2022    Iron deficiency [E61.1]     Iron deficiency anemia [D50.9] 02/08/2022    History of Nissen fundoplication [Y57.377] 29/52/2407    Closed wedge fracture of thoracic vertebra with routine healing T5,T8 [S22.000D] 02/08/2022    (HFpEF) heart failure with preserved ejection fraction ([address] [I50.30] 02/08/2022    Acute on chronic diastolic congestive heart failure ([address] [I50.33]     Anemia [D64.9] 02/07/2022    Hiatal hernia [K44.9] 02/07/2022    Chronic anticoagulation [Z79.01] 03/24/2021    Bilateral edema of lower extremity [R60.0] 03/24/2021    Paroxysmal atrial fibrillation ([address] [I48.0] 10/21/2016    Gastroesophageal reflux disease without esophagitis [K21.9] 09/06/2016    Hyperlipidemia [E78.5] 08/26/2014    Hypertension [I10] 08/26/2014       Plan:        CRS evaluation - colon mass   CEA  Left shoulder x-ray ordered  Optimize cardio-pulmonary function  Cardiology evaluation in progress   Blood products prn - will check H&H  Check stools for occult blood  Hold Eliquis  Observe for GI bleeding   Iron supplementation  Venofer  Trial of Fioricet for headache  Blood Pressure - Monitor and control  Anemia w/u on outpatient basis is recommended   Reflux precautions   DVT prophylaxis  Buspar initiated         IP CONSULT TO HOSPITALIST  IP CONSULT TO DIETITIAN  IP CONSULT TO CARDIOLOGY  IP CONSULT TO GI  IP CONSULT TO IV TEAM  IP CONSULT TO COLORECTAL SURGERY  IP CONSULT TO [address] Mcconnell DO  2/10/2022  3:13 PM

## 2022-02-10 NOTE — PROGRESS NOTES
Physical Therapy    Facility/Department: [facility]MED SURG  Initial Assessment    NAME: Isaac eMhta  : 1935  MRN: [id number]    Date of Service: 2/10/2022  Chief Complaint   Patient presents with    Shortness of Breath    Other     L shoulder pain   Colonoscopy reveals a transverse colon mass,  Further medical management pending  Discharge Recommendations:  Patient would benefit from continued therapy after discharge Further therapy recommended at discharge. PT Equipment Recommendations  Equipment Needed: No    Assessment   Body structures, Functions, Activity limitations: Decreased functional mobility ; Decreased ADL status; Decreased ROM; Decreased strength;Decreased coordination;Decreased balance;Decreased endurance  Assessment: Pt presents with advanced age, multiple co morbidities, genearal weakenss and balance deficits Pt will benefit from further PT intervention pending further medical management and goal to regain functional indepedence needed to return home at [facility]  Specific instructions for Next Treatment: progress gait as indicated with RW  Prognosis: Good  Decision Making: Medium Complexity  Clinical Presentation: evolving  PT Education: Goals;PT Role;Plan of Care;Precautions;Gait Training  REQUIRES PT FOLLOW UP: Yes  Activity Tolerance  Activity Tolerance: Patient Tolerated treatment well;Patient limited by fatigue  Activity Tolerance: Pt states fatigued due to multiple test and sleepless night       Patient Diagnosis(es): The primary encounter diagnosis was Anemia, unspecified type. A diagnosis of Acute on chronic diastolic congestive heart failure (HCC) was also pertinent to this visit. has a past medical history of GERD (gastroesophageal reflux disease) and Hypertension. has a past surgical history that includes joint replacement (left total shoulder; ); bladder suspension ();  Hysterectomy (ovaries left); and Cholecystectomy. Restrictions  Restrictions/Precautions  Restrictions/Precautions: Fall Risk  Required Braces or Orthoses?: No  Position Activity Restriction  Other position/activity restrictions: up with assist  Vision/Hearing  Vision Exceptions:  (light sensative)  Hearing: Within functional limits     Subjective  General  Chart Reviewed: Yes  Patient assessed for rehabilitation services?: Yes  Additional Pertinent Hx: Shortness of breath and left shoulder pain secondary to CHF, Consult Regarding:  Transverse colon mass s/p colonoscopy/EGD  Response To Previous Treatment: Patient with no complaints from previous session.   Family / Caregiver Present: Yes (grand daughter present)  Follows Commands: Within Functional Limits  General Comment  Comments: okay per RN to see, Pt and grand daughter to see  Subjective  Subjective: Pt complain of left shoulder pain  Pain Screening  Patient Currently in Pain: Yes  Pain Assessment  Pain Assessment: 0-10  Pain Level: 5  Pain Location: Shoulder  Pain Orientation: Left  Pain Descriptors: Aching  Pain Frequency: Continuous  Response to Pain Intervention: Patient Satisfied  Oxygen Therapy  O2 Device: Nasal cannula       Orientation  Orientation  Overall Orientation Status: Within Normal Limits  Social/Functional History  Social/Functional History  Lives With: Alone  Type of Home: Mobile home  Home Layout: One level  Home Access: Stairs to enter with rails  Entrance Stairs - Number of Steps: 2  Entrance Stairs - Rails: Both  Bathroom Shower/Tub: Tub/Shower unit  Bathroom Toilet: Standard  Bathroom Equipment: Shower chair  Home Equipment: meevl walker  ADL Assistance: Independent  Homemaking Assistance: Independent  Homemaking Responsibilities: Yes  Meal Prep Responsibility: Primary  Laundry Responsibility: No  Cleaning Responsibility: Primary  Ambulation Assistance: Independent  Transfer Assistance: Independent  Active : Yes  Mode of Transportation: SUV  Leisure & Hobbies: watch TV  Additional Comments: pt reported supportive granddaughter who is present during assessment  Cognition    A&O x4    Objective     Observation/Palpation  Posture: Good  Observation: thoracic kyphosis forward flexed posture    AROM RLE (degrees)  RLE AROM: WNL  AROM LLE (degrees)  LLE AROM : WNL  AROM RUE (degrees)  RUE AROM : WNL  AROM LUE (degrees)  LUE AROM : WNL  Strength RLE  Strength RLE: WNL  Strength LLE  Strength LLE: WNL  Strength RUE  Strength RUE: WNL  Strength LUE  Strength LUE: WNL  Strength Other  Other: general weakness and deconditioning  Tone RLE  RLE Tone: Normotonic  Tone LLE  LLE Tone: Normotonic  Motor Control  Gross Motor?: WNL  Coordination  Rapid Alternating Movements: Normal  Finger to Nose: Normal  Heel to Shin: Normal     Bed mobility  Rolling to Left: Modified independent  Rolling to Right: Modified independent  Supine to Sit: Contact guard assistance  Sit to Supine: Stand by assistance  Scooting: Stand by assistance  Comment: activity require extra time and effort with external support  Transfers  Sit to Stand: Contact guard assistance  Stand to sit: Contact guard assistance  Bed to Chair: Contact guard assistance  Ambulation  Ambulation?: Yes  More Ambulation?: No  Ambulation 1  Surface: level tile  Device: Standard Walker  Assistance: Stand by assistance  Quality of Gait: slow guarded gait, decreased step length , width and levi  Gait Deviations: Slow Levi  Distance: 20ft x 2     Balance  Posture: Good  Sitting - Static: Good  Sitting - Dynamic: Good  Standing - Static: Fair;+  Standing - Dynamic: [address]  Times per week: 5-6x./wk  Specific instructions for Next Treatment: progress gait as indicated with RW  Current Treatment Recommendations: Strengthening,Balance Training,Transfer Training,Functional Mobility Training,Endurance Training,Gait Training                                                  AM-PAC Score     AM-PAC Inpatient Mobility without Stair Climbing Raw Score : 15 (02/10/22 1550)  AM-PAC Inpatient without Stair Climbing T-Scale Score : 43.03 (02/10/22 1550)  Mobility Inpatient CMS 0-100% Score: 47.43 (02/10/22 1550)  Mobility Inpatient without Stair CMS G-Code Modifier : CK (02/10/22 1550)       Goals  Short term goals  Time Frame for Short term goals: 14 days  Short term goal 1: demo indep bed mobilitiy  Short term goal 2: transfer from alternate height surfaces with AD Mod I  Short term goal 3: amb 100 ft  Short term goal 4: pt to ascend/ descend 4 steps with railing SBA  Patient Goals   Patient goals : to return home       Therapy Time   Individual Concurrent Group Co-treatment   Time In 1450         Time Out 1547         Minutes 57         Timed Code Treatment Minutes: [address]       Jane Vasquez, PT

## 2022-02-10 NOTE — PLAN OF CARE
Problem: Falls - Risk of:  Goal: Will remain free from falls  Description: Will remain free from falls  2/10/2022 0237 by Jose Cruz Hurley RN  Outcome: Ongoing     Problem: Falls - Risk of:  Goal: Absence of physical injury  Description: Absence of physical injury  2/10/2022 0237 by Jose Cruz Hurley RN  Outcome: Ongoing     Problem: Safety:  Goal: Free from accidental physical injury  Description: Free from accidental physical injury  2/10/2022 0237 by Jose Cruz Hurley RN  Outcome: Ongoing     Problem: Safety:  Goal: Free from intentional harm  Description: Free from intentional harm  2/10/2022 0237 by Jose Cruz Hurley RN  Outcome: Ongoing     Problem: Daily Care:  Goal: Daily care needs are met  Description: Daily care needs are met  2/10/2022 6854 by Jose Cruz Hurley RN  Outcome: Ongoing     Problem: Pain:  Goal: Patient's pain/discomfort is manageable  Description: Patient's pain/discomfort is manageable  2/10/2022 0237 by Jose Cruz Hurley RN  Outcome: Ongoing     Problem: Pain:  Goal: Pain level will decrease  Description: Pain level will decrease  2/10/2022 0237 by Jose Cruz Hurley RN  Outcome: Ongoing     Problem: Pain:  Goal: Control of acute pain  Description: Control of acute pain  2/10/2022 0237 by Jose Cruz Hurley RN  Outcome: Ongoing     Problem: Pain:  Goal: Control of chronic pain  Description: Control of chronic pain  2/10/2022 0237 by Jose Cruz Hurley RN  Outcome: Ongoing     Problem: Skin Integrity:  Goal: Skin integrity will stabilize  Description: Skin integrity will stabilize  2/10/2022 0237 by Jose Cruz Hurley RN  Outcome: Ongoing

## 2022-02-10 NOTE — PROGRESS NOTES
Admitted to endoscopy  [place] [facility]. Immediate complaints of nausea, pain under left armpit of a 10 on pain scale and shortness of breath.   Vitals bp 158/51 heart rate of 76bpm, respiratory rate 12/min, SP02 97% on 2L/min

## 2022-02-10 NOTE — ANESTHESIA PRE PROCEDURE
Department of Anesthesiology  Preprocedure Note       Name:  Matt Ann   Age:  82 y.o.  :  1935                                          MRN:  [id number]         Date:  2/10/2022      Surgeon: Stephanie Anthony):  Annalisa Jimenez MD    Procedure: Procedure(s):  EGD ESOPHAGOGASTRODUODENOSCOPY  COLONOSCOPY DIAGNOSTIC    Medications prior to admission:   Prior to Admission medications    Medication Sig Start Date End Date Taking? Authorizing Provider   traZODone (DESYREL) 50 MG tablet TAKE ONE TABLET BY MOUTH ONCE NIGHTLY 22   Megan Mujica,    bumetanide (BUMEX) 1 MG tablet Take 0.5 tablets by mouth daily 2/1/22 3/3/22  Megan Mujica,    amiodarone (CORDARONE) 200 MG tablet  21   Historical Provider, MD   KLOR-CON M20 20 MEQ extended release tablet  21   Historical Provider, MD   diclofenac sodium (VOLTAREN) 1 % GEL Apply 2 g topically 4 times daily 22   Megan Mujica,    lisinopril (PRINIVIL;ZESTRIL) 10 MG tablet TAKE 1 TABLET DAILY 11/3/21   Megan Mujica,    fluticasone [facility]) 50 MCG/ACT nasal spray 2 sprays by Each Nostril route daily 9/3/21   Megan Mujica,    pravastatin (PRAVACHOL) 40 MG tablet TAKE 1 TABLET DAILY 21   Megan Mujica DO   nystatin-triamcinolone [facility]) 648581-9.6 UNIT/GM-% cream Apply topically 2 times daily.  19   Jesus Angulo MD   potassium chloride (KLOR-CON M) 20 MEQ TBCR extended release tablet Take 40 mEq by mouth daily (with breakfast)    Historical Provider, MD   calcium-vitamin D (OSCAL-500) 500-200 MG-UNIT per tablet Take 1 tablet by mouth daily    Historical Provider, MD   Multiple Vitamins-Minerals (THERAPEUTIC MULTIVITAMIN-MINERALS) tablet Take 1 tablet by mouth daily    Historical Provider, MD   apixaban (ELIQUIS) 5 MG TABS tablet Take 5 mg by mouth 2 times daily    Historical Provider, MD   acetaminophen (TYLENOL) 325 MG tablet Take 2 tablets by mouth every 4 hours as needed for Pain or Fever 9/18/16   Percy Bear MD   metoprolol (LOPRESSOR) 25 MG tablet Take 25 mg by mouth 2 times daily    Historical Provider, MD       Current medications:    Current Facility-Administered Medications   Medication Dose Route Frequency Provider Last Rate Last Admin    [MAR Hold] busPIRone (BUSPAR) tablet 10 mg  10 mg Oral QPM Pati Glass DO   10 mg at 02/09/22 1718    [MAR Hold] butalbital-acetaminophen-caffeine (FIORICET, ESGIC) per tablet 1 tablet  1 tablet Oral Q6H PRN Pati Glass DO        [facility] Hold] 0.9 % sodium chloride infusion   IntraVENous PRN MALVIN Calzada - CNP        [MAR Hold] butalbital-acetaminophen-caffeine (FIORICET, ESGIC) per tablet 1 tablet  1 tablet Oral Once Pati Glass DO        [facility] Hold] metoprolol tartrate (LOPRESSOR) tablet 12.5 mg  12.5 mg Oral BID Dwayne Yeh MD   12.5 mg at 02/09/22 2059    [MAR Hold] 0.9 % sodium chloride infusion   IntraVENous PRN Say Griffith APRN - NP        [facility] Hold] amiodarone (CORDARONE) tablet 200 mg  200 mg Oral Daily Dorjazmine Griffith, APRN - NP   200 mg at 02/09/22 1031    [Held by provider] lisinopril (PRINIVIL;ZESTRIL) tablet 10 mg  10 mg Oral Daily Dorena Nacho, APRN - NP   10 mg at 02/07/22 2314    [MAR Hold] therapeutic multivitamin-minerals 1 tablet  1 tablet Oral Daily Dorena Rebteresa, APRN - NP   1 tablet at 02/09/22 1031    [MAR Hold] pravastatin (PRAVACHOL) tablet 40 mg  40 mg Oral Nightly Dorena Rebteresa, APRN - NP   40 mg at 02/09/22 2059    [MAR Hold] traZODone (DESYREL) tablet 50 mg  50 mg Oral Nightly PRN Esdrasena Rebteresa, APRN - NP   50 mg at 02/09/22 2115    [MAR Hold] sodium chloride flush 0.9 % injection 5-40 mL  5-40 mL IntraVENous 2 times per day Say Griffith APRN - NP   10 mL at 02/09/22 2100    [MAR Hold] sodium chloride flush 0.9 % injection 10 mL  10 mL IntraVENous PRN MALVIN Gray - CONSTANTIN        [facility] Hold] 0.9 % sodium chloride infusion  25 mL IntraVENous PRN Jacqueline Bro Ulice Goodpasture - CONSTANTIN        [facility]] ondansetron (ZOFRAN-ODT) disintegrating tablet 4 mg  4 mg Oral Q8H PRN Marlyne Cure, APRN - NP   4 mg at 02/09/22 2115    Or    [MAR Hold] ondansetron (ZOFRAN) injection 4 mg  4 mg IntraVENous Q6H PRN Marlyne Cure, APRN - NP   4 mg at 02/08/22 0452    [MAR Hold] polyethylene glycol (GLYCOLAX) packet 17 g  17 g Oral Daily PRN Marlyne Cure, APRN - NP        [MAR Hold] acetaminophen (TYLENOL) tablet 650 mg  650 mg Oral Q6H PRN Marlyne Cure, APRN - NP   650 mg at 02/10/22 0512    Or    [MAR Hold] acetaminophen (TYLENOL) suppository 650 mg  650 mg Rectal Q6H PRN Marlyne Cure, APRN - NP        [facility] Hold] lidocaine 4 % external patch 1 patch  1 patch TransDERmal Daily Luwana Bear, DO   1 patch at 02/09/22 1032    [MAR Hold] pantoprazole (PROTONIX) injection 40 mg  40 mg IntraVENous BID Luwana Bear, DO   40 mg at 02/09/22 2059    And    [MAR Hold] sodium chloride (PF) 0.9 % injection 10 mL  10 mL IntraVENous Daily Luwana Bear, DO   10 mL at 02/08/22 1357       Allergies: Allergies   Allergen Reactions    Codeine Nausea Only     dizziness    Bactrim [Sulfamethoxazole-Trimethoprim] Hives    Naproxen      On allergy list ? Unsure of reaction    Norvasc [Amlodipine Besylate]      Edema-she thinks      Sertraline Hcl      unsure    Toradol [Ketorolac Tromethamine]      unsure    Tramadol      unsure    Magnesium-Containing Compounds Diarrhea     Mag ox 500 ?     Morphine Nausea And Vomiting    Percocet [Oxycodone-Acetaminophen] Nausea And Vomiting       Problem List:    Patient Active Problem List   Diagnosis Code    Hyperlipidemia E78.5    Hypertension I10    Anxiety F41.9    Gastroesophageal reflux disease without esophagitis K21.9    Atypical chest pain R07.89    Paroxysmal atrial fibrillation (HCC) I48.0    Asymptomatic varicose veins I83.90    Bilateral edema of lower extremity R60.0    Chronic anticoagulation Z79.01    Former smoker Z87.891    Mild aortic regurgitation I35.1    Mild concentric left ventricular hypertrophy (LVH) I51.7    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    Anemia D64.9    Hiatal hernia K44.9    Iron deficiency anemia D50.9    History of Nissen fundoplication P83.876    Closed wedge fracture of thoracic vertebra with routine healing T5,T8 S22.000D    (HFpEF) heart failure with preserved ejection fraction (HCC) I50.30    Acute on chronic diastolic congestive heart failure (HCC) I50.33    Severe tricuspid regurgitation I07.1    Iron deficiency E61.1    Pulmonary hypertension (HCC) I27.20       Past Medical History:        Diagnosis Date    GERD (gastroesophageal reflux disease)     Hypertension        Past Surgical History:        Procedure Laterality Date    BLADDER SUSPENSION      CHOLECYSTECTOMY      HYSTERECTOMY  ovaries left    JOINT REPLACEMENT  left total shoulder;        Social History:    Social History     Tobacco Use    Smoking status: Former Smoker     Quit date: 1984     Years since quittin.1    Smokeless tobacco: Never Used   Substance Use Topics    Alcohol use: No                                Counseling given: Not Answered      Vital Signs (Current):   Vitals:    22 1200 22 2042 02/10/22 0828 02/10/22 0851   BP: (!) 169/66 (!) 166/56 (!) 148/51    Pulse: 67 68 71    Resp: 20 18 18    Temp: 97.9 °F (36.6 °C) 97.8 °F (36.6 °C) 98.2 °F (36.8 °C) 98.1 °F (36.7 °C)   TempSrc: Oral Oral Oral    SpO2: 100% 97% 95%    Weight:       Height:                                                  BP Readings from Last 3 Encounters:   02/10/22 (!) 148/51   22 128/64   21 118/78       NPO Status:                                                                                 BMI:   Wt Readings from Last 3 Encounters:   22 148 lb 5.9 oz (67.3 kg)   22 167 lb (75.8 kg)   21 161 lb (73 kg)     Body mass index is 25.47 kg/m².     CBC:   Lab Results   Component Value Date    WBC 8.7 02/10/2022    RBC 4.48 02/10/2022    HGB 8.1 02/10/2022    HCT 28.5 02/10/2022    MCV 63.6 02/10/2022    RDW 29.2 02/10/2022    PLT See Reflexed IPF Result 02/10/2022       CMP:   Lab Results   Component Value Date     02/09/2022    K 3.6 02/09/2022     02/09/2022    CO2 22 02/09/2022    BUN 12 02/09/2022    CREATININE 0.86 02/09/2022    GFRAA >60 02/09/2022    LABGLOM >60 02/09/2022    GLUCOSE 87 02/09/2022    GLUCOSE 109 09/14/2016    PROT 7.4 09/28/2016    CALCIUM 8.6 02/09/2022    BILITOT 0.61 09/28/2016    ALKPHOS 69 09/28/2016    AST 28 09/28/2016    ALT 20 09/28/2016       POC Tests: No results for input(s): POCGLU, POCNA, POCK, POCCL, POCBUN, POCHEMO, POCHCT in the last 72 hours. Coags:   Lab Results   Component Value Date    PROTIME 10.4 09/17/2016    INR 1.0 09/17/2016       HCG (If Applicable): No results found for: PREGTESTUR, PREGSERUM, HCG, HCGQUANT     ABGs: No results found for: PHART, PO2ART, THE0NXN, QKE0AOB, BEART, Q6JNRKXY     Type & Screen (If Applicable):  No results found for: LABABO, LABRH    Drug/Infectious Status (If Applicable):  No results found for: HIV, HEPCAB    COVID-19 Screening (If Applicable):   Lab Results   Component Value Date    COVID19 Not Detected 11/13/2020         EKG 2/7/2022  Sinus rhythm with 1st degree A-V block  Right bundle branch block  Abnormal ECG  When compared with ECG of 07-FEB-2022 11:39,  Criteria for Septal infarct are no longer Present      TTE 2/9/2022  Global left ventricular systolic function is normal. Estimated ejection  fraction is 55-60 % . No obvious wall motion abnormality seen. Grade III (severe) left ventricular diastolic dysfunction. Left atrium is severely dilated. Mildly dilated right ventricular cavity. Calcified aortic valve leaflets with sclerosis, no significant stenosis,  mean gradient 10 mm Hg  Mild aortic insufficiency. Mild mitral regurgitation. Severe tricuspid regurgitation.   Moderate pulmonary hypertension with an estimated right ventricular systolic  pressure of 51 mmHg. No pericardial effusion. Anesthesia Evaluation    Airway: Mallampati: III  TM distance: >3 FB   Neck ROM: full  Mouth opening: > = 3 FB Dental:    (+) upper dentures, lower dentures and edentulous      Pulmonary:Negative Pulmonary ROS and normal exam                               Cardiovascular:    (+) hypertension:, CHF:,                   Neuro/Psych:               GI/Hepatic/Renal:   (+) hiatal hernia, GERD:,           Endo/Other: Negative Endo/Other ROS                    Abdominal:             Vascular: negative vascular ROS. Other Findings:             Anesthesia Plan      MAC     ASA 3     (Complaining of left arm pain upon arrival at GI unit,  Repeat EKG showed no acute changes. Checked with RN from floor, patient has been complaining intermittently of similar pains for last few weeks. Will proceed.)  Induction: intravenous. Anesthetic plan and risks discussed with patient. Use of blood products discussed with sibling whom. Plan discussed with CRNA.               Marisel Olguin MD   2/10/2022

## 2022-02-10 NOTE — PROGRESS NOTES
Occupational Therapy   Occupational Therapy Initial Assessment  Date: 2/10/2022   Patient Name: Bernice Delgadillo  MRN: [id number]     : 1935    Date of Service: 2/10/2022  Chief Complaint   Patient presents with    Shortness of Breath    Other     L shoulder pain       Discharge Recommendations:    Further therapy recommended at discharge. OT Equipment Recommendations  Equipment Needed: Yes  Mobility Devices: ADL Assistive Devices  ADL Assistive Devices: Grab Bars - shower    Assessment   Performance deficits / Impairments: Decreased functional mobility ; Decreased ADL status; Decreased endurance;Decreased high-level IADLs;Decreased balance;Decreased ROM  Assessment: pt would benefit from further therapy at discharge in order to increase safety and independence with ADLS and functional transfers/functional mobility. Treatment Diagnosis: CHF  Prognosis: Good  Decision Making: Medium Complexity  Patient Education: pt ed on POC, purpose of eval, importance of movement, balancing rest with movement, safety during functional transfers/functional mobility. goodreturn  REQUIRES OT FOLLOW UP: Yes  Activity Tolerance  Activity Tolerance: Patient Tolerated treatment well  Safety Devices  Safety Devices in place: Yes  Type of devices: Gait belt;Call light within reach; Left in bed;Nurse notified  Restraints  Initially in place: No           Patient Diagnosis(es): The primary encounter diagnosis was Anemia, unspecified type. A diagnosis of Acute on chronic diastolic congestive heart failure (HCC) was also pertinent to this visit. has a past medical history of GERD (gastroesophageal reflux disease) and Hypertension. has a past surgical history that includes joint replacement (left total shoulder; ); bladder suspension (); Hysterectomy (ovaries left); and Cholecystectomy.     Treatment Diagnosis: CHF      Restrictions  Restrictions/Precautions  Restrictions/Precautions: Fall Risk  Required Braces or Orthoses?: No  Position Activity Restriction  Other position/activity restrictions: up with assist    Subjective   General  Patient assessed for rehabilitation services?: Yes  Family / Caregiver Present: Yes (pt granddaughter present for 2nd half of session)  Diagnosis: CHF  General Comment  Comments: RN ok'd fortherapy this afternoon. pt agreeable to participate in session and cooperative/pleasant throughout  Patient Currently in Pain: Yes  Pain Assessment  Pain Assessment: 0-10  Pain Level: 9  Pain Type: Acute pain  Pain Location: Shoulder  Pain Orientation: Left  Non-Pharmaceutical Pain Intervention(s): Ambulation/Increased Activity; Distraction; Therapeutic presence  Response to Pain Intervention: Patient Satisfied    Social/Functional History  Social/Functional History  Lives With: Alone  Type of Home: Mobile home  Home Layout: One level  Home Access: Stairs to enter with rails  Entrance Stairs - Number of Steps: 2  Entrance Stairs - Rails: Both  Bathroom Shower/Tub: Tub/Shower unit  Bathroom Toilet: Standard  Bathroom Equipment: Shower chair  Home Equipment: Cane (pt reportedonly using cane outside home)  ADL Assistance: [address] [place] [address] Avenue: Independent  Homemaking Responsibilities: Yes  Meal Prep Responsibility: Primary  Laundry Responsibility: No (pt reported granddaughter completes)  Cleaning Responsibility: Primary  Ambulation Assistance: Independent  Transfer Assistance: Independent  Active : Yes  Mode of Transportation: [address]: watch TV  Additional Comments: pt reported supportive granddaughter       Objective   Vision: Impaired  Vision Exceptions: Wears glasses at all times  Hearing: Within functional limits    Orientation  Overall Orientation Status: Within Functional Limits     Balance  Sitting Balance: Modified independent  (~25 minutes on eOB And on commode)  Standing Balance: Contact guard assistance  Standing Balance  Time: ~2 minutes  Activity: pt completed functionalmobility to/from commode and stood at bedside to straighten sheets  Comment: pt with no LOB, but required verbal cues for continuation dueto distractibility. Functional Mobility  Functional - Mobility Device: Other (hand held assist)  Activity: Other  Assist Level: Contact guard assistance  Toilet Transfers  Toilet - Technique: Ambulating  Equipment Used: Standard bedside commode  Toilet Transfer: Contact guard assistance  ADL  Feeding: Modified independent   Grooming: Modified independent   UE Bathing: Stand by assistance  LE Bathing: Minimal assistance  UE Dressing: Stand by assistance  LE Dressing: Minimal assistance  Toileting: Contact guard assistance  Additional Comments: OT facilitated pt in using bedside commode during session. pt required increased time tocomplete, but able to complete ketty-care whiel standing with cGA. pt adjusted L slipper over heal but required assistance for L slipper  Tone RUE  RUE Tone: Normotonic  Tone LUE  LUE Tone: Normotonic  Coordination  Movements Are Fluid And Coordinated: Yes     Bed mobility  Supine to Sit: Contact guard assistance  Sit to Supine: Stand by assistance  Scooting: Stand by assistance  Comment: pt required increased time to complete bed mobility  Transfers  Sit to stand: Contact guard assistance  Stand to sit: Contact guard assistance  Transfer Comments: hand held assist     Cognition  Overall Cognitive Status: [facility]  Cognition Comment: pt would occasionally mix of R and L but would quickly correct self and aware of error made.  pt easily distracted throughout session and required some cuing for continuation of tasks        Sensation  Overall Sensation Status: [facility]        LUE AROM (degrees)  LUE AROM : Exceptions  L Shoulder Flexion 0-180: 0-60 due to prior surgery  L Elbow Flexion 0-145: [facility]  Left Hand AROM (degrees)  Left Hand AROM: [facility]  RUE AROM (degrees)  RUE AROM : Exceptions  R Shoulder Flexion 0-180: 0-100  R Elbow Flexion 0-145: [facility]  Right Hand AROM (degrees)  Right Hand AROM: WFL  LUE Strength  Gross LUE Strength: Exceptions to [facility]  L Hand General: 4/5  RUE Strength  Gross RUE Strength: [facility]  R Hand General: 4/5                   Plan   Plan  Times per week: 2-4x/wk           AM-PAC Score        AM-PAC Inpatient Daily Activity Raw Score: 20 (02/10/22 1433)  AM-PAC Inpatient ADL T-Scale Score : 42.03 (02/10/22 1433)  ADL Inpatient CMS 0-100% Score: 38.32 (02/10/22 1433)  ADL Inpatient CMS G-Code Modifier : Denisha Douse (02/10/22 1433)    Goals  Short term goals  Time Frame for Short term goals: pt will, by discharge  Short term goal 1: complete LB ADLs and toileting tasks with SBA And set up  Short term goal 2: complete UB ADLs with mod I  Short term goal 3: dem SBAduring functional transfers/functional mobility with LRD,as needed  Short term goal 4: dem ~6 minutes dynamic/static standing tolerance with SBA in order to complete functional tasks       Therapy Time   Individual Concurrent Group Co-treatment   Time In 1341         Time Out 1413         Minutes 32         Timed Code Treatment Minutes: JOYCE Pruitt/L

## 2022-02-10 NOTE — PROGRESS NOTES
Complaints of being lightheadness and dizzy. States, \"it hurts, not as severe as it is now. It feels like its going into my back . \"

## 2022-02-10 NOTE — CONSULTS
General Surgery:  Consult Note        PATIENT NAME: Marsha Tafoya   YOB: 1935    ADMISSION DATE: 2/7/2022 11:33 AM     Consulted Physician: Dr. Singh Emelyn: 2/10/2022    Chief Complaint:  Shortness of breath and left shoulder pain secondary to CHF  Consult Regarding:  Transverse colon mass    HISTORY OF PRESENT ILLNESS:  The patient is a 82 y.o. female PMHx GERD, HTN, and Afib who was admitted on 2/7 due to complaints of left shoulder pain and shortness of breath. GI was consulted for microcytic hypochromic anemia. A upper and lower GI endoscopy was performed 2/10. Upper endoscopy revealed a moderate size hiatal hernia. Lower endoscopy revealed a large polypoid mass in the transverse colon close to the hepatic flexure covering about 75% of the circumference about 3 cm in diameter, moderate sigmoid, transverse and descending colon diverticulosis. Pt denies any family history of colon cancer, but states her twin brother passed away at age 76 from an unknown cancer. Pt states she has never had a previous colonoscopy done in the past, but denies any blood in her stool. She also denies any past diagnosis of UC or Crohn's. Pt denies any fever, cough, n/v, but states she has intermittent SOB and chest pain. Before admission to hospital, patient erports 2 bowel movements per week.        Past Medical History:        Diagnosis Date    GERD (gastroesophageal reflux disease)     Hypertension        Past Surgical History:        Procedure Laterality Date    BLADDER SUSPENSION  2001    CHOLECYSTECTOMY      HYSTERECTOMY  ovaries left    JOINT REPLACEMENT  left total shoulder; 1990       Medications Prior to Admission:   Medications Prior to Admission: traZODone (DESYREL) 50 MG tablet, TAKE ONE TABLET BY MOUTH ONCE NIGHTLY  bumetanide (BUMEX) 1 MG tablet, Take 0.5 tablets by mouth daily  amiodarone (CORDARONE) 200 MG tablet,   KLOR-CON M20 20 MEQ extended release tablet,   diclofenac sodium (VOLTAREN) 1 % GEL, Apply 2 g topically 4 times daily  lisinopril (PRINIVIL;ZESTRIL) 10 MG tablet, TAKE 1 TABLET DAILY  fluticasone (FLONASE) 50 MCG/ACT nasal spray, 2 sprays by Each Nostril route daily  pravastatin (PRAVACHOL) 40 MG tablet, TAKE 1 TABLET DAILY  nystatin-triamcinolone (MYCOLOG II) 713243-0.1 UNIT/GM-% cream, Apply topically 2 times daily.   potassium chloride (KLOR-CON M) 20 MEQ TBCR extended release tablet, Take 40 mEq by mouth daily (with breakfast)  calcium-vitamin D (OSCAL-500) 500-200 MG-UNIT per tablet, Take 1 tablet by mouth daily  Multiple Vitamins-Minerals (THERAPEUTIC MULTIVITAMIN-MINERALS) tablet, Take 1 tablet by mouth daily  apixaban (ELIQUIS) 5 MG TABS tablet, Take 5 mg by mouth 2 times daily  acetaminophen (TYLENOL) 325 MG tablet, Take 2 tablets by mouth every 4 hours as needed for Pain or Fever  metoprolol (LOPRESSOR) 25 MG tablet, Take 25 mg by mouth 2 times daily    Allergies:  Codeine, Bactrim [sulfamethoxazole-trimethoprim], Naproxen, Norvasc [amlodipine besylate], Sertraline hcl, Toradol [ketorolac tromethamine], Tramadol, Magnesium-containing compounds, Morphine, and Percocet [oxycodone-acetaminophen]    Social History:   Social History     Socioeconomic History    Marital status:      Spouse name: Not on file    Number of children: Not on file    Years of education: Not on file    Highest education level: Not on file   Occupational History    Not on file   Tobacco Use    Smoking status: Former Smoker     Quit date: 1984     Years since quittin.1    Smokeless tobacco: Never Used   Substance and Sexual Activity    Alcohol use: No    Drug use: No    Sexual activity: Not on file   Other Topics Concern    Not on file   Social History Narrative    Not on file     Social Determinants of Health     Financial Resource Strain: Low Risk     Difficulty of Paying Living Expenses: Not hard at all   Food Insecurity: No Food Insecurity    Worried About Running Out of Food in the Last Year: Never true    [address] in the Last Year: Never true   Transportation Needs:     Lack of Transportation (Medical): Not on file    Lack of Transportation (Non-Medical): Not on file   Physical Activity:     Days of Exercise per Week: Not on file    Minutes of Exercise per Session: Not on file   Stress:     Feeling of Stress : Not on file   Social Connections:     Frequency of Communication with Friends and Family: Not on file    Frequency of Social Gatherings with Friends and Family: Not on file    Attends Buddhism Services: Not on file    Active Member of Clubs or Organizations: Not on file    Attends Club or Organization Meetings: Not on file    Marital Status: Not on file   Intimate Partner Violence:     Fear of Current or Ex-Partner: Not on file    Emotionally Abused: Not on file    Physically Abused: Not on file    Sexually Abused: Not on file   Housing Stability:     Unable to Pay for Housing in the Last Year: Not on file    Number of [place] in the Last Year: Not on file    Unstable Housing in the Last Year: Not on file       Family History:       Problem Relation Age of Onset    Cancer Brother     Cancer Other         bone    Cancer Daughter         breast       REVIEW OF SYSTEMS:    CONSTITUTIONAL: Denies recent weight loss, fatigue, fevers, chills. HEENT: Denies rhinorrhea, dysphagia, odynphagia. CARDIOVASCULAR: Denies history of MI. (+) recent chest pain  RESPIRATORY: Denies recent history of PE. (+) recent SOB  GASTROINTESTINAL: Denies any diarrhea, blood in stool, abdominal pain. GENITOURINARY: Denies increased frequency or dysuria. HEMATOLOGIC/LYMPHATIC: Denies history of anemia or DVTs. ENDOCRINE: Denies history of thyroid problems or diabetes. NEURO: Denies history of CVA, TIA. Review of systems negative unless listed above.     PHYSICAL EXAM:    VITALS:  BP (!) 158/76   Pulse 77   Temp 97.9 °F (36.6 °C) (Oral)   Resp 20   Ht 5' 4\" (1.626 m)   Wt 148 lb 5.9 oz (67.3 kg)   SpO2 95%   BMI 25.47 kg/m²   INTAKE/OUTPUT:     Intake/Output Summary (Last 24 hours) at 2/10/2022 1220  Last data filed at 2/10/2022 1049  Gross per 24 hour   Intake 500 ml   Output --   Net 500 ml       CONSTITUTIONAL:  awake, alert, not distressed and normal weight  HEENT: Normocephalic/atraumatic, without obvious abnormality. NECK:  Supple, symmetrical, trachea midline   CARDIOVASCULAR: Regular rate and rhythm  LUNGS: equal chest rise and fall, no increased WOB, no audible stridor or wheeze  ABDOMEN: Soft. Non-tender. ND. No rebounding. No guarding. Non-peritoneal.   MUSCULOSKELETAL: Muscle strength intact in all extremities bilaterally. NEUROLOGIC: Gross motor intact without focal weakness. SKIN: No cyanosis, rashes, or edema noted.   Orientation:   oriented to person, place, and time    CBC with Differential:    Lab Results   Component Value Date    WBC 8.7 02/10/2022    RBC 4.48 02/10/2022    HGB 8.1 02/10/2022    HCT 28.5 02/10/2022    PLT See Reflexed IPF Result 02/10/2022    MCV 63.6 02/10/2022    MCH 18.1 02/10/2022    MCHC 28.4 02/10/2022    RDW 29.2 02/10/2022    LYMPHOPCT 15 02/07/2022    MONOPCT 4 02/07/2022    BASOPCT 2 02/07/2022    MONOSABS 0.26 02/07/2022    LYMPHSABS 0.96 02/07/2022    EOSABS 0.00 02/07/2022    BASOSABS 0.13 02/07/2022    DIFFTYPE NOT REPORTED 02/07/2022     BMP:    Lab Results   Component Value Date     02/09/2022    K 3.6 02/09/2022     02/09/2022    CO2 22 02/09/2022    BUN 12 02/09/2022    LABALBU 3.9 09/28/2016    CREATININE 0.86 02/09/2022    CALCIUM 8.6 02/09/2022    GFRAA >60 02/09/2022    LABGLOM >60 02/09/2022    GLUCOSE 87 02/09/2022    GLUCOSE 109 09/14/2016     Sodium:    Lab Results   Component Value Date     02/09/2022     Potassium:    Lab Results   Component Value Date    K 3.6 02/09/2022     BUN/Creatinine:    Lab Results   Component Value Date    BUN 12 02/09/2022    CREATININE 0.86 02/09/2022     Troponin:  No results found for: TROPONINI  IRON:    Lab Results   Component Value Date    IRON 37 02/07/2022     Iron Saturation:  No components found for: PERCENTFE  TIBC:    Lab Results   Component Value Date    TIBC 431 02/07/2022     FERRITIN:    Lab Results   Component Value Date    FERRITIN 7 02/07/2022       Pertinent Radiology:   CTA CHEST W WO CONTRAST    Result Date: 2/7/2022  EXAMINATION: CTA OF THE CHEST WITH AND WITHOUT CONTRAST; CTA OF THE ABDOMEN AND PELVIS WITH CONTRAST 2/7/2022 12:56 pm TECHNIQUE: CTA of the chest was performed before and after the administration of intravenous contrast.  Multiplanar reformatted images are provided for review. MIP images are provided for review. Dose modulation, iterative reconstruction, and/or weight based adjustment of the mA/kV was utilized to reduce the radiation dose to as low as reasonably achievable.; CTA of the abdomen and pelvis was performed with the administration of intravenous contrast. Multiplanar reformatted images are provided for review. MIP images are provided for review. Dose modulation, iterative reconstruction, and/or weight based adjustment of the mA/kV was utilized to reduce the radiation dose to as low as reasonably achievable. COMPARISON: None. HISTORY: ORDERING SYSTEM PROVIDED HISTORY: Eval dissection - SOB, back pain radiating to shoulder TECHNOLOGIST PROVIDED HISTORY: Eval dissection - SOB, back pain radiating to shoulder Decision Support Exception - unselect if not a suspected or confirmed emergency medical condition->Emergency Medical Condition (MA) Reason for Exam: Eval dissection - SOB, back pain radiating to shoulder FINDINGS: CTA chest: Aorta: Atherosclerosis of the thoracic aorta noted with no aneurysm or dissection. Mediastinum: Atherosclerosis of the coronary arteries. Borderline cardiomegaly. No pericardial effusion. No adenopathy. Small to moderate-sized hiatal hernia. Lungs/Pleura: No pneumothorax. No pleural effusion.   Scattered linear densities in the bilateral lungs, suggestive of platelike atelectasis versus linear scars. No consolidation, mass, or suspicious nodule in either lung. Soft Tissues/Bones: Multilevel thoracic spondylosis. Old compression fractures of T5 and T8 vertebral bodies. Status post left shoulder arthroplasty. CTA abdomen: Vascular calcification noted. No aneurysm or dissection in the abdominal aorta and its major branches. Unremarkable liver, spleen, pancreas, and adrenals. Areas of cortical thinning/scarring in the left kidney. A 0.3 cm calcification in the right kidney, which may represent a nonobstructing stone or vascular calcification. Areas of cortical thinning/scarring in the left kidney. A subcentimeter hypodensity in the upper pole of the right kidney, likely a simple cyst but too small to fully characterize. Status post cholecystectomy with associated CBD dilatation. No CT evidence of acute appendicitis. Bowel loops nonobstructed. A multiloculated fat containing abdominal wall hernia to the left of umbilicus. Multilevel lumbar spondylosis. . CTA pelvis: No aneurysm or dissection in the pelvic arteries. Vascular calcification noted. Sigmoid diverticulosis. No acute diverticulitis. Status post hysterectomy. No adnexal mass. Incompletely distended urinary bladder with no gross abnormality. No acute osseous pathology in the pelvic bones and proximal femora. Degenerative changes of the bilateral hip and sacroiliac joints noted. No thoracoabdominal aortic aneurysm or dissection. No acute finding in the chest, abdomen, and pelvis. Multiple chronic findings as above.      XR CHEST PORTABLE    Result Date: 2/7/2022  EXAMINATION: ONE XRAY VIEW OF THE CHEST 2/7/2022 12:10 pm COMPARISON: AP chest from 03/21/2020 HISTORY: ORDERING SYSTEM PROVIDED HISTORY: SOB, L shoulder pain TECHNOLOGIST PROVIDED HISTORY: SOB, L shoulder pain Reason for Exam: port upright Additional history of GERD, hypertension and left shoulder joint replacement FINDINGS: Unchanged left shoulder hardware status post replacement. No dislocation or obvious fracture. Overlying ECG monitor leads and gown snaps. Enlarged but stable appearing cardiac silhouette with sizable hiatus hernia again noted. Mediastinal structures midline and unchanged, again with calcification aortic knob. No localized pulmonary opacity or blunting of the costophrenic angles. No pneumothorax. Bones otherwise unchanged. No acute cardiopulmonary disease. Cardiomegaly. Hiatus hernia. CTA ABDOMEN PELVIS W CONTRAST    Result Date: 2/7/2022  EXAMINATION: CTA OF THE CHEST WITH AND WITHOUT CONTRAST; CTA OF THE ABDOMEN AND PELVIS WITH CONTRAST 2/7/2022 12:56 pm TECHNIQUE: CTA of the chest was performed before and after the administration of intravenous contrast.  Multiplanar reformatted images are provided for review. MIP images are provided for review. Dose modulation, iterative reconstruction, and/or weight based adjustment of the mA/kV was utilized to reduce the radiation dose to as low as reasonably achievable.; CTA of the abdomen and pelvis was performed with the administration of intravenous contrast. Multiplanar reformatted images are provided for review. MIP images are provided for review. Dose modulation, iterative reconstruction, and/or weight based adjustment of the mA/kV was utilized to reduce the radiation dose to as low as reasonably achievable. COMPARISON: None. HISTORY: ORDERING SYSTEM PROVIDED HISTORY: Eval dissection - SOB, back pain radiating to shoulder TECHNOLOGIST PROVIDED HISTORY: Eval dissection - SOB, back pain radiating to shoulder Decision Support Exception - unselect if not a suspected or confirmed emergency medical condition->Emergency Medical Condition (MA) Reason for Exam: Eval dissection - SOB, back pain radiating to shoulder FINDINGS: CTA chest: Aorta: Atherosclerosis of the thoracic aorta noted with no aneurysm or dissection.  Mediastinum: Atherosclerosis of the coronary arteries. Borderline cardiomegaly. No pericardial effusion. No adenopathy. Small to moderate-sized hiatal hernia. Lungs/Pleura: No pneumothorax. No pleural effusion. Scattered linear densities in the bilateral lungs, suggestive of platelike atelectasis versus linear scars. No consolidation, mass, or suspicious nodule in either lung. Soft Tissues/Bones: Multilevel thoracic spondylosis. Old compression fractures of T5 and T8 vertebral bodies. Status post left shoulder arthroplasty. CTA abdomen: Vascular calcification noted. No aneurysm or dissection in the abdominal aorta and its major branches. Unremarkable liver, spleen, pancreas, and adrenals. Areas of cortical thinning/scarring in the left kidney. A 0.3 cm calcification in the right kidney, which may represent a nonobstructing stone or vascular calcification. Areas of cortical thinning/scarring in the left kidney. A subcentimeter hypodensity in the upper pole of the right kidney, likely a simple cyst but too small to fully characterize. Status post cholecystectomy with associated CBD dilatation. No CT evidence of acute appendicitis. Bowel loops nonobstructed. A multiloculated fat containing abdominal wall hernia to the left of umbilicus. Multilevel lumbar spondylosis. . CTA pelvis: No aneurysm or dissection in the pelvic arteries. Vascular calcification noted. Sigmoid diverticulosis. No acute diverticulitis. Status post hysterectomy. No adnexal mass. Incompletely distended urinary bladder with no gross abnormality. No acute osseous pathology in the pelvic bones and proximal femora. Degenerative changes of the bilateral hip and sacroiliac joints noted. No thoracoabdominal aortic aneurysm or dissection. No acute finding in the chest, abdomen, and pelvis. Multiple chronic findings as above.          ASSESSMENT:  Active Hospital Problems    Diagnosis Date Noted    Severe tricuspid regurgitation [I07.1] 02/09/2022    Pulmonary hypertension ([facility] [address] [I27.20] 02/09/2022    Iron deficiency [E61.1]     Iron deficiency anemia [D50.9] 02/08/2022    History of Nissen fundoplication [Z07.335] 46/86/9899    Closed wedge fracture of thoracic vertebra with routine healing T5,T8 [S22.000D] 02/08/2022    (HFpEF) heart failure with preserved ejection fraction ([facility] [address] [I50.30] 02/08/2022    Acute on chronic diastolic congestive heart failure ([facility] [address] [I50.33]     Anemia [D64.9] 02/07/2022    Hiatal hernia [K44.9] 02/07/2022    Chronic anticoagulation [Z79.01] 03/24/2021    Bilateral edema of lower extremity [R60.0] 03/24/2021    Paroxysmal atrial fibrillation ([facility] [address] [I48.0] 10/21/2016    Gastroesophageal reflux disease without esophagitis [K21.9] 09/06/2016    Hyperlipidemia [E78.5] 08/26/2014    Hypertension [I10] 08/26/2014     81 yo Female admitted for SOB, chest pain secondary to CHF, s/p 2/10/2022 colonoscopy- transverse colon polypoid mass     Plan:  Continue medical mgmt and supportive care per primary  Obtain CT Chest/Abd/pelvis for staging purposes   Monitor bowel function, if pt tolerates diet and no other acute issues ok to f/u with colorectal surgery, Dr. Freddie Ravi, as out pt to discuss surgical options moving forward. Pt at this time undecided if she would even want to undergo surgery. Explained to pt we will know more after the CT and path results. Await pathology results from c-sope, no acute surgical interventions planned. Ok for d/c once cleared by other services and completion of CT scan. Discussed with Dr. Freddie Ravi    Thank you,    Electronically signed by Pratibha [id number] on 2/10/2022 at 12:20 PM     Patient seen and examined at bedside. Changes made to above note as needed including assessment and plan. As above. Electronically signed by Renee Shine DO on 2/10/2022 at 3:11 PM    I Dr. Freddie Ravi saw and examined the patient. I have edited the above and agree with the above. Tenisha Aaron  Colorectal Surgery

## 2022-02-11 LAB
CARCINOEMBRYONIC ANTIGEN: 5.8 NG/ML
HCT VFR BLD CALC: 30.1 % (ref 36.3–47.1)
HEMOGLOBIN: 8.2 G/DL (ref 11.9–15.1)
MCH RBC QN AUTO: 17.9 PG (ref 25.2–33.5)
MCHC RBC AUTO-ENTMCNC: 27.2 G/DL (ref 28.4–34.8)
MCV RBC AUTO: 65.6 FL (ref 82.6–102.9)
NRBC AUTOMATED: 3.8 PER 100 WBC
PDW BLD-RTO: 30.8 % (ref 11.8–14.4)
PLATELET # BLD: ABNORMAL K/UL (ref 138–453)
PLATELET, FLUORESCENCE: 175 K/UL (ref 138–453)
PLATELET, IMMATURE FRACTION: 4.3 % (ref 1.1–10.3)
PMV BLD AUTO: ABNORMAL FL (ref 8.1–13.5)
RBC # BLD: 4.59 M/UL (ref 3.95–5.11)
SURGICAL PATHOLOGY REPORT: NORMAL
TROPONIN INTERP: ABNORMAL
TROPONIN T: ABNORMAL NG/ML
TROPONIN, HIGH SENSITIVITY: 27 NG/L (ref 0–14)
WBC # BLD: 9.3 K/UL (ref 3.5–11.3)

## 2022-02-11 PROCEDURE — 93005 ELECTROCARDIOGRAM TRACING: CPT | Performed by: INTERNAL MEDICINE

## 2022-02-11 PROCEDURE — 99232 SBSQ HOSP IP/OBS MODERATE 35: CPT | Performed by: INTERNAL MEDICINE

## 2022-02-11 PROCEDURE — 6360000002 HC RX W HCPCS: Performed by: NURSE PRACTITIONER

## 2022-02-11 PROCEDURE — 6370000000 HC RX 637 (ALT 250 FOR IP): Performed by: INTERNAL MEDICINE

## 2022-02-11 PROCEDURE — C9113 INJ PANTOPRAZOLE SODIUM, VIA: HCPCS | Performed by: INTERNAL MEDICINE

## 2022-02-11 PROCEDURE — 6370000000 HC RX 637 (ALT 250 FOR IP): Performed by: NURSE PRACTITIONER

## 2022-02-11 PROCEDURE — 85027 COMPLETE CBC AUTOMATED: CPT

## 2022-02-11 PROCEDURE — 36415 COLL VENOUS BLD VENIPUNCTURE: CPT

## 2022-02-11 PROCEDURE — 6360000002 HC RX W HCPCS: Performed by: INTERNAL MEDICINE

## 2022-02-11 PROCEDURE — 2580000003 HC RX 258: Performed by: INTERNAL MEDICINE

## 2022-02-11 PROCEDURE — 84484 ASSAY OF TROPONIN QUANT: CPT

## 2022-02-11 PROCEDURE — 85055 RETICULATED PLATELET ASSAY: CPT

## 2022-02-11 PROCEDURE — 1200000000 HC SEMI PRIVATE

## 2022-02-11 PROCEDURE — 82378 CARCINOEMBRYONIC ANTIGEN: CPT

## 2022-02-11 RX ORDER — AMIODARONE HYDROCHLORIDE 200 MG/1
200 TABLET ORAL DAILY
Qty: 30 TABLET | Refills: 3 | Status: SHIPPED | OUTPATIENT
Start: 2022-02-11 | End: 2022-07-25

## 2022-02-11 RX ORDER — LISINOPRIL 20 MG/1
20 TABLET ORAL DAILY
Status: DISCONTINUED | OUTPATIENT
Start: 2022-02-12 | End: 2022-02-12 | Stop reason: HOSPADM

## 2022-02-11 RX ORDER — LORAZEPAM 2 MG/ML
0.5 INJECTION INTRAMUSCULAR ONCE
Status: COMPLETED | OUTPATIENT
Start: 2022-02-11 | End: 2022-02-11

## 2022-02-11 RX ADMIN — BUSPIRONE HYDROCHLORIDE 10 MG: 10 TABLET ORAL at 17:09

## 2022-02-11 RX ADMIN — TRAZODONE HYDROCHLORIDE 50 MG: 50 TABLET ORAL at 20:44

## 2022-02-11 RX ADMIN — PRAVASTATIN SODIUM 40 MG: 20 TABLET ORAL at 21:30

## 2022-02-11 RX ADMIN — SODIUM CHLORIDE, PRESERVATIVE FREE 10 ML: 5 INJECTION INTRAVENOUS at 20:44

## 2022-02-11 RX ADMIN — PANTOPRAZOLE SODIUM 40 MG: 40 INJECTION, POWDER, FOR SOLUTION INTRAVENOUS at 08:43

## 2022-02-11 RX ADMIN — PANTOPRAZOLE SODIUM 40 MG: 40 INJECTION, POWDER, FOR SOLUTION INTRAVENOUS at 20:44

## 2022-02-11 RX ADMIN — Medication 1 TABLET: at 08:43

## 2022-02-11 RX ADMIN — SODIUM CHLORIDE, PRESERVATIVE FREE 10 ML: 5 INJECTION INTRAVENOUS at 08:43

## 2022-02-11 RX ADMIN — LISINOPRIL 10 MG: 10 TABLET ORAL at 08:43

## 2022-02-11 RX ADMIN — LORAZEPAM 0.5 MG: 2 INJECTION INTRAMUSCULAR; INTRAVENOUS at 20:44

## 2022-02-11 RX ADMIN — AMIODARONE HYDROCHLORIDE 200 MG: 200 TABLET ORAL at 08:43

## 2022-02-11 RX ADMIN — METOPROLOL TARTRATE 12.5 MG: 25 TABLET ORAL at 08:43

## 2022-02-11 RX ADMIN — ACETAMINOPHEN 650 MG: 325 TABLET ORAL at 08:43

## 2022-02-11 RX ADMIN — BUTALBITAL, ACETAMINOPHEN AND CAFFEINE 1 TABLET: 50; 325; 40 TABLET ORAL at 17:10

## 2022-02-11 ASSESSMENT — PAIN DESCRIPTION - ONSET: ONSET: ON-GOING

## 2022-02-11 ASSESSMENT — PAIN DESCRIPTION - PROGRESSION
CLINICAL_PROGRESSION: NOT CHANGED

## 2022-02-11 ASSESSMENT — PAIN SCALES - GENERAL
PAINLEVEL_OUTOF10: 1
PAINLEVEL_OUTOF10: 0
PAINLEVEL_OUTOF10: 3
PAINLEVEL_OUTOF10: 2
PAINLEVEL_OUTOF10: 5
PAINLEVEL_OUTOF10: 9

## 2022-02-11 ASSESSMENT — PAIN DESCRIPTION - PAIN TYPE: TYPE: ACUTE PAIN

## 2022-02-11 ASSESSMENT — PAIN DESCRIPTION - LOCATION: LOCATION: CHEST

## 2022-02-11 ASSESSMENT — ENCOUNTER SYMPTOMS
COUGH: 0
BLOOD IN STOOL: 0
NAUSEA: 0
SHORTNESS OF BREATH: 1
ABDOMINAL PAIN: 0
VOMITING: 0

## 2022-02-11 ASSESSMENT — PAIN DESCRIPTION - DESCRIPTORS: DESCRIPTORS: PRESSURE

## 2022-02-11 ASSESSMENT — PAIN DESCRIPTION - FREQUENCY: FREQUENCY: INTERMITTENT

## 2022-02-11 ASSESSMENT — PAIN DESCRIPTION - ORIENTATION: ORIENTATION: MID

## 2022-02-11 ASSESSMENT — PAIN - FUNCTIONAL ASSESSMENT: PAIN_FUNCTIONAL_ASSESSMENT: ACTIVITIES ARE NOT PREVENTED

## 2022-02-11 NOTE — PROGRESS NOTES
[facility]  Office: [address], DO, Olecharlotte Ronquilloal, DO, Leonila [place], DO, Jovany Hernandezstoney Blood, DO, Sandra Singh MD, Clara Houser MD, Екатерина Murphy MD, Janel Rivera MD, Chago Back MD, Saurav Collins MD, Diallo Castorena MD, Michaelle Tucker, DO, Kelvin Meier, DO, Elizabeth Mathias MD,  Tone Corbett, DO, Cristi Monroy MD, Bandar Henriquez MD, Sebastián Parker MD, Garry Magaña MD, Greg Hoang MD, Kavita Herron, [facility], [facility] Yuan, CNP, Walker Phoenix, CNP, Candida Carter, CNS, Mady Viveros, CNP, Minal Schmid, CNP, Isiah Dill, CNP, Yamileth Garvey, CNP, Luzma Dawson, CNP, Brigette Felipe PA-C, Ladi Andersen [facility], Jonh Cardenas [facility], Sunitha Parker, CNP, Lasandra Runner, CNP, Kadeem Rogers, CNP, Apollo Carrasquillo, CNP, Rose Mejia, CNP, [facility], [address] [place] [address]    Progress Note    2/11/2022    4:19 PM    Name:   Gricelda Marrero  MRN:     [id number]     Acct:      [de-identified]   Room:   [address] [place] [address] Day:  4  Admit Date:  2/7/2022 11:33 AM    PCP:   Margo Lees DO  Code Status:  Full Code    Subjective:     C/C:   Chief Complaint   Patient presents with    Shortness of Breath    Other     L shoulder pain     Interval History Status:   Intermittent headaches persist  Chronic shoulder pains  Patient reports anterior nonexertional chest pain  She acknowledges that she is having difficulty with her memory    Data Base Updates:  Pathology reveals tubular adenoma    CEA5. 8 High      Shoulder x-ray:  Impression:  1. No acute osseous abnormality   2. Left shoulder arthroplasty without hardware complication. WBC9.3k/uL RBC4.59m/uL Hemoglobin8. 2 Low      CT abdomen:  Impression:  1. Patient has a known colonic mass found on colonoscopy. This is not   definitively identified. The colon is tortuous. Area of asymmetric diffuse   thickening and some narrowing in: At or just proximal to the hepatic flexure   may represent the lesion. 2. No evidence for metastatic disease in the chest abdomen or pelvis. 3. 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 4. Trace pleural effusion adjacent subsegmental atelectasis more pronounced   on the right. Overall slightly more prominent than prior. Brief History:     As documented in the medical record:  Eliane Guzman is a pleasant, cooperative 82year old female who presented for evaluation of bilateral leg swelling, left shoulder pain and progressive SOB which began 2 days ago. She denies any history of dyspnea, COPD or known heart failure. She denies and fever/chills, N/V constipation or diarrhea. She denies hematuria or melanotic stools. She denies chronic NSAID use. She states that she follows regularly with her PCP (last 6 days ago) who recommended cardiology evaluation for her bilateral leg weeping edema and was ordered daily Bumex which she has not picked up from the pharmacy yet. She does have a history of A-fib and is on home Eliquis. She also has a medical history of GERD and HTN. She states that she is independent in her care and takes all her prescribed medications. Her granddaughter is at bedside and expresses concern for some confusion/forgetfulness over the past several months. ED workup showed pBNP 2976. There are no comparative values. Troponin 25-->26. Hgb 6.5. Hemocult (-). CT chest/abd/pelvis (-) for acute findings. \"    The intitial assessment and plan included:  \"   * (Principal) CHF with unknown LVEF ([facility] [address] 2/7/2022 Yes     Hyperlipidemia (Chronic) 2/7/2022 Yes     Hypertension (Chronic) 2/7/2022 Yes     Gastroesophageal reflux disease without esophagitis 2/7/2022 Yes     Paroxysmal atrial fibrillation (HCC) (Chronic) 2/7/2022 Yes     Bilateral edema of lower extremity 2/7/2022 Yes     Chronic anticoagulation 2/7/2022 Yes     Anemia 2/7/2022 Yes     Hiatal hernia 2/7/2022 Yes         Plan included:  Patient status inpatient in the Med/Surge     Admit to Intermed  CHF: Inpatient consult to cardiology. Appreciate input. Will trend BNP. Lasix after blood administration. Echo pending. Anemia: Iron studies. Receiving one unit PRBC. Check Hgb post-transfusion. Transfuse for < 7.0 or noted tachycardia/hypotension. Consult GI for hiatal hernia. Hold Eliquis today. PAF: Continue rate control with home lopressor. HTN: VS per unit. Continue home antihypertensives. HLD: Continue home statin  DVT prophylaxis: EPC's while in bed. GI prophylaxis: Protonix BID  PT/OT eval and treat  Full Code\"     EGD 2017:  Findings: The examined esophagus was normal.        A large paraesophageal hernia was found. The in the duodenum was normal.     Echo 2/8:  Summary   Global left ventricular systolic function is normal. Estimated ejection   fraction is 55-60 % . No obvious wall motion abnormality seen. Grade III (severe) left ventricular diastolic dysfunction. Left atrium is severely dilated. Mildly dilated right ventricular cavity. Calcified aortic valve leaflets with sclerosis, no significant stenosis,   mean gradient 10 mm Hg   Mild aortic insufficiency. Mild mitral regurgitation. Severe tricuspid regurgitation. Moderate pulmonary hypertension with an estimated right ventricular systolic   pressure of 51 mmHg. No pericardial effusion. Results for Orly Mcneil (MRN [id number]) as of 2/8/2022 15:45   Ref. Range 9/17/2016 05:14 2/7/2022 11:48 2/8/2022 05:14   Pro-BNP Latest Ref Range: <300 pg/mL 1,545 (H) 2,976 (H) 3,998 (H)     Results for Orly Mcneil (MRN [id number]) as of 2/11/2022 16:16   Ref.  Range 2/7/2022 17:19   Ferritin Latest Ref Range: 13 - 150 ug/L 7 (L)   Iron Latest Ref Range: 37 - 145 ug/dL 37   Iron Saturation Latest Ref Range: 20 - 55 % 9 (L)   UIBC Latest Ref Range: 112 - 347 ug/dL 394 (H)   TIBC Latest Ref Range: 250 - 450 ug/dL 431     2/10:  Patient underwent EGD and colonoscopy:  EGD:  Esophagus: normal.   Stomach: Moderate size hiatal hernia. Stomach was otherwise normal.  Duodenum: normal  Colonoscopy:  1. A large polypoid mass in the transverse colon close to the hepatic flexure covering about 75% of the circumference about 3 cm in diameter. This was biopsied with cold biopsy forceps. 2.  There was moderate amount of diverticulosis in the sigmoid colon, descending colon and transverse colon. 3.  The rest of the colon was otherwise normal.  4.  Grade 1 internal hemorrhoids on retroflexion. Recommendations:  Await pathology. Repeat colonoscopy is not recommended due to patient age and comorbid conditions. Avoid anticoagulation. Check CEA level. Obtain colorectal surgery consultation. Pathology revealed:  Transverse colon, mass, biopsy:   -  Tubular adenoma. CEA5. 8 High      CT abdomen:  Impression:  1. Patient has a known colonic mass found on colonoscopy. This is not   definitively identified. The colon is tortuous. Area of asymmetric diffuse   thickening and some narrowing in: At or just proximal to the hepatic flexure   may represent the lesion. 2. No evidence for metastatic disease in the chest abdomen or pelvis. 3. 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 4. Trace pleural effusion adjacent subsegmental atelectasis more pronounced   on the right. Overall slightly more prominent than prior. Past Medical History:   has a past medical history of GERD (gastroesophageal reflux disease) and Hypertension. Social History:   reports that she quit smoking about 37 years ago. She has never used smokeless tobacco. She reports that she does not drink alcohol and does not use drugs.      Family History:   Family History   Problem Relation Age of Onset    Cancer Brother     Cancer Other         bone    Cancer Daughter         breast       Review of Systems:     Review of Systems   Constitutional: Positive for activity change (improved) and fatigue (Exercise capacity somewhat improved). Respiratory: Positive for shortness of breath (dyspnea on exertion decreased). Negative for cough. Cardiovascular: Positive for leg swelling (Chronic improved, ). Negative for chest pain (Resolved) and palpitations. Gastrointestinal: Negative for abdominal pain, blood in stool, nausea and vomiting. Has been belching more than usual    Genitourinary: Negative for flank pain and hematuria. Musculoskeletal: Positive for arthralgias and myalgias. Chronic left shoulder pain and stiffness  She has had prior left shoulder arthroplasty   Neurological: Positive for headaches (Still reporting frontal headaches intermittently). Physical Examination:        Vitals  BP (!) 142/49   Pulse 66   Temp 98.3 °F (36.8 °C)   Resp 16   Ht 5' 4\" (1.626 m)   Wt 169 lb 8.5 oz (76.9 kg)   SpO2 96%   BMI 29.10 kg/m²   Temp (24hrs), Av.2 °F (36.8 °C), Min:98 °F (36.7 °C), Max:98.6 °F (37 °C)    No results for input(s): POCGLU in the last 72 hours. Physical Exam  Vitals reviewed. Constitutional:       General: She is not in acute distress. Appearance: She is not diaphoretic. HENT:      Head: Normocephalic. Nose: Nose normal.   Eyes:      General: No scleral icterus. Conjunctiva/sclera: Conjunctivae normal.   Neck:      Trachea: No tracheal deviation. Cardiovascular:      Rate and Rhythm: Normal rate and regular rhythm. Pulmonary:      Effort: Pulmonary effort is normal. No respiratory distress. Breath sounds: Normal breath sounds. No wheezing or rales. Chest:      Chest wall: No tenderness. Abdominal:      General: There is no distension. Palpations: Abdomen is soft. Tenderness: There is no abdominal tenderness. Musculoskeletal:         General: No tenderness. Cervical back: Neck supple. Right lower leg: Edema present. Left lower leg: Edema present.       Comments: Shoulder ROM OK  1/4 edema at the ankles Skin:     General: Skin is warm and dry. Coloration: Skin is pale. Neurological:      Mental Status: She is alert and oriented to person, place, and time. Medications: Allergies: Allergies   Allergen Reactions    Codeine Nausea Only     dizziness    Bactrim [Sulfamethoxazole-Trimethoprim] Hives    Naproxen      On allergy list ? Unsure of reaction    Norvasc [Amlodipine Besylate]      Edema-she thinks      Sertraline Hcl      unsure    Toradol [Ketorolac Tromethamine]      unsure    Tramadol      unsure    Magnesium-Containing Compounds Diarrhea     Mag ox 500 ?  Morphine Nausea And Vomiting    Percocet [Oxycodone-Acetaminophen] Nausea And Vomiting       Current Meds:   Scheduled Meds:    metoprolol tartrate  25 mg Oral BID    [START ON 2/12/2022] lisinopril  20 mg Oral Daily    busPIRone  10 mg Oral QPM    butalbital-acetaminophen-caffeine  1 tablet Oral Once    amiodarone  200 mg Oral Daily    therapeutic multivitamin-minerals  1 tablet Oral Daily    pravastatin  40 mg Oral Nightly    sodium chloride flush  5-40 mL IntraVENous 2 times per day    lidocaine  1 patch TransDERmal Daily    pantoprazole  40 mg IntraVENous BID    And    sodium chloride (PF)  10 mL IntraVENous Daily     Continuous Infusions:    sodium chloride 20 mL/hr at 02/10/22 6686    sodium chloride      sodium chloride      sodium chloride       PRN Meds: butalbital-acetaminophen-caffeine, sodium chloride, sodium chloride, traZODone, sodium chloride flush, sodium chloride, ondansetron **OR** ondansetron, polyethylene glycol, acetaminophen **OR** acetaminophen    Data:     I/O (24Hr):   No intake or output data in the 24 hours ending 02/11/22 1619    Labs:  Hematology:  Recent Labs     02/09/22  0416 02/10/22  0455 02/11/22  0350   WBC 10.3 8.7 9.3   RBC 4.68 4.48 4.59   HGB 8.3* 8.1* 8.2*   HCT 29.6* 28.5* 30.1*   MCV 63.2* 63.6* 65.6*   MCH 17.7* 18.1* 17.9*   MCHC 28.0* 28.4 27.2*   RDW 29.3* 29.2* 30.8*   PLT See Reflexed IPF Result See Reflexed IPF Result See Reflexed IPF Result   MPV NOT REPORTED NOT REPORTED NOT REPORTED     Chemistry:  Recent Labs     02/09/22  0416 02/10/22  0455     --    K 3.6*  --      --    CO2 22  --    GLUCOSE 87  --    BUN 12  --    CREATININE 0.86  --    ANIONGAP 14  --    LABGLOM >60  --    GFRAA >60  --    CALCIUM 8.6  --    PROBNP  --  4,617*     No results for input(s): PROT, LABALBU, LABA1C, B6EPWLJ, H9DVBER, FT4, TSH, AST, ALT, LDH, GGT, ALKPHOS, LABGGT, BILITOT, BILIDIR, AMMONIA, AMYLASE, LIPASE, LACTATE, CHOL, HDL, LDLCHOLESTEROL, CHOLHDLRATIO, TRIG, VLDL, PDZ43FT, PHENYTOIN, PHENYF, URICACID, POCGLU in the last 72 hours. ABG:No results found for: POCPH, PHART, PH, POCPCO2, BGC6NHU, PCO2, POCPO2, PO2ART, PO2, POCHCO3, TIT1WAI, HCO3, NBEA, PBEA, BEART, BE, THGBART, THB, CYR5LDE, CYSZ5MHW, B7AMZGMW, O2SAT, FIO2  Lab Results   Component Value Date/Time    SPECIAL NOT REPORTED 03/21/2020 12:15 PM     Lab Results   Component Value Date/Time    CULTURE NO SIGNIFICANT GROWTH 09/28/2016 07:19 AM    CULTURE  09/28/2016 07:19 AM     Performed at [address] [place] [address] [phone]       Radiology:  CTA CHEST W WO CONTRAST    Result Date: 2/7/2022  No thoracoabdominal aortic aneurysm or dissection. No acute finding in the chest, abdomen, and pelvis. Multiple chronic findings as above. XR CHEST PORTABLE    Result Date: 2/7/2022  No acute cardiopulmonary disease. Cardiomegaly. Hiatus hernia. CTA ABDOMEN PELVIS W CONTRAST    Result Date: 2/7/2022  No thoracoabdominal aortic aneurysm or dissection. No acute finding in the chest, abdomen, and pelvis. Multiple chronic findings as above.        Assessment:        Primary Problem  Acute on chronic diastolic congestive heart failure [facility])    Active Hospital Problems    Diagnosis Date Noted    Tubular adenoma:  3 cm transverse colon [D36.9]     Colonic mass - 3 cm transverse colon [K63.89] 02/10/2022    Severe tricuspid regurgitation [I07.1] 02/09/2022    Pulmonary hypertension (HCC) [I27.20] 02/09/2022    Iron deficiency [E61.1]     Iron deficiency anemia [D50.9] 02/08/2022    History of Nissen fundoplication [V82.438] 26/04/4016    Closed wedge fracture of thoracic vertebra with routine healing T5,T8 [S22.000D] 02/08/2022    (HFpEF) heart failure with preserved ejection fraction (HCC) [I50.30] 02/08/2022    Acute on chronic diastolic congestive heart failure (HCC) [I50.33]     Anemia [D64.9] 02/07/2022    Hiatal hernia [K44.9] 02/07/2022    Chronic anticoagulation [Z79.01] 03/24/2021    Bilateral edema of lower extremity [R60.0] 03/24/2021    Paroxysmal atrial fibrillation ([address] [I48.0] 10/21/2016    Gastroesophageal reflux disease without esophagitis [K21.9] 09/06/2016    Hyperlipidemia [E78.5] 08/26/2014    Hypertension [I10] 08/26/2014       Plan:        CRS evaluation - tubular adenoma,   f/u as scheduled with Dr Al Kennedy outpatient  Suggest outpatient Ortho evaluation for chronic shoulder pain  Patient cannot remember the name of her orthopedic surgeon  Optimize cardio-pulmonary function  Cardiology evaluation in progress   Check EKG  Trend troponins  Blood products prn - will check H&H  Check stools for occult blood  Hold Eliquis  Observe for GI bleeding   Iron supplementation  Venofer  Trial of Fioricet for headache  Blood Pressure - Monitor and control  Anemia w/u on outpatient basis is recommended   Reflux precautions   DVT prophylaxis  Buspar initiated   Hope to initiate discharge planning  Will discharge when arrangements complete and ok with other services.   Follow-up with PCP in one week, August San DO  Notify PCP of discharge   Home care arrangements in progress      IP CONSULT TO HOSPITALIST  IP CONSULT TO DIETITIAN  IP CONSULT TO CARDIOLOGY  IP CONSULT TO GI  IP CONSULT TO IV TEAM  IP CONSULT TO [id number]  IP CONSULT TO [address] Vessie Mortimer, DO  2/11/2022  4:19 PM

## 2022-02-11 NOTE — PROGRESS NOTES
[facility]   Progress Note    Josephine Talley is a 82 y.o. female patient. Hospitalization Day:4      Chief consult reason:   Iron deficiency anemia  Paraesophageal hiatal hernia    Subjective:  Patient examined at bedside  No acute issues overnight  Chart and labs reviewed  Hemoglobin stable 8.2 today  Upper GI endoscopy: Moderate-sized hiatal hernia, stomach well otherwise normal  Colonoscopy: Large polypoid mass in transverse colon close to hepatic flexure about 3 cm in diameter, biopsied. Moderate amount of diverticulosis, grade 1 internal hemorrhoids  Colorectal surgery: Follow-up with colorectal surgery as outpatient no acute surgical intervention planned at this point. Signed off  Continue rest of management per primary/hydrocephalus    VITALS:  BP (!) 181/59   Pulse 73   Temp 98.6 °F (37 °C) (Oral)   Resp 20   Ht 5' 4\" (1.626 m)   Wt 169 lb 8.5 oz (76.9 kg)   SpO2 96%   BMI 29.10 kg/m²   TEMPERATURE:  Current - Temp: 98.6 °F (37 °C);  Max - Temp  Av.1 °F (36.7 °C)  Min: 97.9 °F (36.6 °C)  Max: 98.6 °F (37 °C)    Physical Assessment:  General appearance:  alert, cooperative and no distress  Mental Status:  oriented to person, place and time and normal affect  Lungs:  clear to auscultation bilaterally, normal effort  Heart:  regular rate and rhythm, no murmur  Abdomen:  soft, nontender, nondistended, normal bowel sounds, no masses, hepatomegaly, splenomegaly appreciated  Extremities:  no edema, redness, tenderness in the calves  Skin:  no gross lesions, rashes, induration    Data Review:    Labs and Imaging:     CBC:  Recent Labs     22  1614 22  0416 02/10/22  0455 22  0350   WBC  --  10.3 8.7 9.3   HGB 8.2* 8.3* 8.1* 8.2*   MCV  --  63.2* 63.6* 65.6*   RDW  --  29.3* 29.2* 30.8*   PLT  --  See Reflexed IPF Result See Reflexed IPF Result See Reflexed IPF Result       ANEMIA STUDIES:  No results for input(s): LABIRON, TIBC, FERRITIN, [id number], FOLATE, OCCULTBLD in the last 72 hours. BMP:  Recent Labs     02/09/22  0416      K 3.6*      CO2 22   BUN 12   CREATININE 0.86   GLUCOSE 87   CALCIUM 8.6       LFTS:  No results for input(s): ALKPHOS, ALT, AST, BILITOT, BILIDIR, LABALBU in the last 72 hours. Amylase/Lipase and Ammonia:  No results for input(s): AMYLASE, LIPASE, AMMONIA in the last 72 hours. Acute Hepatitis Panel:  No results found for: HEPBSAG, HEPCAB, HEPBIGM, HEPAIGM    HCV Genotype:  No results found for: HEPATITISCGENOTYPE    HCV Quantitative:  No results found for: HCVQNT    LIVER WORK UP:    AFP  No results found for: AFP    Alpha 1 antitrypsin   No results found for: A1A    CHARANJIT  No results found for: CHARANJIT    AMA  No results found for: MITOAB    ASMA  No results found for: SMOOTHMUSCAB    PT/INR  No results for input(s): PROTIME, INR in the last 72 hours. Cancer Markers:  CEA:  No results for input(s): CEA in the last 72 hours. Ca 125:  No results for input(s):  in the last 72 hours. Ca 19-9:   Invalid input(s):   AFP: No results for input(s): AFP in the last 72 hours. Lactic acid:Invalid input(s): LACTIC ACID    Radiology Review:    No results found. GI Impression:  Mass in transverse colon  Diverticulosis in sigmoid, descending and transverse colon  Grade 1 internal hemorrhoids  Hiatal  hernia  Iron deficiency anemia secondary to all above      Plan and Recommendations:  1. Large polypoid mass in transverse colon close to hepatic flexure, 3 cm in diameter. Biopsied. Awaiting pathology. CEA ordered. Follow-up with colorectal surgeon  2. Moderate amount of diverticulosis in sigmoid, descending, transverse colon. Repeat colonoscopy is not recommended due to patient age and comorbid conditions. 3. Iron deficiency anemia. Monitor H&H. Transfuse if needed. Keep Hb> 7  4. Avoid anticoagulation  5. GI will sign off, please contact for any questions or concerns.           Vinny Chaudhry MD.  Internal Medicine Resident PGY [address]   2/11/2022, 8:15 AM

## 2022-02-11 NOTE — CARE COORDINATION
Met with patient for [address] 80 choices. She deferred to her granddaughter who chose: [facility], [facility] and [facility] with no preference. 1135 Call from Norbert at [facility], they are able to accept the patient.

## 2022-02-11 NOTE — PLAN OF CARE
Problem: Falls - Risk of:  Goal: Will remain free from falls  Description: Will remain free from falls  2/11/2022 0210 by Dmitriy Hood RN  Outcome: Ongoing  2/10/2022 1555 by Dinora Colindres RN  Outcome: Ongoing  Goal: Absence of physical injury  Description: Absence of physical injury  2/11/2022 0210 by Dmitriy Hood RN  Outcome: Ongoing  2/10/2022 1555 by Dinora Colindres RN  Outcome: Ongoing     Problem: Safety:  Goal: Free from accidental physical injury  Description: Free from accidental physical injury  2/11/2022 0210 by Dmitriy Hood RN  Outcome: Ongoing  2/10/2022 1555 by Dinora Colindres RN  Outcome: Ongoing  Goal: Free from intentional harm  Description: Free from intentional harm  2/11/2022 0210 by Dmitriy Hood RN  Outcome: Ongoing  2/10/2022 1555 by Dinora Colindres RN  Outcome: Ongoing     Problem: Daily Care:  Goal: Daily care needs are met  Description: Daily care needs are met  2/11/2022 0210 by Dmitriy Hood RN  Outcome: Ongoing  2/10/2022 1555 by Dinora Colindres RN  Outcome: Ongoing     Problem: Pain:  Goal: Patient's pain/discomfort is manageable  Description: Patient's pain/discomfort is manageable  2/11/2022 0210 by Dmitriy Hood RN  Outcome: Ongoing  2/10/2022 1555 by Dinora Colindres RN  Outcome: Ongoing  Goal: Pain level will decrease  Description: Pain level will decrease  2/11/2022 0210 by Dmitriy Hood RN  Outcome: Ongoing  2/10/2022 1555 by Dinora Colindres RN  Outcome: Ongoing  Goal: Control of acute pain  Description: Control of acute pain  2/11/2022 0210 by Dmitriy Hood RN  Outcome: Ongoing  2/10/2022 1555 by Dinora Colindres RN  Outcome: Ongoing  Goal: Control of chronic pain  Description: Control of chronic pain  2/11/2022 0210 by Dmitriy Hood RN  Outcome: Ongoing  2/10/2022 1555 by Dinora Colindres RN  Outcome: Ongoing     Problem: Skin Integrity:  Goal: Skin integrity will stabilize  Description: Skin integrity will stabilize  2/11/2022 0210 by Dmitriy Hood RN  Outcome: Ongoing  2/10/2022 1555 by Faviola Valente Linn Torres RN  Outcome: Ongoing

## 2022-02-11 NOTE — PROGRESS NOTES
Surgery available as needed. F/U info for Dr. Gualberto James entered in pt's d/c section for pt to be seen.      Thank you,     Electronically signed by Heriberto Briceño DO on 2/10/2022 at 9:34 PM

## 2022-02-11 NOTE — PROGRESS NOTES
[facility]  Progress Note                   Date:   2/11/2022  Patient name: Harvey Hays  Date of admission:  2/7/2022 11:33 AM  MRN:   [id number]  YOB: 1935  PCP: Nasir Liang DO    Reason for Admission: Acute on chronic diastolic congestive heart failure ([facility] [address] [I50.33]  CHF with unknown LVEF ([facility] [I50.9]  Anemia, unspecified type [D64.9]    Subjective:       Clinical Changes /Abnormalities: Patient seen and examined in room. Denies chest pain or shortness of breath. Tele/vitals/labs reviewed . Remains SR on monitor with first degree AVB. Review of Systems    Medications:   Scheduled Meds:   busPIRone  10 mg Oral QPM    butalbital-acetaminophen-caffeine  1 tablet Oral Once    metoprolol tartrate  12.5 mg Oral BID    amiodarone  200 mg Oral Daily    lisinopril  10 mg Oral Daily    therapeutic multivitamin-minerals  1 tablet Oral Daily    pravastatin  40 mg Oral Nightly    sodium chloride flush  5-40 mL IntraVENous 2 times per day    lidocaine  1 patch TransDERmal Daily    pantoprazole  40 mg IntraVENous BID    And    sodium chloride (PF)  10 mL IntraVENous Daily     Continuous Infusions:   sodium chloride 20 mL/hr at 02/10/22 0922    sodium chloride      sodium chloride      sodium chloride       CBC:   Recent Labs     02/09/22  0416 02/10/22  0455 02/11/22  0350   WBC 10.3 8.7 9.3   HGB 8.3* 8.1* 8.2*   PLT See Reflexed IPF Result See Reflexed IPF Result See Reflexed IPF Result     BMP:    Recent Labs     02/09/22  0416      K 3.6*      CO2 22   BUN 12   CREATININE 0.86   GLUCOSE 87     Hepatic:No results for input(s): AST, ALT, ALB, BILITOT, ALKPHOS in the last 72 hours. Troponin:   No results for input(s): TROPHS in the last 72 hours. BNP: No results for input(s): BNP in the last 72 hours. Lipids:   No results for input(s): CHOL, HDL in the last 72 hours.     Invalid input(s): LDLCALCU  INR: No results for input(s): INR in the last 72 hours. Objective:   Vitals: BP (!) 181/59   Pulse 73   Temp 98.6 °F (37 °C) (Oral)   Resp 20   Ht 5' 4\" (1.626 m)   Wt 169 lb 8.5 oz (76.9 kg)   SpO2 96%   BMI 29.10 kg/m²   General appearance: alert and cooperative with exam  HEENT: Head: Normocephalic, no lesions, without obvious abnormality. Neck:no JVD, trachea midline, no adenopathy  Lungs: Clear to auscultation, diminished to bases. No distress   Heart: Regular rate and rhythm, s1/s2 auscultated,  Murmur, SR  Abdomen: soft, non-tender, bowel sounds active  Extremities: edema lower ext   Neurologic: not done      Previous Testing:      EVENT MONITOR 3/4/2020: Prevailing rhythm is SR with short runs of non-sustained supraventricular tachycardia of 15 beats in duration at an average rate of 130 bpm. No significant other sustained tachy or bradyarrhythmias are seen. No significant pauses are noted.      ECHO 10/7/16: EF 50-55%, mild LVH, RV is moderately dilated, LA/RA is severely enlarged, trace MR, moderate TR, RVSP is 30-35 mmHg, mild AR, trace TN.    EKG 2/8/2022  Sinus rhythm with 1st degree A-V block  Right bundle branch block  Abnormal ECG    Echo 2/9/22  Summary  Global left ventricular systolic function is normal. Estimated ejection  fraction is 55-60 % . No obvious wall motion abnormality seen. Grade III (severe) left ventricular diastolic dysfunction. Left atrium is severely dilated. Mildly dilated right ventricular cavity. Calcified aortic valve leaflets with sclerosis, no significant stenosis,  mean gradient 10 mm Hg  Mild aortic insufficiency. Mild mitral regurgitation. Severe tricuspid regurgitation. Moderate pulmonary hypertension with an estimated right ventricular systolic  pressure of 51 mmHg. No pericardial effusion. EGD 2/10/22  Findings:   1. A large polypoid mass in the transverse colon close to the hepatic flexure covering about 75% of the circumference about 3 cm in diameter.   This was biopsied with cold biopsy forceps. 2.  There was moderate amount of diverticulosis in the sigmoid colon, descending colon and transverse colon. 3.  The rest of the colon was otherwise normal.  4.  Grade 1 internal hemorrhoids on retroflexion. Recommendations:  Await pathology. Repeat colonoscopy is not recommended due to patient age and comorbid conditions. Avoid anticoagulation. Check CEA level. Obtain colorectal surgery consultation. Assessment / Acute Cardiac Problems:   1. Anemia  2. Chronic diastolic CHF  3. Mild MR & Severe TR on echo  4. HTN  5. PAF on [facility]     Patient Active Problem List:     Hyperlipidemia     Hypertension     Anxiety     Gastroesophageal reflux disease without esophagitis     Atypical chest pain     Paroxysmal atrial fibrillation (HCC)     Asymptomatic varicose veins     Bilateral edema of lower extremity     Chronic anticoagulation     Diastolic dysfunction     Former smoker     Mild aortic regurgitation     Mild concentric left ventricular hypertrophy (LVH)     Paraesophageal hernia     CHF with unknown LVEF (HCC)     Symptomatic anemia     Hiatal hernia     Iron deficiency anemia     History of Nissen fundoplication     Closed wedge fracture of thoracic vertebra with routine healing T5,T8     (HFpEF) heart failure with preserved ejection fraction (HCC)     Acute on chronic diastolic congestive heart failure ([address]     Iron deficiency      Plan of Treatment:      1. Remains SR. Discussed present risk/benefit of AC at this time given acute anemia and GI findings. Questions/concerns addressed. Will continue PO Amio & BB to maintain SR and hold of off Eliquis until cleared by GI/surgery. 2.  BP elevated - Continue Lisiopril and will increase PO BB.   3. Echo as above. No plans for further invasive testing at this time. 4. Follow up with established cardiologist (Dr. Mary Kay Senior) in 1-2 weeks after discharge.     Electronically signed by MALVIN Chowdhury CNP on 2/11/2022 at 9:20 AM  [facility] [address] [place] [address].  [phone]

## 2022-02-12 VITALS
TEMPERATURE: 98.2 F | RESPIRATION RATE: 18 BRPM | HEART RATE: 72 BPM | HEIGHT: 64 IN | OXYGEN SATURATION: 95 % | BODY MASS INDEX: 29.21 KG/M2 | DIASTOLIC BLOOD PRESSURE: 56 MMHG | SYSTOLIC BLOOD PRESSURE: 157 MMHG | WEIGHT: 171.08 LBS

## 2022-02-12 LAB
ANION GAP SERPL CALCULATED.3IONS-SCNC: 10 MMOL/L (ref 9–17)
BNP INTERPRETATION: ABNORMAL
BUN BLDV-MCNC: 10 MG/DL (ref 8–23)
BUN/CREAT BLD: ABNORMAL (ref 9–20)
CALCIUM SERPL-MCNC: 8.5 MG/DL (ref 8.6–10.4)
CHLORIDE BLD-SCNC: 107 MMOL/L (ref 98–107)
CO2: 24 MMOL/L (ref 20–31)
CREAT SERPL-MCNC: 0.73 MG/DL (ref 0.5–0.9)
EKG ATRIAL RATE: 66 BPM
EKG P AXIS: 86 DEGREES
EKG P-R INTERVAL: 272 MS
EKG Q-T INTERVAL: 488 MS
EKG QRS DURATION: 160 MS
EKG QTC CALCULATION (BAZETT): 511 MS
EKG R AXIS: -11 DEGREES
EKG T AXIS: -18 DEGREES
EKG VENTRICULAR RATE: 66 BPM
GFR AFRICAN AMERICAN: >60 ML/MIN
GFR NON-AFRICAN AMERICAN: >60 ML/MIN
GFR SERPL CREATININE-BSD FRML MDRD: ABNORMAL ML/MIN/{1.73_M2}
GFR SERPL CREATININE-BSD FRML MDRD: ABNORMAL ML/MIN/{1.73_M2}
GLUCOSE BLD-MCNC: 78 MG/DL (ref 70–99)
HCT VFR BLD CALC: 27.6 % (ref 36.3–47.1)
HEMOGLOBIN: 7.5 G/DL (ref 11.9–15.1)
MCH RBC QN AUTO: 18.2 PG (ref 25.2–33.5)
MCHC RBC AUTO-ENTMCNC: 27.2 G/DL (ref 28.4–34.8)
MCV RBC AUTO: 67.2 FL (ref 82.6–102.9)
NRBC AUTOMATED: 0.3 PER 100 WBC
PDW BLD-RTO: 32.1 % (ref 11.8–14.4)
PLATELET # BLD: ABNORMAL K/UL (ref 138–453)
PLATELET, FLUORESCENCE: 156 K/UL (ref 138–453)
PLATELET, IMMATURE FRACTION: 4.5 % (ref 1.1–10.3)
PMV BLD AUTO: ABNORMAL FL (ref 8.1–13.5)
POTASSIUM SERPL-SCNC: 3.4 MMOL/L (ref 3.7–5.3)
PRO-BNP: 3102 PG/ML
RBC # BLD: 4.11 M/UL (ref 3.95–5.11)
SODIUM BLD-SCNC: 141 MMOL/L (ref 135–144)
TROPONIN INTERP: ABNORMAL
TROPONIN INTERP: ABNORMAL
TROPONIN T: ABNORMAL NG/ML
TROPONIN T: ABNORMAL NG/ML
TROPONIN, HIGH SENSITIVITY: 29 NG/L (ref 0–14)
TROPONIN, HIGH SENSITIVITY: 29 NG/L (ref 0–14)
WBC # BLD: 6.6 K/UL (ref 3.5–11.3)

## 2022-02-12 PROCEDURE — 99239 HOSP IP/OBS DSCHRG MGMT >30: CPT | Performed by: INTERNAL MEDICINE

## 2022-02-12 PROCEDURE — 6370000000 HC RX 637 (ALT 250 FOR IP): Performed by: NURSE PRACTITIONER

## 2022-02-12 PROCEDURE — 94760 N-INVAS EAR/PLS OXIMETRY 1: CPT

## 2022-02-12 PROCEDURE — 97116 GAIT TRAINING THERAPY: CPT

## 2022-02-12 PROCEDURE — 6370000000 HC RX 637 (ALT 250 FOR IP): Performed by: INTERNAL MEDICINE

## 2022-02-12 PROCEDURE — 83880 ASSAY OF NATRIURETIC PEPTIDE: CPT

## 2022-02-12 PROCEDURE — 36415 COLL VENOUS BLD VENIPUNCTURE: CPT

## 2022-02-12 PROCEDURE — 80048 BASIC METABOLIC PNL TOTAL CA: CPT

## 2022-02-12 PROCEDURE — 85055 RETICULATED PLATELET ASSAY: CPT

## 2022-02-12 PROCEDURE — 85027 COMPLETE CBC AUTOMATED: CPT

## 2022-02-12 PROCEDURE — 84484 ASSAY OF TROPONIN QUANT: CPT

## 2022-02-12 RX ORDER — LISINOPRIL 20 MG/1
20 TABLET ORAL DAILY
Qty: 30 TABLET | Refills: 3 | Status: SHIPPED | OUTPATIENT
Start: 2022-02-13 | End: 2022-08-21 | Stop reason: SDUPTHER

## 2022-02-12 RX ADMIN — AMIODARONE HYDROCHLORIDE 200 MG: 200 TABLET ORAL at 08:36

## 2022-02-12 RX ADMIN — LISINOPRIL 20 MG: 20 TABLET ORAL at 08:36

## 2022-02-12 RX ADMIN — Medication 1 TABLET: at 08:36

## 2022-02-12 RX ADMIN — METOPROLOL TARTRATE 25 MG: 25 TABLET ORAL at 08:36

## 2022-02-12 ASSESSMENT — PAIN DESCRIPTION - PROGRESSION
CLINICAL_PROGRESSION: NOT CHANGED

## 2022-02-12 ASSESSMENT — PAIN SCALES - GENERAL
PAINLEVEL_OUTOF10: 0
PAINLEVEL_OUTOF10: 0

## 2022-02-12 ASSESSMENT — ENCOUNTER SYMPTOMS
ABDOMINAL PAIN: 0
COUGH: 0
VOMITING: 0
BLOOD IN STOOL: 0
NAUSEA: 0
SHORTNESS OF BREATH: 0

## 2022-02-12 NOTE — PROGRESS NOTES
CLINICAL PHARMACY NOTE: MEDS TO BEDS    Total # of Prescriptions Filled: 1   The following medications were delivered to the patient:  · Lisinopril 20mg    Additional Documentation:  Delivered to patient and granddaughter. GD paid with [place], $1.58.  HR

## 2022-02-12 NOTE — PLAN OF CARE
Problem: Falls - Risk of:  Goal: Will remain free from falls  Description: Will remain free from falls  Outcome: Completed  Goal: Absence of physical injury  Description: Absence of physical injury  Outcome: Completed     Problem: Safety:  Goal: Free from accidental physical injury  Description: Free from accidental physical injury  Outcome: Completed  Goal: Free from intentional harm  Description: Free from intentional harm  Outcome: Completed     Problem: Daily Care:  Goal: Daily care needs are met  Description: Daily care needs are met  Outcome: Completed     Problem: Pain:  Goal: Patient's pain/discomfort is manageable  Description: Patient's pain/discomfort is manageable  Outcome: Completed     Problem: Pain:  Goal: Patient's pain/discomfort is manageable  Description: Patient's pain/discomfort is manageable  Outcome: Completed  Goal: Pain level will decrease  Description: Pain level will decrease  Outcome: Completed  Goal: Control of acute pain  Description: Control of acute pain  Outcome: Completed  Goal: Control of chronic pain  Description: Control of chronic pain  Outcome: Completed     Problem: Skin Integrity:  Goal: Will show no infection signs and symptoms  Description: Will show no infection signs and symptoms  Outcome: Completed  Goal: Absence of new skin breakdown  Description: Absence of new skin breakdown  Outcome: Completed     Problem: Skin Integrity:  Goal: Skin integrity will stabilize  Description: Skin integrity will stabilize  Outcome: Completed

## 2022-02-12 NOTE — DISCHARGE INSTR - COC
Continuity of Care Form    Patient Name: Omar Sorenson   :  1935  MRN:  [id number]    Admit date:  2022  Discharge date:  22    Code Status Order: Full Code   Advance Directives:      Admitting Physician:  Beto Oliver DO  PCP: Geremias Hough DO    Discharging Nurse: [facility]/Room#: 0317/0317-01  Discharging Unit Phone Number: [phone]    Emergency Contact:   Extended Emergency Contact Information  Primary Emergency Contact: [address] of [address] [place] [address] Phone: [phone]  Relation: Child    Past Surgical History:  Past Surgical History:   Procedure Laterality Date    BLADDER SUSPENSION      CHOLECYSTECTOMY      COLONOSCOPY N/A 2/10/2022    COLONOSCOPY WITH BIOPSY performed by Mira Ramos MD at [facility]  ovaries left    JOINT REPLACEMENT  left total shoulder;     UPPER GASTROINTESTINAL ENDOSCOPY N/A 2/10/2022    EGD ESOPHAGOGASTRODUODENOSCOPY performed by Mira Ramos MD at [facility]       Immunization History:   Immunization History   Administered Date(s) Administered    COVID-19, Pfizer Purple top, DILUTE for use, 12+ yrs, 30mcg/0.3mL dose 2021, 2021    Influenza Virus Vaccine 2014, 2015, 2016, 11/15/2017, 2018, 2020    Influenza, Intradermal, Preservative free 10/13/2009    Influenza, Breezy Nim, IM, (6 mo and older Fluzone, Flulaval, Fluarix and 3 yrs and older Afluria) 2016, 11/15/2017, 2018    Influenza, Quadv, adjuvanted, 65 yrs +, IM, PF (Fluad) 2020    Influenza, Triv, inactivated, subunit, adjuvanted, IM (Fluad 65 yrs and older) 2019    Pneumococcal Conjugate 13-valent ([id number]) 10/29/2015    Pneumococcal Polysaccharide (Uezdanitv12) 2006, 10/01/2010    Td, unspecified formulation 2011    Tdap (Boostrix, Adacel) 2019, 2021    Zoster Live (Zostavax) 09/10/2010       Active Problems:  Patient Active Problem List   Diagnosis Code    Hyperlipidemia E78.5    Hypertension I10    Anxiety F41.9    Gastroesophageal reflux disease without esophagitis K21.9    Atypical chest pain R07.89    Paroxysmal atrial fibrillation (HCC) I48.0    Asymptomatic varicose veins I83.90    Bilateral edema of lower extremity R60.0    Chronic anticoagulation Z79.01    Former smoker Z87.891    Mild aortic regurgitation I35.1    Mild concentric left ventricular hypertrophy (LVH) I51.7    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    Hiatal hernia K44.9    Iron deficiency anemia D50.9    History of Nissen fundoplication B46.876    Closed wedge fracture of thoracic vertebra with routine healing T5,T8 S22.000D    (HFpEF) heart failure with preserved ejection fraction (HCC) I50.30    Acute on chronic diastolic congestive heart failure (HCC) I50.33    Severe tricuspid regurgitation I07.1    Pulmonary hypertension (HCC) I27.20    Tubular adenoma:  3 cm transverse colon D36.9    Iron deficiency E61.1       Isolation/Infection:   Isolation            No Isolation          Patient Infection Status       Infection Onset Added Last Indicated Last Indicated By Review Planned Expiration Resolved Resolved By    None active    Resolved    COVID-19 (Rule Out) 11/15/20 11/15/20 11/15/20 Covid-19 Ambulatory (Ordered)   11/18/20 Rule-Out Test Resulted            Nurse Assessment:  Last Vital Signs: BP (!) 157/56   Pulse 72   Temp 98.2 °F (36.8 °C) (Oral)   Resp 18   Ht 5' 4\" (1.626 m)   Wt 171 lb 1.2 oz (77.6 kg)   SpO2 96%   BMI 29.37 kg/m²     Last documented pain score (0-10 scale): Pain Level: 0  Last Weight:   Wt Readings from Last 1 Encounters:   02/12/22 171 lb 1.2 oz (77.6 kg)     Mental Status:  oriented and alert    IV Access:  - None    Nursing Mobility/ADLs:  Walking   Assisted  Transfer  Assisted  Bathing  Independent  Dressing  Independent  Toileting  Assisted  Feeding  Assisted  Med Admin  Independent  Med Delivery   whole    Wound Care Documentation and Therapy:        Elimination:  Continence: Bowel: Yes  Bladder: Yes  Urinary Catheter: None   Colostomy/Ileostomy/Ileal Conduit: na       Date of Last BM: 2/10/22  No intake or output data in the 24 hours ending 02/12/22 1030  No intake/output data recorded. Safety Concerns: At Risk for Falls    Impairments/Disabilities:      None    Nutrition Therapy:  Current Nutrition Therapy:   - Oral Diet:  General    Routes of Feeding: Oral  Liquids: No Restrictions  Daily Fluid Restriction: no  Last Modified Barium Swallow with Video (Video Swallowing Test): not done    Treatments at the Time of Hospital Discharge:   Respiratory Treatments: na  Oxygen Therapy:  is not on home oxygen therapy. Ventilator:    - No ventilator support    Rehab Therapies: Physical Therapy and Occupational Therapy  Weight Bearing Status/Restrictions: No weight bearing restirctions  Other Medical Equipment (for information only, NOT a DME order):  wheelchair, bath bench, and bedside commode  Other Treatments: na    Patient's personal belongings (please select all that are sent with patient):  Cherie    RN SIGNATURE:  Electronically signed by Rita López RN on 2/12/22 at 8:52 AM EST    CASE MANAGEMENT/SOCIAL WORK SECTION    Inpatient Status Date: 2/7/22    Readmission Risk Assessment Score:  Readmission Risk              Risk of Unplanned Readmission:  15           Discharging to Facility/ Agency   Name: [facility]  Address:  [address] [place] [address]         Phone: [phone]       Fax: [phone]        Phone:  Fax:    Dialysis Facility (if applicable)   Name:  Address:  Dialysis Schedule:  Phone:  Fax:    / signature: Electronically signed by Stephany Alexis RN on 2/12/22 at 10:31 AM EST    PHYSICIAN SECTION    Prognosis: Fair    Condition at Discharge: Stable    Rehab Potential (if transferring to Rehab):  Fair    Recommended Labs or Other Treatments After Discharge:   CRS evaluation - tubular adenoma,   f/u as scheduled with Dr Osmel Ledbetter outpatient  Suggest outpatient Ortho evaluation for chronic shoulder pain  Optimize cardio-pulmonary function  Cardiology eval & f/u as scheduled Dr. Jacqueline Jacobsen  Blood products prn - check H&H  Hold Eliquis  Observe for GI bleeding   Iron supplementation  Blood Pressure - Monitor and control  Reflux precautions   Follow-up with PCP in one week, Kevin Delaney DO  Notify PCP of discharge     Physician Certification: I certify the above information and transfer of Myrna Brandon  is necessary for the continuing treatment of the diagnosis listed and that she requires [address] for less 30 days. Update Admission H&P:   Principal Problem:    Acute on chronic diastolic congestive heart failure (HCC)  Active Problems:    Hyperlipidemia    Hypertension    Gastroesophageal reflux disease without esophagitis    Paroxysmal atrial fibrillation (HCC)    Bilateral edema of lower extremity    Chronic anticoagulation    Hiatal hernia    Iron deficiency anemia    History of Nissen fundoplication    Closed wedge fracture of thoracic vertebra with routine healing T5,T8    (HFpEF) heart failure with preserved ejection fraction (HCC)    Severe tricuspid regurgitation    Pulmonary hypertension (HCC)    Tubular adenoma:  3 cm transverse colon    Iron deficiency  Resolved Problems:    * No resolved hospital problems.  *     PHYSICIAN SIGNATURE:  Electronically signed by Nasir Blackwood DO on 2/12/22 at 9:31 AM EST

## 2022-02-12 NOTE — PROGRESS NOTES
Normal Limits  Cognition   Cognition  Overall Cognitive Status: WFL  Objective   Bed mobility  Rolling to Left: Modified independent  Rolling to Right: Modified independent  Supine to Sit: Stand by assistance  Sit to Supine: Stand by assistance  Scooting: Stand by assistance  Transfers  Sit to Stand: Stand by assistance  Stand to sit: Stand by assistance  Bed to Chair: Stand by assistance  Ambulation 1  Surface: level tile  Device: Standard Walker  Assistance: Stand by assistance  Quality of Gait: flexed posture, narrow ALIVIA at times. Distance: 125 feet x1  Comments: No O2 with AMB. Comment: Commode: SBA     AM-PAC Score     AM-PAC Inpatient Mobility without Stair Climbing Raw Score : 16 (02/12/22 1233)  AM-PAC Inpatient without Stair Climbing T-Scale Score : 45.54 (02/12/22 1233)  Mobility Inpatient CMS 0-100% Score: 40.64 (02/12/22 1233)  Mobility Inpatient without Stair CMS G-Code Modifier : CK (02/12/22 1233)       Goals  Short term goals  Time Frame for Short term goals: 14 days  Short term goal 1: demo indep bed mobilitiy  Short term goal 2: transfer from alternate height surfaces with AD Mod I  Short term goal 3: amb 100 ft  Short term goal 4: pt to ascend/ descend 4 steps with railing SBA  Patient Goals   Patient goals : to return home    Plan    Plan  Times per week: 5-6x./wk  Specific instructions for Next Treatment: progress gait as indicated with RW  Current Treatment Recommendations: Strengthening,Balance Training,Transfer Training,Functional Mobility Training,Endurance Training,Gait Training  Safety Devices  Type of devices:  All fall risk precautions in place,Left in bed,Gait belt,Call light within reach,Nurse notified  Restraints  Initially in place: No     Therapy Time   Individual Concurrent Group Co-treatment   Time In 1140         Time Out 1200         Minutes [address] [place] [address]

## 2022-02-12 NOTE — CARE COORDINATION
Discharge [address] [place] Case Management Department  Written by: Rosalind Mccurdy RN    Patient Name: [place] Fraser  Attending Provider: Kurt Montes DO  Admit Date: 2022 11:33 AM  MRN: [id number]  Account: [de-identified]                     : 1935  Discharge Date: 22      Disposition: home with [facility] called office spoke to [facility] smita retreive raza paperwork from [address] [place] [address] , RN

## 2022-02-12 NOTE — PLAN OF CARE
Problem: Falls - Risk of:  Goal: Will remain free from falls  Description: Will remain free from falls  Outcome: Ongoing  Goal: Absence of physical injury  Description: Absence of physical injury  Outcome: Ongoing     Problem: Safety:  Goal: Free from accidental physical injury  Description: Free from accidental physical injury  Outcome: Ongoing  Goal: Free from intentional harm  Description: Free from intentional harm  Outcome: Ongoing     Problem: Daily Care:  Goal: Daily care needs are met  Description: Daily care needs are met  Outcome: Ongoing     Problem: Pain:  Goal: Patient's pain/discomfort is manageable  Description: Patient's pain/discomfort is manageable  Outcome: Ongoing  Goal: Pain level will decrease  Description: Pain level will decrease  Outcome: Ongoing  Goal: Control of acute pain  Description: Control of acute pain  Outcome: Ongoing  Goal: Control of chronic pain  Description: Control of chronic pain  Outcome: Ongoing     Problem: Skin Integrity:  Goal: Skin integrity will stabilize  Description: Skin integrity will stabilize  Outcome: Ongoing     Problem: Skin Integrity:  Goal: Will show no infection signs and symptoms  Description: Will show no infection signs and symptoms  Outcome: Ongoing  Goal: Absence of new skin breakdown  Description: Absence of new skin breakdown  Outcome: Ongoing

## 2022-02-12 NOTE — PLAN OF CARE
[facility]  Office: [address], , Festus Carrizales, DO, Tanya William, DO, Anselmo Gunn, DO, Isaac Sahu MD, Marco Brown MD, Dylon Hoover MD, Julius Crenshaw MD, Daniel Tucker MD, Spencer Gomez MD, Meghna Hightower MD, Tiffanie Silverio, DO, Ermelinda Reynoso, DO, Stacy Villa MD,  Michelle Burgos, DO, Ivette Tony MD, Mikayla Dumont MD, Daquan Lorenzo MD, Genny Washington MD, Anderson Munoz MD, Monique Major, Delphine Hayden, CNP, Jeanne Rangel, CNP, Tushar Lee, CNS, Italia Talley, CNP, Beryle Fix, CNP, Victor Hugo Mary, CNP, Iggy Malik, CNP, Matt Valenzuela, CNP, Abilio Carter PA-C, Sridhar De La Rosa, DNP, Derrell Benitez, DNP, Perla Lee, CNP, Yaakov Whelan, CNP, Laurie Velazquez, CNP, Amaury Hernandez, CNP, Misael Leblanc, CNP, Mario Arce, CNP         [address]    Second Visit Note  For more detailed information please refer to the progress note of the day      2/12/2022    9:32 AM    Name:   Josephine Talley  MRN:     [id number]     Acct:      [de-identified]   Room:   [address] [place] [address] Day:  5  Admit Date:  2/7/2022 11:33 AM    PCP:   Nikky Hernandez DO  Code Status:  Full Code    Patient has been seen  Discharge planning initiated    CURRENT MEDS:   metoprolol tartrate (LOPRESSOR) tablet 25 mg, BID  lisinopril (PRINIVIL;ZESTRIL) tablet 20 mg, Daily  0.9 % sodium chloride infusion, Continuous  busPIRone (BUSPAR) tablet 10 mg, QPM  butalbital-acetaminophen-caffeine (FIORICET, ESGIC) per tablet 1 tablet, Q6H PRN  0.9 % sodium chloride infusion, PRN  butalbital-acetaminophen-caffeine (FIORICET, ESGIC) per tablet 1 tablet, Once  0.9 % sodium chloride infusion, PRN  amiodarone (CORDARONE) tablet 200 mg, Daily  therapeutic multivitamin-minerals 1 tablet, Daily  pravastatin (PRAVACHOL) tablet 40 mg, Nightly  traZODone (DESYREL) tablet 50 mg, Nightly PRN  sodium chloride flush 0.9 % injection 5-40 mL, 2 times per day  sodium chloride flush 0.9 % injection 10 mL, PRN  0.9 % sodium chloride infusion, PRN  ondansetron (ZOFRAN-ODT) disintegrating tablet 4 mg, Q8H PRN   Or  ondansetron (ZOFRAN) injection 4 mg, Q6H PRN  polyethylene glycol (GLYCOLAX) packet 17 g, Daily PRN  acetaminophen (TYLENOL) tablet 650 mg, Q6H PRN   Or  acetaminophen (TYLENOL) suppository 650 mg, Q6H PRN  lidocaine 4 % external patch 1 patch, Daily  pantoprazole (PROTONIX) injection 40 mg, BID   And  sodium chloride (PF) 0.9 % injection 10 mL, Daily        VITALS:  BP (!) 157/56   Pulse 72   Temp 98.2 °F (36.8 °C) (Oral)   Resp 18   Ht 5' 4\" (1.626 m)   Wt 171 lb 1.2 oz (77.6 kg)   SpO2 96%   BMI 29.37 kg/m²     LABS:  Labs:  Hematology:  Recent Labs     02/10/22  0455 02/11/22  0350 02/12/22  0602   WBC 8.7 9.3 6.6   RBC 4.48 4.59 4.11   HGB 8.1* 8.2* 7.5*   HCT 28.5* 30.1* 27.6*   MCV 63.6* 65.6* 67.2*   MCH 18.1* 17.9* 18.2*   MCHC 28.4 27.2* 27.2*   RDW 29.2* 30.8* 32.1*   PLT See Reflexed IPF Result See Reflexed IPF Result See Reflexed IPF Result   MPV NOT REPORTED NOT REPORTED NOT REPORTED     Chemistry:  Recent Labs     02/10/22  0455 02/11/22  1716 02/12/22  0014 02/12/22  0602   NA  --   --   --  141   K  --   --   --  3.4*   CL  --   --   --  107   CO2  --   --   --  24   GLUCOSE  --   --   --  78   BUN  --   --   --  10   CREATININE  --   --   --  0.73   ANIONGAP  --   --   --  10   LABGLOM  --   --   --  >60   GFRAA  --   --   --  >60   CALCIUM  --   --   --  8.5*   PROBNP 4,617*  --   --  3,102*   TROPHS  --  27* 29* 29*   No results for input(s): PROT, LABALBU, LABA1C, H4PMHRF, Q0FQXED, FT4, TSH, AST, ALT, LDH, GGT, ALKPHOS, LABGGT, BILITOT, BILIDIR, AMMONIA, AMYLASE, LIPASE, LACTATE, CHOL, HDL, LDLCHOLESTEROL, CHOLHDLRATIO, TRIG, VLDL, [id number], PHENYTOIN, PHENYF, URICACID, POCGLU in the last 72 hours.     PROBLEMS:  Principal Problem:    Acute on chronic diastolic congestive heart failure (HCC)  Active Problems:    Hyperlipidemia    Hypertension    Gastroesophageal reflux disease without esophagitis    Paroxysmal atrial fibrillation (HCC)    Bilateral edema of lower extremity    Chronic anticoagulation    Hiatal hernia    Iron deficiency anemia    History of Nissen fundoplication    Closed wedge fracture of thoracic vertebra with routine healing T5,T8    (HFpEF) heart failure with preserved ejection fraction (HCC)    Severe tricuspid regurgitation    Pulmonary hypertension (HCC)    Tubular adenoma:  3 cm transverse colon  Resolved Problems:    * No resolved hospital problems.  *      UPDATED PLAN:  See current orders  Med rec done  JUDY done  Home care orders placed  Patient is requesting discharge to home  She declined placement  DCP 32 min+    IP CONSULT TO HOSPITALIST  IP CONSULT TO DIETITIAN  IP CONSULT TO CARDIOLOGY  IP CONSULT TO GI  IP CONSULT TO IV TEAM  IP CONSULT TO [address] CONSULT TO [address] Maura Mcconnell DO  2/12/2022  9:32 AM

## 2022-02-12 NOTE — PROGRESS NOTES
Home Oxygen Evaluation    Home Oxygen Evaluation completed. Patient is on Room air    Resting SpO2 on room air =95%  SpO2 on room air with exercise =94%      Nocturnal Oximetry with patient on room air is recommended is SpO2 is between 89% and 95% (requires additional order).     claudia cheng RCP  2:02 PM

## 2022-02-12 NOTE — DISCHARGE SUMMARY
[facility]  Office: [address], DO, Ronna Roca, DO, Theodore Ewa, DO, Joe Fitchlurdes Gunn, DO, Bernadene Ahumada, MD, Shamar Murcia MD, Noel Mckinnon MD, Meme Da Silva MD, Yazmin Calderón MD, Tracey Almanza MD, Rahul Garay MD, Emily Tanner, DO, Matheus Triplett, DO, Conor Strange MD,  Paulina Mendoza, DO, Gaetano Marcelino MD, Shannon Harmon MD, Char Morataya MD, Mitchell Lara MD, Any Lay MD, Rodrick Yanez, CNP, Claudia Hawkins, CNP, Dory Kwong, CNP, Archie Echeverria, [facility], Sylvain Encinas, CNP, Cathie Hawkins, CNP, Francia Reddy, CNP, Monica Overton, CNP, Galilea Young, CNP, Florence Peacock PA-C, Moses Barrios, DNP, Toni Qiu, DNP, Giovany Doan, CNP, Shawna Almanza, CNP, Vera Banks, CNP, Anum Abbasi, CNP, Adonis Walker, [facility], Miguelangel Bates, [address] [facility]    Discharge Summary    Patient ID: Nikolas Messer  :  1935   MRN: [id number]     ACCOUNT:  [de-identified]   Patient's PCP: August San DO  Admit Date: 2022   Discharge Date: 2022    Discharge Physician: Keeley Jacobo DO     The patient was seen and examined on day of discharge and this discharge summary is in conjunction with any daily progress note from day of discharge.     Active Discharge Diagnoses:     Primary Problem  Acute on chronic diastolic congestive heart failure [facility])      Hospital Problems  Active Hospital Problems    Diagnosis Date Noted    Iron deficiency [E61.1]     Tubular adenoma:  3 cm transverse colon [D36.9]     Severe tricuspid regurgitation [I07.1] 2022    Pulmonary hypertension (HCC) [I27.20] 2022    Iron deficiency anemia [D50.9] 2022    History of Nissen fundoplication [Z87.904]     Closed wedge fracture of thoracic vertebra with routine healing T5,T8 [S22.000D] 02/08/2022    (HFpEF) heart failure with preserved ejection fraction (HCC) [I50.30] 02/08/2022    Acute on chronic diastolic congestive heart failure (HCC) [I50.33]     Hiatal hernia [K44.9] 02/07/2022    Chronic anticoagulation [Z79.01] 03/24/2021    Bilateral edema of lower extremity [R60.0] 03/24/2021    Paroxysmal atrial fibrillation ([facility] [address] [I48.0] 10/21/2016    Gastroesophageal reflux disease without esophagitis [K21.9] 09/06/2016    Hyperlipidemia [E78.5] 08/26/2014    Hypertension [I10] 08/26/2014         Hospital Course:     Brief History  As documented in the medical record:  Miquel Feliciano is a pleasant, cooperative 82year old female who presented for evaluation of bilateral leg swelling, left shoulder pain and progressive SOB which began 2 days ago. She denies any history of dyspnea, COPD or known heart failure. She denies and fever/chills, N/V constipation or diarrhea. She denies hematuria or melanotic stools. She denies chronic NSAID use.  She states that she follows regularly with her PCP (last 6 days ago) who recommended cardiology evaluation for her bilateral leg weeping edema and was ordered daily Bumex which she has not picked up from the pharmacy yet. She does have a history of A-fib and is on home Eliquis. She also has a medical history of GERD and HTN. She states that she is independent in her care and takes all her prescribed medications. Gisela Rojas granddaughter is at bedside and expresses concern for some confusion/forgetfulness over the past several months.      ED workup showed pBNP 2976. There are no comparative values. Troponin 25-->26. Hgb 6.5. Hemocult (-). CT chest/abd/pelvis (-) for acute findings. \"     The intitial assessment and plan included:  \"    * (Principal) CHF with unknown LVEF ([facility] [address] 2/7/2022 Yes     Hyperlipidemia (Chronic) 2/7/2022 Yes     Hypertension (Chronic) 2/7/2022 Yes     Gastroesophageal reflux disease without esophagitis 2/7/2022 Yes     Paroxysmal atrial fibrillation (HCC) (Chronic) 2/7/2022 Yes     Bilateral edema of lower extremity 2/7/2022 Yes     Chronic anticoagulation 2/7/2022 Yes     Anemia 2/7/2022 Yes     Hiatal hernia 2/7/2022 Yes          Plan included:  Patient status inpatient in the Med/Surge     1. Admit to [facility]  2. CHF: Inpatient consult to cardiology. Appreciate input. Will trend BNP. Lasix after blood administration. Echo pending. 3. Anemia: Iron studies. Receiving one unit PRBC. Check Hgb post-transfusion. Transfuse for < 7.0 or noted tachycardia/hypotension. 4. Consult GI for hiatal hernia. 5. Hold Eliquis today. 6. PAF: Continue rate control with home lopressor. 7. HTN: VS per unit. Continue home antihypertensives. 8. HLD: Continue home statin  9. DVT prophylaxis: EPC's while in bed. 10. GI prophylaxis: Protonix BID  11. PT/OT eval and treat  12. Full Code\"     EGD 2017:  Findings:        The examined esophagus was normal.        A large paraesophageal hernia was found.        The in the duodenum was normal.      Echo 2/8:  Summary   Global left ventricular systolic function is normal. Estimated ejection   fraction is 55-60 % . No obvious wall motion abnormality seen. Grade III (severe) left ventricular diastolic dysfunction. Left atrium is severely dilated. Mildly dilated right ventricular cavity. Calcified aortic valve leaflets with sclerosis, no significant stenosis,   mean gradient 10 mm Hg   Mild aortic insufficiency. Mild mitral regurgitation. Severe tricuspid regurgitation. Moderate pulmonary hypertension with an estimated right ventricular systolic   pressure of 51 mmHg. No pericardial effusion.      Results for Orly Mcneil (MRN [id number]) as of 2/8/2022 15:45    Ref.  Range 9/17/2016 05:14 2/7/2022 11:48 2/8/2022 05:14   Pro-BNP Latest Ref Range: <300 pg/mL 1,545 (H) 2,976 (H) 3,998 (H)      Results for Orly Mcneil (MRN [id number]) as of 2/11/2022 16:16    Ref. Range 2/7/2022 17:19   Ferritin Latest Ref Range: 13 - 150 ug/L 7 (L)   Iron Latest Ref Range: 37 - 145 ug/dL 37   Iron Saturation Latest Ref Range: 20 - 55 % 9 (L)   UIBC Latest Ref Range: 112 - 347 ug/dL 394 (H)   TIBC Latest Ref Range: 250 - 450 ug/dL 431      2/10:  Patient underwent EGD and colonoscopy:  EGD:  Esophagus: normal.   Stomach: Moderate size hiatal hernia.  Stomach was otherwise normal.  Duodenum: normal  Colonoscopy:  1.  A large polypoid mass in the transverse colon close to the hepatic flexure covering about 75% of the circumference about 3 cm in diameter.  This was biopsied with cold biopsy forceps. 2.  There was moderate amount of diverticulosis in the sigmoid colon, descending colon and transverse colon. 3.  The rest of the colon was otherwise normal.  4.  Grade 1 internal hemorrhoids on retroflexion.   Recommendations:  Await pathology.   Repeat colonoscopy is not recommended due to patient age and comorbid conditions. Avoid anticoagulation. Check CEA level. Obtain colorectal surgery consultation.     Pathology revealed:  Transverse colon, mass, biopsy:   -  Tubular adenoma.      CEA5.8 High       CT abdomen:  Impression:  1. Patient has a known colonic mass found on colonoscopy.  This is not   definitively identified.  The colon is tortuous.  Area of asymmetric diffuse   thickening and some narrowing in: At or just proximal to the hepatic flexure   may represent the lesion. 2. No evidence for metastatic disease in the chest abdomen or pelvis. 3. 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 4. Trace pleural effusion adjacent subsegmental atelectasis more pronounced   on the right.  Overall slightly more prominent than prior.      The patient's condition was stabilized  Discharge planning initiated    Discharge plan:     CRS evaluation - tubular adenoma,   f/u as scheduled with Dr Ting Osman evaluation for chronic shoulder pain  (Patient cannot remember the name of her orthopedic surgeon)  Optimize cardio-pulmonary function  Cardiology eval & f/u as scheduled Dr. Villa Bokchito  Blood products prn - will check H&H  Hold Eliquis  Observe for GI bleeding   Iron supplementation  Venofer given  Trial of Fioricet for headache  Blood Pressure - Monitor and control  Reflux precautions   DVT prophylaxis  Buspar initiated   Will discharge when arrangements complete and ok with other services. Follow-up with PCP in one week, Germania Walsh DO  Notify PCP of discharge     See current orders  Med rec done  JUDY done  Home care orders placed  Patient is requesting discharge to home  She declined placement  DCP 32 min+    No discharge procedures on file.     Significant Diagnostic Studies:     Labs / Micro:  Labs:  Hematology:  Recent Labs     02/10/22  0455 02/11/22  0350 02/12/22  0602   WBC 8.7 9.3 6.6   RBC 4.48 4.59 4.11   HGB 8.1* 8.2* 7.5*   HCT 28.5* 30.1* 27.6*   MCV 63.6* 65.6* 67.2*   MCH 18.1* 17.9* 18.2*   MCHC 28.4 27.2* 27.2*   RDW 29.2* 30.8* 32.1*   PLT See Reflexed IPF Result See Reflexed IPF Result See Reflexed IPF Result   MPV NOT REPORTED NOT REPORTED NOT REPORTED     Chemistry:  Recent Labs     02/10/22  0455 02/11/22  1716 02/12/22  0014 02/12/22  0602   NA  --   --   --  141   K  --   --   --  3.4*   CL  --   --   --  107   CO2  --   --   --  24   GLUCOSE  --   --   --  78   BUN  --   --   --  10   CREATININE  --   --   --  0.73   ANIONGAP  --   --   --  10   LABGLOM  --   --   --  >60   GFRAA  --   --   --  >60   CALCIUM  --   --   --  8.5*   PROBNP 4,617*  --   --  3,102*   TROPHS  --  27* 29* 29*   No results for input(s): PROT, LABALBU, LABA1C, K5FVPKX, P3DRXZO, FT4, TSH, AST, ALT, LDH, GGT, ALKPHOS, LABGGT, BILITOT, BILIDIR, AMMONIA, AMYLASE, LIPASE, LACTATE, CHOL, HDL, LDLCHOLESTEROL, CHOLHDLRATIO, TRIG, VLDL, RTI57KC, PHENYTOIN, PHENYF, URICACID, POCGLU in the last 72 hours. Radiology:    ECHO Complete 2D W Doppler W Color    Result Date: 2/9/2022  Transthoracic Echocardiography Report (TTE)  Patient Name Jonathan Nolasco     Date of Study           02/09/2022               Eusebio Offer   Date of      1935  Gender                  Female  Birth   Age          82 year(s)  Race                       Room Number  1306        Height:                 64 inch, 162.56 cm   Corporate ID [id number]    Weight:                 165 pounds, 74.8 kg  #   Patient Acct [de-identified]   BSA:        1.8 m^2     BMI:        28.32 kg/m^2  #   MR #         [id number]     Sonographer             Kasiemadeleine Galindo   Accession #  [id number]  Interpreting Physician  Francine Preston   Fellow                   Referring Nurse                           Practitioner   Interpreting             Referring Physician     DARLINE Bynum  Fellow  Type of Study   TTE procedure:2D Echocardiogram, M-Mode, Doppler, Color Doppler. Procedure Date Date: 02/09/2022 Start: 08:08 AM Study Location: [facility] Technical Quality: Good visualization Indications:Congestive heart failure, Hypertension and Atrial fibrillation. History / Tech. Comments: Procedure explained to patient. Patient Status: Inpatient Height: 64 inches Weight: 165 pounds BSA: 1.8 m^2 BMI: 28.32 kg/m^2 HR: 76 bpm CONCLUSIONS Summary Global left ventricular systolic function is normal. Estimated ejection fraction is 55-60 % . No obvious wall motion abnormality seen. Grade III (severe) left ventricular diastolic dysfunction. Left atrium is severely dilated. Mildly dilated right ventricular cavity. Calcified aortic valve leaflets with sclerosis, no significant stenosis, mean gradient 10 mm Hg Mild aortic insufficiency. Mild mitral regurgitation. Severe tricuspid regurgitation. Moderate pulmonary hypertension with an estimated right ventricular systolic pressure of 51 mmHg. No pericardial effusion.  Signature ----------------------------------------------------------------------------  Electronically signed by Ailyn Price(Sonographer) on 02/09/2022  08:46 AM ---------------------------------------------------------------------------- ----------------------------------------------------------------------------  Electronically signed by Francine Preston(Interpreting physician) on  02/09/2022 10:25 AM ---------------------------------------------------------------------------- FINDINGS Left Atrium Left atrium is severely dilated. Inter-atrial septum appears aneurysmal. No obvious shunt seen by color Doppler. Left Ventricle Left ventricle is normal in size. Global left ventricular systolic function is normal. Estimated ejection fraction is 60 % . Calculated EF via 3D Heart Model is 62 %. Mild left ventricular hypertrophy. No obvious wall motion abnormality seen. Grade III (severe) left ventricular diastolic dysfunction. Right Atrium Right atrial dilatation. Right Ventricle Mildly dilated right ventricular cavity. Right ventricular function appears normal . Mitral Valve Normal mitral valve structure. Mitral annular calcification is seen. Mild mitral regurgitation. No mitral stenosis. Aortic Valve The aortic valve appears mildly calcified with mildly reduced cusp mobility. Aortic valve is trileaflet. Mean gradient 10 mmHg indicating sclerosis Mild aortic insufficiency. Tricuspid Valve Normal tricuspid valve leaflets. Severe tricuspid regurgitation. No tricuspid stenosis. Moderate pulmonary hypertension with an estimated right ventricular systolic pressure of 51 mmHg. Pulmonic Valve Pulmonic valve is normal in structure and function. Trivial pulmonic insufficiency. No evidence of pulmonic stenosis. Pericardial Effusion No pericardial effusion. Miscellaneous Normal aortic root dimension. The ascending aorta is normal in size. E/E' average = 12.6.  IVC normal diameter & inspiratory collapse indicating normal RA filling radiation dose to as low as reasonably achievable.; CTA of the abdomen and pelvis was performed with the administration of intravenous contrast. Multiplanar reformatted images are provided for review. MIP images are provided for review. Dose modulation, iterative reconstruction, and/or weight based adjustment of the mA/kV was utilized to reduce the radiation dose to as low as reasonably achievable. COMPARISON: None. HISTORY: ORDERING SYSTEM PROVIDED HISTORY: Eval dissection - SOB, back pain radiating to shoulder TECHNOLOGIST PROVIDED HISTORY: Eval dissection - SOB, back pain radiating to shoulder Decision Support Exception - unselect if not a suspected or confirmed emergency medical condition->Emergency Medical Condition (MA) Reason for Exam: Eval dissection - SOB, back pain radiating to shoulder FINDINGS: CTA chest: Aorta: Atherosclerosis of the thoracic aorta noted with no aneurysm or dissection. Mediastinum: Atherosclerosis of the coronary arteries. Borderline cardiomegaly. No pericardial effusion. No adenopathy. Small to moderate-sized hiatal hernia. Lungs/Pleura: No pneumothorax. No pleural effusion. Scattered linear densities in the bilateral lungs, suggestive of platelike atelectasis versus linear scars. No consolidation, mass, or suspicious nodule in either lung. Soft Tissues/Bones: Multilevel thoracic spondylosis. Old compression fractures of T5 and T8 vertebral bodies. Status post left shoulder arthroplasty. CTA abdomen: Vascular calcification noted. No aneurysm or dissection in the abdominal aorta and its major branches. Unremarkable liver, spleen, pancreas, and adrenals. Areas of cortical thinning/scarring in the left kidney. A 0.3 cm calcification in the right kidney, which may represent a nonobstructing stone or vascular calcification. Areas of cortical thinning/scarring in the left kidney.   A subcentimeter hypodensity in the upper pole of the right kidney, likely a simple cyst but too small to fully characterize. Status post cholecystectomy with associated CBD dilatation. No CT evidence of acute appendicitis. Bowel loops nonobstructed. A multiloculated fat containing abdominal wall hernia to the left of umbilicus. Multilevel lumbar spondylosis. . CTA pelvis: No aneurysm or dissection in the pelvic arteries. Vascular calcification noted. Sigmoid diverticulosis. No acute diverticulitis. Status post hysterectomy. No adnexal mass. Incompletely distended urinary bladder with no gross abnormality. No acute osseous pathology in the pelvic bones and proximal femora. Degenerative changes of the bilateral hip and sacroiliac joints noted. No thoracoabdominal aortic aneurysm or dissection. No acute finding in the chest, abdomen, and pelvis. Multiple chronic findings as above. XR SHOULDER LEFT (MIN 2 VIEWS)    Result Date: 2/10/2022  EXAMINATION: 3 XRAY VIEWS OF THE LEFT SHOULDER 2/10/2022 7:37 pm COMPARISON: None. HISTORY: ORDERING SYSTEM PROVIDED HISTORY: pain TECHNOLOGIST PROVIDED HISTORY: pain FINDINGS: Left shoulder arthroplasty without hardware fracture or lucency. No periprosthetic fracture. No dislocation. Mild narrowing of the acromioclavicular joint. 1. No acute osseous abnormality 2. Left shoulder arthroplasty without hardware complication. XR CHEST PORTABLE    Result Date: 2/7/2022  EXAMINATION: ONE XRAY VIEW OF THE CHEST 2/7/2022 12:10 pm COMPARISON: AP chest from 03/21/2020 HISTORY: ORDERING SYSTEM PROVIDED HISTORY: SOB, L shoulder pain TECHNOLOGIST PROVIDED HISTORY: SOB, L shoulder pain Reason for Exam: port upright Additional history of GERD, hypertension and left shoulder joint replacement FINDINGS: Unchanged left shoulder hardware status post replacement. No dislocation or obvious fracture. Overlying ECG monitor leads and gown snaps. Enlarged but stable appearing cardiac silhouette with sizable hiatus hernia again noted.   Mediastinal structures midline and unchanged, again with calcification aortic knob. No localized pulmonary opacity or blunting of the costophrenic angles. No pneumothorax. Bones otherwise unchanged. No acute cardiopulmonary disease. Cardiomegaly. Hiatus hernia. CTA ABDOMEN PELVIS W CONTRAST    Result Date: 2/7/2022  EXAMINATION: CTA OF THE CHEST WITH AND WITHOUT CONTRAST; CTA OF THE ABDOMEN AND PELVIS WITH CONTRAST 2/7/2022 12:56 pm TECHNIQUE: CTA of the chest was performed before and after the administration of intravenous contrast.  Multiplanar reformatted images are provided for review. MIP images are provided for review. Dose modulation, iterative reconstruction, and/or weight based adjustment of the mA/kV was utilized to reduce the radiation dose to as low as reasonably achievable.; CTA of the abdomen and pelvis was performed with the administration of intravenous contrast. Multiplanar reformatted images are provided for review. MIP images are provided for review. Dose modulation, iterative reconstruction, and/or weight based adjustment of the mA/kV was utilized to reduce the radiation dose to as low as reasonably achievable. COMPARISON: None. HISTORY: ORDERING SYSTEM PROVIDED HISTORY: Eval dissection - SOB, back pain radiating to shoulder TECHNOLOGIST PROVIDED HISTORY: Eval dissection - SOB, back pain radiating to shoulder Decision Support Exception - unselect if not a suspected or confirmed emergency medical condition->Emergency Medical Condition (MA) Reason for Exam: Eval dissection - SOB, back pain radiating to shoulder FINDINGS: CTA chest: Aorta: Atherosclerosis of the thoracic aorta noted with no aneurysm or dissection. Mediastinum: Atherosclerosis of the coronary arteries. Borderline cardiomegaly. No pericardial effusion. No adenopathy. Small to moderate-sized hiatal hernia. Lungs/Pleura: No pneumothorax. No pleural effusion.   Scattered linear densities in the bilateral lungs, suggestive of platelike atelectasis versus linear scars. No consolidation, mass, or suspicious nodule in either lung. Soft Tissues/Bones: Multilevel thoracic spondylosis. Old compression fractures of T5 and T8 vertebral bodies. Status post left shoulder arthroplasty. CTA abdomen: Vascular calcification noted. No aneurysm or dissection in the abdominal aorta and its major branches. Unremarkable liver, spleen, pancreas, and adrenals. Areas of cortical thinning/scarring in the left kidney. A 0.3 cm calcification in the right kidney, which may represent a nonobstructing stone or vascular calcification. Areas of cortical thinning/scarring in the left kidney. A subcentimeter hypodensity in the upper pole of the right kidney, likely a simple cyst but too small to fully characterize. Status post cholecystectomy with associated CBD dilatation. No CT evidence of acute appendicitis. Bowel loops nonobstructed. A multiloculated fat containing abdominal wall hernia to the left of umbilicus. Multilevel lumbar spondylosis. . CTA pelvis: No aneurysm or dissection in the pelvic arteries. Vascular calcification noted. Sigmoid diverticulosis. No acute diverticulitis. Status post hysterectomy. No adnexal mass. Incompletely distended urinary bladder with no gross abnormality. No acute osseous pathology in the pelvic bones and proximal femora. Degenerative changes of the bilateral hip and sacroiliac joints noted. No thoracoabdominal aortic aneurysm or dissection. No acute finding in the chest, abdomen, and pelvis. Multiple chronic findings as above. CT CHEST ABDOMEN PELVIS W CONTRAST    Result Date: 2/10/2022  EXAMINATION: CT OF THE CHEST, ABDOMEN, AND PELVIS WITH CONTRAST 2/10/2022 3:57 pm TECHNIQUE: CT of the chest, abdomen and pelvis was performed with the administration of intravenous contrast. Multiplanar reformatted images are provided for review.  Dose modulation, iterative reconstruction, and/or weight based adjustment of the mA/kV was utilized to reduce the radiation dose to as low as reasonably achievable. COMPARISON: 02/07/2022 HISTORY: ORDERING SYSTEM PROVIDED HISTORY: large transverse colon mass found on c-scope TECHNOLOGIST PROVIDED HISTORY: large transverse colon mass found on c-scope FINDINGS: Chest: Mediastinum: Cardiomegaly. Moderate coronary artery and aortic atherosclerotic calcifications. No significant lymphadenopathy. Thyroid gland grossly normal.  Small sliding hiatal hernia. Lungs/pleura: Motion artifacts degrade images. Small amount of patchy subsegmental atelectasis and trace right pleural effusion increased from prior. Similar but less pronounced findings on the left. Some scarring in the anteromedial middle lobe at the lung base. No evidence for pulmonary metastatic nodules. No pneumothorax. Soft Tissues/Bones: Left shoulder hemiarthroplasty. Severe compression deformity of T8 vertebral body with in the mid anterior portion. Moderate anterior wedge compression deformity of T5. Vacuum disc changes in the lower half of the thoracic spine. Abdomen/Pelvis: Organs: Liver shows no focal masses. Cholecystectomy port account for common bile duct dilatation. Spleen, pancreas, adrenal glands and kidneys show no significant abnormalities. GI/Bowel: There is limited evaluation due to absence of oral contrast. Sliding hiatal hernia as noted above. Small bowel loops show no evidence for obstruction. The colon is quite tortuous. Mass identified on recent colonoscopy is not convincing noted but asymmetric diffuse soft tissue thickening/fullness in the right colon at or just proximal to the hepatic flexure may represent the known lesion. Most of the transverse colon is gaseous. There is a left paraumbilical Crenshaw type hernia involving anterior wall of transverse colon with sac diameter 5 cm and a neck diameter 2.5 cm. Sigmoid diverticulosis. Pelvis: Circumferential urinary bladder wall thickening with some gas in the urinary bladder. There is also some perivesical stranding. Cystitis likely. Recent instrumentation could also result in the intravesical gas. Peritoneum/Retroperitoneum: Atherosclerotic disease. No ascites. No significant lymphadenopathy. Bones/Soft Tissues: Diffuse degenerative changes. No aggressive lytic or blastic lesions. No acute process. 1. Patient has a known colonic mass found on colonoscopy. This is not definitively identified. The colon is tortuous. Area of asymmetric diffuse thickening and some narrowing in: At or just proximal to the hepatic flexure may represent the lesion. 2. No evidence for metastatic disease in the chest abdomen or pelvis. 3. 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 4. Trace pleural effusion adjacent subsegmental atelectasis more pronounced on the right. Overall slightly more prominent than prior.  RECOMMENDATIONS: Unavailable         Consultations:    Consults:     Final Specialist Recommendations/Findings:   IP CONSULT TO HOSPITALIST  IP CONSULT TO DIETITIAN  IP CONSULT TO CARDIOLOGY  IP CONSULT TO GI  IP CONSULT TO IV TEAM  IP CONSULT TO COLORECTAL SURGERY  IP CONSULT TO COLORECTAL SURGERY  IP CONSULT TO HOME CARE NEEDS        Discharged Condition:    Stable     Disposition: Home Care      Physician Follow Up:   Thomas Hernandez MD  [address] [place] [address]  [phone]    Schedule an appointment as soon as possible for a visit in 1 week  discuss colon polyp findings     [address]  [phone]    Schedule an appointment as soon as possible for a visit in 1 week  for hospital f/u with cardiology to further discuss blood thinners       Activity:  activity as tolerated    Diet:  cardiac diet     Discharge Medications:      Medication List      CHANGE how you take these medications    amiodarone 200 MG tablet  Commonly known as: CORDARONE  Take 1 tablet by mouth daily  What changed:   · how much to take  · how to take this  · when to take this     lisinopril 20 MG tablet  Commonly known as: PRINIVIL;ZESTRIL  Take 1 tablet by mouth daily  Start taking on: February 13, 2022  What changed:   · medication strength  · See the new instructions. CONTINUE taking these medications    bumetanide 1 MG tablet  Commonly known as: BUMEX  Take 0.5 tablets by mouth daily     calcium-vitamin D 500-200 MG-UNIT per tablet  Commonly known as: OSCAL-500     fluticasone 50 MCG/ACT nasal spray  Commonly known as: FLONASE  2 sprays by Each Nostril route daily     metoprolol tartrate 25 MG tablet  Commonly known as: LOPRESSOR     nystatin-triamcinolone 843997-5.1 UNIT/GM-% cream  Commonly known as: MYCOLOG II  Apply topically 2 times daily.      potassium chloride 20 MEQ Tbcr extended release tablet  Commonly known as: KLOR-CON M     pravastatin 40 MG tablet  Commonly known as: PRAVACHOL  TAKE 1 TABLET DAILY     therapeutic multivitamin-minerals tablet     traZODone 50 MG tablet  Commonly known as: DESYREL  TAKE ONE TABLET BY MOUTH ONCE NIGHTLY        STOP taking these medications    acetaminophen 325 MG tablet  Commonly known as: TYLENOL     diclofenac sodium 1 % Gel  Commonly known as: VOLTAREN     Eliquis 5 MG Tabs tablet  Generic drug: apixaban     Klor-Con M20 20 MEQ extended release tablet  Generic drug: potassium chloride           Where to Get Your Medications      These medications were sent to [facility] [address], [address], [address] [place] [address]    Phone: [phone]   · amiodarone 200 MG tablet     These medications were sent to [address]  [place]  [address]    Phone: [phone]   · lisinopril 20 MG tablet         Time Spent on discharge is  32 mins in patient examination, evaluation, counseling, medication reconciliation, discharge plan and follow up.    Electronically signed by   Figueroa Nuñez DO  2/12/2022  5:32 PM      Thank you Dr. Noam Rasmussen DO for the opportunity to be involved in this patient's care.

## 2022-02-12 NOTE — PROGRESS NOTES
[facility]  Office: [address], DO, Ryanne Newell, DO, Yelena Villagran, DO, Isacc Judie Blood, DO, Marylene Freiberg, MD, Danay Medina MD, Isabel San MD, Dorothea Franco MD, Karolina Malone MD, Lamont Clark MD, Екатерина Kay MD, Pauly Ji, DO, Trena Palomares, DO, Sandor Pickett MD,  Orien Dakin, DO, Juanita Rob MD, Annabella Jauregui MD, Siena Lam MD, Benitez Su MD, Ro Pacheco MD, Israel Charlton, [facility], [facility] Bolden, CNP, Luly Li, [facility], Sigifredo Yip, CNS, Mara Knight, CNP, Ayaz Casillas, CNP, Aldo Howell, CNP, Kari Camilo, CNP, Gina Huang, CNP, Enoc Moore PA-C, Vianey Gillespie, [facility], Leigh Huggins, [facility], Lesia Herbert, CNP, Gerald Villafuerte, CNP, Stella Graham, CNP, Elisa Olmstead, CNP, Julieta Pretty, CNP, Titi Nugent, [facility]    Progress Note    2/12/2022    9:28 AM    Name:   Myrna Brandon  MRN:     [id number]     Acct:      [de-identified]   Room:   [address] [place] [address] Day:  5  Admit Date:  2/7/2022 11:33 AM    PCP:   Kevin Delaney DO  Code Status:  Full Code    Subjective:     C/C:   Chief Complaint   Patient presents with    Shortness of Breath    Other     L shoulder pain     Interval History Status:   Feeling good  Wants to go home  Lying flat  No chest pain  No shoulder pain  No BM, melena, hematochezia    Data Base Updates:  WBC6.6k/uL RBC4.11m/uL Hemoglobin7. 5 Low      Pro-BNP3,102 High  (improving)    Troponin, High [id number] High    Results for [facility] (MRN [id number]) as of 2/12/2022 09:11   Ref.  Range 2/7/2022 13:43 2/7/2022 17:19 2/11/2022 17:16 2/12/2022 00:14 2/12/2022 06:02   Troponin, High Sensitivity Latest Ref Range: 0 - 14 ng/L 26 (H) 26 (H) 27 (H) 29 (H) 29 (H)       EKG:      Brief History:     As documented in the medical record:  Pankaj Olivares is a pleasant, cooperative 82year old female who presented for evaluation of bilateral leg swelling, left shoulder pain and progressive SOB which began 2 days ago. She denies any history of dyspnea, COPD or known heart failure. She denies and fever/chills, N/V constipation or diarrhea. She denies hematuria or melanotic stools. She denies chronic NSAID use. She states that she follows regularly with her PCP (last 6 days ago) who recommended cardiology evaluation for her bilateral leg weeping edema and was ordered daily Bumex which she has not picked up from the pharmacy yet. She does have a history of A-fib and is on home Eliquis. She also has a medical history of GERD and HTN. She states that she is independent in her care and takes all her prescribed medications. Her granddaughter is at bedside and expresses concern for some confusion/forgetfulness over the past several months. ED workup showed pBNP 2976. There are no comparative values. Troponin 25-->26. Hgb 6.5. Hemocult (-). CT chest/abd/pelvis (-) for acute findings. \"    The intitial assessment and plan included:  \"   * (Principal) CHF with unknown LVEF ([facility] [address] 2/7/2022 Yes     Hyperlipidemia (Chronic) 2/7/2022 Yes     Hypertension (Chronic) 2/7/2022 Yes     Gastroesophageal reflux disease without esophagitis 2/7/2022 Yes     Paroxysmal atrial fibrillation (HCC) (Chronic) 2/7/2022 Yes     Bilateral edema of lower extremity 2/7/2022 Yes     Chronic anticoagulation 2/7/2022 Yes     Anemia 2/7/2022 Yes     Hiatal hernia 2/7/2022 Yes         Plan included:  Patient status inpatient in the Med/Surge     Admit to [facility]  CHF: Inpatient consult to cardiology. Appreciate input. Will trend BNP. Lasix after blood administration. Echo pending. Anemia: Iron studies. Receiving one unit PRBC. Check Hgb post-transfusion. Transfuse for < 7.0 or noted tachycardia/hypotension. Consult GI for hiatal hernia. Hold Eliquis today. PAF: Continue rate control with home lopressor. HTN: VS per unit. Continue home antihypertensives.   HLD: Continue home statin  DVT prophylaxis: EPC's while in bed.   GI prophylaxis: Protonix BID  PT/OT eval and treat  Full Code\"     EGD 2017:  Findings: The examined esophagus was normal.        A large paraesophageal hernia was found. The in the duodenum was normal.     Echo 2/8:  Summary   Global left ventricular systolic function is normal. Estimated ejection   fraction is 55-60 % . No obvious wall motion abnormality seen. Grade III (severe) left ventricular diastolic dysfunction. Left atrium is severely dilated. Mildly dilated right ventricular cavity. Calcified aortic valve leaflets with sclerosis, no significant stenosis,   mean gradient 10 mm Hg   Mild aortic insufficiency. Mild mitral regurgitation. Severe tricuspid regurgitation. Moderate pulmonary hypertension with an estimated right ventricular systolic   pressure of 51 mmHg. No pericardial effusion. Results for Dea Moreno (MRN [id number]) as of 2/8/2022 15:45   Ref. Range 9/17/2016 05:14 2/7/2022 11:48 2/8/2022 05:14   Pro-BNP Latest Ref Range: <300 pg/mL 1,545 (H) 2,976 (H) 3,998 (H)     Results for Dea Moreno (MRN [id number]) as of 2/11/2022 16:16   Ref. Range 2/7/2022 17:19   Ferritin Latest Ref Range: 13 - 150 ug/L 7 (L)   Iron Latest Ref Range: 37 - 145 ug/dL 37   Iron Saturation Latest Ref Range: 20 - 55 % 9 (L)   UIBC Latest Ref Range: 112 - 347 ug/dL 394 (H)   TIBC Latest Ref Range: 250 - 450 ug/dL 431     2/10:  Patient underwent EGD and colonoscopy:  EGD:  Esophagus: normal.   Stomach: Moderate size hiatal hernia. Stomach was otherwise normal.  Duodenum: normal  Colonoscopy:  1. A large polypoid mass in the transverse colon close to the hepatic flexure covering about 75% of the circumference about 3 cm in diameter. This was biopsied with cold biopsy forceps. 2.  There was moderate amount of diverticulosis in the sigmoid colon, descending colon and transverse colon.   3.  The rest of the colon was otherwise normal.  4.  Grade 1 internal hemorrhoids on retroflexion. Recommendations:  Await pathology. Repeat colonoscopy is not recommended due to patient age and comorbid conditions. Avoid anticoagulation. Check CEA level. Obtain colorectal surgery consultation. Pathology revealed:  Transverse colon, mass, biopsy:   -  Tubular adenoma. CEA5. 8 High      CT abdomen:  Impression:  1. Patient has a known colonic mass found on colonoscopy. This is not   definitively identified. The colon is tortuous. Area of asymmetric diffuse   thickening and some narrowing in: At or just proximal to the hepatic flexure   may represent the lesion. 2. No evidence for metastatic disease in the chest abdomen or pelvis. 3. 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 4. Trace pleural effusion adjacent subsegmental atelectasis more pronounced   on the right. Overall slightly more prominent than prior. Past Medical History:   has a past medical history of GERD (gastroesophageal reflux disease) and Hypertension. Social History:   reports that she quit smoking about 37 years ago. She has never used smokeless tobacco. She reports that she does not drink alcohol and does not use drugs. Family History:   Family History   Problem Relation Age of Onset    Cancer Brother     Cancer Other         bone    Cancer Daughter         breast       Review of Systems:     Review of Systems   Constitutional: Positive for activity change (improved) and fatigue (Exercise capacity somewhat improved). Respiratory: Negative for cough and shortness of breath. Cardiovascular: Positive for leg swelling (Chronic improved, ). Negative for chest pain (Resolved) and palpitations. No orthopnea   Gastrointestinal: Negative for abdominal pain, blood in stool, nausea and vomiting. Has been belching more than usual    Genitourinary: Negative for flank pain and hematuria. Musculoskeletal: Positive for arthralgias and myalgias.         Chronic left shoulder pain and stiffness  She has had prior left shoulder arthroplasty   Neurological: Negative for headaches (Still reporting frontal headaches intermittently). Physical Examination:        Vitals  BP (!) 157/56   Pulse 72   Temp 98.2 °F (36.8 °C) (Oral)   Resp 18   Ht 5' 4\" (1.626 m)   Wt 171 lb 1.2 oz (77.6 kg)   SpO2 96%   BMI 29.37 kg/m²   Temp (24hrs), Av.1 °F (36.7 °C), Min:97.5 °F (36.4 °C), Max:98.4 °F (36.9 °C)    No results for input(s): POCGLU in the last 72 hours. Physical Exam  Vitals reviewed. Constitutional:       General: She is not in acute distress. Appearance: She is not diaphoretic. HENT:      Head: Normocephalic. Nose: Nose normal.   Eyes:      General: No scleral icterus. Conjunctiva/sclera: Conjunctivae normal.   Neck:      Trachea: No tracheal deviation. Cardiovascular:      Rate and Rhythm: Normal rate and regular rhythm. Pulmonary:      Effort: Pulmonary effort is normal. No respiratory distress. Breath sounds: Normal breath sounds. No wheezing or rales. Chest:      Chest wall: No tenderness. Abdominal:      General: There is no distension. Palpations: Abdomen is soft. There is no mass. Tenderness: There is no abdominal tenderness. There is no guarding. Musculoskeletal:         General: No tenderness. Cervical back: Neck supple. Right lower leg: Edema present. Left lower leg: Edema present. Comments: Shoulder ROM OK  1/4 edema at the ankles     Skin:     General: Skin is warm and dry. Coloration: Skin is pale. Neurological:      Mental Status: She is alert and oriented to person, place, and time. Medications: Allergies:     Allergies   Allergen Reactions    Codeine Nausea Only     dizziness    Bactrim [Sulfamethoxazole-Trimethoprim] Hives    Naproxen      On allergy list ? Unsure of reaction    Norvasc [Amlodipine Besylate]      Edema-she thinks      Sertraline Hcl      unsure  Toradol [Ketorolac Tromethamine]      unsure    Tramadol      unsure    Magnesium-Containing Compounds Diarrhea     Mag ox 500 ?  Morphine Nausea And Vomiting    Percocet [Oxycodone-Acetaminophen] Nausea And Vomiting       Current Meds:   Scheduled Meds:    metoprolol tartrate  25 mg Oral BID    lisinopril  20 mg Oral Daily    busPIRone  10 mg Oral QPM    butalbital-acetaminophen-caffeine  1 tablet Oral Once    amiodarone  200 mg Oral Daily    therapeutic multivitamin-minerals  1 tablet Oral Daily    pravastatin  40 mg Oral Nightly    sodium chloride flush  5-40 mL IntraVENous 2 times per day    lidocaine  1 patch TransDERmal Daily    pantoprazole  40 mg IntraVENous BID    And    sodium chloride (PF)  10 mL IntraVENous Daily     Continuous Infusions:    sodium chloride 20 mL/hr at 02/10/22 7502    sodium chloride      sodium chloride      sodium chloride       PRN Meds: butalbital-acetaminophen-caffeine, sodium chloride, sodium chloride, traZODone, sodium chloride flush, sodium chloride, ondansetron **OR** ondansetron, polyethylene glycol, acetaminophen **OR** acetaminophen    Data:     I/O (24Hr):   No intake or output data in the 24 hours ending 02/12/22 0928    Labs:  Hematology:  Recent Labs     02/10/22  0455 02/11/22  0350 02/12/22  0602   WBC 8.7 9.3 6.6   RBC 4.48 4.59 4.11   HGB 8.1* 8.2* 7.5*   HCT 28.5* 30.1* 27.6*   MCV 63.6* 65.6* 67.2*   MCH 18.1* 17.9* 18.2*   MCHC 28.4 27.2* 27.2*   RDW 29.2* 30.8* 32.1*   PLT See Reflexed IPF Result See Reflexed IPF Result See Reflexed IPF Result   MPV NOT REPORTED NOT REPORTED NOT REPORTED     Chemistry:  Recent Labs     02/10/22  0455 02/11/22  1716 02/12/22  0014 02/12/22  0602   NA  --   --   --  141   K  --   --   --  3.4*   CL  --   --   --  107   CO2  --   --   --  24   GLUCOSE  --   --   --  78   BUN  --   --   --  10   CREATININE  --   --   --  0.73   ANIONGAP  --   --   --  10   LABGLOM  --   --   --  >60   GFRAA  --   --   --  >60 CALCIUM  --   --   --  8.5*   PROBNP 4,617*  --   --  3,102*   TROPHS  --  27* 29* 29*     No results for input(s): PROT, LABALBU, LABA1C, Z3RJCUA, P0ANCPJ, FT4, TSH, AST, ALT, LDH, GGT, ALKPHOS, LABGGT, BILITOT, BILIDIR, AMMONIA, AMYLASE, LIPASE, LACTATE, CHOL, HDL, LDLCHOLESTEROL, CHOLHDLRATIO, TRIG, VLDL, PVT69NV, PHENYTOIN, PHENYF, URICACID, POCGLU in the last 72 hours. ABG:No results found for: POCPH, PHART, PH, POCPCO2, KNL1RWB, PCO2, POCPO2, PO2ART, PO2, POCHCO3, AYA0AEX, HCO3, NBEA, PBEA, BEART, BE, THGBART, THB, KIF4RZK, OTHT3THY, H9EYKGXZ, O2SAT, FIO2  Lab Results   Component Value Date/Time    SPECIAL NOT REPORTED 03/21/2020 12:15 PM     Lab Results   Component Value Date/Time    CULTURE NO SIGNIFICANT GROWTH 09/28/2016 07:19 AM    CULTURE  09/28/2016 07:19 AM     Performed at [address] [place] [address] [phone]       Radiology:  CTA CHEST W WO CONTRAST    Result Date: 2/7/2022  No thoracoabdominal aortic aneurysm or dissection. No acute finding in the chest, abdomen, and pelvis. Multiple chronic findings as above. XR CHEST PORTABLE    Result Date: 2/7/2022  No acute cardiopulmonary disease. Cardiomegaly. Hiatus hernia. CTA ABDOMEN PELVIS W CONTRAST    Result Date: 2/7/2022  No thoracoabdominal aortic aneurysm or dissection. No acute finding in the chest, abdomen, and pelvis. Multiple chronic findings as above.        Assessment:        Primary Problem  Acute on chronic diastolic congestive heart failure [facility])    Active Hospital Problems    Diagnosis Date Noted    Tubular adenoma:  3 cm transverse colon [D36.9]     Severe tricuspid regurgitation [I07.1] 02/09/2022    Pulmonary hypertension (HCC) [I27.20] 02/09/2022    Iron deficiency anemia [D50.9] 02/08/2022    History of Nissen fundoplication [E95.973] 13/16/5004    Closed wedge fracture of thoracic vertebra with routine healing T5,T8 [S22.000D] 02/08/2022    (HFpEF) [facility] with [facility] [I50.30] 02/08/2022    Acute on chronic diastolic congestive heart failure ([facility] [address] [I50.33]     Hiatal hernia [K44.9] 02/07/2022    Chronic anticoagulation [Z79.01] 03/24/2021    Bilateral edema of lower extremity [R60.0] 03/24/2021    Paroxysmal atrial fibrillation ([facility] [address] [I48.0] 10/21/2016    Gastroesophageal reflux disease without esophagitis [K21.9] 09/06/2016    Hyperlipidemia [E78.5] 08/26/2014    Hypertension [I10] 08/26/2014       Plan:        CRS evaluation - tubular adenoma,   f/u as scheduled with Dr Freddie Ravi outpatient  Suggest outpatient Ortho evaluation for chronic shoulder pain  (Patient cannot remember the name of her orthopedic surgeon)  Optimize cardio-pulmonary function  Cardiology eval & f/u as scheduled Dr. Torrey Pandey  Blood products prn - will check H&H  Hold Eliquis  Observe for GI bleeding   Iron supplementation  Venofer given  Trial of Fioricet for headache  Blood Pressure - Monitor and control  Reflux precautions   DVT prophylaxis  Buspar initiated   Will discharge when arrangements complete and ok with other services.   Follow-up with PCP in one week, Florence Nieves DO  Notify PCP of discharge   Home care arrangements in progress      IP CONSULT TO HOSPITALIST  IP CONSULT TO DIETITIAN  IP CONSULT TO CARDIOLOGY  IP CONSULT TO GI  IP CONSULT TO IV TEAM  IP CONSULT TO [place] [id number]  IP CONSULT TO 91Roby Mcconnell DO  2/12/2022  9:28 AM

## 2022-02-14 ENCOUNTER — CARE COORDINATION (OUTPATIENT)
Dept: CASE MANAGEMENT | Age: 87
End: 2022-02-14

## 2022-02-14 NOTE — CARE COORDINATION
Randy [facility] Transitions Initial Follow Up Call    Call within 2 business days of discharge: Yes    Patient: Georgia Barajas Patient : 1935   MRN: <[id number]>  Reason for Admission: 2022 - 2022 [address]. Acute on chronic diastolic congestive heart failure, Anemia, Hiatal hernia, Large polypoid mass in the transverse colon/ Tubular adenoma. Discharge Date: 22 RARS: Readmission Risk Score: 16.1 ( )  NR  CT    Last Discharge [facility]       Complaint Diagnosis Description Type Department Provider    22 Shortness of Breath; Other Anemia, unspecified type . .. ED to Hosp-Admission (Discharged) (ADMITTED) [address], DO; Gaston Mess . ..           1 Schedule an appointment with Enrique Mcbride MD (Colon and Rectal Surgery) in 1 week (2022); discuss colon polyp findings  2 Schedule an appointment with Anita Haeys MD (Cardiology) in 1 week (2022); for hospital f/u with cardiology to further discuss blood thinners    Transitions of Care Initial Call    Was this an external facility discharge? No Discharge Facility:     Challenges to be reviewed by the provider   Additional needs identified to be addressed with provider: No  none             Method of communication with provider : none      Advance Care Planning:   Does patient have an Advance Directive: Dtr/Jackie Jauregui primary decision maker [phone]. Was this a readmission? No  Patient stated reason for admission:     Current: [place] reports occasional congested cough, feeling tired/fatigue, and notes some pedal/ankle edema. Does not have a home scale/perform daily wts. Advised to elevate legs when at rest, v/u. Reviewed limiting daily sodium to 1500 mg or less and to avoid drinking fluids in excess of 1.5 L daily, v/u. States good appetite and no elimination concerns. Last BM  and reported as normal. No GERD or reflux complaint. [facility] visited 22.  Has/taking meds as directed. States her dtr will schedule her appts. Provided my outreach should she need any support. Patients top risk factors for readmission: Fluid overload    Care Transition Nurse (CTN) contacted the patient by telephone to perform post hospital discharge assessment. Verified name and  with patient as identifiers. Provided introduction to self, and explanation of the CTN role. CTN reviewed discharge instructions, medical action plan and red flags with patient who verbalized understanding. Patient given an opportunity to ask questions and does not have any further questions or concerns at this time. Were discharge instructions available to patient? Yes. Reviewed appropriate site of care based on symptoms and resources available to patient including: When to call 911. The patient agrees to contact the PCP office for questions related to their healthcare. Medication reconciliation was performed with patient, who verbalizes understanding of administration of home medications. Advised obtaining a 90-day supply of all daily and as-needed medications. CTN provided contact information. Plan for follow-up call in 5-7 days based on severity of symptoms and risk factors. Plan for next call: Dtr to schedule PCP, GI, and cardiology appts. Has pedal/ankle edema, No home scale.      Care Transitions 24 Hour Call    Do you have any ongoing symptoms?: Yes  Patient-reported symptoms: Congestion, Cough, Other (Comment: pedal/ankle edema.)  Do you have a copy of your discharge instructions?: Yes  Do you have all of your prescriptions and are they filled?: Yes  Patient DME: Hussein rai  Are you an active caregiver in your home?: Yes  Care Transitions Interventions         Follow Up  Future Appointments   Date Time Provider Erick Shepherd   2022  2:00 PM Mak Cordova, DO [address] RN

## 2022-02-15 ENCOUNTER — TELEPHONE (OUTPATIENT)
Dept: FAMILY MEDICINE CLINIC | Age: 87
End: 2022-02-15

## 2022-02-16 ENCOUNTER — APPOINTMENT (OUTPATIENT)
Dept: CT IMAGING | Age: 87
DRG: 176 | End: 2022-02-16
Payer: MEDICARE

## 2022-02-16 ENCOUNTER — HOSPITAL ENCOUNTER (INPATIENT)
Age: 87
LOS: 1 days | Discharge: HOME HEALTH | DRG: 176 | End: 2022-02-17
Attending: EMERGENCY MEDICINE
Payer: MEDICARE

## 2022-02-16 ENCOUNTER — CARE COORDINATION (OUTPATIENT)
Dept: CASE MANAGEMENT | Age: 87
End: 2022-02-16

## 2022-02-16 ENCOUNTER — APPOINTMENT (OUTPATIENT)
Dept: GENERAL RADIOLOGY | Age: 87
DRG: 176 | End: 2022-02-16
Payer: MEDICARE

## 2022-02-16 DIAGNOSIS — R60.0 BILATERAL LOWER EXTREMITY EDEMA: ICD-10-CM

## 2022-02-16 DIAGNOSIS — R09.89 PULMONARY VASCULAR CONGESTION: Primary | ICD-10-CM

## 2022-02-16 DIAGNOSIS — E61.1 IRON DEFICIENCY: ICD-10-CM

## 2022-02-16 PROBLEM — I26.99 BILATERAL PULMONARY EMBOLISM (HCC): Status: ACTIVE | Noted: 2022-02-16

## 2022-02-16 PROBLEM — I82.462 ACUTE DEEP VEIN THROMBOSIS (DVT) OF CALF MUSCLE VEIN OF LEFT LOWER EXTREMITY (HCC): Status: ACTIVE | Noted: 2022-02-16

## 2022-02-16 LAB
ABSOLUTE EOS #: 0.09 K/UL (ref 0–0.44)
ABSOLUTE IMMATURE GRANULOCYTE: 0 K/UL (ref 0–0.3)
ABSOLUTE LYMPH #: 0.93 K/UL (ref 1.1–3.7)
ABSOLUTE MONO #: 0.93 K/UL (ref 0.1–1.2)
ALBUMIN SERPL-MCNC: 3.9 G/DL (ref 3.5–5.2)
ALBUMIN/GLOBULIN RATIO: 1.3 (ref 1–2.5)
ALP BLD-CCNC: 61 U/L (ref 35–104)
ALT SERPL-CCNC: 19 U/L (ref 5–33)
ANION GAP SERPL CALCULATED.3IONS-SCNC: 14 MMOL/L (ref 9–17)
AST SERPL-CCNC: 23 U/L
BASOPHILS # BLD: 1 % (ref 0–2)
BASOPHILS ABSOLUTE: 0.09 K/UL (ref 0–0.2)
BILIRUB SERPL-MCNC: 0.51 MG/DL (ref 0.3–1.2)
BNP INTERPRETATION: ABNORMAL
BUN BLDV-MCNC: 10 MG/DL (ref 8–23)
BUN/CREAT BLD: NORMAL (ref 9–20)
CALCIUM SERPL-MCNC: 9.5 MG/DL (ref 8.6–10.4)
CHLORIDE BLD-SCNC: 100 MMOL/L (ref 98–107)
CO2: 25 MMOL/L (ref 20–31)
CREAT SERPL-MCNC: 0.76 MG/DL (ref 0.5–0.9)
D-DIMER QUANTITATIVE: 3.23 MG/L FEU
DIFFERENTIAL TYPE: ABNORMAL
EOSINOPHILS RELATIVE PERCENT: 1 % (ref 1–4)
GFR AFRICAN AMERICAN: >60 ML/MIN
GFR NON-AFRICAN AMERICAN: >60 ML/MIN
GFR SERPL CREATININE-BSD FRML MDRD: NORMAL ML/MIN/{1.73_M2}
GFR SERPL CREATININE-BSD FRML MDRD: NORMAL ML/MIN/{1.73_M2}
GLUCOSE BLD-MCNC: 95 MG/DL (ref 70–99)
HCT VFR BLD CALC: 36.6 % (ref 36.3–47.1)
HCT VFR BLD CALC: 36.9 % (ref 36.3–47.1)
HEMOGLOBIN: 10.2 G/DL (ref 11.9–15.1)
HEMOGLOBIN: 10.3 G/DL (ref 11.9–15.1)
IMMATURE GRANULOCYTES: 0 %
INR BLD: 1
LACTIC ACID, WHOLE BLOOD: 1.3 MMOL/L (ref 0.7–2.1)
LIPASE: 14 U/L (ref 13–60)
LYMPHOCYTES # BLD: 10 % (ref 24–43)
MAGNESIUM: 1.9 MG/DL (ref 1.6–2.6)
MCH RBC QN AUTO: 18.9 PG (ref 25.2–33.5)
MCH RBC QN AUTO: 19.1 PG (ref 25.2–33.5)
MCHC RBC AUTO-ENTMCNC: 27.6 G/DL (ref 28.4–34.8)
MCHC RBC AUTO-ENTMCNC: 28.1 G/DL (ref 28.4–34.8)
MCV RBC AUTO: 67.2 FL (ref 82.6–102.9)
MCV RBC AUTO: 69.1 FL (ref 82.6–102.9)
MONOCYTES # BLD: 10 % (ref 3–12)
MORPHOLOGY: ABNORMAL
NRBC AUTOMATED: 0 PER 100 WBC
NRBC AUTOMATED: 0 PER 100 WBC
PARTIAL THROMBOPLASTIN TIME: 25.4 SEC (ref 20.5–30.5)
PARTIAL THROMBOPLASTIN TIME: 26.7 SEC (ref 20.5–30.5)
PDW BLD-RTO: 36.7 % (ref 11.8–14.4)
PDW BLD-RTO: 37.1 % (ref 11.8–14.4)
PLATELET # BLD: ABNORMAL K/UL (ref 138–453)
PLATELET # BLD: ABNORMAL K/UL (ref 138–453)
PLATELET ESTIMATE: ABNORMAL
PLATELET, FLUORESCENCE: 176 K/UL (ref 138–453)
PLATELET, FLUORESCENCE: 187 K/UL (ref 138–453)
PLATELET, IMMATURE FRACTION: 3.7 % (ref 1.1–10.3)
PLATELET, IMMATURE FRACTION: 4.6 % (ref 1.1–10.3)
PMV BLD AUTO: ABNORMAL FL (ref 8.1–13.5)
PMV BLD AUTO: ABNORMAL FL (ref 8.1–13.5)
POTASSIUM SERPL-SCNC: 4.6 MMOL/L (ref 3.7–5.3)
PRO-BNP: 3153 PG/ML
PROTHROMBIN TIME: 11.1 SEC (ref 9.1–12.3)
RBC # BLD: 5.34 M/UL (ref 3.95–5.11)
RBC # BLD: 5.45 M/UL (ref 3.95–5.11)
RBC # BLD: ABNORMAL 10*6/UL
SEG NEUTROPHILS: 78 % (ref 36–65)
SEGMENTED NEUTROPHILS ABSOLUTE COUNT: 7.26 K/UL (ref 1.5–8.1)
SODIUM BLD-SCNC: 139 MMOL/L (ref 135–144)
TOTAL PROTEIN: 6.8 G/DL (ref 6.4–8.3)
TROPONIN INTERP: ABNORMAL
TROPONIN INTERP: ABNORMAL
TROPONIN T: ABNORMAL NG/ML
TROPONIN T: ABNORMAL NG/ML
TROPONIN, HIGH SENSITIVITY: 26 NG/L (ref 0–14)
TROPONIN, HIGH SENSITIVITY: 26 NG/L (ref 0–14)
WBC # BLD: 10.5 K/UL (ref 3.5–11.3)
WBC # BLD: 9.3 K/UL (ref 3.5–11.3)
WBC # BLD: ABNORMAL 10*3/UL

## 2022-02-16 PROCEDURE — 6360000002 HC RX W HCPCS: Performed by: STUDENT IN AN ORGANIZED HEALTH CARE EDUCATION/TRAINING PROGRAM

## 2022-02-16 PROCEDURE — APPSS45 APP SPLIT SHARED TIME 31-45 MINUTES: Performed by: NURSE PRACTITIONER

## 2022-02-16 PROCEDURE — 83605 ASSAY OF LACTIC ACID: CPT

## 2022-02-16 PROCEDURE — 84484 ASSAY OF TROPONIN QUANT: CPT

## 2022-02-16 PROCEDURE — 93970 EXTREMITY STUDY: CPT

## 2022-02-16 PROCEDURE — 2580000003 HC RX 258: Performed by: STUDENT IN AN ORGANIZED HEALTH CARE EDUCATION/TRAINING PROGRAM

## 2022-02-16 PROCEDURE — 99222 1ST HOSP IP/OBS MODERATE 55: CPT | Performed by: NURSE PRACTITIONER

## 2022-02-16 PROCEDURE — 93005 ELECTROCARDIOGRAM TRACING: CPT | Performed by: STUDENT IN AN ORGANIZED HEALTH CARE EDUCATION/TRAINING PROGRAM

## 2022-02-16 PROCEDURE — 85730 THROMBOPLASTIN TIME PARTIAL: CPT

## 2022-02-16 PROCEDURE — 85025 COMPLETE CBC W/AUTO DIFF WBC: CPT

## 2022-02-16 PROCEDURE — 96375 TX/PRO/DX INJ NEW DRUG ADDON: CPT

## 2022-02-16 PROCEDURE — G0378 HOSPITAL OBSERVATION PER HR: HCPCS

## 2022-02-16 PROCEDURE — 71260 CT THORAX DX C+: CPT

## 2022-02-16 PROCEDURE — 85055 RETICULATED PLATELET ASSAY: CPT

## 2022-02-16 PROCEDURE — 83735 ASSAY OF MAGNESIUM: CPT

## 2022-02-16 PROCEDURE — 83550 IRON BINDING TEST: CPT

## 2022-02-16 PROCEDURE — 83880 ASSAY OF NATRIURETIC PEPTIDE: CPT

## 2022-02-16 PROCEDURE — 85027 COMPLETE CBC AUTOMATED: CPT

## 2022-02-16 PROCEDURE — 80053 COMPREHEN METABOLIC PANEL: CPT

## 2022-02-16 PROCEDURE — 96366 THER/PROPH/DIAG IV INF ADDON: CPT

## 2022-02-16 PROCEDURE — 96365 THER/PROPH/DIAG IV INF INIT: CPT

## 2022-02-16 PROCEDURE — 83540 ASSAY OF IRON: CPT

## 2022-02-16 PROCEDURE — 99285 EMERGENCY DEPT VISIT HI MDM: CPT

## 2022-02-16 PROCEDURE — 83690 ASSAY OF LIPASE: CPT

## 2022-02-16 PROCEDURE — 2060000000 HC ICU INTERMEDIATE R&B

## 2022-02-16 PROCEDURE — 6360000004 HC RX CONTRAST MEDICATION: Performed by: STUDENT IN AN ORGANIZED HEALTH CARE EDUCATION/TRAINING PROGRAM

## 2022-02-16 PROCEDURE — 96374 THER/PROPH/DIAG INJ IV PUSH: CPT

## 2022-02-16 PROCEDURE — 71045 X-RAY EXAM CHEST 1 VIEW: CPT

## 2022-02-16 PROCEDURE — 85610 PROTHROMBIN TIME: CPT

## 2022-02-16 PROCEDURE — 85379 FIBRIN DEGRADATION QUANT: CPT

## 2022-02-16 RX ORDER — HEPARIN SODIUM 1000 [USP'U]/ML
80 INJECTION, SOLUTION INTRAVENOUS; SUBCUTANEOUS PRN
Status: DISCONTINUED | OUTPATIENT
Start: 2022-02-16 | End: 2022-02-17

## 2022-02-16 RX ORDER — FUROSEMIDE 10 MG/ML
40 INJECTION INTRAMUSCULAR; INTRAVENOUS ONCE
Status: COMPLETED | OUTPATIENT
Start: 2022-02-16 | End: 2022-02-16

## 2022-02-16 RX ORDER — HEPARIN SODIUM 1000 [USP'U]/ML
40 INJECTION, SOLUTION INTRAVENOUS; SUBCUTANEOUS PRN
Status: DISCONTINUED | OUTPATIENT
Start: 2022-02-16 | End: 2022-02-17

## 2022-02-16 RX ORDER — HEPARIN SODIUM 1000 [USP'U]/ML
80 INJECTION, SOLUTION INTRAVENOUS; SUBCUTANEOUS ONCE
Status: COMPLETED | OUTPATIENT
Start: 2022-02-16 | End: 2022-02-16

## 2022-02-16 RX ADMIN — IOPAMIDOL 75 ML: 755 INJECTION, SOLUTION INTRAVENOUS at 17:10

## 2022-02-16 RX ADMIN — FUROSEMIDE 40 MG: 10 INJECTION, SOLUTION INTRAVENOUS at 17:43

## 2022-02-16 RX ADMIN — HEPARIN SODIUM 18 UNITS/KG/HR: 5000 INJECTION INTRAVENOUS; SUBCUTANEOUS at 22:06

## 2022-02-16 RX ADMIN — HEPARIN SODIUM 6210 UNITS: 1000 INJECTION INTRAVENOUS; SUBCUTANEOUS at 22:06

## 2022-02-16 NOTE — CARE COORDINATION
Request from [address] Wilfrid Figueroa RN.,  Please see that patient has been scheduled with PCP, Cardiology and GI    Spoke with patient, was given phone number for granddaughter Rhonda Bah) [phone] who takes care of her appts and transportation. Patient states Amalia Lidia works at a bank but can call her anyway. Patient does not have appt with hospital f/u with  PCP.,   However, patient does have physical with PCP in March made 2/14    Patient states she did have appt today with GI but that has been rescheduled due to weather. Called cardiology appt for March 8th with NP Stacy Renee  (made by grand daughter)    Did call Amalia Murillo,  No answer and mailbox is full unable to leave msg. Ask patient if she is doing ok and has all she needs before the storm,  Patient states\" I have all my medication, I have food and warmth so I am ok\".   Patient also states\" I feel a little achy and tired, not sleeping well,  Other than that I am doing ok\"     Will send updated information to [address], [address] [facility] Coordination Transition

## 2022-02-16 NOTE — ED PROVIDER NOTES
[facility] ED  Emergency Department Encounter  Emergency Medicine Resident     Pt Name: Fede Martinez  MRN: [id number]  [place] 1935  Date of evaluation: 2/16/22  PCP:  Urmila Blandon DO    CHIEF COMPLAINT       Chief Complaint   Patient presents with    Chest Pain       HISTORY OFPRESENT ILLNESS  (Location/Symptom, Timing/Onset, Context/Setting, Quality, Duration, Modifying Factors,Severity.)      Fede Martinez is a 82 y. o.yo female who presents with complaints of sudden onset chest pain, she states that the pain started while she was laying at home. It progressively worsened despite she called EMS. On arrival, patient was given aspirin and nitroglycerin per EMS. Patient was stating that she was also had associated shortness of breath, but no nausea and vomiting. She does state that she is having generalized fatigue. Chart review, patient was recently admitted on the 10th of this month due to severe anemia in which she required blood transfusion. Patient also had an EGD/colonoscopy done due to the anemia, she was found to have adenocarcinoma of the colon. Afterwards, she was discharged without any complication. PAST MEDICAL / SURGICAL / SOCIAL / FAMILY HISTORY      has a past medical history of Blood circulation, collateral, CHF (congestive heart failure) ([facility] COPD (chronic obstructive pulmonary disease) ([facility] [address] GERD (gastroesophageal reflux disease), Hyperlipidemia, and Hypertension. has a past surgical history that includes joint replacement (left total shoulder; 1990); bladder suspension (2001); Hysterectomy (ovaries left); Cholecystectomy; Upper gastrointestinal endoscopy (N/A, 2/10/2022); and Colonoscopy (N/A, 2/10/2022).      Social History     Socioeconomic History    Marital status:      Spouse name: Not on file    Number of children: Not on file    Years of education: Not on file    Highest education level: Not on file   Occupational History  Not on file   Tobacco Use    Smoking status: Former Smoker     Quit date: 1984     Years since quittin.1    Smokeless tobacco: Never Used   Substance and Sexual Activity    Alcohol use: No    Drug use: No    Sexual activity: Not on file   Other Topics Concern    Not on file   Social History Narrative    Not on file     Social Determinants of Health     Financial Resource Strain: Low Risk     Difficulty of Paying Living Expenses: Not hard at all   Food Insecurity: No Food Insecurity    Worried About [address] in the Last Year: Never true    [facility] in the Last Year: Never true   Transportation Needs:     Lack of Transportation (Medical): Not on file    Lack of Transportation (Non-Medical):  Not on file   Physical Activity:     Days of Exercise per Week: Not on file    Minutes of Exercise per Session: Not on file   Stress:     Feeling of Stress : Not on file   Social Connections:     Frequency of Communication with Friends and Family: Not on file    Frequency of Social Gatherings with Friends and Family: Not on file    Attends Shinto Services: Not on file    Active Member of [address] [place] [address] or Organizations: Not on file    Attends Club or Organization Meetings: Not on file    Marital Status: Not on file   Intimate Partner Violence:     Fear of Current or Ex-Partner: Not on file    Emotionally Abused: Not on file    Physically Abused: Not on file    Sexually Abused: Not on file   Housing Stability:     Unable to Pay for Housing in the Last Year: Not on file    Number of [place] in the Last Year: Not on file    Unstable Housing in the Last Year: Not on file       Family History   Problem Relation Age of Onset    Cancer Brother     Cancer Other         bone    Cancer Daughter         breast        Allergies:  Codeine, Bactrim [sulfamethoxazole-trimethoprim], Naproxen, Norvasc [amlodipine besylate], Sertraline hcl, Toradol [ketorolac tromethamine], Tramadol, Magnesium-containing compounds, Morphine, and Percocet [oxycodone-acetaminophen]    Home Medications:  Prior to Admission medications    Medication Sig Start Date End Date Taking? Authorizing Provider   apixaban (ELIQUIS) 5 MG TABS tablet Take 2 tablets by mouth 2 times daily for 7 days 2/17/22 2/24/22 Yes Tory Johnston,    apixaban (ELIQUIS) 5 MG TABS tablet Take 1 tablet by mouth 2 times daily 2/25/22  Yes Tory Johnston, DO   ferrous sulfate (FE TABS 325) 325 (65 Fe) MG EC tablet Take 1 tablet by mouth daily (with breakfast) 2/18/22  Yes Gerardo Torres, DO   Compression Stockings MISC by Does not apply route 2/17/22  Yes Tory Johnston, DO   lisinopril (PRINIVIL;ZESTRIL) 20 MG tablet Take 1 tablet by mouth daily 2/13/22   Kurt Montes DO   amiodarone (CORDARONE) 200 MG tablet Take 1 tablet by mouth daily 2/11/22   MALVIN Dasilva - CNP   traZODone (DESYREL) 50 MG tablet TAKE ONE TABLET BY MOUTH ONCE NIGHTLY 2/7/22   Alayna Joseph DO   bumetanide (BUMEX) 1 MG tablet Take 0.5 tablets by mouth daily  Patient taking differently: Take 1 mg by mouth daily  2/1/22 3/3/22  Alayna Joseph DO   pravastatin (PRAVACHOL) 40 MG tablet TAKE 1 TABLET DAILY 8/5/21   Alayna Joseph DO   potassium chloride (KLOR-CON M) 20 MEQ TBCR extended release tablet Take 40 mEq by mouth daily (with breakfast)    Historical Provider, MD   calcium-vitamin D (OSCAL-500) 500-200 MG-UNIT per tablet Take 1 tablet by mouth daily    Historical Provider, MD   Multiple Vitamins-Minerals (THERAPEUTIC MULTIVITAMIN-MINERALS) tablet Take 1 tablet by mouth daily    Historical Provider, MD   metoprolol (LOPRESSOR) 25 MG tablet Take 25 mg by mouth 2 times daily    Historical Provider, MD       REVIEW OFSYSTEMS    (2-9 systems for level 4, 10 or more for level 5)      Review of Systems   Constitutional: Positive for fatigue. Negative for fever. Respiratory: Positive for shortness of breath. Cardiovascular: Positive for chest pain and leg swelling. Gastrointestinal: Negative for abdominal pain, nausea and vomiting. Musculoskeletal: Positive for myalgias. Psychiatric/Behavioral: Negative for behavioral problems and confusion. PHYSICAL EXAM   (up to 7 for level 4, 8 or more forlevel 5)      INITIAL VITALS:   ED Triage Vitals [02/16/22 1527]   BP Temp Temp Source Pulse Resp SpO2 Height Weight   (!) 171/70 98.6 °F (37 °C) Oral 69 18 93 % -- 171 lb (77.6 kg)       Physical Exam  Constitutional:       Appearance: Normal appearance. HENT:      Head: Normocephalic and atraumatic. Mouth/Throat:      Mouth: Mucous membranes are moist.   Eyes:      Extraocular Movements: Extraocular movements intact. Pupils: Pupils are equal, round, and reactive to light. Cardiovascular:      Rate and Rhythm: Normal rate. Pulmonary:      Effort: Pulmonary effort is normal. No respiratory distress. Abdominal:      General: There is no distension. Tenderness: There is no abdominal tenderness. Musculoskeletal:      Cervical back: No rigidity or tenderness. Right lower leg: Edema present. Left lower leg: Edema present. Skin:     Capillary Refill: Capillary refill takes less than 2 seconds. Coloration: Skin is not jaundiced or pale. Neurological:      General: No focal deficit present. Mental Status: She is alert.    Psychiatric:         Mood and Affect: Mood normal.         Behavior: Behavior normal.         DIFFERENTIAL  DIAGNOSIS     PLAN (LABS / IMAGING / EKG):  Orders Placed This Encounter   Procedures    XR CHEST PORTABLE    CT CHEST PULMONARY EMBOLISM W CONTRAST    VL DUP LOWER EXTREMITY VENOUS BILATERAL    CBC with Auto Differential    Troponin    Brain Natriuretic Peptide    Comprehensive Metabolic Panel    Lipase    Magnesium    Troponin    Lactic Acid, Whole Blood    D-Dimer, Quantitative    Protime-INR    APTT    Immature Platelet Fraction    CBC  Iron and TIBC    Immature Platelet Fraction    Cortisol Total    CBC with Auto Differential    ADULT DIET;  Regular    Telemetry monitoring - continuous duration    Orthostatic blood pressure and pulse    Gradual Compression Stockings (ANIKA)    Inpatient consult to Internal Medicine    Inpatient consult to [address] O2 eval (desaturation screen)    EKG 12 Lead    ADMIT TO INPATIENT    Discharge patient       MEDICATIONS ORDERED:  Orders Placed This Encounter   Medications    iopamidol (ISOVUE-370) 76 % injection 75 mL    furosemide (LASIX) injection 40 mg    heparin (porcine) injection 6,210 Units    DISCONTD: heparin (porcine) injection 6,210 Units    DISCONTD: heparin (porcine) injection 3,100 Units    DISCONTD: heparin 25,000 units in 0.9% sodium chloride 250 mL infusion    DISCONTD: heparin (porcine) injection 6,210 Units    DISCONTD: heparin (porcine) injection 3,100 Units    DISCONTD: heparin 25,000 units in dextrose 5 % 250 mL infusion (rate based)    bumetanide (BUMEX) tablet 1 mg    DISCONTD: amiodarone (CORDARONE) tablet 200 mg    DISCONTD: calcium carbonate w/vitamin D (CALTRATE) 600-400 MG-UNIT per tab 1 tablet    lisinopril (PRINIVIL;ZESTRIL) tablet 20 mg    metoprolol tartrate (LOPRESSOR) tablet 25 mg    therapeutic multivitamin-minerals 1 tablet    DISCONTD: potassium chloride (KLOR-CON) extended release tablet 40 mEq    pravastatin (PRAVACHOL) tablet 40 mg    traZODone (DESYREL) tablet 50 mg    DISCONTD: metoprolol (LOPRESSOR) injection 5 mg    acetaminophen (TYLENOL) tablet 650 mg    ferrous sulfate (FE TABS 325) EC tablet 325 mg    apixaban (ELIQUIS) tablet 10 mg    potassium chloride (KLOR-CON M) extended release tablet 40 mEq    calcium carbonate-vitamin D3 (CALTRATE) 600-400 MG-UNIT per tab 1 tablet    apixaban (ELIQUIS) 5 MG TABS tablet     Sig: Take 2 tablets by mouth 2 times daily for 7 days     Dispense:  28 tablet     Refill:  0    apixaban (ELIQUIS) 5 MG TABS tablet     Sig: Take 1 tablet by mouth 2 times daily     Dispense:  60 tablet     Refill:  1    ferrous sulfate (FE TABS 325) 325 (65 Fe) MG EC tablet     Sig: Take 1 tablet by mouth daily (with breakfast)     Dispense:  90 tablet     Refill:  3    Compression Stockings MISC     Sig: by Does not apply route     Dispense:  1 each     Refill:  0       Initial MDM/Plan: 82 y.o. female who presents with complaints of chest pain with shortness of breath. Patient was stable vitals, she does not appear to be toxic in appearance. He is hypertensive that was documented in the chart of systolic 711 however when I was in the room patient's was 150s. She does not have any chest wall crepitus given that she did recently have an EGD done a couple days ago. Patient does have baseline bilateral lower extremity 2+ pitting edema which she stated has been progressively worsening  No crackles on lung auscultation. Given recent hospitalization, mild decrease in the mobility, will get a D-dimer. If elevated, plan to get a CT scan to rule out pulmonary embolism  We'll also work-up cardiac pathology for ACS, her EKG did show sinus rhythm with first-degree AV block and right bundle branch block, QTC of 490, on comparison of previous EKG that was done on 11th of this month, there was no change. I did look in her chart, recently worked up for thyroid dysfunction which was normal.    We'll get an x-ray to rule out pneumonia versus pneumothorax versus pulmonary edema which could cause the chest pain that she is having.   Given that patient was recently seen for anemia, chest pain could be due to anemia and thus will get CBC to rule out anemia  Likely admit patient  DIAGNOSTIC RESULTS / EMERGENCYDEPARTMENT COURSE / MDM     LABS:  Labs Reviewed   CBC WITH AUTO DIFFERENTIAL - Abnormal; Notable for the following components:       Result Value    RBC 5.34 (*)     Hemoglobin 10.2 (*)     MCV 69.1 (*)     MCH 19.1 (*)     MCHC 27.6 (*)     RDW 36.7 (*)     Seg Neutrophils 78 (*)     Lymphocytes 10 (*)     Absolute Lymph # 0.93 (*)     All other components within normal limits   TROPONIN - Abnormal; Notable for the following components:    Troponin, High Sensitivity 26 (*)     All other components within normal limits   BRAIN NATRIURETIC PEPTIDE - Abnormal; Notable for the following components:    Pro-BNP 3,153 (*)     All other components within normal limits   TROPONIN - Abnormal; Notable for the following components:    Troponin, High Sensitivity 26 (*)     All other components within normal limits   CBC - Abnormal; Notable for the following components:    RBC 5.45 (*)     Hemoglobin 10.3 (*)     MCV 67.2 (*)     MCH 18.9 (*)     MCHC 28.1 (*)     RDW 37.1 (*)     All other components within normal limits   APTT - Abnormal; Notable for the following components:    PTT >120.0 (*)     All other components within normal limits   COMPREHENSIVE METABOLIC PANEL   LIPASE   MAGNESIUM   LACTIC ACID, WHOLE BLOOD   D-DIMER, QUANTITATIVE   PROTIME-INR   APTT   IMMATURE PLATELET FRACTION   APTT   IRON AND TIBC   IMMATURE PLATELET FRACTION   CORTISOL TOTAL         RADIOLOGY:  No results found. EKG  EKG Interpretation    Interpreted by me    Rhythm: normal sinus   Rate: normal  Axis: normal  Ectopy: none  Conduction: normal  ST Segments: no acute change  T Waves: no acute change  Q Waves: none    Clinical Impression: sinus rhythm with first-degree AV block and right bundle branch block, QTC of 490,    All EKG's are interpreted by the Emergency Department Physicianwho either signs or Co-signs this chart in the absence of a cardiologist.    EMERGENCY DEPARTMENT COURSE:          PROCEDURES:  None    CONSULTS:  IP CONSULT TO INTERNAL MEDICINE  IP CONSULT TO HOME CARE NEEDS    CRITICAL CARE:      FINAL IMPRESSION      1. Pulmonary vascular congestion    2. Bilateral lower extremity edema    3.  Iron deficiency          DISPOSITION / PLAN DISPOSITION Admitted 02/16/2022 10:56:05 PM      PATIENT REFERRED TO:  Lucía Barr, DO  [address]  [place]  [phone]    Schedule an appointment as soon as possible for a visit in 1 week  Hospital follow-up after being admitted and treated for pulmonary embolism    Tenisha Hernandez MD  [phone] [place] [address]  [phone]    Schedule an appointment as soon as possible for a visit  Follow up to go over biopsy results. Major Guadarrama MD  . [address] [address]  [phone]      Heart failure, atrial fibrillation      DISCHARGE MEDICATIONS:  Discharge Medication List as of 2/17/2022  2:36 PM      START taking these medications    Details   !! apixaban (ELIQUIS) 5 MG TABS tablet Take 2 tablets by mouth 2 times daily for 7 days, Disp-28 tablet, R-0Normal      !! apixaban (ELIQUIS) 5 MG TABS tablet Take 1 tablet by mouth 2 times daily, Disp-60 tablet, R-1Normal      ferrous sulfate (FE TABS 325) 325 (65 Fe) MG EC tablet Take 1 tablet by mouth daily (with breakfast), Disp-90 tablet, R-3Normal      Compression Stockings MISC Starting Thu 2/17/2022, Disp-1 each, R-0, Print       !! - Potential duplicate medications found. Please discuss with provider.           Ramón Peña MD  Emergency Medicine Resident    (Please note that portions of this note were completed with a voice recognition program.Efforts were made to edit the dictations but occasionally words are mis-transcribed.)        Ramón Peña MD  Resident  02/17/22 2019

## 2022-02-16 NOTE — ED PROVIDER NOTES
[facility] ED  Emergency Department  Emergency Medicine Resident Sign-out     Care of Elmira Fraser was assumed from Dr. Marcela Johnson and is being seen for Chest Pain  . The patient's initial evaluation and plan have been discussed with the prior provider who initially evaluated the patient.      EMERGENCY DEPARTMENT COURSE / MEDICAL DECISION MAKING:       MEDICATIONS GIVEN:  Orders Placed This Encounter   Medications    iopamidol (ISOVUE-370) 76 % injection 75 mL    furosemide (LASIX) injection 40 mg       LABS / RADIOLOGY:     Labs Reviewed   CBC WITH AUTO DIFFERENTIAL - Abnormal; Notable for the following components:       Result Value    RBC 5.34 (*)     Hemoglobin 10.2 (*)     MCV 69.1 (*)     MCH 19.1 (*)     MCHC 27.6 (*)     RDW 36.7 (*)     Seg Neutrophils 78 (*)     Lymphocytes 10 (*)     Absolute Lymph # 0.93 (*)     All other components within normal limits   TROPONIN - Abnormal; Notable for the following components:    Troponin, High Sensitivity 26 (*)     All other components within normal limits   BRAIN NATRIURETIC PEPTIDE - Abnormal; Notable for the following components:    Pro-BNP 3,153 (*)     All other components within normal limits   COMPREHENSIVE METABOLIC PANEL   LIPASE   MAGNESIUM   LACTIC ACID, WHOLE BLOOD   D-DIMER, QUANTITATIVE   PROTIME-INR   APTT   IMMATURE PLATELET FRACTION   TROPONIN       ECHO Complete 2D W Doppler W Color    Result Date: 2/9/2022  Transthoracic Echocardiography Report (TTE)  Patient Name Jonathan Nolasco     Date of Study           02/09/2022               Batsheva Lobe   Date of      1935  Gender                  Female  Birth   Age          82 year(s)  Race                       Room Number  0317        Height:                 64 inch, 162.56 cm   Corporate ID [id number]    Weight:                 165 pounds, 74.8 kg  #   Patient Acct [de-identified]   BSA:        1.8 m^2     BMI:        28.32 kg/m^2  #   MR #         [id number]     Sonographer Keiry Salmon   Accession #  [id number]  Interpreting Physician  Francine Preston   Fellow                   Referring Nurse                           Practitioner   Interpreting             Referring Physician     DARLINE Ferrera  Fellow  Type of Study   TTE procedure:2D Echocardiogram, M-Mode, Doppler, Color Doppler. Procedure Date Date: 02/09/2022 Start: 08:08 AM Study Location: [facility] Technical Quality: Good visualization Indications:Congestive heart failure, Hypertension and Atrial fibrillation. History / Tech. Comments: Procedure explained to patient. Patient Status: Inpatient Height: 64 inches Weight: 165 pounds BSA: 1.8 m^2 BMI: 28.32 kg/m^2 HR: 76 bpm CONCLUSIONS Summary Global left ventricular systolic function is normal. Estimated ejection fraction is 55-60 % . No obvious wall motion abnormality seen. Grade III (severe) left ventricular diastolic dysfunction. Left atrium is severely dilated. Mildly dilated right ventricular cavity. Calcified aortic valve leaflets with sclerosis, no significant stenosis, mean gradient 10 mm Hg Mild aortic insufficiency. Mild mitral regurgitation. Severe tricuspid regurgitation. Moderate pulmonary hypertension with an estimated right ventricular systolic pressure of 51 mmHg. No pericardial effusion. Signature ----------------------------------------------------------------------------  Electronically signed by Ailyn Rey(Sonographer) on 02/09/2022  08:46 AM ---------------------------------------------------------------------------- ----------------------------------------------------------------------------  Electronically signed by Francine Preston(Interpreting physician) on  02/09/2022 10:25 AM ---------------------------------------------------------------------------- FINDINGS Left Atrium Left atrium is severely dilated. Inter-atrial septum appears aneurysmal. No obvious shunt seen by color Doppler.  Left Ventricle Left ventricle is normal in size. Global left ventricular systolic function is normal. Estimated ejection fraction is 60 % . Calculated EF via 3D Heart Model is 62 %. Mild left ventricular hypertrophy. No obvious wall motion abnormality seen. Grade III (severe) left ventricular diastolic dysfunction. Right Atrium Right atrial dilatation. Right Ventricle Mildly dilated right ventricular cavity. Right ventricular function appears normal . Mitral Valve Normal mitral valve structure. Mitral annular calcification is seen. Mild mitral regurgitation. No mitral stenosis. Aortic Valve The aortic valve appears mildly calcified with mildly reduced cusp mobility. Aortic valve is trileaflet. Mean gradient 10 mmHg indicating sclerosis Mild aortic insufficiency. Tricuspid Valve Normal tricuspid valve leaflets. Severe tricuspid regurgitation. No tricuspid stenosis. Moderate pulmonary hypertension with an estimated right ventricular systolic pressure of 51 mmHg. Pulmonic Valve Pulmonic valve is normal in structure and function. Trivial pulmonic insufficiency. No evidence of pulmonic stenosis. Pericardial Effusion No pericardial effusion. Miscellaneous Normal aortic root dimension. The ascending aorta is normal in size. E/E' average = 12.6. IVC normal diameter & inspiratory collapse indicating normal RA filling pressure .  M-mode / 2D Measurements & Calculations:   LVIDd:4.4 cm(3.7 - 5.6 cm)       Diastolic EVQBJP:498 ml  LVIDs:3 cm(2.2 - 4.0 cm)         Systolic SITABL:87 ml  EQFF:1.3 cm(0.6 - 1.1 cm)        Aortic Root:2.5 cm(2.0 - 3.7 cm)  LVPWd:1.2 cm(0.6 - 1.1 cm)       LA Dimension: 4.4 cm(1.9 - 4.0 cm)  Fractional Shortenin.82 %    LA volume/Index: 103.43 ml /57m^2  Calculated LVEF (%): 62.39 %     LVOT:1.8 cm                                   RVDd:4.3 cm   Mitral:                                 Aortic   Valve Area (P1/2-Time): 2.89 cm^2       Peak Velocity: 2.24 m/s  Peak E-Wave: 1.05 m/s                   Mean Velocity: 1.30 m/s  Peak A-Wave: 0.57 m/s                   Peak Gradient: 20.07 mmHg  E/A Ratio: 1.86                         Mean Gradient: 10 mmHg  Peak Gradient: 4.41 mmHg  Mean Gradient: 3 mmHg  Deceleration Time: 215 msec             Area (continuity): 1.49 cm^2  P1/2t: 76 msec                          AV VTI: 48.2 cm   Area (continuity): 2.28 cm^2  Mean Velocity: 0.74 m/s   Tricuspid:                              Pulmonic:   Estimated RVSP: 51 mmHg                 Peak Velocity: 1.07 m/s  Peak TR Velocity: 3.38 m/s              Peak Gradient: 4.58 mmHg  Peak TR Gradient: 45.6976 mmHg  Estimated RA Pressure: 5 mmHg                                           Estimated PASP: 50.7 mmHg  Diastology / Tissue Doppler Septal Wall E' velocity:0.07 m/s Septal Wall E/E':15.6 Lateral Wall E' velocity:0.12 m/s Lateral Wall E/E':9.6    CTA CHEST W WO CONTRAST    Result Date: 2/7/2022  EXAMINATION: CTA OF THE CHEST WITH AND WITHOUT CONTRAST; CTA OF THE ABDOMEN AND PELVIS WITH CONTRAST 2/7/2022 12:56 pm TECHNIQUE: CTA of the chest was performed before and after the administration of intravenous contrast.  Multiplanar reformatted images are provided for review. MIP images are provided for review. Dose modulation, iterative reconstruction, and/or weight based adjustment of the mA/kV was utilized to reduce the radiation dose to as low as reasonably achievable.; CTA of the abdomen and pelvis was performed with the administration of intravenous contrast. Multiplanar reformatted images are provided for review. MIP images are provided for review. Dose modulation, iterative reconstruction, and/or weight based adjustment of the mA/kV was utilized to reduce the radiation dose to as low as reasonably achievable. COMPARISON: None.  HISTORY: ORDERING SYSTEM PROVIDED HISTORY: Eval dissection - SOB, back pain radiating to shoulder TECHNOLOGIST PROVIDED HISTORY: Eval dissection - SOB, back pain radiating to shoulder Decision Support Exception - unselect if not a suspected or confirmed emergency medical condition->Emergency Medical Condition (MA) Reason for Exam: Eval dissection - SOB, back pain radiating to shoulder FINDINGS: CTA chest: Aorta: Atherosclerosis of the thoracic aorta noted with no aneurysm or dissection. Mediastinum: Atherosclerosis of the coronary arteries. Borderline cardiomegaly. No pericardial effusion. No adenopathy. Small to moderate-sized hiatal hernia. Lungs/Pleura: No pneumothorax. No pleural effusion. Scattered linear densities in the bilateral lungs, suggestive of platelike atelectasis versus linear scars. No consolidation, mass, or suspicious nodule in either lung. Soft Tissues/Bones: Multilevel thoracic spondylosis. Old compression fractures of T5 and T8 vertebral bodies. Status post left shoulder arthroplasty. CTA abdomen: Vascular calcification noted. No aneurysm or dissection in the abdominal aorta and its major branches. Unremarkable liver, spleen, pancreas, and adrenals. Areas of cortical thinning/scarring in the left kidney. A 0.3 cm calcification in the right kidney, which may represent a nonobstructing stone or vascular calcification. Areas of cortical thinning/scarring in the left kidney. A subcentimeter hypodensity in the upper pole of the right kidney, likely a simple cyst but too small to fully characterize. Status post cholecystectomy with associated CBD dilatation. No CT evidence of acute appendicitis. Bowel loops nonobstructed. A multiloculated fat containing abdominal wall hernia to the left of umbilicus. Multilevel lumbar spondylosis. . CTA pelvis: No aneurysm or dissection in the pelvic arteries. Vascular calcification noted. Sigmoid diverticulosis. No acute diverticulitis. Status post hysterectomy. No adnexal mass. Incompletely distended urinary bladder with no gross abnormality. No acute osseous pathology in the pelvic bones and proximal femora.   Degenerative changes of the bilateral hip and sacroiliac joints noted. No thoracoabdominal aortic aneurysm or dissection. No acute finding in the chest, abdomen, and pelvis. Multiple chronic findings as above. XR SHOULDER LEFT (MIN 2 VIEWS)    Result Date: 2/10/2022  EXAMINATION: 3 XRAY VIEWS OF THE LEFT SHOULDER 2/10/2022 7:37 pm COMPARISON: None. HISTORY: ORDERING SYSTEM PROVIDED HISTORY: pain TECHNOLOGIST PROVIDED HISTORY: pain FINDINGS: Left shoulder arthroplasty without hardware fracture or lucency. No periprosthetic fracture. No dislocation. Mild narrowing of the acromioclavicular joint. 1. No acute osseous abnormality 2. Left shoulder arthroplasty without hardware complication. XR CHEST PORTABLE    Result Date: 2/16/2022  EXAMINATION: ONE XRAY VIEW OF THE CHEST 2/16/2022 12:53 pm COMPARISON: 02/07/2022 HISTORY: ORDERING SYSTEM PROVIDED HISTORY: chest pain and shortness of breath TECHNOLOGIST PROVIDED HISTORY: chest pain and shortness of breath Reason for Exam: port upr FINDINGS: The cardial pericardial silhouette is enlarged but stable. Hiatal hernia is again seen. Chronic interstitial opacities are detected. No focal infiltrate is seen. Pulmonary vasculature is borderline congested. No pneumothorax. No free air. No acute bony abnormality. Left shoulder prosthesis again seen. Borderline pulmonary vascular congestion. Otherwise negative portable chest radiograph. XR CHEST PORTABLE    Result Date: 2/7/2022  EXAMINATION: ONE XRAY VIEW OF THE CHEST 2/7/2022 12:10 pm COMPARISON: AP chest from 03/21/2020 HISTORY: ORDERING SYSTEM PROVIDED HISTORY: SOB, L shoulder pain TECHNOLOGIST PROVIDED HISTORY: SOB, L shoulder pain Reason for Exam: port upright Additional history of GERD, hypertension and left shoulder joint replacement FINDINGS: Unchanged left shoulder hardware status post replacement. No dislocation or obvious fracture. Overlying ECG monitor leads and gown snaps.  Enlarged but stable appearing cardiac silhouette with sizable hiatus hernia again noted. Mediastinal structures midline and unchanged, again with calcification aortic knob. No localized pulmonary opacity or blunting of the costophrenic angles. No pneumothorax. Bones otherwise unchanged. No acute cardiopulmonary disease. Cardiomegaly. Hiatus hernia. CTA ABDOMEN PELVIS W CONTRAST    Result Date: 2/7/2022  EXAMINATION: CTA OF THE CHEST WITH AND WITHOUT CONTRAST; CTA OF THE ABDOMEN AND PELVIS WITH CONTRAST 2/7/2022 12:56 pm TECHNIQUE: CTA of the chest was performed before and after the administration of intravenous contrast.  Multiplanar reformatted images are provided for review. MIP images are provided for review. Dose modulation, iterative reconstruction, and/or weight based adjustment of the mA/kV was utilized to reduce the radiation dose to as low as reasonably achievable.; CTA of the abdomen and pelvis was performed with the administration of intravenous contrast. Multiplanar reformatted images are provided for review. MIP images are provided for review. Dose modulation, iterative reconstruction, and/or weight based adjustment of the mA/kV was utilized to reduce the radiation dose to as low as reasonably achievable. COMPARISON: None. HISTORY: ORDERING SYSTEM PROVIDED HISTORY: Eval dissection - SOB, back pain radiating to shoulder TECHNOLOGIST PROVIDED HISTORY: Eval dissection - SOB, back pain radiating to shoulder Decision Support Exception - unselect if not a suspected or confirmed emergency medical condition->Emergency Medical Condition (MA) Reason for Exam: Eval dissection - SOB, back pain radiating to shoulder FINDINGS: CTA chest: Aorta: Atherosclerosis of the thoracic aorta noted with no aneurysm or dissection. Mediastinum: Atherosclerosis of the coronary arteries. Borderline cardiomegaly. No pericardial effusion. No adenopathy. Small to moderate-sized hiatal hernia. Lungs/Pleura: No pneumothorax. No pleural effusion.   Scattered linear densities in the bilateral lungs, suggestive of platelike atelectasis versus linear scars. No consolidation, mass, or suspicious nodule in either lung. Soft Tissues/Bones: Multilevel thoracic spondylosis. Old compression fractures of T5 and T8 vertebral bodies. Status post left shoulder arthroplasty. CTA abdomen: Vascular calcification noted. No aneurysm or dissection in the abdominal aorta and its major branches. Unremarkable liver, spleen, pancreas, and adrenals. Areas of cortical thinning/scarring in the left kidney. A 0.3 cm calcification in the right kidney, which may represent a nonobstructing stone or vascular calcification. Areas of cortical thinning/scarring in the left kidney. A subcentimeter hypodensity in the upper pole of the right kidney, likely a simple cyst but too small to fully characterize. Status post cholecystectomy with associated CBD dilatation. No CT evidence of acute appendicitis. Bowel loops nonobstructed. A multiloculated fat containing abdominal wall hernia to the left of umbilicus. Multilevel lumbar spondylosis. . CTA pelvis: No aneurysm or dissection in the pelvic arteries. Vascular calcification noted. Sigmoid diverticulosis. No acute diverticulitis. Status post hysterectomy. No adnexal mass. Incompletely distended urinary bladder with no gross abnormality. No acute osseous pathology in the pelvic bones and proximal femora. Degenerative changes of the bilateral hip and sacroiliac joints noted. No thoracoabdominal aortic aneurysm or dissection. No acute finding in the chest, abdomen, and pelvis. Multiple chronic findings as above. CT CHEST ABDOMEN PELVIS W CONTRAST    Result Date: 2/10/2022  EXAMINATION: CT OF THE CHEST, ABDOMEN, AND PELVIS WITH CONTRAST 2/10/2022 3:57 pm TECHNIQUE: CT of the chest, abdomen and pelvis was performed with the administration of intravenous contrast. Multiplanar reformatted images are provided for review.  Dose modulation, iterative reconstruction, and/or weight based adjustment of the mA/kV was utilized to reduce the radiation dose to as low as reasonably achievable. COMPARISON: 02/07/2022 HISTORY: ORDERING SYSTEM PROVIDED HISTORY: large transverse colon mass found on c-scope TECHNOLOGIST PROVIDED HISTORY: large transverse colon mass found on c-scope FINDINGS: Chest: Mediastinum: Cardiomegaly. Moderate coronary artery and aortic atherosclerotic calcifications. No significant lymphadenopathy. Thyroid gland grossly normal.  Small sliding hiatal hernia. Lungs/pleura: Motion artifacts degrade images. Small amount of patchy subsegmental atelectasis and trace right pleural effusion increased from prior. Similar but less pronounced findings on the left. Some scarring in the anteromedial middle lobe at the lung base. No evidence for pulmonary metastatic nodules. No pneumothorax. Soft Tissues/Bones: Left shoulder hemiarthroplasty. Severe compression deformity of T8 vertebral body with in the mid anterior portion. Moderate anterior wedge compression deformity of T5. Vacuum disc changes in the lower half of the thoracic spine. Abdomen/Pelvis: Organs: Liver shows no focal masses. Cholecystectomy port account for common bile duct dilatation. Spleen, pancreas, adrenal glands and kidneys show no significant abnormalities. GI/Bowel: There is limited evaluation due to absence of oral contrast. Sliding hiatal hernia as noted above. Small bowel loops show no evidence for obstruction. The colon is quite tortuous. Mass identified on recent colonoscopy is not convincing noted but asymmetric diffuse soft tissue thickening/fullness in the right colon at or just proximal to the hepatic flexure may represent the known lesion. Most of the transverse colon is gaseous. There is a left paraumbilical Crenshaw type hernia involving anterior wall of transverse colon with sac diameter 5 cm and a neck diameter 2.5 cm. Sigmoid diverticulosis.  Pelvis: Circumferential urinary bladder wall thickening with some gas in the urinary bladder. There is also some perivesical stranding. Cystitis likely. Recent instrumentation could also result in the intravesical gas. Peritoneum/Retroperitoneum: Atherosclerotic disease. No ascites. No significant lymphadenopathy. Bones/Soft Tissues: Diffuse degenerative changes. No aggressive lytic or blastic lesions. No acute process. 1. Patient has a known colonic mass found on colonoscopy. This is not definitively identified. The colon is tortuous. Area of asymmetric diffuse thickening and some narrowing in: At or just proximal to the hepatic flexure may represent the lesion. 2. No evidence for metastatic disease in the chest abdomen or pelvis. 3. 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 4. Trace pleural effusion adjacent subsegmental atelectasis more pronounced on the right. Overall slightly more prominent than prior. RECOMMENDATIONS: Unavailable       RECENT VITALS:     Temp: 98.6 °F (37 °C),  Pulse: 69, Resp: 18, BP: (!) 171/70, SpO2: 93 %    This patient is a 82 y.o. Female with chest pain and shortness of breath. Not requiring oxygen. Past medical history of colon cancer that was recently diagnosed, A. fib on Eliquis, heart failure on Bumex. Had a recent EGD/colonoscopy 7 days ago they discovered the colon cancer. ACS work-up so far showed mildly elevated troponins are at baseline, EKG old right bundle branch block with improving T wave inversions. BNP at baseline. Chest x-ray showing some pulmonary vascular congestion but otherwise quite mild. D-dimer was elevated. Awaiting CT PE scan. Likely recommend admission to observation unit if negative versus discharge with close follow-up if patient strongly wishes to go home. Patient found to have multiple bilateral small PEs likely from left DVT. Patient is already on Eliquis for A. fib. Will need to switch anticoagulation. Patient started on heparin here while in the emergency room. Patient is borderline hypoxic however on March test did not desat. Patient admitted [facility] for further monitoring and change in home anticoagulation therapy. OUTSTANDING TASKS / RECOMMENDATIONS:    1. F/u Ct scan     FINAL IMPRESSION:     1. Pulmonary vascular congestion    2. Bilateral lower extremity edema        DISPOSITION:         DISPOSITION:  []  Discharge   []  Transfer -    [x]  Admission -     []  Against Medical Advice   []  Eloped   FOLLOW-UP: No follow-up provider specified.    DISCHARGE MEDICATIONS: New Prescriptions    No medications on file           [facility]   Emergency Medicine Resident  [place], [place]  Resident  02/16/22 [id number]

## 2022-02-16 NOTE — ED NOTES
Pt. Presents to the ER via ems after she called out for chest pressure. Pt denies any sob at this time. Pt states that the pain started this morning. Pt was given 324 asa and 1 nitro en route. Pt. resp are non labored and even. Dr. Jennifer Avila at the [facility] for evaluation at this time. Pt line, labs, and ekg were ocmpleted and sent via tube system. Bed in the lowest position, wheels locked, side rails up x2, and call light within reach.       Trice Baca RN  02/16/22 [id number]

## 2022-02-16 NOTE — ED PROVIDER NOTES
[facility]     Emergency Department     Faculty Note/ Attestation      Pt Name: Jillian Yap                                       MRN: [id number]  Alygfsamuel 1935  Date of evaluation: 2/16/2022    Patients PCP:    Jaun Marcano DO      Attestation  I performed a history and physical examination of the patient and discussed management with the resident. I reviewed the residents note and agree with the documented findings and plan of care. Any areas of disagreement are noted on the chart. I was personally present for the key portions of any procedures. I have documented in the chart those procedures where I was not present during the key portions. I have reviewed the emergency nurses triage note. I agree with the chief complaint, past medical history, past surgical history, allergies, medications, social and family history as documented unless otherwise noted below. For Physician Assistant/ Nurse Practitioner cases/documentation I have personally evaluated this patient and have completed at least one if not all key elements of the E/M (history, physical exam, and MDM). Additional findings are as noted. Initial Screens:        Massiel Coma Scale  Eye Opening: Spontaneous  Best Verbal Response: Oriented  Best Motor Response: Obeys commands  [place] Coma Scale Score: 15    Vitals:    Vitals:    02/16/22 1527   BP: (!) 171/70   Pulse: 69   Resp: 18   Temp: 98.6 °F (37 °C)   TempSrc: Oral   SpO2: 93%   Weight: 171 lb (77.6 kg)       CHIEF COMPLAINT       Chief Complaint   Patient presents with    Chest Pain             DIAGNOSTIC RESULTS             RADIOLOGY:   XR CHEST PORTABLE   Final Result   Borderline pulmonary vascular congestion. Otherwise negative portable chest   radiograph.                LABS:  Labs Reviewed   LACTIC ACID, WHOLE BLOOD   CBC WITH AUTO DIFFERENTIAL   TROPONIN   BRAIN NATRIURETIC PEPTIDE   COMPREHENSIVE METABOLIC PANEL   LIPASE   MAGNESIUM TROPONIN   D-DIMER, QUANTITATIVE   PROTIME-INR   APTT         EMERGENCY DEPARTMENT COURSE:     -------------------------  BP: (!) 171/70, Temp: 98.6 °F (37 °C), Pulse: 69, Resp: 18      Comments    83 yo F  CP today at home this morning  Hx of HFpEF, Afib, tricuspid regurg, off AC now  SOB  BIB[facility]S, rec'd ASA    Here last week, hgb 6.1  EGD showed colon CA    No resp distress, BP controlled    Plan for cardiac/PE w/u     7:21 PM EST  CT shows bilateral small PEs, patient states she does have some shortness of breath on exertion and does have some lower extremity swelling which is new for her. Patient lives at home alone, will likely need admission for some discharge planning as well as reevaluation in the morning.     (Please note that portions of this note were completed with a voice recognition program.  Efforts were made to edit the dictations but occasionally words are mis-transcribed.)      Marlyn Lux MD,, MD  Attending Emergency Physician         Marlyn Lux MD  02/16/22 Becca Singh

## 2022-02-17 VITALS
RESPIRATION RATE: 19 BRPM | SYSTOLIC BLOOD PRESSURE: 152 MMHG | DIASTOLIC BLOOD PRESSURE: 58 MMHG | BODY MASS INDEX: 29.35 KG/M2 | OXYGEN SATURATION: 96 % | TEMPERATURE: 98.9 F | HEART RATE: 73 BPM | WEIGHT: 171 LBS

## 2022-02-17 LAB
CORTISOL COLLECTION INFO: NORMAL
CORTISOL: 15.2 UG/DL (ref 2.7–18.4)
IRON SATURATION: 23 % (ref 20–55)
IRON: 84 UG/DL (ref 37–145)
PARTIAL THROMBOPLASTIN TIME: >120 SEC (ref 20.5–30.5)
TOTAL IRON BINDING CAPACITY: 367 UG/DL (ref 250–450)
UNSATURATED IRON BINDING CAPACITY: 283 UG/DL (ref 112–347)

## 2022-02-17 PROCEDURE — 82533 TOTAL CORTISOL: CPT

## 2022-02-17 PROCEDURE — 6370000000 HC RX 637 (ALT 250 FOR IP): Performed by: NURSE PRACTITIONER

## 2022-02-17 PROCEDURE — 85730 THROMBOPLASTIN TIME PARTIAL: CPT

## 2022-02-17 PROCEDURE — 2580000003 HC RX 258: Performed by: STUDENT IN AN ORGANIZED HEALTH CARE EDUCATION/TRAINING PROGRAM

## 2022-02-17 PROCEDURE — 96366 THER/PROPH/DIAG IV INF ADDON: CPT

## 2022-02-17 PROCEDURE — G0378 HOSPITAL OBSERVATION PER HR: HCPCS

## 2022-02-17 PROCEDURE — 6370000000 HC RX 637 (ALT 250 FOR IP): Performed by: INTERNAL MEDICINE

## 2022-02-17 PROCEDURE — 6360000002 HC RX W HCPCS: Performed by: STUDENT IN AN ORGANIZED HEALTH CARE EDUCATION/TRAINING PROGRAM

## 2022-02-17 PROCEDURE — 36415 COLL VENOUS BLD VENIPUNCTURE: CPT

## 2022-02-17 PROCEDURE — 99239 HOSP IP/OBS DSCHRG MGMT >30: CPT | Performed by: INTERNAL MEDICINE

## 2022-02-17 RX ORDER — M-VIT,TX,IRON,MINS/CALC/FOLIC 27MG-0.4MG
1 TABLET ORAL DAILY
Status: DISCONTINUED | OUTPATIENT
Start: 2022-02-17 | End: 2022-02-17 | Stop reason: HOSPADM

## 2022-02-17 RX ORDER — TRAZODONE HYDROCHLORIDE 50 MG/1
50 TABLET ORAL NIGHTLY
Status: DISCONTINUED | OUTPATIENT
Start: 2022-02-17 | End: 2022-02-17 | Stop reason: HOSPADM

## 2022-02-17 RX ORDER — LANOLIN ALCOHOL/MO/W.PET/CERES
325 CREAM (GRAM) TOPICAL
Qty: 90 TABLET | Refills: 3 | Status: SHIPPED | OUTPATIENT
Start: 2022-02-18 | End: 2022-10-14 | Stop reason: SDUPTHER

## 2022-02-17 RX ORDER — METOPROLOL TARTRATE 5 MG/5ML
5 INJECTION INTRAVENOUS EVERY 4 HOURS PRN
Status: DISCONTINUED | OUTPATIENT
Start: 2022-02-17 | End: 2022-02-17

## 2022-02-17 RX ORDER — PRAVASTATIN SODIUM 20 MG
40 TABLET ORAL DAILY
Status: DISCONTINUED | OUTPATIENT
Start: 2022-02-17 | End: 2022-02-17 | Stop reason: HOSPADM

## 2022-02-17 RX ORDER — POTASSIUM CHLORIDE 20 MEQ/1
40 TABLET, EXTENDED RELEASE ORAL
Status: DISCONTINUED | OUTPATIENT
Start: 2022-02-18 | End: 2022-02-17 | Stop reason: HOSPADM

## 2022-02-17 RX ORDER — HEPARIN SODIUM 1000 [USP'U]/ML
40 INJECTION, SOLUTION INTRAVENOUS; SUBCUTANEOUS PRN
Status: DISCONTINUED | OUTPATIENT
Start: 2022-02-17 | End: 2022-02-17

## 2022-02-17 RX ORDER — HEPARIN SODIUM AND DEXTROSE 10000; 5 [USP'U]/100ML; G/100ML
5-30 INJECTION INTRAVENOUS CONTINUOUS
Status: DISCONTINUED | OUTPATIENT
Start: 2022-02-17 | End: 2022-02-17

## 2022-02-17 RX ORDER — AMIODARONE HYDROCHLORIDE 200 MG/1
200 TABLET ORAL DAILY
Status: DISCONTINUED | OUTPATIENT
Start: 2022-02-17 | End: 2022-02-17

## 2022-02-17 RX ORDER — LISINOPRIL 20 MG/1
20 TABLET ORAL DAILY
Status: DISCONTINUED | OUTPATIENT
Start: 2022-02-17 | End: 2022-02-17 | Stop reason: HOSPADM

## 2022-02-17 RX ORDER — LANOLIN ALCOHOL/MO/W.PET/CERES
325 CREAM (GRAM) TOPICAL
Status: DISCONTINUED | OUTPATIENT
Start: 2022-02-18 | End: 2022-02-17 | Stop reason: HOSPADM

## 2022-02-17 RX ORDER — HEPARIN SODIUM 1000 [USP'U]/ML
80 INJECTION, SOLUTION INTRAVENOUS; SUBCUTANEOUS PRN
Status: DISCONTINUED | OUTPATIENT
Start: 2022-02-17 | End: 2022-02-17

## 2022-02-17 RX ORDER — BUMETANIDE 1 MG/1
1 TABLET ORAL DAILY
Status: DISCONTINUED | OUTPATIENT
Start: 2022-02-17 | End: 2022-02-17 | Stop reason: HOSPADM

## 2022-02-17 RX ORDER — ACETAMINOPHEN 325 MG/1
650 TABLET ORAL EVERY 4 HOURS PRN
Status: DISCONTINUED | OUTPATIENT
Start: 2022-02-17 | End: 2022-02-17 | Stop reason: HOSPADM

## 2022-02-17 RX ORDER — POTASSIUM CHLORIDE 600 MG/1
40 TABLET, FILM COATED, EXTENDED RELEASE ORAL
Status: DISCONTINUED | OUTPATIENT
Start: 2022-02-18 | End: 2022-02-17

## 2022-02-17 RX ADMIN — BUMETANIDE 1 MG: 1 TABLET ORAL at 13:25

## 2022-02-17 RX ADMIN — ACETAMINOPHEN 650 MG: 325 TABLET ORAL at 11:04

## 2022-02-17 RX ADMIN — LISINOPRIL 20 MG: 20 TABLET ORAL at 13:25

## 2022-02-17 RX ADMIN — APIXABAN 10 MG: 5 TABLET, FILM COATED ORAL at 13:24

## 2022-02-17 RX ADMIN — METOPROLOL TARTRATE 25 MG: 25 TABLET ORAL at 13:25

## 2022-02-17 RX ADMIN — Medication 1 TABLET: at 14:30

## 2022-02-17 RX ADMIN — PRAVASTATIN SODIUM 40 MG: 20 TABLET ORAL at 13:26

## 2022-02-17 RX ADMIN — HEPARIN SODIUM 14 UNITS/KG/HR: 5000 INJECTION INTRAVENOUS; SUBCUTANEOUS at 08:02

## 2022-02-17 RX ADMIN — Medication 1 TABLET: at 13:25

## 2022-02-17 ASSESSMENT — ENCOUNTER SYMPTOMS
NAUSEA: 0
SHORTNESS OF BREATH: 1
PHOTOPHOBIA: 0
SINUS PRESSURE: 0
VOMITING: 0
ABDOMINAL DISTENTION: 0
SINUS PAIN: 0
DIARRHEA: 0
ABDOMINAL PAIN: 0

## 2022-02-17 ASSESSMENT — PAIN DESCRIPTION - PAIN TYPE: TYPE: OTHER (COMMENT)

## 2022-02-17 ASSESSMENT — PAIN SCALES - GENERAL
PAINLEVEL_OUTOF10: 2
PAINLEVEL_OUTOF10: 2
PAINLEVEL_OUTOF10: 6
PAINLEVEL_OUTOF10: 0
PAINLEVEL_OUTOF10: 2

## 2022-02-17 ASSESSMENT — PAIN DESCRIPTION - DESCRIPTORS: DESCRIPTORS: ACHING

## 2022-02-17 ASSESSMENT — PAIN DESCRIPTION - ORIENTATION: ORIENTATION: INNER

## 2022-02-17 ASSESSMENT — PAIN DESCRIPTION - LOCATION
LOCATION: BACK
LOCATION: BACK

## 2022-02-17 ASSESSMENT — PAIN DESCRIPTION - FREQUENCY: FREQUENCY: OTHER (COMMENT)

## 2022-02-17 NOTE — CARE COORDINATION
Case Management Initial Discharge Plan  Danii Begum,             Met with:patient to discuss discharge plans. Information verified: address, contacts, phone number, , insurance Yes  Insurance Provider: Baltazar Larsen    Emergency Contact/Next of Kin name & number: Joslyn Summers (granddaughter) [phone]  Who are involved in patient's support system? family    PCP: Edgar Rosas DO  Date of last visit: couple months      Discharge Planning    Living Arrangements:  Alone     Home has 1 stories  3 stairs to climb to get into front door    Patient able to perform ADL's:Independent    Current Services (outpatient & in home) home care  DME equipment: none  DME provider:     Is patient receiving oral anticoagulation therapy? No    Potential Assistance Needed:  N/A    Patient agreeable to home care: Yes  Freedom of choice provided:  yes    Prior SNF/Rehab Placement and Facility: no  Agreeable to SNF/Rehab: No  [place] of choice provided: n/a     Evaluation: no    Expected Discharge date:       Patient expects to be discharged to: If home: is the family and/or caregiver wiling & able to provide support at home? yes  Who will be providing this support? granddaughter    Follow Up Appointment: Best Day/ Time:      Transportation provider:   Transportation arrangements needed for discharge: No    Readmission Risk              Risk of Unplanned Readmission:  13             Does patient have a readmission risk score greater than 14?: No  If yes, follow-up appointment must be made within 7 days of discharge. Goals of Care: comfort      Educated patient on transitional options, provided freedom of choice and are agreeable with plan      Discharge Plan: home to granddaughter's house [address], [address]. Notified Dorene Tripp of updated address and discharge today.  She get pt set up to be seen          Electronically signed by Rachna Gillespie RN on 22 at 2:52 PM EST

## 2022-02-17 NOTE — PROGRESS NOTES
Patient feeling constant urge to urinate- only urinated 350 mL overnight. No burning or pain with urination and urine clear and yellow. Messaged MD about frequent urge to urinate and she requested a bladder scan. . Scan showed only 30 mL- MD requested we continue monitoring Is and Os.

## 2022-02-17 NOTE — PROGRESS NOTES
Patient discharged home in stable condition accompanied by staff and family. IV access discontinued. Discharge instructions explained and understanding verbalized.

## 2022-02-17 NOTE — H&P
[facility]  Office: [address], DO, Lowell Champion, DO, Elie Jg, DO, Livanjj Turcios Blood, DO, Blayne Flanagan MD, Eboni Durant MD, Robyn Ordonez MD, Braydon Castillo MD, Yancy Arora MD, Queen Chema MD, Alphonso Klinefelter, MD, Dipak Garza, DO, Diana Joseph, DO, Sergio Laureano MD,  Diana Olson, DO, Liberty Bautista MD, Alexandra Valdes MD, Reyna Berumen MD, Dahlia Burnham MD, Vikram Finnegan MD, Claudia Freeman, [facility], [facility], CNP, Patrick Wilkinson, CNP, Enedina Desir, CNS, Omar Oden, CNP, Heriberto Nguyen, CNP, Delfino Bedolla, CNP, Brenna Umanzor, CNP, Dain Hanley, CNP, Gab Urbina PA-C, Gisell Denny, [facility], Daniel Chavez, [facility], Nicki Acevedo, CNP, Jose Lima, CNP, Riri Salomon, CNP, Opal Rutledge, CNP, Arcelia Logan, CNP, Mamie Lea, [facility]   [address]    HISTORY AND PHYSICAL EXAMINATION            Date:   2/16/2022  Patient name:  Heather Rossi  Date of admission:  2/16/2022  3:25 PM  MRN:   [id number]  Account:  [de-identified]  YOB: 1935  PCP:    Aniceto Kim DO  Room:   16/16  Code Status:    Prior    Chief Complaint:     Chief Complaint   Patient presents with    Chest Pain       History Obtained From:     patient    History of Present Illness:     Heather Rossi is a 82 y.o. Non- / non  female who presents with Chest Pain   and is admitted to the hospital for the management of Acute deep vein thrombosis (DVT) of calf muscle vein of left lower extremity ([facility] [address] Patient was recently hospitalized for acute iron deficiency anemia. Gastroenterology evaluation was completed and patient was found to have a colonic mass likely malignant in nature. Prior to this admission patient was anticoagulated due to persistent A. fib. Patient was taken off of her anticoagulation due to anemia requiring transfusion.   Over the past several days patient has been having general fatigue and shortness of breath. Patient reported to the emergency department where she also endorsed lower extremity edema to the left leg worse than right. Emergency department evaluation is undertaken and patient is found to have an acute DVT to the left lower extremity. A PE study was completed and patient has multiple small pulmonary emboli to bilateral lungs without evidence of right heart strain. Patient was started on a heparin drip by the emergency department and admitted for further evaluation. At the time my exam patient is resting comfortably. CMS is intact to the distal extremities however lower extremity is edematous. Patient has no dyspnea while at rest.  Patient denies chest pain or tightness. Past Medical History:     Past Medical History:   Diagnosis Date    GERD (gastroesophageal reflux disease)     Hypertension         Past Surgical History:     Past Surgical History:   Procedure Laterality Date    BLADDER SUSPENSION  2001    CHOLECYSTECTOMY      COLONOSCOPY N/A 2/10/2022    COLONOSCOPY WITH BIOPSY performed by Say Prajapati MD at [facility]    HYSTERECTOMY  ovaries left    JOINT REPLACEMENT  left total shoulder; 1990    UPPER GASTROINTESTINAL ENDOSCOPY N/A 2/10/2022    EGD ESOPHAGOGASTRODUODENOSCOPY performed by Say Prajapati MD at [facility]        Medications Prior to Admission:     Prior to Admission medications    Medication Sig Start Date End Date Taking?  Authorizing Provider   lisinopril (PRINIVIL;ZESTRIL) 20 MG tablet Take 1 tablet by mouth daily 2/13/22   Merry Villarreal DO   amiodarone (CORDARONE) 200 MG tablet Take 1 tablet by mouth daily 2/11/22   MALVIN Hairston CNP   traZODone (DESYREL) 50 MG tablet TAKE ONE TABLET BY MOUTH ONCE NIGHTLY 2/7/22   Sameera Hernandez DO   bumetanide (BUMEX) 1 MG tablet Take 0.5 tablets by mouth daily  Patient taking differently: Take 1 mg by mouth daily  2/1/22 3/3/22  Sameera Hernandez DO   pravastatin (PRAVACHOL) 40 MG tablet TAKE 1 TABLET DAILY 8/5/21   Gwyn London DO   potassium chloride (KLOR-CON M) 20 MEQ TBCR extended release tablet Take 40 mEq by mouth daily (with breakfast)    Historical Provider, MD   calcium-vitamin D (OSCAL-500) 500-200 MG-UNIT per tablet Take 1 tablet by mouth daily    Historical Provider, MD   Multiple Vitamins-Minerals (THERAPEUTIC MULTIVITAMIN-MINERALS) tablet Take 1 tablet by mouth daily    Historical Provider, MD   metoprolol (LOPRESSOR) 25 MG tablet Take 25 mg by mouth 2 times daily    Historical Provider, MD        Allergies:     Codeine, Bactrim [sulfamethoxazole-trimethoprim], Naproxen, Norvasc [amlodipine besylate], Sertraline hcl, Toradol [ketorolac tromethamine], Tramadol, Magnesium-containing compounds, Morphine, and Percocet [oxycodone-acetaminophen]    Social History:     Tobacco:    reports that she quit smoking about 37 years ago. She has never used smokeless tobacco.  Alcohol:      reports no history of alcohol use. Drug Use:  reports no history of drug use. Family History:     Family History   Problem Relation Age of Onset    Cancer Brother     Cancer Other         bone    Cancer Daughter         breast       Review of Systems:     Positive and Negative as described in HPI. Review of Systems   Constitutional: Positive for activity change and fatigue. HENT: Negative for sinus pressure and sinus pain. Eyes: Negative for photophobia and visual disturbance. Respiratory: Positive for shortness of breath. Cardiovascular: Negative for chest pain and palpitations. Gastrointestinal: Negative for abdominal distention, abdominal pain and diarrhea. Endocrine: Negative for cold intolerance and heat intolerance. Genitourinary: Negative for decreased urine volume and vaginal bleeding. Musculoskeletal: Negative for arthralgias and gait problem. Skin: Negative. Neurological: Negative for syncope and weakness.    Hematological: Negative for adenopathy. Does not bruise/bleed easily. Psychiatric/Behavioral: Negative for agitation and confusion. Physical Exam:   BP (!) 171/70   Pulse 69   Temp 98.6 °F (37 °C) (Oral)   Resp 18   Wt 171 lb (77.6 kg)   SpO2 93%   BMI 29.35 kg/m²   Temp (24hrs), Av.6 °F (37 °C), Min:98.6 °F (37 °C), Max:98.6 °F (37 °C)    No results for input(s): POCGLU in the last 72 hours. No intake or output data in the 24 hours ending 22 2303    Physical Exam  Constitutional:       Appearance: Normal appearance. She is normal weight. HENT:      Head: Normocephalic and atraumatic. Nose: Nose normal.      Mouth/Throat:      Mouth: Mucous membranes are moist.   Eyes:      Extraocular Movements: Extraocular movements intact. Conjunctiva/sclera: Conjunctivae normal.      Pupils: Pupils are equal, round, and reactive to light. Cardiovascular:      Rate and Rhythm: Normal rate and regular rhythm. Pulses: Normal pulses. Pulmonary:      Effort: Pulmonary effort is normal. No respiratory distress. Breath sounds: Normal breath sounds. No wheezing. Abdominal:      General: Abdomen is flat. Bowel sounds are normal. There is no distension. Palpations: Abdomen is soft. Tenderness: There is no abdominal tenderness. Musculoskeletal:         General: No swelling, tenderness or deformity. Normal range of motion. Cervical back: Normal range of motion and neck supple. No rigidity. Skin:     General: Skin is warm and dry. Capillary Refill: Capillary refill takes less than 2 seconds. Neurological:      General: No focal deficit present. Mental Status: She is alert and oriented to person, place, and time.    Psychiatric:         Mood and Affect: Mood normal.         Behavior: Behavior normal.         Investigations:      Laboratory Testing:  Recent Results (from the past 24 hour(s))   CBC with Auto Differential    Collection Time: 22  3:45 PM   Result Value Ref Range    WBC 9.3 3.5 - 11.3 k/uL    RBC 5.34 (H) 3.95 - 5.11 m/uL    Hemoglobin 10.2 (L) 11.9 - 15.1 g/dL    Hematocrit 36.9 36.3 - 47.1 %    MCV 69.1 (L) 82.6 - 102.9 fL    MCH 19.1 (L) 25.2 - 33.5 pg    MCHC 27.6 (L) 28.4 - 34.8 g/dL    RDW 36.7 (H) 11.8 - 14.4 %    Platelets See Reflexed IPF Result 138 - 453 k/uL    MPV NOT REPORTED 8.1 - 13.5 fL    NRBC Automated 0.0 0.0 per 100 WBC    Differential Type NOT REPORTED     WBC Morphology NOT REPORTED     RBC Morphology NOT REPORTED     Platelet Estimate NOT REPORTED     Immature Granulocytes 0 0 %    Seg Neutrophils 78 (H) 36 - 65 %    Lymphocytes 10 (L) 24 - 43 %    Monocytes 10 3 - 12 %    Eosinophils % 1 1 - 4 %    Basophils 1 0 - 2 %    Absolute Immature Granulocyte 0.00 0.00 - 0.30 k/uL    Segs Absolute 7.26 1.50 - 8.10 k/uL    Absolute Lymph # 0.93 (L) 1.10 - 3.70 k/uL    Absolute Mono # 0.93 0.10 - 1.20 k/uL    Absolute Eos # 0.09 0.00 - 0.44 k/uL    Basophils Absolute 0.09 0.00 - 0.20 k/uL    Morphology ANISOCYTOSIS PRESENT     Morphology MICROCYTOSIS PRESENT     Morphology 2+ TARGET CELLS     Morphology 1+ POLYCHROMASIA     Morphology HYPOCHROMIA PRESENT     Morphology 1+ SCHISTOCYTES    Troponin    Collection Time: 02/16/22  3:45 PM   Result Value Ref Range    Troponin, High Sensitivity 26 (H) 0 - 14 ng/L    Troponin T NOT REPORTED <0.03 ng/mL    Troponin Interp NOT REPORTED    Brain Natriuretic Peptide    Collection Time: 02/16/22  3:45 PM   Result Value Ref Range    Pro-BNP 3,153 (H) <300 pg/mL    BNP Interpretation NOT REPORTED    Comprehensive Metabolic Panel    Collection Time: 02/16/22  3:45 PM   Result Value Ref Range    Glucose 95 70 - 99 mg/dL    BUN 10 8 - 23 mg/dL    CREATININE 0.76 0.50 - 0.90 mg/dL    Bun/Cre Ratio NOT REPORTED 9 - 20    Calcium 9.5 8.6 - 10.4 mg/dL    Sodium 139 135 - 144 mmol/L    Potassium 4.6 3.7 - 5.3 mmol/L    Chloride 100 98 - 107 mmol/L    CO2 25 20 - 31 mmol/L    Anion Gap 14 9 - 17 mmol/L    Alkaline Phosphatase 61 35 - 104 U/L ALT 19 5 - 33 U/L    AST 23 <32 U/L    Total Bilirubin 0.51 0.3 - 1.2 mg/dL    Total Protein 6.8 6.4 - 8.3 g/dL    Albumin 3.9 3.5 - 5.2 g/dL    Albumin/Globulin Ratio 1.3 1.0 - 2.5    GFR Non-African American >60 >60 mL/min    GFR African American >60 >60 mL/min    GFR Comment          GFR Staging NOT REPORTED    Lipase    Collection Time: 02/16/22  3:45 PM   Result Value Ref Range    Lipase 14 13 - 60 U/L   Magnesium    Collection Time: 02/16/22  3:45 PM   Result Value Ref Range    Magnesium 1.9 1.6 - 2.6 mg/dL   Lactic Acid, Whole Blood    Collection Time: 02/16/22  3:45 PM   Result Value Ref Range    Lactic Acid, Whole Blood 1.3 0.7 - 2.1 mmol/L   D-Dimer, Quantitative    Collection Time: 02/16/22  3:45 PM   Result Value Ref Range    D-Dimer, Quant 3.23 mg/L FEU   Protime-INR    Collection Time: 02/16/22  3:45 PM   Result Value Ref Range    Protime 11.1 9.1 - 12.3 sec    INR 1.0    APTT    Collection Time: 02/16/22  3:45 PM   Result Value Ref Range    PTT 25.4 20.5 - 30.5 sec   Immature Platelet Fraction    Collection Time: 02/16/22  3:45 PM   Result Value Ref Range    Platelet, Immature Fraction 3.7 1.1 - 10.3 %    Platelet, Fluorescence 176 138 - 453 k/uL   Troponin    Collection Time: 02/16/22  5:47 PM   Result Value Ref Range    Troponin, High Sensitivity 26 (H) 0 - 14 ng/L    Troponin T NOT REPORTED <0.03 ng/mL    Troponin Interp NOT REPORTED    CBC    Collection Time: 02/16/22  9:59 PM   Result Value Ref Range    WBC 10.5 3.5 - 11.3 k/uL    RBC 5.45 (H) 3.95 - 5.11 m/uL    Hemoglobin 10.3 (L) 11.9 - 15.1 g/dL    Hematocrit 36.6 36.3 - 47.1 %    MCV 67.2 (L) 82.6 - 102.9 fL    MCH 18.9 (L) 25.2 - 33.5 pg    MCHC 28.1 (L) 28.4 - 34.8 g/dL    RDW 37.1 (H) 11.8 - 14.4 %    Platelets See Reflexed IPF Result 138 - 453 k/uL    MPV NOT REPORTED 8.1 - 13.5 fL    NRBC Automated 0.0 0.0 per 100 WBC   Immature Platelet Fraction    Collection Time: 02/16/22  9:59 PM   Result Value Ref Range    Platelet, Immature Fraction 4.6 1.1 - 10.3 %    Platelet, Fluorescence 187 138 - 453 k/uL       Imaging/Diagnostics:  XR SHOULDER LEFT (MIN 2 VIEWS)    Result Date: 2/10/2022  1. No acute osseous abnormality 2. Left shoulder arthroplasty without hardware complication. XR CHEST PORTABLE    Result Date: 2/16/2022  Borderline pulmonary vascular congestion. Otherwise negative portable chest radiograph. CT CHEST PULMONARY EMBOLISM W CONTRAST    Addendum Date: 2/16/2022    ADDENDUM: Findings were discussed with Dr. Brandan Kilpatrick at 5:47 pm on 2/16/2022. Result Date: 2/16/2022  Small pulmonary emboli are detected within all of the lobes of the lungs, with the exception of the right upper lobe. There is an overall low clot burden. No convincing evidence of right heart strain. CT CHEST ABDOMEN PELVIS W CONTRAST    Result Date: 2/10/2022  1. Patient has a known colonic mass found on colonoscopy. This is not definitively identified. The colon is tortuous. Area of asymmetric diffuse thickening and some narrowing in: At or just proximal to the hepatic flexure may represent the lesion. 2. No evidence for metastatic disease in the chest abdomen or pelvis. 3. 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 4. Trace pleural effusion adjacent subsegmental atelectasis more pronounced on the right. Overall slightly more prominent than prior.  RECOMMENDATIONS: Unavailable       Assessment :      Hospital Problems           Last Modified POA    * (Principal) Acute deep vein thrombosis (DVT) of calf muscle vein of left lower extremity ([address] 2/16/2022 Yes    Hyperlipidemia (Chronic) 2/16/2022 Yes    Hypertension (Chronic) 2/16/2022 Yes    Gastroesophageal reflux disease without esophagitis 2/16/2022 Yes    Paroxysmal atrial fibrillation (HCC) (Chronic) 2/16/2022 Yes    Chronic anticoagulation 2/16/2022 Yes    Iron deficiency anemia 2/16/2022 Yes    (HFpEF) heart failure with preserved ejection fraction ([address] 2/16/2022 Yes    Severe tricuspid regurgitation 2/16/2022 Yes    Bilateral pulmonary embolism ([address] 2/16/2022 Yes          Plan:     Patient status inpatient in the  Med/Surge    Acute DVT left lower extremity with bilateral PE, small without right heart strain  Currently on heparin drip, long-term anticoagulation to be determined following vascular consult  Vascular surgery consultation, ? Cristino filter  Check iron and TIBC due to recent anemia, hemoglobin greater than 10 today  Colonic mass, -Tubular adenoma without evidence of malignancy  Follow-up outpatient with colorectal surgeon  Hypertension with hyperlipidemia  Home medication regiment  Paroxysmal atrial fibrillation with recent chronic anticoagulation, discontinued 1 week ago  Restart anticoagulation for now      Consultations:   IP CONSULT TO INTERNAL MEDICINE    Patient is admitted as inpatient status because of co-morbidities listed above, severity of signs and symptoms as outlined, requirement for current medical therapies and most importantly because of direct risk to patient if care not provided in a hospital setting. Expected length of stay > 48 hours.     MALVIN Loaiza NP  2/16/2022  11:04 PM    Copy sent to Dr. Nikky Hernandez DO

## 2022-02-17 NOTE — DISCHARGE INSTR - COC
Continuity of Care Form    Patient Name: Gricelda Marrero   :  1935  MRN:  [id number]    Admit date:  2022  Discharge date:  22    Code Status Order: Prior   Advance Directives:      Admitting Physician:  No admitting provider for patient encounter. PCP: Margo Lees DO    Discharging Nurse: ANGIE Figueroa Unit/Room#: 0707/6553-52  Discharging Unit Phone Number: ***    Emergency Contact:   Extended Emergency Contact Information  Primary Emergency Contact: Karolina Cedeño [address] Phone: [phone]  Relation: Child    Past Surgical History:  Past Surgical History:   Procedure Laterality Date    BLADDER SUSPENSION      CHOLECYSTECTOMY      COLONOSCOPY N/A 2/10/2022    COLONOSCOPY WITH BIOPSY performed by Steven Schwartz MD at [facility]  ovaries left    JOINT REPLACEMENT  left total shoulder;     UPPER GASTROINTESTINAL ENDOSCOPY N/A 2/10/2022    EGD ESOPHAGOGASTRODUODENOSCOPY performed by Steven Schwartz MD at [facility]       Immunization History:   Immunization History   Administered Date(s) Administered    COVID-19, Pfizer Purple top, DILUTE for use, 12+ yrs, 30mcg/0.3mL dose 2021, 2021    Influenza Virus Vaccine 2014, 2015, 2016, 11/15/2017, 2018, 2020    Influenza, Intradermal, Preservative free 10/13/2009    Influenza, Quadv, IM, (6 mo and older Fluzone, Flulaval, Fluarix and 3 yrs and older Afluria) 2016, 11/15/2017, 2018    Influenza, Quadv, adjuvanted, 65 yrs +, IM, PF (Fluad) 2020    Influenza, Triv, inactivated, subunit, adjuvanted, IM (Fluad 65 yrs and older) 2019    Pneumococcal Conjugate 13-valent (Ofsbajk22) 10/29/2015    Pneumococcal Polysaccharide ([id number]) 2006, 10/01/2010    Td, unspecified formulation 2011    Tdap (Boostrix, Adacel) 2019, 2021    Zoster Live (Zostavax) 09/10/2010       Active Problems:  Patient Active Problem List   Diagnosis Code    Hyperlipidemia E78.5    Hypertension I10    Anxiety F41.9    Gastroesophageal reflux disease without esophagitis K21.9    Atypical chest pain R07.89    Paroxysmal atrial fibrillation (HCC) I48.0    Asymptomatic varicose veins I83.90    Bilateral edema of lower extremity R60.0    Chronic anticoagulation Z79.01    Former smoker Z87.891    Mild aortic regurgitation I35.1    Mild concentric left ventricular hypertrophy (LVH) I51.7    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    Hiatal hernia K44.9    Iron deficiency anemia D50.9    History of Nissen fundoplication S36.589    Closed wedge fracture of thoracic vertebra with routine healing T5,T8 S22.000D    (HFpEF) heart failure with preserved ejection fraction (HCC) I50.30    Acute on chronic diastolic congestive heart failure (HCC) I50.33    Severe tricuspid regurgitation I07.1    Pulmonary hypertension (HCC) I27.20    Tubular adenoma:  3 cm transverse colon D36.9    Iron deficiency E61.1    Acute deep vein thrombosis (DVT) of calf muscle vein of left lower extremity (HCC) I82.462    Bilateral pulmonary embolism (HCC) I26.99       Isolation/Infection:   Isolation            No Isolation          Patient Infection Status       Infection Onset Added Last Indicated Last Indicated By Review Planned Expiration Resolved Resolved By    None active    Resolved    COVID-19 (Rule Out) 11/15/20 11/15/20 11/15/20 Covid-19 Ambulatory (Ordered)   11/18/20 Rule-Out Test Resulted            Nurse Assessment:  Last Vital Signs: BP (!) 157/72   Pulse 77   Temp 99.1 °F (37.3 °C) (Oral)   Resp 18   Wt 171 lb (77.6 kg)   SpO2 96%   BMI 29.35 kg/m²     Last documented pain score (0-10 scale): Pain Level: 0  Last Weight:   Wt Readings from Last 1 Encounters:   02/16/22 171 lb (77.6 kg)     Mental Status:  oriented, alert, and able to concentrate and follow conversation    IV Access:  - None    Nursing Mobility/ADLs:  Walking 2:56 PM EST    PHYSICIAN SECTION    Prognosis: Fair    Condition at Discharge: Stable    Rehab Potential (if transferring to Rehab): Fair    Recommended Labs or Other Treatments After Discharge: Follow-up with your primary care physician within 1 week of discharge for posthospitalization follow-up.  -Continue follow-up with colorectal surgery due to concerns for malignancy given previous colonoscopy results  -Return to the hospital for any bleeding or develop lightheadedness, dizziness, nausea, vomiting, diarrhea  -Continue follow-up with your cardiologist for management of your personal atrial fibrillation and heart failure.  -Continue her lower extremity stockings  -Needs repeat CBC in one week. Physician Certification: I certify the above information and transfer of Roselyn Lee  is necessary for the continuing treatment of the diagnosis listed and that she requires Home Care for greater 30 days.      Update Admission H&P: No change in H&P    PHYSICIAN SIGNATURE:  Electronically signed by Verito Kelly DO on 2/17/22 at 2:52 PM EST

## 2022-02-17 NOTE — PROGRESS NOTES
CLINICAL PHARMACY NOTE: MEDS TO BEDS    Total # of Prescriptions Filled: 1   The following medications were delivered to the patient:  · ferosul 325mg tablet    Additional Documentation: meds delivered to the pt in room 3007, I let the pt know that the dr also wrote for eliquis, but the insurance was saying that it was too soon to fill, the pt confirmed that she has this medication at home. I instructed her to follow the directions on the green sheet attached to her bag of meds and not the directions on the bottle at home. The pt confirmed that she understood to take 2 tablets by mouth twice daily for 7 days then to take 1 tablet by mouth twice daily.  meds delivered on 02.17.22 at 16:46

## 2022-02-17 NOTE — DISCHARGE SUMMARY
[facility]  Office: [address], DO, Deanne Hoang, DO, Parveen Cardona, DO, Kumar Gunn, DO, Yolanda Farmer MD, Natalie Correia MD, Shaheed Ferrer MD, Jose Barreto MD, Ab Hare MD, Yulisa Strange MD, Nancy Fuentes MD, Becca Mckeon, DO, Lloyd Butt, DO, Natalie Morton MD,  Sony Pina, DO, Stephanie Huizar MD, Oza Hatchet, MD, Rikki Serrano MD, Estephanie Ricks MD, Cyndy Adam MD, Zelda Schirmer, CNP, Mendoza Rivas, CNP, Smiley Garibay, CNP, Mich Strong, CNS, Dana Pickens, CNP, Quang Millard, CNP, Sally Li, CNP, Jasen Martins, CNP, Julian Moya, CNP, Jaden Zuniga PA-C, Kiara Gamez, [facility], Sheri Gonzalez, [facility], Tami Austin, CNP, Noni Pérez, CNP, Dionicio Mazariegos, CNP, Wayne Colindres, CNP, Duke Islas, CNP, Carson Goltz, [facility]    Discharge Summary     Patient ID: Fede Martinez  :  1935   MRN: [id number]     ACCOUNT:  [de-identified]   Patient's PCP: Urmila Blandon DO  Admit Date: 2022   Discharge Date: 2022     Length of Stay: 1  Code Status:  Prior  Admitting Physician: No admitting provider for patient encounter. Discharge Physician: Anyi Quiroga DO     Active Discharge Diagnoses:     Hospital Problem Lists:  Principal Problem:    Acute pulmonary embolism without acute cor pulmonale (HCC)  Active Problems:    Hyperlipidemia    Hypertension    Gastroesophageal reflux disease without esophagitis    Paroxysmal atrial fibrillation (HCC)    Chronic anticoagulation    Iron deficiency anemia    (HFpEF) heart failure with preserved ejection fraction (HCC)    Acute on chronic heart failure with preserved ejection fraction (HCC)    Severe tricuspid regurgitation    Acute deep vein thrombosis (DVT) of calf muscle vein of left lower extremity (HCC)  Resolved Problems:    * No resolved hospital problems.  *      Admission Condition:  stable     Discharged Condition: stable    Hospital Stay:     Hospital Course:  Myrna Brandon is a 82 y.o. female who initially presented to our hospital with complaints of weakness and lower extremity swelling. CTA of her chest was done which showed small bilateral pulmonary emboli within all the lobes of the lungs with exception of the right upper lobe. There was no evidence of right heart strain. Of note, patient was recently discharged from our hospital on 2/12/2022 after being treated for anemia thought to be secondary to iron deficiency and chronic blood loss. At that time, she underwent colonoscopy which showed a large polypoid mass in the transverse colon close to the hepatic flexure covering about 75% of the circumference and about 3 cm in diameter. Patient had the mass biopsied which came back positive for tubular adenoma. However, despite negative biopsy, there was still very high concern for malignancy. Patient was treated for iron deficiency and discharged home off of her Eliquis due to concern for rebleeding however presents this time with pulmonary embolism. Long discussion was had with both patient and her primary caregiver regarding the risk benefits of anticoagulation. Discussed risk of worsening pulmonary embolism first risk of bleeding on anticoagulation given her mass. Decision was ultimately made to proceed with anticoagulation. Patient was started on Eliquis 10 mg twice daily. They were instructed to return to the hospital should he develop any further bleeding. Regarding her mass, patient does have outpatient follow-up with colorectal surgery scheduled for next week where further treatment will be planned. Otherwise, patient instructed to continue follow-up with her cardiologist after previously been started on Bumex for lower extremity swelling and concerns for diastolic dysfunction. Patient was given pression stockings to help.     Significant therapeutic interventions: See above    Significant Diagnostic Studies:   Labs / Micro:  CBC:   Lab Results   Component Value Date    WBC 10.5 02/16/2022    RBC 5.45 02/16/2022    HGB 10.3 02/16/2022    HCT 36.6 02/16/2022    MCV 67.2 02/16/2022    MCH 18.9 02/16/2022    MCHC 28.1 02/16/2022    RDW 37.1 02/16/2022    PLT See Reflexed IPF Result 02/16/2022        Radiology:  XR SHOULDER LEFT (MIN 2 VIEWS)    Result Date: 2/10/2022  1. No acute osseous abnormality 2. Left shoulder arthroplasty without hardware complication. XR CHEST PORTABLE    Result Date: 2/16/2022  Borderline pulmonary vascular congestion. Otherwise negative portable chest radiograph. CT CHEST PULMONARY EMBOLISM W CONTRAST    Addendum Date: 2/16/2022    ADDENDUM: Findings were discussed with Dr. Sola Nguyen at 5:47 pm on 2/16/2022. Result Date: 2/16/2022  Small pulmonary emboli are detected within all of the lobes of the lungs, with the exception of the right upper lobe. There is an overall low clot burden. No convincing evidence of right heart strain. CT CHEST ABDOMEN PELVIS W CONTRAST    Result Date: 2/10/2022  1. Patient has a known colonic mass found on colonoscopy. This is not definitively identified. The colon is tortuous. Area of asymmetric diffuse thickening and some narrowing in: At or just proximal to the hepatic flexure may represent the lesion. 2. No evidence for metastatic disease in the chest abdomen or pelvis. 3. 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 4. Trace pleural effusion adjacent subsegmental atelectasis more pronounced on the right. Overall slightly more prominent than prior. RECOMMENDATIONS: Unavailable       Consultations:    Consults:     Final Specialist Recommendations/Findings:   IP CONSULT TO INTERNAL MEDICINE  IP CONSULT TO HOME CARE NEEDS      The patient was seen and examined on day of discharge and this discharge summary is in conjunction with any daily progress note from day of discharge.     Discharge plan: Disposition: Home    Physician Follow Up:     Marc Posey, DO  [address]  [facility]  [phone]    Schedule an appointment as soon as possible for a visit in 1 week  Hospital follow-up after being admitted and treated for pulmonary embolism    Tenisha Saleem MD  [id number] [facility] [address]  [phone]    Schedule an appointment as soon as possible for a visit  Follow up to go over biopsy results. Anne Rivera MD  [facility] [address] [place] [address]  [phone]      Heart failure, atrial fibrillation       Requiring Further Evaluation/Follow Up POST HOSPITALIZATION/Incidental Findings:   -Follow-up with your primary care physician within 1 week of discharge for posthospitalization follow-up.  -Continue follow-up with colorectal surgery due to concerns for malignancy given previous colonoscopy results  -Return to the hospital for any bleeding or develop lightheadedness, dizziness, nausea, vomiting, diarrhea  -Continue follow-up with your cardiologist for management of your personal atrial fibrillation and heart failure.  -Continue her lower extremity stockings  -Needs repeat CBC in one week. Diet: cardiac diet    Activity: As tolerated    Instructions to Patient: see above    Discharge Medications:      Medication List      START taking these medications    * apixaban 5 MG Tabs tablet  Commonly known as: Eliquis  Take 2 tablets by mouth 2 times daily for 7 days     * apixaban 5 MG Tabs tablet  Commonly known as: Eliquis  Take 1 tablet by mouth 2 times daily  Start taking on: February 25, 2022     Compression Stockings Misc  by Does not apply route     ferrous sulfate 325 (65 Fe) MG EC tablet  Commonly known as: FE TABS 325  Take 1 tablet by mouth daily (with breakfast)  Start taking on: February 18, 2022         * This list has 2 medication(s) that are the same as other medications prescribed for you.  Read the directions carefully, and ask your doctor or other care provider to review them with you. CHANGE how you take these medications    bumetanide 1 MG tablet  Commonly known as: BUMEX  Take 0.5 tablets by mouth daily  What changed: how much to take        CONTINUE taking these medications    amiodarone 200 MG tablet  Commonly known as: CORDARONE  Take 1 tablet by mouth daily     calcium-vitamin D 500-200 MG-UNIT per tablet  Commonly known as: OSCAL-500     lisinopril 20 MG tablet  Commonly known as: PRINIVIL;ZESTRIL  Take 1 tablet by mouth daily     metoprolol tartrate 25 MG tablet  Commonly known as: LOPRESSOR     potassium chloride 20 MEQ Tbcr extended release tablet  Commonly known as: KLOR-CON M     pravastatin 40 MG tablet  Commonly known as: PRAVACHOL  TAKE 1 TABLET DAILY     therapeutic multivitamin-minerals tablet     traZODone 50 MG tablet  Commonly known as: DESYREL  TAKE ONE TABLET BY MOUTH ONCE NIGHTLY           Where to Get Your Medications      These medications were sent to [facility] [address] [address]    Phone: [phone]   apixaban 5 MG Tabs tablet  apixaban 5 MG Tabs tablet  ferrous sulfate 325 (65 Fe) MG EC tablet     You can get these medications from any pharmacy    Bring a paper prescription for each of these medications  Compression Stockings Misc         Discharge Procedure Orders   CBC with Auto Differential   Standing Status: Future Standing Exp. Date: 03/17/22   Order Comments: Please forward to August San DO       Time Spent on discharge is  37 mins in patient examination, evaluation, counseling as well as medication reconciliation, prescriptions for required medications, discharge plan and follow up. Electronically signed by   Kartik Gonzalez DO  2/17/2022  2:52 PM      Thank you Dr. August San DO for the opportunity to be involved in this patient's care.

## 2022-02-17 NOTE — PLAN OF CARE
Problem: Falls - Risk of:  Goal: Will remain free from falls  Description: Will remain free from falls  2/17/2022 1433 by Damián Blair RN  Outcome: Completed  2/17/2022 0121 by Ting Pires RN  Outcome: Ongoing  Goal: Absence of physical injury  Description: Absence of physical injury  2/17/2022 1433 by Damián Blair RN  Outcome: Completed  2/17/2022 0121 by Ting Pires RN  Outcome: Ongoing     Problem: Skin Integrity:  Goal: Will show no infection signs and symptoms  Description: Will show no infection signs and symptoms  Outcome: Completed  Goal: Absence of new skin breakdown  Description: Absence of new skin breakdown  Outcome: Completed

## 2022-02-17 NOTE — PROGRESS NOTES
[facility]  Office: [address], DO, Rukhsana Nj, DO, Orlando Garcia, DO, Kanu Montgomeryan Blood, DO, Art Leary MD, Shannan Hernandez MD, Bereket Martel MD, Lawanda Kirk MD, Kian Corea MD, Megan Montejo MD, Milli Orantes MD, Janeen Moon, DO, Antoinette Mich, DO, Paolo Sargent MD,  Kita Santos, DO, Eduin Pena MD, Frank Huggins MD, Yisel Gold MD, Linh Moore MD, Abdulaziz Saldaña MD, Clyde Murray, CNP, Colby Cat, CNP, Gideon Ocasio, CNP, Crystal Kaufman, CNS, Radha Barksdale, CNP, Daniella Tracey, CNP, Livier Headley, CNP, Clyde Berg, CNP, Gerardo Ceron, CNP, CASTRO AvilesC, Laron Shultz, DNP, Jefferson Craig, DNP, Marlee Cabello, CNP, Elvie Edwards, CNP, Boyd Ceballos, CNP, Logan Montana, CNP, Ingrid Roach, CNP, Christopher Renteria, [address] [place] [address]    Progress Note    2/17/2022    2:18 PM    Name:   Bernice Delgadillo  MRN:     [id number]     Acct:      [de-identified]   Room:   [address] [place] [address] Day:  1  Admit Date:  2/16/2022  3:25 PM    PCP:   Thornton Kussmaul, DO  Code Status:  Prior    Subjective:     C/C:   Chief Complaint   Patient presents with    Chest Pain     Interval History Status: improved. Patient seen and examined at bedside today. Patient resting comfortably no acute distress. Patient says she came in initially for weakness, leg swelling which has been progressively getting worse. Patient was previously admitted to our hospital for GI bleeding and was found to have a mass concerning for malignancy- this was biopsied came back positive for adenoma. At that time, patient's Eliquis was held due to high risk of rebleed. Patient has since been off of anticoagulation    Brief History:     72-year-old female comes in with weakness and lower extremity swelling.   Patient was recently discharged from our hospital after being treated for iron deficiency anemia/GI bleed and found to have colonic mass which was positive for adenoma. It was recommended that patient be taken off of Eliquis at that time so she was discharged off of it. She presents today with increasing fatigue and lower extremity swelling. CT PE protocol showed     Review of Systems:     Constitutional:  negative for chills, fevers, sweats  Respiratory:  negative for cough, dyspnea on exertion, shortness of breath, wheezing  Cardiovascular:  negative for chest pain, chest pressure/discomfort, lower extremity edema, palpitations  Gastrointestinal:  negative for abdominal pain, constipation, diarrhea, nausea, vomiting  Neurological:  negative for dizziness, headache    Medications: Allergies: Allergies   Allergen Reactions    Codeine Nausea Only     dizziness    Bactrim [Sulfamethoxazole-Trimethoprim] Hives    Naproxen      On allergy list ? Unsure of reaction    Norvasc [Amlodipine Besylate]      Edema-she thinks      Sertraline Hcl      unsure    Toradol [Ketorolac Tromethamine]      unsure    Tramadol      unsure    Magnesium-Containing Compounds Diarrhea     Mag ox 500 ?     Morphine Nausea And Vomiting    Percocet [Oxycodone-Acetaminophen] Nausea And Vomiting       Current Meds:   Scheduled Meds:    bumetanide  1 mg Oral Daily    lisinopril  20 mg Oral Daily    metoprolol tartrate  25 mg Oral BID    therapeutic multivitamin-minerals  1 tablet Oral Daily    pravastatin  40 mg Oral Daily    traZODone  50 mg Oral Nightly    [START ON 2/18/2022] ferrous sulfate  325 mg Oral Daily with breakfast    apixaban  10 mg Oral BID    [START ON 2/18/2022] potassium chloride  40 mEq Oral Daily with breakfast    calcium carbonate-vitamin D3  1 tablet Oral Daily     Continuous Infusions:     PRN Meds: acetaminophen    Data:     Past Medical History:   has a past medical history of Blood circulation, collateral, CHF (congestive heart failure) ([facility] [address] COPD (chronic obstructive pulmonary disease) ([facility] [address] GERD (gastroesophageal reflux disease), Hyperlipidemia, and Hypertension. Social History:   reports that she quit smoking about 37 years ago. She has never used smokeless tobacco. She reports that she does not drink alcohol and does not use drugs. Family History:   Family History   Problem Relation Age of Onset    Cancer Brother     Cancer Other         bone    Cancer Daughter         breast       Vitals:  BP (!) 152/58   Pulse 73   Temp 98.9 °F (37.2 °C) (Oral)   Resp 19   Wt 171 lb (77.6 kg)   SpO2 96%   BMI 29.35 kg/m²   Temp (24hrs), Av.7 °F (37.1 °C), Min:98.1 °F (36.7 °C), Max:99.1 °F (37.3 °C)    No results for input(s): POCGLU in the last 72 hours. I/O (24Hr):     Intake/Output Summary (Last 24 hours) at 2022 1418  Last data filed at 2022 1331  Gross per 24 hour   Intake 85.47 ml   Output 100 ml   Net -14.53 ml       Labs:  Hematology:  Recent Labs     22  1545 22  2159   WBC 9.3 10.5   RBC 5.34* 5.45*   HGB 10.2* 10.3*   HCT 36.9 36.6   MCV 69.1* 67.2*   MCH 19.1* 18.9*   MCHC 27.6* 28.1*   RDW 36.7* 37.1*   PLT See Reflexed IPF Result See Reflexed IPF Result   MPV NOT REPORTED NOT REPORTED   INR 1.0  --    DDIMER 3.23  --      Chemistry:  Recent Labs     22  1545 22  1747     --    K 4.6  --      --    CO2 25  --    GLUCOSE 95  --    BUN 10  --    CREATININE 0.76  --    MG 1.9  --    ANIONGAP 14  --    LABGLOM >60  --    GFRAA >60  --    CALCIUM 9.5  --    PROBNP 3,153*  --    TROPHS 26* 26*   LACTACIDWB 1.3  --      Recent Labs     22  1545   PROT 6.8   LABALBU 3.9   AST 23   ALT 19   ALKPHOS 61   BILITOT 0.51   LIPASE 14     ABG:No results found for: POCPH, PHART, PH, POCPCO2, CBY2VRU, PCO2, POCPO2, PO2ART, PO2, POCHCO3, ANQ6VIL, HCO3, NBEA, PBEA, BEART, BE, THGBART, THB, TZS1QPZ, FOPN1SQZ, P3DSRGEV, O2SAT, FIO2  Lab Results   Component Value Date/Time    SPECIAL NOT REPORTED 2020 12:15 PM     Lab Results   Component Value Date/Time    CULTURE NO SIGNIFICANT GROWTH 09/28/2016 07:19 AM    CULTURE  09/28/2016 07:19 AM     Performed at [facility], [address] [place] [address] [phone]       Radiology:  XR SHOULDER LEFT (MIN 2 VIEWS)    Result Date: 2/10/2022  1. No acute osseous abnormality 2. Left shoulder arthroplasty without hardware complication. XR CHEST PORTABLE    Result Date: 2/16/2022  Borderline pulmonary vascular congestion. Otherwise negative portable chest radiograph. CT CHEST PULMONARY EMBOLISM W CONTRAST    Addendum Date: 2/16/2022    ADDENDUM: Findings were discussed with Dr. Stevan Damon at 5:47 pm on 2/16/2022. Result Date: 2/16/2022  Small pulmonary emboli are detected within all of the lobes of the lungs, with the exception of the right upper lobe. There is an overall low clot burden. No convincing evidence of right heart strain. CT CHEST ABDOMEN PELVIS W CONTRAST    Result Date: 2/10/2022  1. Patient has a known colonic mass found on colonoscopy. This is not definitively identified. The colon is tortuous. Area of asymmetric diffuse thickening and some narrowing in: At or just proximal to the hepatic flexure may represent the lesion. 2. No evidence for metastatic disease in the chest abdomen or pelvis. 3. 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 4. Trace pleural effusion adjacent subsegmental atelectasis more pronounced on the right. Overall slightly more prominent than prior.  RECOMMENDATIONS: Unavailable       Physical Examination:        General appearance:  alert, cooperative and no distress  Mental Status:  oriented to person, place and time and normal affect  Lungs:  clear to auscultation bilaterally, normal effort  Heart:  regular rate and rhythm, no murmur  Abdomen:  soft, nontender, nondistended, normal bowel sounds, no masses, hepatomegaly, splenomegaly  Extremities:  no edema, redness, tenderness in the calves  Skin:  no gross lesions, rashes, induration    Assessment:        Hospital Problems           Last Modified POA    * (Principal) Acute pulmonary embolism without acute cor pulmonale ([address] 2/17/2022 Yes    Hyperlipidemia (Chronic) 2/16/2022 Yes    Hypertension (Chronic) 2/16/2022 Yes    Gastroesophageal reflux disease without esophagitis 2/16/2022 Yes    Paroxysmal atrial fibrillation (HCC) (Chronic) 2/16/2022 Yes    Chronic anticoagulation 2/16/2022 Yes    Iron deficiency anemia 2/16/2022 Yes    (HFpEF) heart failure with preserved ejection fraction ([address] 2/16/2022 Yes    Acute on chronic heart failure with preserved ejection fraction ([address] 2/17/2022 Yes    Severe tricuspid regurgitation 2/16/2022 Yes    Acute deep vein thrombosis (DVT) of calf muscle vein of left lower extremity ([address] 2/17/2022 Yes          Plan:        Acute Pulmonary embolism: CT showed small pulmonary emboli within all lobes of the lungs with exception of the right upper lobe with low clot burden no convincing evidence of right heart strain. Lower extremity Doppler showed acute DVT in the gastrocnemius vein (below the knee )on the left but no DVT on the right. Long discussion with patient and granddaughter at bedside regarding risk benefit of anticoagulation. Patient was recently hospitalized with iron deficiency anemia and found to have a colon mass that was biopsied and came back positive for tubular adenoma. Discussed risk of bleeding versus risk of worsening PE. Ultimately, decision was made for patient to restart on anticoagulation. Told patient/family to monitor for worsening bleeding and to return to the hospital s continue hould she develop. Paroxysmal atrial fibrillation: On Eliquis again, continue beta-blocker  Iron deficiency anemia: On iron supplementation  Acute on chronic Exacerbation of HFpEF: There was concern for diastolic heart failure, patient was started on Bumex 0.5 mg daily by her PCP. We will continue for now.   She has an appointment

## 2022-02-18 ENCOUNTER — CARE COORDINATION (OUTPATIENT)
Dept: CASE MANAGEMENT | Age: 87
End: 2022-02-18

## 2022-02-18 DIAGNOSIS — I82.462 ACUTE DEEP VEIN THROMBOSIS (DVT) OF CALF MUSCLE VEIN OF LEFT LOWER EXTREMITY (HCC): Primary | ICD-10-CM

## 2022-02-18 LAB
EKG ATRIAL RATE: 69 BPM
EKG P AXIS: 62 DEGREES
EKG P-R INTERVAL: 288 MS
EKG Q-T INTERVAL: 458 MS
EKG QRS DURATION: 146 MS
EKG QTC CALCULATION (BAZETT): 490 MS
EKG R AXIS: -8 DEGREES
EKG T AXIS: 12 DEGREES
EKG VENTRICULAR RATE: 69 BPM

## 2022-02-18 PROCEDURE — 1111F DSCHRG MED/CURRENT MED MERGE: CPT | Performed by: FAMILY MEDICINE

## 2022-02-18 NOTE — CARE COORDINATION
Scheduled patient for hospital f/u with PCP office. Will see Jessica KILLIAN.     Spoke with daughter who states she is good with 2/22 @ 9:40AM    Cheryle Eduardo, [address] [place] [address] Coordination Transition

## 2022-02-18 NOTE — CARE COORDINATION
[facility] Transitions Initial Follow Up Call    Call within 2 business days of discharge: Yes    Patient: Marsha Tafoya Patient : 1935   MRN: [id number]  Reason for Admission: Acute PE  Discharge Date: 22 RARS: Readmission Risk Score: 17.9 ( )      Last Discharge [facility]       Complaint Diagnosis Description Type Department Provider    22 Chest Pain Pulmonary vascular congestion . .. ED to Hosp-Admission (Discharged) (ADMITTED) [facility]  [facility], ; Jose Cruz White . .. Spoke with: [facility], Granddaughter    Facility: [facility]    Non-face-to-face services provided:  Scheduled appointment with PCP-3/7  Scheduled appointment with Specialist- with Dr. Cory Rodriguez and reviewed discharge summary and/or continuity of care documents     Spoke with patient's grand daughter, [facility]. She is caring for patient in her home right now, and is staying with the granddaughter at this time. She reports patient is still sleeping, has been sleeping for the past 14 hours. She had some confusion last evening when first coming home. Patient has some swelling to her bilateral legs but denies any chest pains, shortness of breath or cough. She has no s/s of bleeding, no blood in stool, nosebleeds, etc.  [facility] was advised to monitor for this now that she is back on her blood thinner. Medications reviewed, 1111F  done. Her appointments for follow up have been scheduled however she was not able to get into PCP office sooner than 3/7. I will route to Elaina ZENDEJAS to have her see about moving up appointment sooner. Granddaughter did voice some concerns about the patient eventually returning to her home, states she has been more forgetful lately and is questioning assisted living or additional services. I expressed that [facility] has SW that can help assist with these issues. I did give her the phone number to [facility].  Discussed PE, expressed for her to spot check O2 saturations to see where her baseline is and to check again if she gets short of breath. They deny any other needs or concerns at this time. Transitions of Care Initial Call    Was this an external facility discharge? No Discharge Facility: [facility]    Challenges to be reviewed by the provider   Additional needs identified to be addressed with provider: No  none             Method of communication with provider : none      Advance Care Planning:   Does patient have an Advance Directive: patient declined education. Was this a readmission? No  Patient stated reason for admission: chest pain, shortness of breath    Patients top risk factors for readmission: medical condition-PE    Care Transition Nurse (CTN) contacted the family by telephone to perform post hospital discharge assessment. Verified name and  with family as identifiers. Provided introduction to self, and explanation of the CTN role. CTN reviewed discharge instructions, medical action plan and red flags with family who verbalized understanding. Family given an opportunity to ask questions and does not have any further questions or concerns at this time. Were discharge instructions available to patient? Yes. Reviewed appropriate site of care based on symptoms and resources available to patient including: PCP and Specialist. The family agrees to contact the PCP office for questions related to their healthcare. Medication reconciliation was performed with family, who verbalizes understanding of administration of home medications. Advised obtaining a 90-day supply of all daily and as-needed medications. Covid Risk Education     Educated patient about risk for severe COVID-19 due to risk factors according to CDC guidelines. CTN reviewed discharge instructions, medical action plan and red flag symptoms with the family who verbalized understanding. Discussed COVID vaccination status: Yes.  Education provided on COVID-19 vaccination as appropriate. Discussed exposure protocols and quarantine with CDC Guidelines. Family was given an opportunity to verbalize any questions and concerns and agrees to contact CTN or health care provider for questions related to their healthcare. Reviewed and educated family on any new and changed medications related to discharge diagnosis. Was patient discharged with a pulse oximeter? No Discussed and confirmed pulse oximeter discharge instructions and when to notify provider or seek emergency care. CTN provided contact information. Plan for follow-up call in 5-7 days based on severity of symptoms and risk factors.   Plan for next call: Routine follow up, check if PCP appoitment moved up sooner          Care Transitions 24 Hour Call    Schedule Follow Up Appointment with PCP: Completed  Do you have any ongoing symptoms?: Yes  Patient-reported symptoms: Other (Comment: some intertmittent confusion last night, swelling to BLE)  Do you have a copy of your discharge instructions?: Yes  Do you have all of your prescriptions and are they filled?: Yes  Have you been contacted by a [facility] Pharmacist?: No  Have you scheduled your follow up appointment?: Yes  How are you going to get to your appointment?: Car - family or friend to transport  Were you discharged with any Home Care or Post Acute Services: Yes  Post Acute Services: Home Health (Comment: [facility])  Patient DME: Paulina rai  Do you feel like you have everything you need to keep you well at home?: Yes  Are you an active caregiver in your home?: Yes  Care Transitions Interventions         Follow Up  Future Appointments   Date Time Provider Erick Shepherd   3/7/2022  4:40 PM Jules Chavez   5/4/2022  2:00 PM Ady Castano DO [id number] [address] Roth RN

## 2022-02-22 ENCOUNTER — OFFICE VISIT (OUTPATIENT)
Dept: FAMILY MEDICINE CLINIC | Age: 87
End: 2022-02-22
Payer: MEDICARE

## 2022-02-22 VITALS
WEIGHT: 151 LBS | OXYGEN SATURATION: 96 % | DIASTOLIC BLOOD PRESSURE: 68 MMHG | SYSTOLIC BLOOD PRESSURE: 116 MMHG | HEART RATE: 64 BPM | BODY MASS INDEX: 25.92 KG/M2 | TEMPERATURE: 97.8 F

## 2022-02-22 DIAGNOSIS — I26.99 ACUTE PULMONARY EMBOLISM WITHOUT ACUTE COR PULMONALE, UNSPECIFIED PULMONARY EMBOLISM TYPE (HCC): Primary | ICD-10-CM

## 2022-02-22 DIAGNOSIS — R41.3 SHORT-TERM MEMORY LOSS: ICD-10-CM

## 2022-02-22 PROCEDURE — 99495 TRANSJ CARE MGMT MOD F2F 14D: CPT | Performed by: NURSE PRACTITIONER

## 2022-02-22 PROCEDURE — 1111F DSCHRG MED/CURRENT MED MERGE: CPT | Performed by: NURSE PRACTITIONER

## 2022-02-22 ASSESSMENT — PATIENT HEALTH QUESTIONNAIRE - PHQ9
SUM OF ALL RESPONSES TO PHQ9 QUESTIONS 1 & 2: 2
1. LITTLE INTEREST OR PLEASURE IN DOING THINGS: 0
SUM OF ALL RESPONSES TO PHQ QUESTIONS 1-9: 2
2. FEELING DOWN, DEPRESSED OR HOPELESS: 2

## 2022-02-22 NOTE — PROGRESS NOTES
Post-Discharge Transitional Care  Follow Up      Adi Farnsworth   YOB: 1935    Date of Office Visit:  2/22/2022  Date of Hospital Admission: 2/16/22  Date of Hospital Discharge: 2/17/22  Risk of hospital readmission (high >=14%. Medium >=10%) :Readmission Risk Score: 17.9 ( )      Care management risk score Rising risk (score 2-5) and Complex Care (Scores >=6): 5     Non face to face  following discharge, date last encounter closed (first attempt may have been earlier): 2/18/2022 10:29 AM    Call initiated 2 business days of discharge: Yes    ASSESSMENT/PLAN:   Acute pulmonary embolism without acute cor pulmonale, unspecified pulmonary embolism type ([facility] [address]  -     ME DISCHARGE MEDS RECONCILED W/ CURRENT OUTPATIENT MED LIST  Short-term memory loss  -     Teddy Brian MD, Neurology, [facility]      Medical Decision Making: moderate complexity  Return in about 1 month (around 3/22/2022). On this date 2/22/2022 I have spent 35 minutes reviewing previous notes, test results and face to face with the patient discussing the diagnosis and importance of compliance with the treatment plan as well as documenting on the day of the visit. Subjective:   HPI:  Follow up of Hospital problems/diagnosis(es): Acute PE    Inpatient course: Discharge summary reviewed- see chart. Interval history/Current status: patient has h/o paroxysmal a-fib and has been on Eliquis for this. Last week she stopped the Eliquis for colonoscopy and subsequently developed many clots in both lungs. She was hospitalized and monitored - discharged home without further incidence. She has been restarted on Eliquis and denies any s/s of bleeding anywhere. She is accompanied by her Granddaughter today and she is voicing concern about short term memory loss. She reports increased forgetfulness including conversations and this was occurring prior to this hospitalization.  She has not been evaluated by neurology for this problem in the past.    Patient is also concerned about a wound to her right lower leg and has 2 adhesive bandages covering the area.  She reports this happened after discharge from the hospital    Patient Active Problem List   Diagnosis    Hyperlipidemia    Hypertension    Anxiety    Gastroesophageal reflux disease without esophagitis    Atypical chest pain    Paroxysmal atrial fibrillation (HCC)    Asymptomatic varicose veins    Bilateral edema of lower extremity    Chronic anticoagulation    Former smoker    Mild aortic regurgitation    Mild concentric left ventricular hypertrophy (LVH)    Paraesophageal hernia    Hiatal hernia    Iron deficiency anemia    History of Nissen fundoplication    Closed wedge fracture of thoracic vertebra with routine healing T5,T8    (HFpEF) heart failure with preserved ejection fraction (HCC)    Acute on chronic heart failure with preserved ejection fraction (HCC)    Severe tricuspid regurgitation    Pulmonary hypertension (HCC)    Tubular adenoma:  3 cm transverse colon    Iron deficiency    Acute deep vein thrombosis (DVT) of calf muscle vein of left lower extremity (HCC)    Acute pulmonary embolism without acute cor pulmonale (HCC)       Medications listed as ordered at the time of discharge from hospital  [unfilled]      Medications marked \"taking\" at this time  Outpatient Medications Marked as Taking for the 2/22/22 encounter (Office Visit) with MALVIN Acevedo CNP   Medication Sig Dispense Refill    apixaban (ELIQUIS) 5 MG TABS tablet Take 2 tablets by mouth 2 times daily for 7 days 28 tablet 0    [START ON 2/25/2022] apixaban (ELIQUIS) 5 MG TABS tablet Take 1 tablet by mouth 2 times daily 60 tablet 1    ferrous sulfate (FE TABS 325) 325 (65 Fe) MG EC tablet Take 1 tablet by mouth daily (with breakfast) 90 tablet 3    Compression Stockings MISC by Does not apply route 1 each 0    lisinopril (PRINIVIL;ZESTRIL) 20 MG tablet Take 1 tablet by mouth daily 30 tablet 3    amiodarone (CORDARONE) 200 MG tablet Take 1 tablet by mouth daily 30 tablet 3    traZODone (DESYREL) 50 MG tablet TAKE ONE TABLET BY MOUTH ONCE NIGHTLY 90 tablet 1    bumetanide (BUMEX) 1 MG tablet Take 0.5 tablets by mouth daily (Patient taking differently: Take 1 mg by mouth daily ) 15 tablet 0    pravastatin (PRAVACHOL) 40 MG tablet TAKE 1 TABLET DAILY 14 tablet 0    potassium chloride (KLOR-CON M) 20 MEQ TBCR extended release tablet Take 40 mEq by mouth daily (with breakfast)      calcium-vitamin D (OSCAL-500) 500-200 MG-UNIT per tablet Take 1 tablet by mouth daily      Multiple Vitamins-Minerals (THERAPEUTIC MULTIVITAMIN-MINERALS) tablet Take 1 tablet by mouth daily      metoprolol (LOPRESSOR) 25 MG tablet Take 25 mg by mouth 2 times daily          Medications patient taking as of now reconciled against medications ordered at time of hospital discharge: Yes    A comprehensive review of systems was negative except for what was noted in the HPI. Objective:    · Constitutional: Ruben Santos is oriented to person, place, and time. Vital signs are normal. Appears well-developed and well-nourished. · HEENT:   · Head: Normocephalic and atraumatic. Right Ear: Hearing and external ear normal.   Left Ear: Hearing and external ear normal.   · Nose:  Nares normal. Septum midline. Mucosa normal. No drainage or sinus tenderness. · Mouth/Throat: Oropharynx-no erythema, no exudate. Uvula midline, no erythema, no edema. Mucous membranes are pink and moist.   · Eyes:PERRL, EOMI, Conjunctiva normal, No discharge. · Neck: Trachea normal, full passive range of motion. Non-tender on palpation. Neck supple. No thyromegaly present. · Cardiovascular: Normal rate, regular rhythm, S1, S2, no murmur, no gallop, no friction rub, intact distal pulses. · Pulmonary/Chest: Breath sounds are clear throughout, No respiratory distress, No wheezing, No chest tenderness.  Effort

## 2022-02-23 ENCOUNTER — CARE COORDINATION (OUTPATIENT)
Dept: CASE MANAGEMENT | Age: 87
End: 2022-02-23

## 2022-02-23 NOTE — CARE COORDINATION
[facility] Transitions Follow Up Call    2022    Patient: Jillian Yap  Patient : 1935   MRN: [id number]  Reason for Admission: Acute PE  Discharge Date: 22 RARS: Readmission Risk Score: 17.9 ( )         Spoke with: Pierre Mcgowan, granddaughter    Spoke with Pierre Roslyn who said patient is doing ok today. She said she is back home now and no longer staying with her granddaughter. Patient was seen at PCP office this week for follow up. She has been referred to neurology for the short term memory issues. [place] Roslyn states that the day patient was going to return to her home, she fell, missed last step and had \"goose egg\" to her head. She has been seen by PCP since this incident. Pierre Mcgowan had her stay one more day with her before returning home. She states she has some weakness still. They have not heard from [facility] home care, Pierre Mcgowan states nobody ever called. I called Jacqlyn Closs as notes indicated that CM gave updated information where patient is staying. Per Jacqlyn Closs, nurse had tried to contact patient and was unsuccessful. I did let her know that patient is now back in her own home and could still benefit from home care. Granddaughter said she needs assistance with medication set up as she sometimes takes medications too soon or will double up on medication accidentally because she doesn't remember taking. Dorene with [facility] to return call to see if they will be reaching out to patient again. Care Transitions Follow Up Call    Needs to be reviewed by the provider   Additional needs identified to be addressed with provider: No  none             Method of communication with provider : none      Care Transition Nurse (CTN) contacted the family by telephone to follow up after admission on . Verified name and  with family as identifiers. Addressed changes since last contact: none  Discussed follow-up appointments.      CTN reviewed discharge instructions, medical action plan and red flags with family and discussed any barriers to care and/or understanding of plan of care after discharge. Discussed appropriate site of care based on symptoms and resources available to patient including: PCP and Specialist. The family agrees to contact the PCP office for questions related to their healthcare. Patients top risk factors for readmission: medical condition-PE  Interventions to address risk factors: Obtained and reviewed discharge summary and/or continuity of care documents      Non-[facility] follow up appointment(s): na    CTN provided contact information for future needs. Plan for follow-up call in 7-10 days based on severity of symptoms and risk factors. Plan for next call: Routine follow up            Care Transitions Subsequent and Final Call    Schedule Follow Up Appointment with PCP: Completed  Subsequent and Final Calls  Do you have any ongoing symptoms?: Yes  Patient-reported symptoms: Other, Weakness  Have your medications changed?: No  Do you have any questions related to your medications?: No  Do you currently have any active services?: Yes  Are you currently active with any services?: Home Health  Do you have any needs or concerns that I can assist you with?: No  Identified Barriers: Lack of Education  Care Transitions Interventions  Other Interventions:            Follow Up  Future Appointments   Date Time Provider Erick Shepherd   4/1/2022  2:00 PM Shereen [facility]   5/4/2022  2:00 PM DO REZA Martin RN

## 2022-02-25 ENCOUNTER — TELEPHONE (OUTPATIENT)
Dept: FAMILY MEDICINE CLINIC | Age: 87
End: 2022-02-25

## 2022-02-25 ENCOUNTER — CARE COORDINATION (OUTPATIENT)
Dept: CASE MANAGEMENT | Age: 87
End: 2022-02-25

## 2022-02-25 NOTE — TELEPHONE ENCOUNTER
Jesse Rizzo called and wanted to inform you she  will see patient one time this week for home physical therapy and two times and next week.

## 2022-02-25 NOTE — CARE COORDINATION
[facility] Transitions Follow Up Call    2022    Patient: Bernice Delgadillo  Patient : 1935   MRN: [id number]  Reason for Admission: Acute PE  Discharge Date: 22 RARS: Readmission Risk Score: 17.9 ( )         Spoke with: Granddaughter, Williams Robles and patient    Spoke with patient who said she is feeling pretty good today. She said her head is a little sore where she had fallen earlier in the week, has been seen by MD this week on  post fall for hospital follow up and was seen by colorectal surgeon this week as well. Patient did not have much information about MD visit with surgeon other than they want her to do another colonoscopy. Patient states nurse was out yesterday from home care. Spoke with Williams Robles the granddaughter. She states that she attended the MD visit with the surgeon and relayed they want to do another colonoscopy. Granddaughter said she was kind of confused after visit as the surgeon was telling her that patient had malignancy and needed surgery but said she was told by another MD that pathology report was negative for malignancy. Pathology report dated  showed no malignancy in sample. Granddaughter is going to see if she can get a second opinion. They deny any new needs or concerns at this time. Care Transitions Follow Up Call    Needs to be reviewed by the provider   Additional needs identified to be addressed with provider: No  none             Method of communication with provider : none      Care Transition Nurse (CTN) contacted the patient by telephone to follow up after admission on . Verified name and  with patient as identifiers. Addressed changes since last contact: none  Discussed follow-up appointments. CTN reviewed discharge instructions, medical action plan and red flags with patient and discussed any barriers to care and/or understanding of plan of care after discharge.  Discussed appropriate site of care based on symptoms and resources available to patient including: PCP and Specialist. The patient agrees to contact the PCP office for questions related to their healthcare. Patients top risk factors for readmission: medical condition-PE  Interventions to address risk factors: Obtained and reviewed discharge summary and/or continuity of care documents      Non-[facility] follow up appointment(s): n/a    CTN provided contact information for future needs. Plan for follow-up call in 7-10 days based on severity of symptoms and risk factors. Plan for next call: Routine follow up            Care Transitions Subsequent and Final Call    Schedule Follow Up Appointment with PCP: Completed  Subsequent and Final Calls  Do you have any ongoing symptoms?: Yes  Onset of Patient-reported symptoms: In the past 7 days  Patient-reported symptoms: Shortness of Breath  Have your medications changed?: No  Do you have any questions related to your medications?: No  Do you currently have any active services?: Yes  Are you currently active with any services?: Home Health  Do you have any needs or concerns that I can assist you with?: No  Identified Barriers: Lack of Education  Care Transitions Interventions  Other Interventions:            Follow Up  Future Appointments   Date Time Provider Erick Shepherd   4/1/2022  2:00 PM Rose Mary [facility]   5/4/2022  2:00 PM Margit Solomons, DO [facility] Thelma Romberg, RN

## 2022-03-01 ENCOUNTER — TELEPHONE (OUTPATIENT)
Dept: FAMILY MEDICINE CLINIC | Age: 87
End: 2022-03-01

## 2022-03-03 ENCOUNTER — CARE COORDINATION (OUTPATIENT)
Dept: CASE MANAGEMENT | Age: 87
End: 2022-03-03

## 2022-03-03 NOTE — CARE COORDINATION
[facility] Transitions Follow Up Call    3/3/2022    Patient: Zulma Garvey  Patient : 1935   MRN: [id number]  Reason for Admission: Acute PE  Discharge Date: 22 RARS: Readmission Risk Score: 17.9 ( )         Spoke with: Becca Agee, granddaughter    Spoke with Becca Agee who said patient has been doing ok. She has some ABRAHAM but has had no worsening breathing or new onset of shortness of breath. Patient is at home now, has home care following with her. She was seen by colorectal surgeon last week and granddaughter indicated they were going to seek out 2nd opinion. She had a fall last week, has not had any falls since then. She will see her PCP again in April. Currently they deny any new needs or concerns, denies any medication changes. Care Transitions Follow Up Call    Needs to be reviewed by the provider   Additional needs identified to be addressed with provider: No  none             Method of communication with provider : none      Care Transition Nurse (CTN) contacted the family by telephone to follow up after admission on . Verified name and  with family as identifiers. Addressed changes since last contact: none  Discussed follow-up appointments. CTN reviewed discharge instructions, medical action plan and red flags with family and discussed any barriers to care and/or understanding of plan of care after discharge. Discussed appropriate site of care based on symptoms and resources available to patient including: PCP and Specialist. The family agrees to contact the PCP office for questions related to their healthcare. Patients top risk factors for readmission: medical condition-Acute PE  Interventions to address risk factors: Education of patient/family/caregiver/guardian to support self-management-Discussed s/s to report to PCP and or when to return to ED      Non-[facility] follow up appointment(s): n/a    CTN provided contact information for future needs.  Plan for follow-up call in - days based on severity of symptoms and risk factors. Plan for next call: Routine follow up            Care Transitions Subsequent and Final Call    Schedule Follow Up Appointment with PCP: Completed  Subsequent and Final Calls  Do you have any ongoing symptoms?: Yes  Onset of Patient-reported symptoms: In the past 7 days  Patient-reported symptoms: Shortness of Breath  Have your medications changed?: No  Do you have any questions related to your medications?: No  Do you currently have any active services?: Yes  Are you currently active with any services?: Home Health  Do you have any needs or concerns that I can assist you with?: No  Identified Barriers: Lack of Education  Care Transitions Interventions  Other Interventions:            Follow Up  Future Appointments   Date Time Provider Erick Shepherd   4/1/2022  2:00 PM Dillon Miller [address] FP [facility]   5/4/2022  2:00 PM DO REZA Monsalve RN

## 2022-03-04 ENCOUNTER — TELEPHONE (OUTPATIENT)
Dept: FAMILY MEDICINE CLINIC | Age: 87
End: 2022-03-04

## 2022-03-04 DIAGNOSIS — R97.0 ELEVATED CEA: ICD-10-CM

## 2022-03-04 DIAGNOSIS — D12.6 TUBULAR ADENOMA OF COLON: Primary | ICD-10-CM

## 2022-03-04 NOTE — TELEPHONE ENCOUNTER
Pt was diagnosed with a mass on her colon. She saw dr Rachel Rodney. 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 Granddaughter  Told him they were told that it was benign. It was obvious to them that he didn't even read the report before he came into the room. They would like a referral for a second opinion because they do not have confidence in dr Belinda Wellington.   Please advised at [phone]

## 2022-03-07 NOTE — TELEPHONE ENCOUNTER
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 It does not appear to be cancerous and I think they are more concerned about the area getting obstructed b/c it is really starting to block/clog the colon in that region than they are the mass being cancerous. I will enter a referral for a second opinion for Kala.  I'm sorry that they did not feel confident with Dr. Janice Rondon

## 2022-03-07 NOTE — TELEPHONE ENCOUNTER
DAUGHTER WILL CB FOR INFO.     [address] [facility] Elpidio Garza, [address]  [phone]

## 2022-03-09 ENCOUNTER — CARE COORDINATION (OUTPATIENT)
Dept: CASE MANAGEMENT | Age: 87
End: 2022-03-09

## 2022-03-09 NOTE — CARE COORDINATION
[facility] Transitions Follow Up Call    3/9/2022    Patient: Nikolas Messer  Patient : 1935   MRN: [id number]  Reason for Admission: Acute PE  Discharge Date: 22 RARS: Readmission Risk Score: 17.9 ( )         Spoke with: En Margy, Granddaughter    Spoke with En Ji, she states patient is doing ok, she spoke with her earlier today. She denies any new issues with her breathing and said they went to see cardiology the other day, blood pressures were higher but she did not take her medication prior to her visit, including blood pressure medications. Daughter reports no changes made but said they wanted to know when she is getting her labs. She had labs ordered from last discharge on  for CBC with diff but this has not been done yet. Granddaughter was uncertain if home care was still following. TC to [facility], refaxed the order for CBC to [facility] and spoke with the home care nurse, Lyndsey Goetz who will be seeing patient tomorrow. She states she will draw the labs tomorrow. Currently patient has no s/s of infection, no s/s of bleeding and no changes in breathing. Nurse states she is getting more forgetful and feels that soon she will not be able to care for herself at home. Granddaughter did relay that patient had said she will no longer be driving and granddaughter voiced concerns about getting her back and forth to appointments. Both granddaughter and home care nurse were given the transportation number for [employer]un ZENDEJAS to see about getting transportation to appointments. Patient has follow up with Dr. Talita Barroso, colorectal surgeon for 2nd opinion on 3/17. They deny any other needs or concerns at this time.      Care Transitions Follow Up Call    Needs to be reviewed by the provider   Additional needs identified to be addressed with provider: No  none             Method of communication with provider : none      Care Transition Nurse (CTN) contacted the patient by telephone to follow up after admission on . Verified name and  with family as identifiers. Addressed changes since last contact: labs-gave lab order to [facility] for them to draw CBC    Discussed follow-up appointments. CTN reviewed discharge instructions, medical action plan and red flags with patient and discussed any barriers to care and/or understanding of plan of care after discharge. Discussed appropriate site of care based on symptoms and resources available to patient including: PCP and Specialist. The patient agrees to contact the PCP office for questions related to their healthcare. Patients top risk factors for readmission: medical condition-PE  Interventions to address risk factors: Scheduled appointment with Specialist-3/17 with Zelpha Najjar [facility] follow up appointment(s): 3/17    CTN provided contact information for future needs. Plan for follow-up call in 7-10 days based on severity of symptoms and risk factors. Plan for next call: Follow up next week to address additional needs            Care Transitions Subsequent and Final Call    Schedule Follow Up Appointment with PCP: Completed  Subsequent and Final Calls  Do you have any ongoing symptoms?: Yes  Onset of Patient-reported symptoms: In the past 7 days  Patient-reported symptoms: Fatigue, Shortness of Breath  Have your medications changed?: No  Do you have any questions related to your medications?: No  Do you currently have any active services?: Yes  Are you currently active with any services?: Home Health  Do you have any needs or concerns that I can assist you with?: No  Identified Barriers: Lack of Education  Care Transitions Interventions  Other Interventions:            Follow Up  Future Appointments   Date Time Provider Erick Shepherd   2022  2:00 PM Alejandro GIBBONSEABERNIE FRANKS [facility]   2022  2:00 PM DO REZA Smith RN

## 2022-03-16 ENCOUNTER — CARE COORDINATION (OUTPATIENT)
Dept: CASE MANAGEMENT | Age: 87
End: 2022-03-16

## 2022-03-16 NOTE — CARE COORDINATION
[facility] Transitions Follow Up Call    3/16/2022    Patient: Josephine Talley  Patient : 1935   MRN: [id number]  Reason for Admission: Acute PE  Discharge Date: 22 RARS: Readmission Risk Score: 17.9 ( )         Attempted to reach patient for subsequent transitional call. VM left to return call to [phone]. 1st attempt. Patient has appointment tomorrow for 2nd opinion with colorectal surgeon. Will reach out to patient again on Friday.       Follow Up  Future Appointments   Date Time Provider Erick Shepherd   2022  2:00 PM Marisa BUTTS RETREAT TINY [facility]   2022  2:00 PM DO REZA Hodge RN

## 2022-03-18 ENCOUNTER — CARE COORDINATION (OUTPATIENT)
Dept: CASE MANAGEMENT | Age: 87
End: 2022-03-18

## 2022-03-18 NOTE — CARE COORDINATION
[facility] Transitions Follow Up Call    3/18/2022    Patient: Myrna Brandon  Patient : 1935   MRN: [id number]  Reason for Admission: Acute PE  Discharge Date: 22 RARS: Readmission Risk Score: 17.9 ( )         Spoke with: Cuauhtemoc Byrd, Granddaughter    Spoke with Cuauhtemoc Byrd who said she feels her grandmother is a little stronger this week. She took her yesterday for 2nd opinion to see colorectal surgeon Dr. Ta Sim. Cuauhtemoc Byrd states he felt patient needed surgery as well d/t where mass is and causing her issues. Cuauhtemoc Byrd states that all the doctors so far are thinking d/t the size of the mass is more likely cancer but pathology had come back negative. Either way, they want to remove the mass at this time while she is at her healthiest before she runs into more problems. Cuauhtemoc Byrd states she has to discuss this with her and they are to make a decision by  when they return to see him. Home care nurse was out today, patient continues to work with [employer] [facility] and Cuauhtemoc Byrd feels her strength has improved. She has some fatigue and ABRAHAM but otherwise is a little better this week. Patient will be seen by her PCP on . They deny any needs or concerns at this time. Expressed we would reach out again next week to discuss any ongoing needs that the patient has. I discussed with the granddaughter about ongoing care coordination which she is agreeable to. Cuauhtemoc Byrd was given the number to Area office on Aging to check on resources for HHA a couple of days a week. She will check with them and if she is not eligible for services will check other resources. They deny any needs or concerns at this time. Care Transitions Follow Up Call    Needs to be reviewed by the provider   Additional needs identified to be addressed with provider: No  none             Method of communication with provider : none      Care Transition Nurse (CTN) contacted the family by telephone to follow up after admission on .  Verified name and  with family as identifiers. Addressed changes since last contact: none  Discussed follow-up appointments. CTN reviewed discharge instructions, medical action plan and red flags with family and discussed any barriers to care and/or understanding of plan of care after discharge. Discussed appropriate site of care based on symptoms and resources available to patient including: PCP and Specialist. The family agrees to contact the PCP office for questions related to their healthcare. Patients top risk factors for readmission: medical condition-Acute PE  Interventions to address risk factors: Obtained and reviewed discharge summary and/or continuity of care documents      Non[facility] follow up appointment(s): 4/8 with Dr. Sylvia Hernandez    CTN provided contact information for future needs. Plan for follow-up call in 5-7 days based on severity of symptoms and risk factors. Plan for next call: Assess further needs, refer to [facility] Transitions Subsequent and Final Call    Schedule Follow Up Appointment with PCP: Completed  Subsequent and Final Calls  Do you have any ongoing symptoms?: Yes  Onset of Patient-reported symptoms: In the past 7 days  Patient-reported symptoms: Fatigue, Shortness of Breath  Have your medications changed?: No  Do you have any questions related to your medications?: No  Do you currently have any active services?: Yes  Are you currently active with any services?: Home Health  Do you have any needs or concerns that I can assist you with?: Yes  Identified Barriers: Lack of Education  Care Transitions Interventions  Other Interventions:            Follow Up  Future Appointments   Date Time Provider Erick Shepherd   4/1/2022  2:00 PM Jah GIBBONSEAT TINY [facility]   5/4/2022  2:00 PM DO REZA Madison RN

## 2022-03-23 ENCOUNTER — CARE COORDINATION (OUTPATIENT)
Dept: CASE MANAGEMENT | Age: 87
End: 2022-03-23

## 2022-03-23 NOTE — CARE COORDINATION
[facility] Transitions Follow Up Call    3/23/2022    Patient: Osiris Rapp  Patient : 1935   MRN: [id number]  Reason for Admission: Acute pulmonary embolism  Discharge Date: 22 RARS: Readmission Risk Score: 17.9 ( )         Attempted to reach patient for subsequent transitional call. VM left to return call to [phone]. 1st attempt.           Follow Up  Future Appointments   Date Time Provider Erick Shepherd   2022  2:00 PM Danni Lipoma BENJAMÍN RETREAT TINY [facility]   2022  2:00 PM DO REZA Navarro RN

## 2022-03-24 ENCOUNTER — CARE COORDINATION (OUTPATIENT)
Dept: CASE MANAGEMENT | Age: 87
End: 2022-03-24

## 2022-03-24 NOTE — CARE COORDINATION
[facility]    3/24/2022    Patient: Marsha Tafoya  Patient : 1935   MRN: [id number]  Reason for Admission: PE  Discharge Date: 22 RARS: Readmission Risk Score: 17.9 ( )         Spoke with: Ruben Medina, granddaughter    Spoke with Ruben Medina who said her grandmother is doing about the same this week. She has some fatigue, ABRAHAM but no worsening shortness of breath. She is getting around in her home ok, home care through [facility] continues to see her. She was seen by colorectal surgeon who is recommending surgery for colonic mass. Pathology results of mass showed no malignancy but do to size no matter if malignant or not is causing complications for patient and they are recommending removal of mass. Patient has not decided if she wants this or not, granddaughter said she is not wanting to have any surgery. Currently patient lives alone in [facility]. When speaking with the home care nurse a couple of weeks ago, she felt that patient soon would not be able to manage at home independently however she has done well thus far. I spoke with Ruben Medina about having ACM to help with management of her care and for any additional resources she may need. She has been given the number for area office of aging as well as who to call for transportation through her [employer] They deny any acute needs at this time. Will refer to [facility] Transitions Follow Up Call    Needs to be reviewed by the provider   Additional needs identified to be addressed with provider: No  none             Method of communication with provider : none      Care Transition Nurse (CTN) contacted the patient by telephone to follow up after admission on . Verified name and  with family as identifiers.     Addressed changes since last contact: none  Discussed follow-up appointments      CTN reviewed discharge instructions, medical action plan and red flags with family and discussed any barriers to care and/or

## 2022-03-29 ENCOUNTER — CARE COORDINATION (OUTPATIENT)
Dept: CARE COORDINATION | Age: 87
End: 2022-03-29

## 2022-03-29 NOTE — CARE COORDINATION
Ambulatory Care Coordination Note  3/29/2022  CM Risk Score: 5  Charlson 10 Year Mortality Risk Score: 98%     ACC: Willy Parker RN    Summary Note: ACM reviewed chart, CTN referral for CC needs. CHF management  [facility] Needs[facility] currently  May need further services sooner than later    ACM Plan: Will follow up to complete CC assessment with next outreach          Care Coordination Interventions    Program Enrollment: Rising Risk  Referral from Primary Care Provider: No  Suggested Interventions and Community Resources  Zone Management Tools: In Process (Comment: CHF Management)         Goals Addressed                 This Visit's Progress     Conditions and Symptoms        I will schedule office visits, as directed by my provider. I will keep my appointment or reschedule if I have to cancel. I will notify my provider of any barriers to my plan of care. I will follow my Zone Management tool to seek urgent or emergent care. I will notify my provider of any symptoms that indicate a worsening of my condition. Barriers: overwhelmed by complexity of regimen  Plan for overcoming my barriers: Willingness to work with ACM and PCP for CHF management, CC needs. Confidence: 9/10  Anticipated Goal Completion Date: 6/2022              Prior to Admission medications    Medication Sig Start Date End Date Taking?  Authorizing Provider   apixaban (ELIQUIS) 5 MG TABS tablet Take 2 tablets by mouth 2 times daily for 7 days 2/17/22 2/24/22  Gayathri Alvarado, DO   apixaban (ELIQUIS) 5 MG TABS tablet Take 1 tablet by mouth 2 times daily 2/25/22   Gayathri Alvarado, DO   ferrous sulfate (FE TABS 325) 325 (65 Fe) MG EC tablet Take 1 tablet by mouth daily (with breakfast) 2/18/22   Gayathri Alvarado, DO   Compression Stockings MISC by Does not apply route 2/17/22   Gayathri Alvarado, DO   lisinopril (PRINIVIL;ZESTRIL) 20 MG tablet Take 1 tablet by mouth daily 2/13/22   Nora Adorno, DO   amiodarone (CORDARONE) 200 MG tablet Take 1 tablet by mouth daily 2/11/22   Cristhian Hess APRN - FRANCESCA   traZODone (DESYREL) 50 MG tablet TAKE ONE TABLET BY MOUTH ONCE NIGHTLY 2/7/22   [facility], DO   bumetanide (BUMEX) 1 MG tablet Take 0.5 tablets by mouth daily  Patient taking differently: Take 1 mg by mouth daily  2/1/22 3/3/22  [facility], DO   pravastatin (PRAVACHOL) 40 MG tablet TAKE 1 TABLET DAILY 8/5/21   [facility], DO   potassium chloride (KLOR-CON M) 20 MEQ TBCR extended release tablet Take 40 mEq by mouth daily (with breakfast)    Historical Provider, MD   calcium-vitamin D (OSCAL-500) 500-200 MG-UNIT per tablet Take 1 tablet by mouth daily    Historical Provider, MD   Multiple Vitamins-Minerals (THERAPEUTIC MULTIVITAMIN-MINERALS) tablet Take 1 tablet by mouth daily    Historical Provider, MD   metoprolol (LOPRESSOR) 25 MG tablet Take 25 mg by mouth 2 times daily    Historical Provider, MD       Future Appointments   Date Time Provider Erick Shepherd   [facility]2022  [facility]   [facility]4/2022  [facility]     ,   Congestive Heart Failure Assessment    Are you currently restricting fluids?: No Restriction  Do you understand a low sodium diet?: Yes  Do you understand how to read food labels?: Yes  How many restaurant meals do you eat per week?: 0  Do you salt your food before tasting it?: No     No patient-reported symptoms      Symptoms:  None: Yes      Symptom course: stable  Weight trend: stable  Salt intake watch compared to last visit: stable      and   General Assessment    Do you have any symptoms that are causing concern?: No

## 2022-04-07 ENCOUNTER — OFFICE VISIT (OUTPATIENT)
Dept: FAMILY MEDICINE CLINIC | Age: 87
End: 2022-04-07
Payer: MEDICARE

## 2022-04-07 ENCOUNTER — CARE COORDINATION (OUTPATIENT)
Dept: CARE COORDINATION | Age: 87
End: 2022-04-07

## 2022-04-07 VITALS
HEART RATE: 54 BPM | OXYGEN SATURATION: 98 % | BODY MASS INDEX: 23.65 KG/M2 | DIASTOLIC BLOOD PRESSURE: 60 MMHG | SYSTOLIC BLOOD PRESSURE: 120 MMHG | WEIGHT: 137.8 LBS

## 2022-04-07 DIAGNOSIS — Z00.00 MEDICARE ANNUAL WELLNESS VISIT, SUBSEQUENT: ICD-10-CM

## 2022-04-07 DIAGNOSIS — I82.90 VTE (VENOUS THROMBOEMBOLISM): ICD-10-CM

## 2022-04-07 DIAGNOSIS — D64.9 ANEMIA, UNSPECIFIED TYPE: ICD-10-CM

## 2022-04-07 DIAGNOSIS — R41.3 SHORT-TERM MEMORY LOSS: ICD-10-CM

## 2022-04-07 DIAGNOSIS — I50.33 ACUTE ON CHRONIC HEART FAILURE WITH PRESERVED EJECTION FRACTION (HCC): ICD-10-CM

## 2022-04-07 DIAGNOSIS — I48.0 PAROXYSMAL ATRIAL FIBRILLATION (HCC): Chronic | ICD-10-CM

## 2022-04-07 DIAGNOSIS — E78.5 HYPERLIPIDEMIA, UNSPECIFIED HYPERLIPIDEMIA TYPE: Chronic | ICD-10-CM

## 2022-04-07 DIAGNOSIS — D36.9 TUBULAR ADENOMA: Primary | ICD-10-CM

## 2022-04-07 DIAGNOSIS — I10 PRIMARY HYPERTENSION: Chronic | ICD-10-CM

## 2022-04-07 PROBLEM — I82.462 ACUTE DEEP VEIN THROMBOSIS (DVT) OF CALF MUSCLE VEIN OF LEFT LOWER EXTREMITY (HCC): Status: RESOLVED | Noted: 2022-02-16 | Resolved: 2022-04-07

## 2022-04-07 PROCEDURE — 99214 OFFICE O/P EST MOD 30 MIN: CPT | Performed by: FAMILY MEDICINE

## 2022-04-07 PROCEDURE — G0439 PPPS, SUBSEQ VISIT: HCPCS | Performed by: FAMILY MEDICINE

## 2022-04-07 ASSESSMENT — LIFESTYLE VARIABLES: HOW OFTEN DO YOU HAVE A DRINK CONTAINING ALCOHOL: NEVER

## 2022-04-07 ASSESSMENT — PATIENT HEALTH QUESTIONNAIRE - PHQ9
SUM OF ALL RESPONSES TO PHQ QUESTIONS 1-9: 1
SUM OF ALL RESPONSES TO PHQ QUESTIONS 1-9: 1
2. FEELING DOWN, DEPRESSED OR HOPELESS: 0
SUM OF ALL RESPONSES TO PHQ QUESTIONS 1-9: 1
SUM OF ALL RESPONSES TO PHQ9 QUESTIONS 1 & 2: 1
1. LITTLE INTEREST OR PLEASURE IN DOING THINGS: 1
SUM OF ALL RESPONSES TO PHQ QUESTIONS 1-9: 1

## 2022-04-07 NOTE — PATIENT INSTRUCTIONS
Personalized Preventive Plan for Audi Lackey - 4/7/2022  Medicare offers a range of preventive health benefits. Some of the tests and screenings are paid in full while other may be subject to a deductible, co-insurance, and/or copay. Some of these benefits include a comprehensive review of your medical history including lifestyle, illnesses that may run in your family, and various assessments and screenings as appropriate. After reviewing your medical record and screening and assessments performed today your provider may have ordered immunizations, labs, imaging, and/or referrals for you. A list of these orders (if applicable) as well as your Preventive Care list are included within your After Visit Summary for your review. Other Preventive Recommendations:    · A preventive eye exam performed by an eye specialist is recommended every 1-2 years to screen for glaucoma; cataracts, macular degeneration, and other eye disorders. · A preventive dental visit is recommended every 6 months. · Try to get at least 150 minutes of exercise per week or 10,000 steps per day on a pedometer . · Order or download the FREE \"Exercise & Physical Activity: Your Everyday Guide\" from [employer]. Call 4-882.203.2835 or search [employer] online. · You need 8513-6127 mg of calcium and 8084-9279 IU of vitamin D per day. It is possible to meet your calcium requirement with diet alone, but a vitamin D supplement is usually necessary to meet this goal.  · When exposed to the sun, use a sunscreen that protects against both UVA and UVB radiation with an SPF of 30 or greater. Reapply every 2 to 3 hours or after sweating, drying off with a towel, or swimming. · Always wear a seat belt when traveling in a car. Always wear a helmet when riding a bicycle or motorcycle.

## 2022-04-07 NOTE — PROGRESS NOTES
Medicare Annual Wellness Visit    Brody Bermudez is here for 1 Month Follow-Up and Medicare AWV    Assessment & Plan   Medicare annual wellness visit, subsequent      Recommendations for Preventive Services Due: see orders and patient instructions/AVS.  Recommended screening schedule for the next 5-10 years is provided to the patient in written form: see Patient Instructions/AVS.     No follow-ups on file. Subjective       Patient's complete Health Risk Assessment and screening values have been reviewed and are found in Flowsheets. The following problems were reviewed today and where indicated follow up appointments were made and/or referrals ordered.     Positive Risk Factor Screenings with Interventions:             General Health and ACP:  General  In general, how would you say your health is?: Very Good  In the past 7 days, have you experienced any of the following: New or Increased Pain, New or Increased Fatigue, Loneliness, Social Isolation, Stress or Anger?: (!) Yes  Select all that apply: (!) New or Increased Fatigue  Do you get the social and emotional support that you need?: Yes  Do you have a Living Will?: Yes    Advance Directives     Power of  Living Will ACP-Advance Directive ACP-Power of     Not on File Not on File Not on File Not on File      General Health Risk Interventions:  · Fatigue: sees cardiology - improving overall    Health Habits/Nutrition:     Physical Activity: Inactive    Days of Exercise per Week: 0 days    Minutes of Exercise per Session: 0 min     Have you lost any weight without trying in the past 3 months?: No     Have you seen the dentist within the past year?: N/A - wear dentures    Health Habits/Nutrition Interventions:  · Inadequate physical activity:  patient is not ready to increase his/her physical activity level at this time    Hearing/Vision:  Do you or your family notice any trouble with your hearing that hasn't been managed with hearing aids?: (!) Yes  Do you have difficulty driving, watching TV, or doing any of your daily activities because of your eyesight?: No  Have you had an eye exam within the past year?: (!) No  No exam data present    Hearing/Vision Interventions:  · Hearing concerns:  patient declines any further evaluation/treatment for hearing issues  · Vision concerns:  patient encouraged to make appointment with his/her eye specialist     ADLs:  In the past 7 days, did you need help from others to perform any of the following everyday activities: Eating, dressing, grooming, bathing, toileting, or walking/balance?: No  In the past 7 days, did you need help from others to take care of any of the following: Laundry, housekeeping, banking/finances, shopping, telephone use, food preparation, transportation, or taking medications?: (!) Yes  Select all that apply: (!) Laundry,Shopping    ADL Interventions:  · granddaughter helps          Objective   Vitals:    04/07/22 0952   BP: 120/60   Site: Left Upper Arm   Position: Sitting   Cuff Size: Small Adult   Pulse: 54   SpO2: 98%   Weight: 137 lb 12.8 oz (62.5 kg)      Body mass index is 23.65 kg/m². Allergies   Allergen Reactions    Codeine Nausea Only     dizziness    Bactrim [Sulfamethoxazole-Trimethoprim] Hives    Naproxen      On allergy list ? Unsure of reaction    Norvasc [Amlodipine Besylate]      Edema-she thinks      Sertraline Hcl      unsure    Toradol [Ketorolac Tromethamine]      unsure    Tramadol      unsure    Magnesium-Containing Compounds Diarrhea     Mag ox 500 ?  Morphine Nausea And Vomiting    Percocet [Oxycodone-Acetaminophen] Nausea And Vomiting     Prior to Visit Medications    Medication Sig Taking?  Authorizing Provider   apixaban (ELIQUIS) 5 MG TABS tablet Take 1 tablet by mouth 2 times daily Yes Tory Johnston, DO   ferrous sulfate (FE TABS 325) 325 (65 Fe) MG EC tablet Take 1 tablet by mouth daily (with breakfast) Yes Hilda Mcgarry, DO Compression Stockings MISC by Does not apply route Yes Tory Johnston DO   lisinopril (PRINIVIL;ZESTRIL) 20 MG tablet Take 1 tablet by mouth daily Yes Jack Juan,    amiodarone (CORDARONE) 200 MG tablet Take 1 tablet by mouth daily Yes Nicky No, APRN - CNP   traZODone (DESYREL) 50 MG tablet TAKE ONE TABLET BY MOUTH ONCE NIGHTLY Yes Suzanne Beasley DO   pravastatin (PRAVACHOL) 40 MG tablet TAKE 1 TABLET DAILY Yes Suzanne Beasley DO   potassium chloride (KLOR-CON M) 20 MEQ TBCR extended release tablet Take 40 mEq by mouth daily (with breakfast) Yes Historical Provider, MD   calcium-vitamin D (OSCAL-500) 500-200 MG-UNIT per tablet Take 1 tablet by mouth daily Yes Historical Provider, MD   Multiple Vitamins-Minerals (THERAPEUTIC MULTIVITAMIN-MINERALS) tablet Take 1 tablet by mouth daily Yes Historical Provider, MD   metoprolol (LOPRESSOR) 25 MG tablet Take 25 mg by mouth 2 times daily Yes Historical Provider, MD   apixaban (ELIQUIS) 5 MG TABS tablet Take 2 tablets by mouth 2 times daily for 7 days  Tory Johnston,    bumetanide (BUMEX) 1 MG tablet Take 0.5 tablets by mouth daily  Patient taking differently: Take 1 mg by mouth daily   Suzanne Beasley DO       CareTeam (Including outside providers/suppliers regularly involved in providing care):   Patient Care Team:  Suzanne Beasley DO as PCP - General (Family Medicine)  Suzanne Beasley DO as [facility]CP [facility]  Judge Edwin MD as Consulting Physician (General Surgery)  Jayro Lala RN as Ambulatory Care Manager    Reviewed and updated this visit:  Tobacco  Allergies  Meds  Med Hx  Surg Hx  Soc Hx  Fam Hx             PERSONALIZED PREVENTION PLAN GIVEN

## 2022-04-19 ENCOUNTER — CARE COORDINATION (OUTPATIENT)
Dept: CARE COORDINATION | Age: 87
End: 2022-04-19

## 2022-04-22 LAB
ALBUMIN SERPL-MCNC: 3.9 G/DL
ALP BLD-CCNC: 42 U/L
ALT SERPL-CCNC: 15 U/L
ANION GAP SERPL CALCULATED.3IONS-SCNC: 9 MMOL/L
AST SERPL-CCNC: 19 U/L
BASOPHILS ABSOLUTE: ABNORMAL
BASOPHILS RELATIVE PERCENT: ABNORMAL
BILIRUB SERPL-MCNC: 0.3 MG/DL (ref 0.1–1.4)
BUN BLDV-MCNC: 29 MG/DL
CALCIUM SERPL-MCNC: 9.3 MG/DL
CHLORIDE BLD-SCNC: 107 MMOL/L
CO2: 27 MMOL/L
CREAT SERPL-MCNC: 1.2 MG/DL
EOSINOPHILS ABSOLUTE: 0.1 /ΜL
EOSINOPHILS RELATIVE PERCENT: 1.9 %
GFR CALCULATED: 43
GLUCOSE BLD-MCNC: 111 MG/DL
HCT VFR BLD CALC: 31.6 % (ref 36–46)
HEMOGLOBIN: 10.3 G/DL (ref 12–16)
LYMPHOCYTES ABSOLUTE: 0.8 /ΜL
LYMPHOCYTES RELATIVE PERCENT: 13.6 %
MCH RBC QN AUTO: 26 PG
MCHC RBC AUTO-ENTMCNC: 32.7 G/DL
MCV RBC AUTO: 80 FL
MONOCYTES ABSOLUTE: 0.3 /ΜL
MONOCYTES RELATIVE PERCENT: 4.9 %
NEUTROPHILS ABSOLUTE: 4.5 /ΜL
NEUTROPHILS RELATIVE PERCENT: 79.6 %
PDW BLD-RTO: 35.6 %
PLATELET # BLD: 156 K/ΜL
PMV BLD AUTO: 8.8 FL
POTASSIUM SERPL-SCNC: 4.2 MMOL/L
RBC # BLD: 3.97 10^6/ΜL
SODIUM BLD-SCNC: 143 MMOL/L
TOTAL PROTEIN: 6.2
WBC # BLD: 5.6 10^3/ML

## 2022-04-25 ENCOUNTER — CARE COORDINATION (OUTPATIENT)
Dept: CARE COORDINATION | Age: 87
End: 2022-04-25

## 2022-05-02 ENCOUNTER — CARE COORDINATION (OUTPATIENT)
Dept: CARE COORDINATION | Age: 87
End: 2022-05-02

## 2022-05-06 ENCOUNTER — CARE COORDINATION (OUTPATIENT)
Dept: CARE COORDINATION | Age: 87
End: 2022-05-06

## 2022-05-10 DIAGNOSIS — E78.5 HYPERLIPIDEMIA, UNSPECIFIED HYPERLIPIDEMIA TYPE: Chronic | ICD-10-CM

## 2022-05-10 DIAGNOSIS — I10 PRIMARY HYPERTENSION: Chronic | ICD-10-CM

## 2022-05-10 DIAGNOSIS — D64.9 ANEMIA, UNSPECIFIED TYPE: ICD-10-CM

## 2022-05-20 ENCOUNTER — CARE COORDINATION (OUTPATIENT)
Dept: CARE COORDINATION | Age: 87
End: 2022-05-20

## 2022-05-20 NOTE — CARE COORDINATION
Ambulatory Care Coordination Note  5/20/2022  CM Risk Score: 5  Charlson 10 Year Mortality Risk Score: 98%     ACC: Trent So RN    Summary Note: ACM received call from Beata Mckeon, grand daughter, updates with upcoming colorectal surgery, 6/10/22, following with Dr. Brennan Huddleston for removal of colon mass. Preop testing-May 26th  Colonoscopy scheduled for 6/9/22  Beata Mckeon does have concerns for home needs, post surgery, may have SNF placement for rehab. Willing to take in Boss in her home for short term, if needed after Rehab   Does have POA paperwork and ACP information, would like to fax to PCP office, information and fax number provided. ACM Plan: Will follow up with Marnie Hickey on Monday for updates with call to Dr. Young Leger regarding questions with upcoming colonoscopy             Care Coordination Interventions    Program Enrollment: Rising Risk  Referral from Primary Care Provider: No  Suggested Interventions and Community Resources  Zone Management Tools: In Process (Comment: CHF Management)         Goals Addressed                 This Visit's Progress     Conditions and Symptoms   Improving     I will schedule office visits, as directed by my provider. I will keep my appointment or reschedule if I have to cancel. I will notify my provider of any barriers to my plan of care. I will follow my Zone Management tool to seek urgent or emergent care. I will notify my provider of any symptoms that indicate a worsening of my condition. Barriers: overwhelmed by complexity of regimen  Plan for overcoming my barriers: Willingness to work with ACM and PCP for CHF management, CC needs. Confidence: 9/10  Anticipated Goal Completion Date: 6/2022              Prior to Admission medications    Medication Sig Start Date End Date Taking?  Authorizing Provider   apixaban (ELIQUIS) 5 MG TABS tablet Take 1 tablet by mouth 2 times daily 2/25/22  Yes Tory Johnston, DO   ferrous sulfate (FE TABS 325) 325 (65 Fe) MG EC tablet Take 1 tablet by mouth daily (with breakfast) 2/18/22  Yes [facility] , DO   Compression Stockings MISC by Does not apply route 2/17/22  Yes Tory Johnston, DO   lisinopril (PRINIVIL;ZESTRIL) 20 MG tablet Take 1 tablet by mouth daily 2/13/22  Yes Kristin Giraldo, DO   amiodarone (CORDARONE) 200 MG tablet Take 1 tablet by mouth daily 2/11/22  Yes MALVIN Fitch CNP   traZODone (DESYREL) 50 MG tablet TAKE ONE TABLET BY MOUTH ONCE NIGHTLY 2/7/22  Yes Mandi Cisneros, DO   pravastatin (PRAVACHOL) 40 MG tablet TAKE 1 TABLET DAILY 8/5/21  Yes Mandi Cisneros, DO   potassium chloride (KLOR-CON M) 20 MEQ TBCR extended release tablet Take 40 mEq by mouth daily (with breakfast)   Yes Historical Provider, MD   calcium-vitamin D (OSCAL-500) 500-200 MG-UNIT per tablet Take 1 tablet by mouth daily   Yes Historical Provider, MD   Multiple Vitamins-Minerals (THERAPEUTIC MULTIVITAMIN-MINERALS) tablet Take 1 tablet by mouth daily   Yes Historical Provider, MD   metoprolol (LOPRESSOR) 25 MG tablet Take 25 mg by mouth 2 times daily   Yes Historical Provider, MD   apixaban (ELIQUIS) 5 MG TABS tablet Take 2 tablets by mouth 2 times daily for 7 days 2/17/22 2/24/22  [facility], DO   bumetanide (BUMEX) 1 MG tablet Take 0.5 tablets by mouth daily  Patient taking differently: Take 1 mg by mouth daily  2/1/22 3/3/22  Mandi Cisneros, DO       No future appointments.   ,   Congestive Heart Failure Assessment    Are you currently restricting fluids?: No Restriction  Do you understand a low sodium diet?: Yes  Do you understand how to read food labels?: Yes  How many restaurant meals do you eat per week?: 0  Do you salt your food before tasting it?: No     No patient-reported symptoms      Symptoms:  None: Yes      Symptom course: stable  Weight trend: stable     ,   COPD Assessment    Does the patient understand envrionmental exposure?: Yes  Is the patient able to verbalize Rescue vs. Long Acting medications?: Yes  Does the patient have a nebulizer?: Yes  Does the patient use a space with inhaled medications?: No     No patient-reported symptoms         Symptoms:  None: Yes      Symptom course: stable  Increase use of rapid acting/rescue inhaled medications?: No  Change in chronic cough?: No/At Baseline  Change in sputum?: No/At Baseline  Self Monitoring - SaO2: No  Have you had a recent diagnosis of pneumonia either by PCP or at a hospital?: No      and   General Assessment    Do you have any symptoms that are causing concern?: No

## 2022-05-25 ENCOUNTER — TELEPHONE (OUTPATIENT)
Dept: FAMILY MEDICINE CLINIC | Age: 87
End: 2022-05-25

## 2022-05-27 ENCOUNTER — TELEPHONE (OUTPATIENT)
Dept: FAMILY MEDICINE CLINIC | Age: 87
End: 2022-05-27

## 2022-05-27 NOTE — TELEPHONE ENCOUNTER
----- Message from Alan Malhotra sent at 5/27/2022  1:29 PM EDT -----  Subject: Message to Provider    QUESTIONS  Information for Provider? Patient would like to have medications faxed   over to Dr. Tramaine Marie? She doesn't have the fax number please call back   if more info is needed [phone]  ---------------------------------------------------------------------------  --------------  CALL BACK INFO  What is the best way for the office to contact you? OK to leave message on   voicemail  Preferred Call Back Phone Number? [phone]  ---------------------------------------------------------------------------  --------------  SCRIPT ANSWERS  Relationship to Patient?  Self

## 2022-05-31 ENCOUNTER — CARE COORDINATION (OUTPATIENT)
Dept: CARE COORDINATION | Age: 87
End: 2022-05-31

## 2022-06-16 ENCOUNTER — CARE COORDINATION (OUTPATIENT)
Dept: CARE COORDINATION | Age: 87
End: 2022-06-16

## 2022-06-24 ENCOUNTER — CARE COORDINATION (OUTPATIENT)
Dept: CARE COORDINATION | Age: 87
End: 2022-06-24

## 2022-07-06 ENCOUNTER — CARE COORDINATION (OUTPATIENT)
Dept: CARE COORDINATION | Age: 87
End: 2022-07-06

## 2022-07-11 ENCOUNTER — TELEPHONE (OUTPATIENT)
Dept: FAMILY MEDICINE CLINIC | Age: 87
End: 2022-07-11

## 2022-07-11 DIAGNOSIS — R13.10 DYSPHAGIA, UNSPECIFIED TYPE: Primary | ICD-10-CM

## 2022-07-11 DIAGNOSIS — I50.33 ACUTE ON CHRONIC HEART FAILURE WITH PRESERVED EJECTION FRACTION (HCC): ICD-10-CM

## 2022-07-11 DIAGNOSIS — I48.0 PAROXYSMAL ATRIAL FIBRILLATION (HCC): ICD-10-CM

## 2022-07-11 DIAGNOSIS — I82.90 VTE (VENOUS THROMBOEMBOLISM): ICD-10-CM

## 2022-07-11 NOTE — TELEPHONE ENCOUNTER
Pts daughter Srinivas Concepcion called. Pt was ust discharged from a nursing facility and she needs a order for home care and a swallow study order. They are requesting [address] [id number] and [facility] for testing. Please call when complete. Fax number for home care is attached on pended referral. She was just discharged from [facility] in [place]. LM with them to send over discharge records.  (not 400 pgs)

## 2022-07-13 NOTE — TELEPHONE ENCOUNTER
Orders signed but my last visit with her was 4/7/22 so I'm not sure that will fit the standards that are needed.  She may need some sort of hospital follow up visit for us to be able to put the home care order in and it be approved

## 2022-07-13 NOTE — TELEPHONE ENCOUNTER
[facility] for Jackie-daughter that Dr. Paulina Cruz signed orders but patient may have to have a follow up with him for orders to be accepted.     Remi Cisneros

## 2022-07-15 ENCOUNTER — HOSPITAL ENCOUNTER (OUTPATIENT)
Dept: GENERAL RADIOLOGY | Age: 87
Discharge: HOME | End: 2022-07-17
Payer: MEDICARE

## 2022-07-15 ENCOUNTER — TELEPHONE (OUTPATIENT)
Dept: FAMILY MEDICINE CLINIC | Age: 87
End: 2022-07-15

## 2022-07-15 DIAGNOSIS — R13.10 DYSPHAGIA, UNSPECIFIED TYPE: ICD-10-CM

## 2022-07-15 PROCEDURE — 92611 MOTION FLUOROSCOPY/SWALLOW: CPT

## 2022-07-15 PROCEDURE — 74230 X-RAY XM SWLNG FUNCJ C+: CPT

## 2022-07-15 NOTE — PROCEDURES
Regular; Soft & Bite Sized;Pureed; Thin straw; Thin cup;Mildly Thick straw    Dysphagia Outcome Severity Scale: Level 6: Within functional limits/Modified independence  Penetration-Aspiration Scale (PAS): 2 - Material enters the airway, remains above the vocal folds, and is ejected from the airway    Recommended Diet:  Solid consistency: Regular  extra sauces and gravies  Liquid consistency: Thin (with chin tuck)  Liquid administration via: Cup    Medication administration: Meds in puree    Safe Swallow Protocol:  Supervision: Distant  Compensatory Swallowing Strategies : Alternate solids and liquids;Eat/Feed slowly;Upright as possible for all oral intake;Small bites/sips; Chin tuck      Recommendations/Treatment  Requires SLP Intervention: Yes        D/C Recommendations: To be determined  Postural Changes and/or Swallow Maneuvers: Upright 90 degrees      Recommended Exercises:    Therapeutic Interventions: Diet tolerance monitoring;Pharyngeal exercises; Laryngeal exercises         Education: Images and recommendations were reviewed with patient following this exam.   Patient Education: yes  Patient Education Response: Verbalizes understanding    Prognosis  Prognosis for safe diet advancement: good      Goals:    Long Term:       To Maximize safety with intake, optimize nutrition/hydration and minimize risk for aspiration. Short Term:     Goal 1: Will utilize chin tuck with thin liquids. Goal 2: Recommend completion of esophagram given location of patients c/o of food getting stuck                Dysphagia Goals: The patient will tolerate recommended diet without observed clinical signs of aspiration      Oral Preparation / Oral Phase: Impaired     Decreased bolus formation with spillover BOT with soft and dry solids, otherwise WNL      Pharyngeal Phase  Pharyngeal Phase: WFL    Puree: No penetration and no aspiration no stasis. Soft Solids: No penetration and no aspiration min-mod vallec and pyriform stasis. Regular solids: + trace penetration no aspiration min - mod vallec and pyriform residue. Thick straw: No penetration and no aspiration with no stasis. Thin straw: + penetration no aspiration min stasis. Thin cup with chin tuck - no penetration and no aspiration. Esophageal Phase  Esophageal Screen: [facility]    Recommend Esophagram completion as pt.  C/O food getting stuck in upper chest area    Pain       0/10      Therapy Time:   Individual Concurrent Group Co-treatment   Time In  1400         Time Out  1415         Minutes  15                   KIET Santacruz, 7/15/2022, 2:45 PM

## 2022-07-20 ENCOUNTER — TELEPHONE (OUTPATIENT)
Dept: FAMILY MEDICINE CLINIC | Age: 87
End: 2022-07-20

## 2022-07-20 NOTE — TELEPHONE ENCOUNTER
Unfortunately I don't think I've had an office visit with Rich Mann in the past 90 days so I don't believe I can enter a home health referral

## 2022-07-20 NOTE — TELEPHONE ENCOUNTER
----- Message from César Knapp sent at 7/20/2022  3:33 PM EDT -----  Subject: Message to Provider    QUESTIONS  Information for Provider? [facility] needs orders for skilled nursing   and also home health care. It needs to be one order for both written   together. One was sent over but it was only for home health aid.  Please   send new order over to fax number [phone].  ---------------------------------------------------------------------------  --------------  [id number] [facility]  [phone]; OK to leave message on voicemail  ---------------------------------------------------------------------------  --------------  SCRIPT ANSWERS  undefined

## 2022-07-20 NOTE — TELEPHONE ENCOUNTER
----- Message from Oleg Nieves sent at 7/20/2022  3:17 PM EDT -----  Subject: Appointment Request    Reason for Call: Established Patient Appointment needed: Routine Existing   Condition Follow Up    QUESTIONS    Reason for appointment request? Available appointments did not meet   patient need     Additional Information for Provider? Patients granddaughter Grey Arreola called in   patient needs a f/u from her swallow test. Screened green. ---------------------------------------------------------------------------  --------------  Mary Tian INFO  [phone];  Do not leave any message, patient will call back for answer  ---------------------------------------------------------------------------  --------------  SCRIPT ANSWERS  FRANSICO Screen: Grady Brown

## 2022-07-25 ENCOUNTER — OFFICE VISIT (OUTPATIENT)
Dept: FAMILY MEDICINE CLINIC | Age: 87
End: 2022-07-25
Payer: MEDICARE

## 2022-07-25 VITALS
OXYGEN SATURATION: 98 % | SYSTOLIC BLOOD PRESSURE: 120 MMHG | TEMPERATURE: 97 F | BODY MASS INDEX: 21.15 KG/M2 | WEIGHT: 123.2 LBS | HEART RATE: 59 BPM | DIASTOLIC BLOOD PRESSURE: 60 MMHG

## 2022-07-25 DIAGNOSIS — E78.5 HYPERLIPIDEMIA, UNSPECIFIED HYPERLIPIDEMIA TYPE: Chronic | ICD-10-CM

## 2022-07-25 DIAGNOSIS — R13.10 DYSPHAGIA, UNSPECIFIED TYPE: ICD-10-CM

## 2022-07-25 DIAGNOSIS — I48.0 PAROXYSMAL ATRIAL FIBRILLATION (HCC): Chronic | ICD-10-CM

## 2022-07-25 DIAGNOSIS — K63.5 POLYP OF COLON, UNSPECIFIED PART OF COLON, UNSPECIFIED TYPE: ICD-10-CM

## 2022-07-25 DIAGNOSIS — R41.3 MEMORY CHANGE: Primary | ICD-10-CM

## 2022-07-25 DIAGNOSIS — I50.33 ACUTE ON CHRONIC HEART FAILURE WITH PRESERVED EJECTION FRACTION (HCC): ICD-10-CM

## 2022-07-25 DIAGNOSIS — I10 PRIMARY HYPERTENSION: Chronic | ICD-10-CM

## 2022-07-25 PROCEDURE — 1123F ACP DISCUSS/DSCN MKR DOCD: CPT | Performed by: FAMILY MEDICINE

## 2022-07-25 PROCEDURE — 99214 OFFICE O/P EST MOD 30 MIN: CPT | Performed by: FAMILY MEDICINE

## 2022-07-25 RX ORDER — POTASSIUM CHLORIDE 750 MG/1
TABLET, EXTENDED RELEASE ORAL
COMMUNITY
Start: 2022-07-12 | End: 2022-11-02

## 2022-07-25 RX ORDER — MULTIVITAMIN
TABLET ORAL
COMMUNITY
Start: 2022-07-11

## 2022-07-25 RX ORDER — DOFETILIDE 0.25 MG/1
CAPSULE ORAL
COMMUNITY
Start: 2022-07-11 | End: 2022-08-22 | Stop reason: SDUPTHER

## 2022-07-25 RX ORDER — FAMOTIDINE 10 MG
10 TABLET ORAL 2 TIMES DAILY PRN
COMMUNITY
Start: 2022-06-20 | End: 2022-10-31

## 2022-07-25 RX ORDER — PSEUDOEPHEDRINE HCL 30 MG
100 TABLET ORAL 2 TIMES DAILY
COMMUNITY
Start: 2022-06-20 | End: 2022-10-31

## 2022-07-25 SDOH — ECONOMIC STABILITY: FOOD INSECURITY: WITHIN THE PAST 12 MONTHS, THE FOOD YOU BOUGHT JUST DIDN'T LAST AND YOU DIDN'T HAVE MONEY TO GET MORE.: NEVER TRUE

## 2022-07-25 SDOH — ECONOMIC STABILITY: TRANSPORTATION INSECURITY
IN THE PAST 12 MONTHS, HAS THE LACK OF TRANSPORTATION KEPT YOU FROM MEDICAL APPOINTMENTS OR FROM GETTING MEDICATIONS?: NO

## 2022-07-25 SDOH — ECONOMIC STABILITY: TRANSPORTATION INSECURITY
IN THE PAST 12 MONTHS, HAS LACK OF TRANSPORTATION KEPT YOU FROM MEETINGS, WORK, OR FROM GETTING THINGS NEEDED FOR DAILY LIVING?: NO

## 2022-07-25 SDOH — ECONOMIC STABILITY: FOOD INSECURITY: WITHIN THE PAST 12 MONTHS, YOU WORRIED THAT YOUR FOOD WOULD RUN OUT BEFORE YOU GOT MONEY TO BUY MORE.: NEVER TRUE

## 2022-07-25 ASSESSMENT — ENCOUNTER SYMPTOMS
EYES NEGATIVE: 1
ABDOMINAL PAIN: 0
SHORTNESS OF BREATH: 0
BLURRED VISION: 0
ORTHOPNEA: 0
RESPIRATORY NEGATIVE: 1
ALLERGIC/IMMUNOLOGIC NEGATIVE: 1
GASTROINTESTINAL NEGATIVE: 1

## 2022-07-25 ASSESSMENT — PATIENT HEALTH QUESTIONNAIRE - PHQ9
SUM OF ALL RESPONSES TO PHQ QUESTIONS 1-9: 0
SUM OF ALL RESPONSES TO PHQ QUESTIONS 1-9: 0
1. LITTLE INTEREST OR PLEASURE IN DOING THINGS: 0
2. FEELING DOWN, DEPRESSED OR HOPELESS: 0
SUM OF ALL RESPONSES TO PHQ9 QUESTIONS 1 & 2: 0
SUM OF ALL RESPONSES TO PHQ QUESTIONS 1-9: 0
SUM OF ALL RESPONSES TO PHQ QUESTIONS 1-9: 0

## 2022-07-25 ASSESSMENT — SOCIAL DETERMINANTS OF HEALTH (SDOH): HOW HARD IS IT FOR YOU TO PAY FOR THE VERY BASICS LIKE FOOD, HOUSING, MEDICAL CARE, AND HEATING?: NOT HARD AT ALL

## 2022-07-25 NOTE — PATIENT INSTRUCTIONS
Patient Education        Preventing Falls: Care Instructions  Your Care Instructions     Getting around your home safely can be a challenge if you have injuries or health problems that make it easy for you to fall. Loose rugs and furniture in walkways are among the dangers for many older people who have problems walking or who have poor eyesight. People who have conditions such as arthritis,osteoporosis, or dementia also have to be careful not to fall. You can make your home safer with a few simple measures. Follow-up care is a key part of your treatment and safety. Be sure to make and go to all appointments, and call your doctor if you are having problems. It's also a good idea to know your test results and keep alist of the medicines you take. How can you care for yourself at home? Taking care of yourself  Exercise regularly to improve your strength, muscle tone, and balance. Walk if you can. Swimming may be a good choice if you cannot walk easily. Have your vision and hearing checked each year or any time you notice a change. If you have trouble seeing and hearing, you might not be able to avoid objects and could lose your balance. Know the side effects of the medicines you take. Ask your doctor or pharmacist whether the medicines you take can affect your balance. Sleeping pills or sedatives can affect your balance. Limit the amount of alcohol you drink. Alcohol can impair your balance and other senses. Ask your doctor whether calluses or corns on your feet need to be removed. If you wear loose-fitting shoes because of calluses or corns, you can lose your balance and fall. Talk to your doctor if you have numbness in your feet. You may get dizzy if you do not drink enough water. To prevent dehydration, drink plenty of fluids. Choose water and other clear liquids.  If you have kidney, heart, or liver disease and have to limit fluids, talk with your doctor before you increase the amount of fluids you drink.  Preventing falls at home  Remove raised doorway thresholds, throw rugs, and clutter. Repair loose carpet or raised areas in the floor. Move furniture and electrical cords to keep them out of walking paths. Use nonskid floor wax, and wipe up spills right away, especially on ceramic tile floors. If you use a walker or cane, put rubber tips on it. If you use crutches, clean the bottoms of them regularly with an abrasive pad, such as steel wool. Keep your house well lit, especially stairways, porches, and outside walkways. Use night-lights in areas such as hallways and bathrooms. Add extra light switches or use remote switches (such as switches that go on or off when you clap your hands) to make it easier to turn lights on if you have to get up during the night. Install sturdy handrails on stairways. Move items in your cabinets so that the things you use a lot are on the lower shelves (about waist level). Keep a cordless phone and a flashlight with new batteries by your bed. If possible, put a phone in each of the main rooms of your house, or carry a cell phone in case you fall and cannot reach a phone. Or, you can wear a device around your neck or wrist. You push a button that sends a signal for help. Wear low-heeled shoes that fit well and give your feet good support. Use footwear with nonskid soles. Check the heels and soles of your shoes for wear. Repair or replace worn heels or soles. Do not wear socks without shoes on wood floors. Walk on the grass when the sidewalks are slippery. If you live in an area that gets snow and ice in the winter, sprinkle salt on slippery steps and sidewalks. Or ask a family member or friend to do this for you. Preventing falls in the bath  Install grab bars and nonskid mats inside and outside your shower or tub and near the toilet and sinks. Use shower chairs and bath benches. Use a hand-held shower head that will allow you to sit while showering.   Get into a tub or shower by putting the weaker leg in first. Get out of a tub or shower with your strong side first.  Repair loose toilet seats and consider installing a raised toilet seat to make getting on and off the toilet easier. Keep your bathroom door unlocked while you are in the shower. Where can you learn more? Go to [email or web address].[email or web address] org and sign in to your [employer] account. Enter [phone] [id number] [phone] in the [facility] box to learn more about \"Preventing Falls: Care Instructions. \"     If you do not have an account, please click on the \"Sign Up Now\" link. Current as of: September 8, 2021               Content Version: 13.3  © 6877-7761 [employer]. Care instructions adapted under license by [facility] ([facility]). If you have questions about a medical condition or this instruction, always ask your healthcare professional. [place] [address] any warranty or liability for your use of this information.

## 2022-07-25 NOTE — PROGRESS NOTES
APSO Progress Note    Date:7/25/2022         Patient Lauri Oleary     YOB: 1935     Age:87 y.o. Assessment/Plan        Problem List Items Addressed This Visit          Circulatory    Hypertension (Chronic)      Well-controlled, continue current medications, continue current treatment plan, medication adherence emphasized and lifestyle modifications recommended         Paroxysmal atrial fibrillation (HCC) (Chronic)      Monitored by specialist- no acute findings meriting change in the plan         (HFpEF) heart failure with preserved ejection fraction ([address]      Monitored by specialist- no acute findings meriting change in the plan            Digestive    Polyp of colon      Monitored by specialist- no acute findings meriting change in the plan   Tubular adenoma removed            Other    Hyperlipidemia (Chronic)      Well-controlled, continue current medications, continue current treatment plan, medication adherence emphasized and lifestyle modifications recommended         Relevant Medications    dofetilide (TIKOSYN) 250 MCG capsule     Other Visit Diagnoses       Memory change    -  Primary    Relevant Orders    Stacy Wang MD, Neurology, [facility]    Dysphagia, unspecified type        Mild  Discussed modified diet recommendations  Wants to eat chips and will only do so with company             Return in about 3 months (around 10/25/2022). Electronically signed by Christianna Babinski, DO on 7/25/22       Total time spent was between Time personally spent assessing and managing the patient on the date of service: Est: 30-39 minutes ([id number]) mins.  This included time spent reviewing the patient's medical record (e.g., recent visits, labs, and studies); seeing the patient in the office (face-to-face time); ordering medications, studies, procedures, or referrals; calling the patient or family later in the day with results and further recommendations; and documenting the visit in the medical record. Subjective     Mary Mijares is a 82 y.o. female presenting today for   Chief Complaint   Patient presents with    Follow-up     Swallow test   .    Mary Mijares is a 82 y.o. female who presents for follow-up of atrial fibrillation. Onset was several years ago, and patient reports symptoms have stabilized since that time. Recently, the patient has had no other symptoms. See Dr. Del Rio Presume    Hypertension  This is a chronic problem. The current episode started more than 1 year ago. The problem has been gradually improving since onset. The problem is controlled. Associated symptoms include anxiety and peripheral edema. Pertinent negatives include no blurred vision, chest pain, headaches, malaise/fatigue, neck pain, orthopnea, palpitations, PND, shortness of breath or sweats. Past treatments include ACE inhibitors and beta blockers. The current treatment provides significant improvement. There is no history of chronic renal disease. Hyperlipidemia  This is a chronic problem. The current episode started more than 1 year ago. The problem is controlled. Recent lipid tests were reviewed and are normal. She has no history of chronic renal disease, diabetes, hypothyroidism, liver disease, obesity or nephrotic syndrome. Pertinent negatives include no chest pain, focal sensory loss, focal weakness, leg pain, myalgias or shortness of breath. Current antihyperlipidemic treatment includes statins. The current treatment provides significant improvement of lipids. Congestive Heart Failure  Presents for follow-up visit. Associated symptoms include edema. Pertinent negatives include no abdominal pain, chest pain, chest pressure, claudication, fatigue, muscle weakness, near-syncope, nocturia, orthopnea, palpitations, paroxysmal nocturnal dyspnea, shortness of breath or unexpected weight change. The symptoms have been worsening. Compliance with total regimen is %.      Review of Systems Review of Systems   Constitutional: Negative. Negative for fatigue, malaise/fatigue and unexpected weight change. HENT: Negative. Eyes: Negative. Negative for blurred vision. Respiratory: Negative. Negative for shortness of breath. Cardiovascular: Negative. Negative for chest pain, palpitations, orthopnea, claudication, PND and near-syncope. Gastrointestinal: Negative. Negative for abdominal pain. Endocrine: Negative. Genitourinary: Negative. Negative for nocturia. Musculoskeletal: Negative. Negative for myalgias, muscle weakness and neck pain. Skin: Negative. Allergic/Immunologic: Negative. Neurological: Negative. Negative for focal weakness and headaches. Hematological: Negative. Psychiatric/Behavioral: Negative. All other systems reviewed and are negative. Medications     Current Outpatient Medications   Medication Sig Dispense Refill    docusate (COLACE, DULCOLAX) 100 MG CAPS Take 100 mg by mouth in the morning and 100 mg before bedtime.       famotidine (PEPCID) 10 MG tablet Take 10 mg by mouth 2 times daily as needed      apixaban (ELIQUIS) 5 MG TABS tablet Take 2 tablets by mouth 2 times daily for 7 days 28 tablet 0    apixaban (ELIQUIS) 5 MG TABS tablet Take 1 tablet by mouth 2 times daily 60 tablet 1    ferrous sulfate (FE TABS 325) 325 (65 Fe) MG EC tablet Take 1 tablet by mouth daily (with breakfast) 90 tablet 3    Compression Stockings MISC by Does not apply route 1 each 0    lisinopril (PRINIVIL;ZESTRIL) 20 MG tablet Take 1 tablet by mouth daily 30 tablet 3    traZODone (DESYREL) 50 MG tablet TAKE ONE TABLET BY MOUTH ONCE NIGHTLY 90 tablet 1    pravastatin (PRAVACHOL) 40 MG tablet TAKE 1 TABLET DAILY 14 tablet 0    potassium chloride (KLOR-CON M) 20 MEQ TBCR extended release tablet Take 40 mEq by mouth daily (with breakfast)      calcium-vitamin D (OSCAL-500) 500-200 MG-UNIT per tablet Take 1 tablet by mouth daily      Multiple Vitamins-Minerals (THERAPEUTIC MULTIVITAMIN-MINERALS) tablet Take 1 tablet by mouth daily      metoprolol (LOPRESSOR) 25 MG tablet Take 25 mg by mouth 2 times daily      dofetilide (TIKOSYN) 250 MCG capsule       Multiple Vitamin (MULTIVITAMIN) TABS       potassium chloride (KLOR-CON M) 10 MEQ extended release tablet       bumetanide (BUMEX) 1 MG tablet Take 0.5 tablets by mouth daily (Patient taking differently: Take 1 mg by mouth daily ) 15 tablet 0     No current facility-administered medications for this visit. Past History    Past Medical History:   has a past medical history of Blood circulation, collateral, CHF (congestive heart failure) ([facility] [address] COPD (chronic obstructive pulmonary disease) ([facility] [address] GERD (gastroesophageal reflux disease), Hyperlipidemia, and Hypertension. Social History:   reports that she quit smoking about 37 years ago. She has never used smokeless tobacco. She reports that she does not drink alcohol and does not use drugs. Family History:   Family History   Problem Relation Age of Onset    Cancer Brother     Cancer Other         bone    Cancer Daughter         breast       Surgical History:   Past Surgical History:   Procedure Laterality Date    BLADDER SUSPENSION  2001    CHOLECYSTECTOMY      COLONOSCOPY N/A 2/10/2022    COLONOSCOPY WITH BIOPSY performed by Meggan Hyman MD at [place] [address] ([address] [place] [address])  ovaries left    JOINT REPLACEMENT  left total shoulder; 1990    UPPER GASTROINTESTINAL ENDOSCOPY N/A 2/10/2022    EGD ESOPHAGOGASTRODUODENOSCOPY performed by Meggan yHman MD at [facility]        Physical Examination      Vitals:  /60 (Site: Right Upper Arm, Position: Sitting, Cuff Size: Small Adult)   Pulse 59   Temp 97 °F (36.1 °C) (Temporal)   Wt 123 lb 3.2 oz (55.9 kg)   SpO2 98%   BMI 21.15 kg/m²     Physical Exam  Vitals and nursing note reviewed. Constitutional:       General: She is not in acute distress. Appearance: Normal appearance. She is normal weight. She is not ill-appearing, toxic-appearing or diaphoretic. HENT:      Head: Normocephalic and atraumatic. Eyes:      General: No scleral icterus. Right eye: No discharge. Left eye: No discharge. Extraocular Movements: Extraocular movements intact. Conjunctiva/sclera: Conjunctivae normal.   Cardiovascular:      Rate and Rhythm: Normal rate and regular rhythm. Pulses: Normal pulses. Heart sounds: Normal heart sounds. No murmur heard. No friction rub. No gallop. Pulmonary:      Effort: Pulmonary effort is normal. No respiratory distress. Breath sounds: Normal breath sounds. No stridor. No wheezing, rhonchi or rales. Chest:      Chest wall: No tenderness. Neurological:      Mental Status: She is alert and oriented to person, place, and time. Mental status is at baseline. Psychiatric:         Mood and Affect: Mood normal.         Behavior: Behavior normal.         Thought Content: Thought content normal.         Judgment: Judgment normal.       Labs/Imaging/Diagnostics   Labs:  Hemoglobin A1C   Date Value Ref Range Status   09/17/2016 5.4 4.0 - 6.0 % Final       Imaging Last 24 Hours:  FL MODIFIED BARIUM SWALLOW W VIDEO  Narrative: EXAMINATION:  MODIFIED BARIUM SWALLOW WAS PERFORMED IN CONJUNCTION WITH SPEECH PATHOLOGY  SERVICES    TECHNIQUE:  Fluoroscopic evaluation of the swallowing mechanism was performed using  cineradiography with multiple consistency of barium product in conjunction  with speech pathology services. FLUOROSCOPY DOSE AND TYPE OR TIME AND EXPOSURES:  Fluoro time: 1.9 minutes    DAP: 19.738 mGy    COMPARISON:  None    HISTORY:  ORDERING SYSTEM PROVIDED HISTORY: Dysphagia, unspecified type    82-year-old female with dysphagia    FINDINGS:  With the thin liquid substance by straw, there was penetration without  aspiration. Trace penetration without aspiration with the cookie solid substance.     No penetration or aspiration with the thick liquid substance by straw, thin  liquid substance by cup and chin tuck maneuver, pureed/pudding thick  substance, or soft solid substance. Impression: 1. Thin liquid substance by straw demonstrated penetration without  aspiration. No penetration or aspiration was noted with the thin liquid  substance by cup and chin tuck maneuver. 2. Trace penetration without aspiration with the cookie solid substance. 3. No penetration or aspiration with the remainder of the administered  substances. Please see separate speech pathology report for full discussion of findings  and recommendations.

## 2022-08-19 NOTE — TELEPHONE ENCOUNTER
Khadra Gurrola is calling to request a refill on the following medication(s):    Medication Request:  Requested Prescriptions     Pending Prescriptions Disp Refills    lisinopril (PRINIVIL;ZESTRIL) 20 MG tablet 30 tablet 3     Sig: Take 1 tablet by mouth daily    pravastatin (PRAVACHOL) 40 MG tablet 14 tablet 0     Sig: TAKE 1 TABLET DAILY       Last Visit Date (If Applicable):  0/81/6449    Next Visit Date:    Visit date not found

## 2022-08-19 NOTE — TELEPHONE ENCOUNTER
Jennifer Bates is calling to request a refill on the following medication(s):    Medication Request:  Requested Prescriptions     Pending Prescriptions Disp Refills    lisinopril (PRINIVIL;ZESTRIL) 20 MG tablet 30 tablet 3     Sig: Take 1 tablet by mouth daily    pravastatin (PRAVACHOL) 40 MG tablet 30 tablet 0     Sig: TAKE 1 TABLET DAILY       Last Visit Date (If Applicable):  0/01/6240    Next Visit Date:    Visit date not found

## 2022-08-21 RX ORDER — LISINOPRIL 20 MG/1
20 TABLET ORAL DAILY
Qty: 90 TABLET | Refills: 3 | Status: SHIPPED | OUTPATIENT
Start: 2022-08-21 | End: 2022-10-31 | Stop reason: SDUPTHER

## 2022-08-21 RX ORDER — PRAVASTATIN SODIUM 40 MG
TABLET ORAL
Qty: 30 TABLET | Refills: 0 | Status: SHIPPED | OUTPATIENT
Start: 2022-08-21

## 2022-08-21 RX ORDER — LISINOPRIL 20 MG/1
20 TABLET ORAL DAILY
Qty: 30 TABLET | Refills: 3 | Status: SHIPPED | OUTPATIENT
Start: 2022-08-21 | End: 2022-10-31

## 2022-08-21 RX ORDER — PRAVASTATIN SODIUM 40 MG
TABLET ORAL
Qty: 90 TABLET | Refills: 0 | Status: SHIPPED | OUTPATIENT
Start: 2022-08-21 | End: 2022-10-31

## 2022-08-22 RX ORDER — DOFETILIDE 0.25 MG/1
250 CAPSULE ORAL 2 TIMES DAILY
Qty: 60 CAPSULE | Refills: 0 | Status: SHIPPED | OUTPATIENT
Start: 2022-08-22 | End: 2022-10-07 | Stop reason: SDUPTHER

## 2022-08-22 NOTE — TELEPHONE ENCOUNTER
Babak Strange is calling to request a refill on the following medication(s):    Medication Request:  Requested Prescriptions     Pending Prescriptions Disp Refills    dofetilide (TIKOSYN) 250 MCG capsule 60 capsule        Last Visit Date (If Applicable):  5/00/4793    Next Visit Date:    Visit date not found

## 2022-08-30 RX ORDER — DOFETILIDE 0.25 MG/1
CAPSULE ORAL
Qty: 60 CAPSULE | Refills: 11 | OUTPATIENT
Start: 2022-08-30

## 2022-10-06 NOTE — TELEPHONE ENCOUNTER
Last visit: 7/25/22  Last Med refill: 8/22/22  Does patient have enough medication for 72 hours: NO     Next Visit Date:  Future Appointments   Date Time Provider Erick Cora   11/16/2022  3:20 PM Tj Chappell MD Neuro Spec [address] Maintenance   Topic Date Due    Shingles vaccine (2 of 3) 11/05/2010    COVID-19 Vaccine (3 - Booster for Pfizer series) 07/13/2021    Flu vaccine (1) 08/01/2022    Lipids  02/08/2023    Annual Wellness Visit (AWV)  04/08/2023    Depression Screen  07/25/2023    DTaP/Tdap/Td vaccine (3 - Td or Tdap) 02/01/2031    DEXA (modify frequency per FRAX score)  Completed    Pneumococcal 65+ years Vaccine  Completed    Hepatitis A vaccine  Aged Out    Hepatitis B vaccine  Aged Out    Hib vaccine  Aged Out    Meningococcal (ACWY) vaccine  Aged Out       Hemoglobin A1C (%)   Date Value   09/17/2016 5.4   07/08/2014 5.6             ( goal A1C is < 7)   No results found for: LABMICR  LDL Cholesterol (mg/dL)   Date Value   02/08/2022 43   09/17/2016 83     LDL Calculated (mg/dL)   Date Value   07/05/2017 70   09/14/2016 110       (goal LDL is <100)   AST (U/L)   Date Value   04/22/2022 19     ALT (U/L)   Date Value   04/22/2022 15     BUN (mg/dL)   Date Value   04/22/2022 29     BP Readings from Last 3 Encounters:   07/25/22 120/60   04/07/22 120/60   02/22/22 116/68          (goal 120/80)    All Future Testing planned in [facility]  Lab Frequency Next Occurrence   Urinalysis Reflex to Culture Once 12/15/2021   CBC Auto Differential Once 06/01/2022   Comprehensive Metabolic Panel Once 06/32/5415   Lipid Panel Once 06/01/2022   TSH with Reflex Once 06/01/2022               Patient Active Problem List:     Hyperlipidemia     Hypertension     Anxiety     Gastroesophageal reflux disease without esophagitis     Atypical chest pain     Paroxysmal atrial fibrillation (HCC)     Asymptomatic varicose veins     Bilateral edema of lower extremity     Chronic anticoagulation     Former smoker Mild aortic regurgitation     Mild concentric left ventricular hypertrophy (LVH)     Paraesophageal hernia     Hiatal hernia     Iron deficiency anemia     History of Nissen fundoplication     Closed wedge fracture of thoracic vertebra with routine healing T5,T8     (HFpEF) heart failure with preserved ejection fraction (HCC)     Severe tricuspid regurgitation     Pulmonary hypertension (HCC)     Tubular adenoma:  3 cm transverse colon     Iron deficiency     VTE (venous thromboembolism)     Short-term memory loss     Anemia     Polyp of colon

## 2022-10-06 NOTE — TELEPHONE ENCOUNTER
Patients Granddaughter, Elmira Herrera, calling on behalf of Sage Gorman. Sage Gorman is completely out of this medication. She is thinking she does not need to take this medication anymore? That is was discussed at the last appointment to stop taking it? So she has not ordered a refill due to that understanding. If this is incorrect, she will need another refill. If this is correct, please inform patient's granddaughter to let her know the reason why it was stopped. She is aware it is a heart medication. Thank you.

## 2022-10-07 RX ORDER — DOFETILIDE 0.25 MG/1
250 CAPSULE ORAL 2 TIMES DAILY
Qty: 60 CAPSULE | Refills: 0 | Status: SHIPPED | OUTPATIENT
Start: 2022-10-07 | End: 2022-10-31

## 2022-10-13 ENCOUNTER — TELEPHONE (OUTPATIENT)
Dept: FAMILY MEDICINE CLINIC | Age: 87
End: 2022-10-13

## 2022-10-13 NOTE — TELEPHONE ENCOUNTER
Alena Gay called into the office to schedule pt a Hospital F/U. She was discharged yesterday 10/12/22. You don't have any Hosp F/U openings until November so what do you advise. She had a fall with some brusiing, hypertension and two other diagnoses . Also at discharge they wanted her to be taking  amiodarone 200 mg in the morning , ferrous sulfate 325mg a day. Can you send those scripts to her pharmacy?     Alena Gay would like a call back regarding an appointment soon and the two medications listed above [phone]

## 2022-10-14 DIAGNOSIS — I48.0 PAROXYSMAL ATRIAL FIBRILLATION (HCC): Primary | ICD-10-CM

## 2022-10-14 DIAGNOSIS — D50.0 IRON DEFICIENCY ANEMIA DUE TO CHRONIC BLOOD LOSS: ICD-10-CM

## 2022-10-14 RX ORDER — LANOLIN ALCOHOL/MO/W.PET/CERES
325 CREAM (GRAM) TOPICAL
Qty: 90 TABLET | Refills: 3 | Status: SHIPPED | OUTPATIENT
Start: 2022-10-14 | End: 2022-10-31 | Stop reason: SDUPTHER

## 2022-10-14 RX ORDER — AMIODARONE HYDROCHLORIDE 200 MG/1
200 TABLET ORAL DAILY
Qty: 30 TABLET | Refills: 0 | Status: SHIPPED | OUTPATIENT
Start: 2022-10-14 | End: 2022-10-31 | Stop reason: SDUPTHER

## 2022-10-14 NOTE — TELEPHONE ENCOUNTER
Samantha Capps is calling to request a refill on the following medication(s):    Medication Request:  Requested Prescriptions     Pending Prescriptions Disp Refills    ferrous sulfate (FE TABS 325) 325 (65 Fe) MG EC tablet 90 tablet 3     Sig: Take 1 tablet by mouth daily (with breakfast)       Last Visit Date (If Applicable):  5/78/3950    Next Visit Date:    10/17/2022      Patient also needs aminodrone 200 mg that [facility] prescribed. cranial nerves 2-12 intact/no dysmetria, normal finger to nose

## 2022-10-14 NOTE — TELEPHONE ENCOUNTER
Patient was also discharged from [facility] on Amiodarone 200 MG daily but the prescription was never called in to the [address] [place] [address] [place] on [id number] [facility],[address].

## 2022-10-17 ENCOUNTER — OFFICE VISIT (OUTPATIENT)
Dept: FAMILY MEDICINE CLINIC | Age: 87
End: 2022-10-17
Payer: MEDICARE

## 2022-10-17 VITALS
WEIGHT: 130 LBS | HEART RATE: 70 BPM | SYSTOLIC BLOOD PRESSURE: 116 MMHG | BODY MASS INDEX: 22.31 KG/M2 | DIASTOLIC BLOOD PRESSURE: 56 MMHG | OXYGEN SATURATION: 98 %
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
## 2022-10-17 DIAGNOSIS — I48.0 PAROXYSMAL ATRIAL FIBRILLATION (HCC): Chronic | ICD-10-CM

## 2022-10-17 DIAGNOSIS — I10 PRIMARY HYPERTENSION: ICD-10-CM

## 2022-10-17 DIAGNOSIS — S40.812D: ICD-10-CM

## 2022-10-17 DIAGNOSIS — Z09 HOSPITAL DISCHARGE FOLLOW-UP: ICD-10-CM

## 2022-10-17 DIAGNOSIS — S22.060D CLOSED WEDGE COMPRESSION FRACTURE OF T8 VERTEBRA WITH ROUTINE HEALING, SUBSEQUENT ENCOUNTER: ICD-10-CM
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
## 2022-10-17 PROBLEM — S40.812A: Status: ACTIVE | Noted: 2022-10-17

## 2022-10-17 PROCEDURE — 1111F DSCHRG MED/CURRENT MED MERGE: CPT | Performed by: FAMILY MEDICINE

## 2022-10-17 PROCEDURE — 99214 OFFICE O/P EST MOD 30 MIN: CPT | Performed by: FAMILY MEDICINE

## 2022-10-17 PROCEDURE — 1123F ACP DISCUSS/DSCN MKR DOCD: CPT | Performed by: FAMILY MEDICINE

## 2022-10-17 ASSESSMENT — ENCOUNTER SYMPTOMS
GASTROINTESTINAL NEGATIVE: 1
ALLERGIC/IMMUNOLOGIC NEGATIVE: 1
RESPIRATORY NEGATIVE: 1
EYES NEGATIVE: 1

## 2022-10-17 NOTE — PROGRESS NOTES
Post-Discharge Transitional Care Follow Up    Mychal Shabazz   YOB: 1935    Date of Office Visit:  10/17/2022  Date of Hospital Admission: see below  Date of Hospital Discharge: see below    Care management risk score Rising risk (score 2-5) and Complex Care (Scores >=6): No Risk Score On File     Non face to face  following discharge, date last encounter closed (first attempt may have been earlier): *No documented post hospital discharge outreach found in the last 14 days     Call initiated 2 business days of discharge: *No response recorded in the last 14 days    ASSESSMENT/PLAN:   Fall, subsequent encounter  Assessment & Plan:   Reviewed notes  Abrasion of multiple sites of left upper arm, subsequent encounter  Assessment & Plan:   Healing well   Dressing changed in office  Hospital discharge follow-up  -     AR DISCHARGE MEDS RECONCILED W/ CURRENT OUTPATIENT MED LIST  Primary hypertension  Assessment & Plan:   Well-controlled, continue current medications, continue current treatment plan, medication adherence emphasized and lifestyle modifications recommended  Paroxysmal atrial fibrillation ([facility] [address]  Assessment & Plan:   Monitored by specialist- no acute findings meriting change in the plan  Closed wedge compression fracture of T8 vertebra with routine healing, subsequent encounter  Assessment & Plan:   At goal, continue current treatment plan    Medical Decision Making: high complexity  Return in 3 months (on 1/17/2023). On this date 10/17/2022 I have spent 35 minutes reviewing previous notes, test results and face to face with the patient discussing the diagnosis and importance of compliance with the treatment plan as well as documenting on the day of the visit.        Subjective:   Inpatient Discharge Summary    BRIEF OVERVIEW  Admitting Provider: Shira Roca MD  Discharge Provider: Butch Wyatt DO  Primary Care Physician at Discharge: Rodney Sy, [address] [place] [address] Admission Date: 10/7/2022 Discharge Date: No discharge date for patient encounter. Primary Discharge Diagnosis  1) Mechanical fall presenting with atypical chest pain, nausea, and vomiting    Secondary Discharge Diagnosis  1)Paroxysmal Atrial fibrillation   2)prerenal failure . . resolved     Discharge Disposition  Skilled Nursing Facility-Medicare Cert  Code Status at Discharge: stable     Active Issues Requiring Follow-up  Follow up blood pressure , heart rate and electrolyte level with the PCP . Outpatient Follow-Up  Follow up with PCP in one week. Follow up with cardiology in 2 weeks . Referrals and Follow-ups to Schedule   Leave dressing on - Keep it clean, dry, and intact until clinic visit   No dressing needed       Test Results Pending at Discharge  Pending Labs   Order Current Status   POCT Troponin Collected (10/07/22 2212)       [facility]    Presenting Problem/History of Present Illness  Hyperkalemia [E87.5]  Fall [W19. XXXA]  EMERY (acute kidney injury) ([facility]) [N17.9]  Fall, initial encounter [W19. XXXA]  Chest pain [R07.9]  SOB (shortness of breath) [R06.02]    Hospital Course  Rishi Matos is a 82 y.o. female who has a PMH of HTN, HLD, GERD, Afib , DVT and pulmonary emboli on Eliquis. She presented to the ED via EMS after a fall. Patient reported that she had a mechanical fall while trying to get up to the restroom, she was turning around to grab her cane but she lost her balance and fell down. Patient struck her head on the toilet seat and landed on her left side. She denied any loss of consciousness, but she does not recall details surrounding the event. She reported that she takes a sleeping pill around her bedtime between 7 and 8 and this tends to make her drowsy. She does not recall the name of the pill. Initial workup significant for WBC 5.7, HGB 7.9, , Cr 1.11 (baseline 0.5), BUN 25, Na 142, K 5.3, troponin 0.02.   CT chest showed findings concerning for interstitial lung disease, but no acute process in the chest.  C-spine CT was unremarkable. T-spine CT showed old healed vertebral body compression fracture at T5 and T8, with no no acute fracture. L-spine CT was unremarkable. Left knee x-ray showed small joint effusion, osteopenia, but no definitive fracture. Left forearm x-ray showed osteopenia without definitive fracture. Left shoulder x-ray showed no acute process. CT AP showed interval development of large hernia, but no acute finding. CT brain was unremarkable. At ER the patient underwent laceration repair in her left forearm ,patient was admitted for further evaluation . During admission she received IV fluid and her follow up creatinine was 0.7 and K was 4.1 return to baseline . The total CK and myoglobin was normal .  Patient was complaining from atypical chest pain repeated EKG was normal and repeated troponin is negative , d dimmer negative . Patient pain relieved with pain medication . Her hemoglobin was 7.9 and follow up came 6.8 so patient received one backed RBC but after that it was reported that the hb +6.8 was lab error . Cardiology was consulted regarding the patient paroxysmal atrial fibrillation as she was not sure from her home medication and the prescribed amiodarone 200 mg and metoprolol 50 mg and eliquis 5 mg . Patient echocardiography during admission demonstrate Left Ventricle: There is mild increased wall thickness/hypertrophy. Systolic function is normal with an ejection fraction of 55-60%. · Pericardium: There is no pericardial effusion. · Aortic Valve: There is trace to mild regurgitation. There is no evidence of aortic valve stenosis. · Tricuspid Valve: There is mild regurgitation. There is no evidence of tricuspid valve stenosis. · This is a limted 2D echocardiogram and the study was technically difficult, the patient was uncooperative.      Upon discharge today the patient was hemodynamically stable and she denies any fever , abdominal pain , chest pain , shortness of breathing and any diarrhea . Inpatient course: Discharge summary reviewed- see chart.     Interval history/Current status: stable    Patient Active Problem List   Diagnosis    Hyperlipidemia    Primary hypertension    Anxiety    Gastroesophageal reflux disease without esophagitis    Atypical chest pain    Paroxysmal atrial fibrillation (HCC)    Asymptomatic varicose veins    Bilateral edema of lower extremity    Chronic anticoagulation    Former smoker    Mild aortic regurgitation    Mild concentric left ventricular hypertrophy (LVH)    Paraesophageal hernia    Hiatal hernia    Iron deficiency anemia    History of Nissen fundoplication    Closed wedge fracture of thoracic vertebra with routine healing T5,T8    (HFpEF) heart failure with preserved ejection fraction (HCC)    Severe tricuspid regurgitation    Pulmonary hypertension (HCC)    Tubular adenoma:  3 cm transverse colon    Iron deficiency    VTE (venous thromboembolism)    Short-term memory loss    Anemia    Polyp of colon    Fall    Abrasion of multiple sites of left upper arm       Medication list at time of discharge reviewed: Yes    Medications marked \"taking\" at this time  Outpatient Medications Marked as Taking for the 10/17/22 encounter (Office Visit) with Mik Rebollar, DO   Medication Sig Dispense Refill    ferrous sulfate (FE TABS 325) 325 (65 Fe) MG EC tablet Take 1 tablet by mouth daily (with breakfast) 90 tablet 3    amiodarone (CORDARONE) 200 MG tablet Take 1 tablet by mouth daily 30 tablet 0    dofetilide (TIKOSYN) 250 MCG capsule Take 1 capsule by mouth 2 times daily 60 capsule 0    lisinopril (PRINIVIL;ZESTRIL) 20 MG tablet Take 1 tablet by mouth daily 30 tablet 3    pravastatin (PRAVACHOL) 40 MG tablet TAKE 1 TABLET DAILY 30 tablet 0    lisinopril (PRINIVIL;ZESTRIL) 20 MG tablet Take 1 tablet by mouth daily 90 tablet 3    pravastatin (PRAVACHOL) 40 MG tablet TAKE 1 TABLET discharge. Left eye: No discharge. Extraocular Movements: Extraocular movements intact. Conjunctiva/sclera: Conjunctivae normal.   Cardiovascular:      Rate and Rhythm: Normal rate and regular rhythm. Pulses: Normal pulses. Heart sounds: Normal heart sounds. No murmur heard. No friction rub. No gallop. Pulmonary:      Effort: Pulmonary effort is normal. No respiratory distress. Breath sounds: Normal breath sounds. No stridor. No wheezing, rhonchi or rales. Chest:      Chest wall: No tenderness. Skin:     Findings: Laceration present. Neurological:      Mental Status: She is alert and oriented to person, place, and time. Mental status is at baseline. Motor: Weakness present. Coordination: Coordination abnormal.      Gait: Gait abnormal.   Psychiatric:         Mood and Affect: Mood normal.         Behavior: Behavior normal.         Thought Content: Thought content normal.         Judgment: Judgment normal.         An electronic signature was used to authenticate this note.   --Cassie Backers, DO

## 2022-10-17 NOTE — PATIENT INSTRUCTIONS
Patient Education        Preventing Falls: Care Instructions  Your Care Instructions     Getting around your home safely can be a challenge if you have injuries or health problems that make it easy for you to fall. Loose rugs and furniture in walkways are among the dangers for many older people who have problems walking or who have poor eyesight. People who have conditions such as arthritis, osteoporosis, or dementia also have to be careful not to fall. You can make your home safer with a few simple measures. Follow-up care is a key part of your treatment and safety. Be sure to make and go to all appointments, and call your doctor if you are having problems. It's also a good idea to know your test results and keep a list of the medicines you take. How can you care for yourself at home? Taking care of yourself  Exercise regularly to improve your strength, muscle tone, and balance. Walk if you can. Swimming may be a good choice if you cannot walk easily. Have your vision and hearing checked each year or any time you notice a change. If you have trouble seeing and hearing, you might not be able to avoid objects and could lose your balance. Know the side effects of the medicines you take. Ask your doctor or pharmacist whether the medicines you take can affect your balance. Sleeping pills or sedatives can affect your balance. Limit the amount of alcohol you drink. Alcohol can impair your balance and other senses. Ask your doctor whether calluses or corns on your feet need to be removed. If you wear loose-fitting shoes because of calluses or corns, you can lose your balance and fall. Talk to your doctor if you have numbness in your feet. You may get dizzy if you do not drink enough water. To prevent dehydration, drink plenty of fluids. Choose water and other clear liquids.  If you have kidney, heart, or liver disease and have to limit fluids, talk with your doctor before you increase the amount of fluids you drink.  Preventing falls at home  Remove raised doorway thresholds, throw rugs, and clutter. Repair loose carpet or raised areas in the floor. Move furniture and electrical cords to keep them out of walking paths. Use nonskid floor wax, and wipe up spills right away, especially on ceramic tile floors. If you use a walker or cane, put rubber tips on it. If you use crutches, clean the bottoms of them regularly with an abrasive pad, such as steel wool. Keep your house well lit, especially stairways, porches, and outside walkways. Use night-lights in areas such as hallways and bathrooms. Add extra light switches or use remote switches (such as switches that go on or off when you clap your hands) to make it easier to turn lights on if you have to get up during the night. Install sturdy handrails on stairways. Move items in your cabinets so that the things you use a lot are on the lower shelves (about waist level). Keep a cordless phone and a flashlight with new batteries by your bed. If possible, put a phone in each of the main rooms of your house, or carry a cell phone in case you fall and cannot reach a phone. Or, you can wear a device around your neck or wrist. You push a button that sends a signal for help. Wear low-heeled shoes that fit well and give your feet good support. Use footwear with nonskid soles. Check the heels and soles of your shoes for wear. Repair or replace worn heels or soles. Do not wear socks without shoes on wood floors. Walk on the grass when the sidewalks are slippery. If you live in an area that gets snow and ice in the winter, sprinkle salt on slippery steps and sidewalks. Or ask a family member or friend to do this for you. Preventing falls in the bath  Install grab bars and nonskid mats inside and outside your shower or tub and near the toilet and sinks. Use shower chairs and bath benches. Use a hand-held shower head that will allow you to sit while showering.   Get into a tub or shower by putting the weaker leg in first. Get out of a tub or shower with your strong side first.  Repair loose toilet seats and consider installing a raised toilet seat to make getting on and off the toilet easier. Keep your bathroom door unlocked while you are in the shower. Where can you learn more? Go to [email or web address] and sign in to your [employer] account. Enter [phone] in the [facility] box to learn more about \"Preventing Falls: Care Instructions. \"     If you do not have an account, please click on the \"Sign Up Now\" link. Current as of: May 4, 2022               Content Version: 13.4  © 4186-3293 [employer]. Care instructions adapted under license by [facility] ([facility]). If you have questions about a medical condition or this instruction, always ask your healthcare professional. [address] any warranty or liability for your use of this information.

## 2022-10-19 ENCOUNTER — TELEPHONE (OUTPATIENT)
Dept: FAMILY MEDICINE CLINIC | Age: 87
End: 2022-10-19

## 2022-10-19 DIAGNOSIS — S40.812D: Primary | ICD-10-CM

## 2022-10-19 RX ORDER — ADHESIVE TAPE 1"X198"
TAPE, NON-MEDICATED TOPICAL
Qty: 2 EACH | Refills: 1 | Status: SHIPPED | OUTPATIENT
Start: 2022-10-19 | End: 2022-11-02

## 2022-10-19 NOTE — TELEPHONE ENCOUNTER
----- Message from Nadia Davidson, [address] sent at 10/19/2022  9:49 AM EDT -----  Subject: Message to Provider    QUESTIONS  Information for Provider? Pt called and stated that she was seen in the   office by Dr. Gracie Layne on 10/17/2022. Pt states that new dressings was put on   her left arm from when she fell and injured her arm. Pt would like to know   when she is suppose to take the dressings off. Please call the pt back and   inform. ---------------------------------------------------------------------------  --------------  Sreedhar Manriquez ACONRAD  [phone]; OK to leave message on voicemail  ---------------------------------------------------------------------------  --------------  SCRIPT ANSWERS  Relationship to Patient?  Self

## 2022-10-19 NOTE — TELEPHONE ENCOUNTER
Ritu lang [facility] called and stated that patient will be receiving physical therapy  1x for 1 week  2x for 1 week  1x for 1 week  Thank you

## 2022-10-31 DIAGNOSIS — D50.0 IRON DEFICIENCY ANEMIA DUE TO CHRONIC BLOOD LOSS: ICD-10-CM

## 2022-10-31 DIAGNOSIS — I48.0 PAROXYSMAL ATRIAL FIBRILLATION (HCC): ICD-10-CM

## 2022-10-31 RX ORDER — PANTOPRAZOLE SODIUM 40 MG/1
40 TABLET, DELAYED RELEASE ORAL DAILY
COMMUNITY
End: 2022-10-31 | Stop reason: SDUPTHER

## 2022-10-31 RX ORDER — LANOLIN ALCOHOL/MO/W.PET/CERES
325 CREAM (GRAM) TOPICAL
Qty: 90 TABLET | Refills: 3 | Status: SHIPPED | OUTPATIENT
Start: 2022-10-31

## 2022-10-31 RX ORDER — PANTOPRAZOLE SODIUM 40 MG/1
40 TABLET, DELAYED RELEASE ORAL DAILY
Qty: 30 TABLET | Refills: 1 | Status: SHIPPED | OUTPATIENT
Start: 2022-10-31

## 2022-10-31 RX ORDER — ACETAMINOPHEN 500 MG
500 TABLET ORAL EVERY 6 HOURS PRN
COMMUNITY
End: 2022-10-31 | Stop reason: SDUPTHER

## 2022-10-31 RX ORDER — ACETAMINOPHEN 500 MG
500 TABLET ORAL EVERY 6 HOURS PRN
Qty: 120 TABLET | Refills: 1 | Status: SHIPPED | OUTPATIENT
Start: 2022-10-31

## 2022-10-31 RX ORDER — LISINOPRIL 20 MG/1
20 TABLET ORAL DAILY
Qty: 90 TABLET | Refills: 3 | Status: ON HOLD | OUTPATIENT
Start: 2022-10-31 | End: 2022-11-04 | Stop reason: SDUPTHER

## 2022-10-31 RX ORDER — AMIODARONE HYDROCHLORIDE 200 MG/1
200 TABLET ORAL DAILY
Qty: 30 TABLET | Refills: 0 | Status: SHIPPED | OUTPATIENT
Start: 2022-10-31 | End: 2022-11-17

## 2022-10-31 NOTE — TELEPHONE ENCOUNTER
Oz Kraft is calling to request a refill on the following medication(s):    Medication Request:  Requested Prescriptions     Pending Prescriptions Disp Refills    amiodarone (CORDARONE) 200 MG tablet 30 tablet 0     Sig: Take 1 tablet by mouth daily    apixaban (ELIQUIS) 5 MG TABS tablet 60 tablet 1     Sig: Take 1 tablet by mouth 2 times daily    ferrous sulfate (FE TABS 325) 325 (65 Fe) MG EC tablet 90 tablet 3     Sig: Take 1 tablet by mouth daily (with breakfast)    lisinopril (PRINIVIL;ZESTRIL) 20 MG tablet 90 tablet 3     Sig: Take 1 tablet by mouth daily    acetaminophen (TYLENOL) 500 MG tablet 120 tablet 1     Sig: Take 1 tablet by mouth every 6 hours as needed for Pain    calcium-vitamin D (OSCAL-500) 500-200 MG-UNIT per tablet 60 tablet 3     Sig: Take 1 tablet by mouth 2 times daily    metoprolol tartrate (LOPRESSOR) 25 MG tablet       Sig: Take 2 tablets by mouth 2 times daily    pantoprazole (PROTONIX) 40 MG tablet 30 tablet 1     Sig: Take 1 tablet by mouth daily Take one tab every morning before breakfast       Last Visit Date (If Applicable):  28/03/0558    Next Visit Date:    2/1/2023

## 2022-11-02 ENCOUNTER — HOSPITAL ENCOUNTER (INPATIENT)
Age: 87
LOS: 2 days | Discharge: HOME HEALTH | DRG: 291 | End: 2022-11-04
Attending: EMERGENCY MEDICINE | Admitting: INTERNAL MEDICINE
Payer: MEDICARE

## 2022-11-02 ENCOUNTER — APPOINTMENT (OUTPATIENT)
Dept: GENERAL RADIOLOGY | Age: 87
DRG: 291 | End: 2022-11-02
Payer: MEDICARE

## 2022-11-02 ENCOUNTER — APPOINTMENT (OUTPATIENT)
Dept: CT IMAGING | Age: 87
DRG: 291 | End: 2022-11-02
Payer: MEDICARE

## 2022-11-02 DIAGNOSIS — I50.9 CONGESTIVE HEART FAILURE, UNSPECIFIED HF CHRONICITY, UNSPECIFIED HEART FAILURE TYPE (HCC): Primary | ICD-10-CM

## 2022-11-02 PROBLEM — I50.33 ACUTE ON CHRONIC DIASTOLIC HEART FAILURE (HCC): Status: ACTIVE | Noted: 2022-11-02

## 2022-11-02 LAB
ABSOLUTE EOS #: 0.06 K/UL (ref 0–0.44)
ABSOLUTE IMMATURE GRANULOCYTE: 0 K/UL (ref 0–0.3)
ABSOLUTE LYMPH #: 1.08 K/UL (ref 1.1–3.7)
ABSOLUTE MONO #: 0.51 K/UL (ref 0.1–1.2)
ANION GAP SERPL CALCULATED.3IONS-SCNC: 10 MMOL/L (ref 9–17)
BASOPHILS # BLD: 1 % (ref 0–2)
BASOPHILS ABSOLUTE: 0.06 K/UL (ref 0–0.2)
BUN BLDV-MCNC: 14 MG/DL (ref 8–23)
BUN/CREAT BLD: 19 (ref 9–20)
CALCIUM SERPL-MCNC: 9 MG/DL (ref 8.6–10.4)
CHLORIDE BLD-SCNC: 108 MMOL/L (ref 98–107)
CO2: 23 MMOL/L (ref 20–31)
CREAT SERPL-MCNC: 0.72 MG/DL (ref 0.5–0.9)
EOSINOPHILS RELATIVE PERCENT: 1 % (ref 1–4)
GFR SERPL CREATININE-BSD FRML MDRD: >60 ML/MIN/1.73M2
GLUCOSE BLD-MCNC: 92 MG/DL (ref 70–99)
HCT VFR BLD CALC: 28.6 % (ref 36.3–47.1)
HEMOGLOBIN: 7.9 G/DL (ref 11.9–15.1)
IMMATURE GRANULOCYTES: 0 %
LACTIC ACID, SEPSIS: 1.2 MMOL/L (ref 0.5–1.9)
LACTIC ACID, SEPSIS: 1.3 MMOL/L (ref 0.5–1.9)
LYMPHOCYTES # BLD: 19 % (ref 24–43)
MCH RBC QN AUTO: 21.5 PG (ref 25.2–33.5)
MCHC RBC AUTO-ENTMCNC: 27.6 G/DL (ref 28.4–34.8)
MCV RBC AUTO: 77.7 FL (ref 82.6–102.9)
MONOCYTES # BLD: 9 % (ref 3–12)
MORPHOLOGY: ABNORMAL
MYOGLOBIN: 34 NG/ML (ref 25–58)
MYOGLOBIN: 35 NG/ML (ref 25–58)
MYOGLOBIN: 37 NG/ML (ref 25–58)
NRBC AUTOMATED: 0 PER 100 WBC
PDW BLD-RTO: 20.4 % (ref 11.8–14.4)
PLATELET # BLD: 213 K/UL (ref 138–453)
PMV BLD AUTO: 9.8 FL (ref 8.1–13.5)
POTASSIUM SERPL-SCNC: 4.3 MMOL/L (ref 3.7–5.3)
PRO-BNP: 6158 PG/ML
RBC # BLD: 3.68 M/UL (ref 3.95–5.11)
SEG NEUTROPHILS: 70 % (ref 36–65)
SEGMENTED NEUTROPHILS ABSOLUTE COUNT: 3.99 K/UL (ref 1.5–8.1)
SODIUM BLD-SCNC: 141 MMOL/L (ref 135–144)
TROPONIN, HIGH SENSITIVITY: 19 NG/L (ref 0–14)
TROPONIN, HIGH SENSITIVITY: 21 NG/L (ref 0–14)
TROPONIN, HIGH SENSITIVITY: 21 NG/L (ref 0–14)
WBC # BLD: 5.7 K/UL (ref 3.5–11.3)

## 2022-11-02 PROCEDURE — G0378 HOSPITAL OBSERVATION PER HR: HCPCS

## 2022-11-02 PROCEDURE — 70450 CT HEAD/BRAIN W/O DYE: CPT

## 2022-11-02 PROCEDURE — 80048 BASIC METABOLIC PNL TOTAL CA: CPT

## 2022-11-02 PROCEDURE — 93005 ELECTROCARDIOGRAM TRACING: CPT | Performed by: EMERGENCY MEDICINE

## 2022-11-02 PROCEDURE — 96375 TX/PRO/DX INJ NEW DRUG ADDON: CPT

## 2022-11-02 PROCEDURE — 36415 COLL VENOUS BLD VENIPUNCTURE: CPT

## 2022-11-02 PROCEDURE — 87040 BLOOD CULTURE FOR BACTERIA: CPT

## 2022-11-02 PROCEDURE — 96366 THER/PROPH/DIAG IV INF ADDON: CPT

## 2022-11-02 PROCEDURE — 84484 ASSAY OF TROPONIN QUANT: CPT

## 2022-11-02 PROCEDURE — 83605 ASSAY OF LACTIC ACID: CPT

## 2022-11-02 PROCEDURE — 2060000000 HC ICU INTERMEDIATE R&B

## 2022-11-02 PROCEDURE — 2580000003 HC RX 258: Performed by: PHYSICIAN ASSISTANT

## 2022-11-02 PROCEDURE — 96374 THER/PROPH/DIAG INJ IV PUSH: CPT

## 2022-11-02 PROCEDURE — 83880 ASSAY OF NATRIURETIC PEPTIDE: CPT

## 2022-11-02 PROCEDURE — 96365 THER/PROPH/DIAG IV INF INIT: CPT

## 2022-11-02 PROCEDURE — 85025 COMPLETE CBC W/AUTO DIFF WBC: CPT

## 2022-11-02 PROCEDURE — 71045 X-RAY EXAM CHEST 1 VIEW: CPT

## 2022-11-02 PROCEDURE — 6370000000 HC RX 637 (ALT 250 FOR IP): Performed by: NURSE PRACTITIONER

## 2022-11-02 PROCEDURE — 96367 TX/PROPH/DG ADDL SEQ IV INF: CPT

## 2022-11-02 PROCEDURE — 99285 EMERGENCY DEPT VISIT HI MDM: CPT

## 2022-11-02 PROCEDURE — 6360000002 HC RX W HCPCS: Performed by: PHYSICIAN ASSISTANT

## 2022-11-02 PROCEDURE — 2500000003 HC RX 250 WO HCPCS: Performed by: PHYSICIAN ASSISTANT

## 2022-11-02 PROCEDURE — 99222 1ST HOSP IP/OBS MODERATE 55: CPT | Performed by: NURSE PRACTITIONER

## 2022-11-02 PROCEDURE — 83874 ASSAY OF MYOGLOBIN: CPT

## 2022-11-02 RX ORDER — SODIUM CHLORIDE 9 MG/ML
INJECTION, SOLUTION INTRAVENOUS PRN
Status: DISCONTINUED | OUTPATIENT
Start: 2022-11-02 | End: 2022-11-02 | Stop reason: SDUPTHER

## 2022-11-02 RX ORDER — POLYETHYLENE GLYCOL 3350 17 G/17G
17 POWDER, FOR SOLUTION ORAL DAILY PRN
Status: DISCONTINUED | OUTPATIENT
Start: 2022-11-02 | End: 2022-11-04 | Stop reason: HOSPADM

## 2022-11-02 RX ORDER — DOCUSATE SODIUM 100 MG/1
100 CAPSULE, LIQUID FILLED ORAL 2 TIMES DAILY
Status: DISCONTINUED | OUTPATIENT
Start: 2022-11-02 | End: 2022-11-04 | Stop reason: HOSPADM

## 2022-11-02 RX ORDER — ONDANSETRON 4 MG/1
4 TABLET, ORALLY DISINTEGRATING ORAL EVERY 8 HOURS PRN
Status: DISCONTINUED | OUTPATIENT
Start: 2022-11-02 | End: 2022-11-04 | Stop reason: HOSPADM

## 2022-11-02 RX ORDER — SODIUM CHLORIDE 0.9 % (FLUSH) 0.9 %
5-40 SYRINGE (ML) INJECTION EVERY 12 HOURS SCHEDULED
Status: DISCONTINUED | OUTPATIENT
Start: 2022-11-02 | End: 2022-11-04 | Stop reason: HOSPADM

## 2022-11-02 RX ORDER — AMIODARONE HYDROCHLORIDE 200 MG/1
200 TABLET ORAL DAILY
Status: DISCONTINUED | OUTPATIENT
Start: 2022-11-03 | End: 2022-11-04 | Stop reason: HOSPADM

## 2022-11-02 RX ORDER — POTASSIUM CHLORIDE 7.45 MG/ML
10 INJECTION INTRAVENOUS PRN
Status: DISCONTINUED | OUTPATIENT
Start: 2022-11-02 | End: 2022-11-04 | Stop reason: HOSPADM

## 2022-11-02 RX ORDER — BUMETANIDE 0.25 MG/ML
1 INJECTION, SOLUTION INTRAMUSCULAR; INTRAVENOUS DAILY
Status: DISCONTINUED | OUTPATIENT
Start: 2022-11-03 | End: 2022-11-03

## 2022-11-02 RX ORDER — ACETAMINOPHEN 325 MG/1
650 TABLET ORAL EVERY 6 HOURS PRN
Status: DISCONTINUED | OUTPATIENT
Start: 2022-11-02 | End: 2022-11-04 | Stop reason: HOSPADM

## 2022-11-02 RX ORDER — ONDANSETRON 2 MG/ML
4 INJECTION INTRAMUSCULAR; INTRAVENOUS EVERY 6 HOURS PRN
Status: DISCONTINUED | OUTPATIENT
Start: 2022-11-02 | End: 2022-11-04 | Stop reason: HOSPADM

## 2022-11-02 RX ORDER — POTASSIUM CHLORIDE 20 MEQ/1
40 TABLET, EXTENDED RELEASE ORAL PRN
Status: DISCONTINUED | OUTPATIENT
Start: 2022-11-02 | End: 2022-11-04 | Stop reason: HOSPADM

## 2022-11-02 RX ORDER — ACETAMINOPHEN 500 MG
500 TABLET ORAL EVERY 6 HOURS PRN
Status: DISCONTINUED | OUTPATIENT
Start: 2022-11-02 | End: 2022-11-02 | Stop reason: SDUPTHER

## 2022-11-02 RX ORDER — LISINOPRIL 20 MG/1
20 TABLET ORAL DAILY
Status: DISCONTINUED | OUTPATIENT
Start: 2022-11-02 | End: 2022-11-03

## 2022-11-02 RX ORDER — FUROSEMIDE 10 MG/ML
20 INJECTION INTRAMUSCULAR; INTRAVENOUS ONCE
Status: COMPLETED | OUTPATIENT
Start: 2022-11-02 | End: 2022-11-02

## 2022-11-02 RX ORDER — PRAVASTATIN SODIUM 20 MG
20 TABLET ORAL DAILY
Status: DISCONTINUED | OUTPATIENT
Start: 2022-11-02 | End: 2022-11-03

## 2022-11-02 RX ORDER — LANOLIN ALCOHOL/MO/W.PET/CERES
325 CREAM (GRAM) TOPICAL
Status: DISCONTINUED | OUTPATIENT
Start: 2022-11-03 | End: 2022-11-04 | Stop reason: HOSPADM

## 2022-11-02 RX ORDER — SODIUM CHLORIDE 0.9 % (FLUSH) 0.9 %
10 SYRINGE (ML) INJECTION PRN
Status: DISCONTINUED | OUTPATIENT
Start: 2022-11-02 | End: 2022-11-02 | Stop reason: SDUPTHER

## 2022-11-02 RX ORDER — M-VIT,TX,IRON,MINS/CALC/FOLIC 27MG-0.4MG
1 TABLET ORAL DAILY
Status: DISCONTINUED | OUTPATIENT
Start: 2022-11-02 | End: 2022-11-04 | Stop reason: HOSPADM

## 2022-11-02 RX ORDER — METOPROLOL TARTRATE 50 MG/1
50 TABLET, FILM COATED ORAL 2 TIMES DAILY
Status: DISCONTINUED | OUTPATIENT
Start: 2022-11-02 | End: 2022-11-04 | Stop reason: HOSPADM

## 2022-11-02 RX ORDER — PANTOPRAZOLE SODIUM 40 MG/1
40 TABLET, DELAYED RELEASE ORAL DAILY
Status: DISCONTINUED | OUTPATIENT
Start: 2022-11-03 | End: 2022-11-04 | Stop reason: HOSPADM

## 2022-11-02 RX ORDER — SODIUM CHLORIDE 9 MG/ML
INJECTION, SOLUTION INTRAVENOUS PRN
Status: DISCONTINUED | OUTPATIENT
Start: 2022-11-02 | End: 2022-11-04 | Stop reason: HOSPADM

## 2022-11-02 RX ORDER — ACETAMINOPHEN 650 MG/1
650 SUPPOSITORY RECTAL EVERY 6 HOURS PRN
Status: DISCONTINUED | OUTPATIENT
Start: 2022-11-02 | End: 2022-11-04 | Stop reason: HOSPADM

## 2022-11-02 RX ORDER — SODIUM CHLORIDE 0.9 % (FLUSH) 0.9 %
5-40 SYRINGE (ML) INJECTION EVERY 12 HOURS SCHEDULED
Status: DISCONTINUED | OUTPATIENT
Start: 2022-11-02 | End: 2022-11-04 | Stop reason: SDUPTHER

## 2022-11-02 RX ORDER — SODIUM CHLORIDE 0.9 % (FLUSH) 0.9 %
5-40 SYRINGE (ML) INJECTION PRN
Status: DISCONTINUED | OUTPATIENT
Start: 2022-11-02 | End: 2022-11-04 | Stop reason: HOSPADM

## 2022-11-02 RX ORDER — METOPROLOL TARTRATE 5 MG/5ML
5 INJECTION INTRAVENOUS ONCE
Status: COMPLETED | OUTPATIENT
Start: 2022-11-02 | End: 2022-11-02

## 2022-11-02 RX ORDER — GUAIFENESIN 600 MG/1
600 TABLET, EXTENDED RELEASE ORAL 2 TIMES DAILY
Status: DISCONTINUED | OUTPATIENT
Start: 2022-11-02 | End: 2022-11-03

## 2022-11-02 RX ADMIN — SODIUM CHLORIDE: 9 INJECTION, SOLUTION INTRAVENOUS at 17:49

## 2022-11-02 RX ADMIN — METOPROLOL TARTRATE 5 MG: 5 INJECTION, SOLUTION INTRAVENOUS at 14:54

## 2022-11-02 RX ADMIN — DOCUSATE SODIUM 100 MG: 100 CAPSULE, LIQUID FILLED ORAL at 20:10

## 2022-11-02 RX ADMIN — AZITHROMYCIN DIHYDRATE 500 MG: 500 INJECTION, POWDER, LYOPHILIZED, FOR SOLUTION INTRAVENOUS at 18:23

## 2022-11-02 RX ADMIN — FUROSEMIDE 20 MG: 10 INJECTION, SOLUTION INTRAMUSCULAR; INTRAVENOUS at 15:59

## 2022-11-02 RX ADMIN — METOPROLOL TARTRATE 50 MG: 50 TABLET, FILM COATED ORAL at 20:10

## 2022-11-02 RX ADMIN — LISINOPRIL 20 MG: 20 TABLET ORAL at 20:10

## 2022-11-02 RX ADMIN — Medication 1 TABLET: at 20:10

## 2022-11-02 RX ADMIN — PRAVASTATIN SODIUM 20 MG: 20 TABLET ORAL at 20:10

## 2022-11-02 RX ADMIN — GUAIFENESIN 600 MG: 600 TABLET, EXTENDED RELEASE ORAL at 20:10

## 2022-11-02 RX ADMIN — Medication 2 TABLET: at 20:10

## 2022-11-02 RX ADMIN — APIXABAN 2.5 MG: 2.5 TABLET, FILM COATED ORAL at 20:10

## 2022-11-02 RX ADMIN — CEFTRIAXONE SODIUM 1000 MG: 1 INJECTION, POWDER, FOR SOLUTION INTRAMUSCULAR; INTRAVENOUS at 17:50

## 2022-11-02 ASSESSMENT — ENCOUNTER SYMPTOMS
VOMITING: 0
DIARRHEA: 0
COUGH: 1
CHEST TIGHTNESS: 0
ABDOMINAL PAIN: 0
SHORTNESS OF BREATH: 1
CONSTIPATION: 0
NAUSEA: 0

## 2022-11-02 ASSESSMENT — HEART SCORE: ECG: 1

## 2022-11-02 ASSESSMENT — PAIN - FUNCTIONAL ASSESSMENT: PAIN_FUNCTIONAL_ASSESSMENT: 0-10

## 2022-11-02 ASSESSMENT — PAIN SCALES - GENERAL
PAINLEVEL_OUTOF10: 0
PAINLEVEL_OUTOF10: 2

## 2022-11-02 NOTE — ED NOTES
ED to inpatient nurses report     Chief Complaint   Patient presents with    Other     Visiting wound care nurse said pt's HR sounded irregular with stethoscope and told pt it was best to be seen in ED.  Chest Pain     \"Discomfort not pain: 2/10. Does not radiate.  Headache     Light headed as well, no photosensitivity, no issues with sound making HA worse. Took tylenol approximately 0900am, helped with HA but not chest discomfort. Present to ED from home  LOC: alert and orientated to name, place, date  Vital signs   Vitals:    11/02/22 1447 11/02/22 1502 11/02/22 1517 11/02/22 1532   BP: (!) 187/77 (!) 152/93 (!) 168/78 (!) 174/76   Pulse: 79 76 74 74   Resp: 17 25 20 18   Temp:       TempSrc:       SpO2: 95% (!) 88% 99% 96%   Weight:       Height:          Oxygen Baseline room air    Current needs required room air   SEPSIS: n/a  [n/a] Lactate X 2 ordered (Yes or No)  [n/a] Antibiotics given (Yes or No)  [n/a] IV Fluids ordered (Yes or No)             [n/a] IV completed (Yes or No)  [n/a] Hourly Vital Signs (Validated)  [n/a] Outstanding Orders:     LDAs:   Peripheral IV 11/02/22 Left Antecubital (Active)   Site Assessment Clean, dry & intact 11/02/22 1339   Line Status Blood return noted;Brisk blood return;Flushed;Normal saline locked;Specimen collected 11/02/22 1339   Dressing Status Clean, dry & intact; New dressing applied 11/02/22 1339     Mobility: Requires assistance * 2  Fall Risk:    Pending ED orders: none  Present condition: stable  Code Status: full code  Consults: IP CONSULT TO HOSPITALIST  []  Hospitalist  Completed  [] yes [] no Who:   []  Medicine  Completed  [] yes [] No Who:   []  Cardiology  Completed  [] yes [] No Who:   []  GI   Completed  [] yes [] No Who:   []  Neurology  Completed  [] yes [] No Who:   []  Nephrology Completed  [] yes [] No Who:    []  Vascular  Completed  [] yes [] No Who:   []  Ortho  Completed  [] yes [] No Who:     []  Surgery  Completed  [] yes [] No Who: []  Urology  Completed  [] yes [] No Who:    []  CT Surgery Completed  [] yes [] No Who:   []  Podiatry  Completed  [] yes [] No Who:    []  Other    Completed  [] yes [] No Who:  Interventions: IV, labs, cultures, antibiotics, lasix, lopressor  Important Events: purewick placement, poor historian- granddaughter helps fill in gaps        Electronically signed by Gm Dudley RN on 11/2/2022 at 4:31 PM       Gm Dudley RN  11/02/22 [id number] [id number]

## 2022-11-02 NOTE — ED PROVIDER NOTES
[address] [place] [address] ED  [facility]      Pt Name: Dennison Aase  MRN: [id number]  [place] 1935  Date ofevaluation: 11/2/2022  Provider: Aleah Davis PA-C    CHIEF COMPLAINT       Chief Complaint   Patient presents with    Other     Visiting wound care nurse said pt's HR sounded irregular with stethoscope and told pt it was best to be seen in ED. Chest Pain     \"Discomfort not pain: 2/10. Does not radiate. Headache     Light headed as well, no photosensitivity, no issues with sound making HA worse. Took tylenol approximately 0900am, helped with HA but not chest discomfort. HISTORY OF PRESENT ILLNESS  (Location/Symptom, Timing/Onset, Context/Setting, Quality, Duration, Modifying Factors, Severity.)   Dennison Aase is a 82 y.o. female who presents to the emergency department with   Weakness, SOB, AND LEG SWELLING. REports cough for a few days and headache tht started today. Visiting nurse directed patient to the ED. Nursing Notes were reviewed.     ALLERGIES     Codeine, Bactrim [sulfamethoxazole-trimethoprim], Naproxen, Norvasc [amlodipine besylate], Sertraline hcl, Toradol [ketorolac tromethamine], Tramadol, Magnesium-containing compounds, Morphine, and Percocet [oxycodone-acetaminophen]    CURRENT MEDICATIONS       Previous Medications    ACETAMINOPHEN (TYLENOL) 500 MG TABLET    Take 1 tablet by mouth every 6 hours as needed for Pain    AMIODARONE (CORDARONE) 200 MG TABLET    Take 1 tablet by mouth daily    APIXABAN (ELIQUIS) 5 MG TABS TABLET    Take 1 tablet by mouth 2 times daily    CALCIUM-VITAMIN D (OSCAL-500) 500-200 MG-UNIT PER TABLET    Take 1 tablet by mouth 2 times daily    COMPRESSION STOCKINGS MISC    by Does not apply route    FERROUS SULFATE (FE TABS 325) 325 (65 FE) MG EC TABLET    Take 1 tablet by mouth daily (with breakfast)    LISINOPRIL (PRINIVIL;ZESTRIL) 20 MG TABLET    Take 1 tablet by mouth daily    METOPROLOL TARTRATE (LOPRESSOR) 25 MG TABLET    Take 2 tablets by mouth 2 times daily    MULTIPLE VITAMIN (MULTIVITAMIN) TABS        MULTIPLE VITAMINS-MINERALS (THERAPEUTIC MULTIVITAMIN-MINERALS) TABLET    Take 1 tablet by mouth daily    PANTOPRAZOLE (PROTONIX) 40 MG TABLET    Take 1 tablet by mouth daily Take one tab every morning before breakfast    PRAVASTATIN (PRAVACHOL) 40 MG TABLET    TAKE 1 TABLET DAILY       PAST MEDICAL HISTORY         Diagnosis Date    Blood circulation, collateral     CHF (congestive heart failure) (HCC)     COPD (chronic obstructive pulmonary disease) (HCC)     GERD (gastroesophageal reflux disease)     Hyperlipidemia     Hypertension        SURGICAL HISTORY           Procedure Laterality Date    BLADDER SUSPENSION      CHOLECYSTECTOMY      COLONOSCOPY N/A 2/10/2022    COLONOSCOPY WITH BIOPSY performed by Shagufta Vigil MD at [facility]    HYSTERECTOMY (CERVIX STATUS UNKNOWN)  ovaries left    JOINT REPLACEMENT  left total shoulder;     UPPER GASTROINTESTINAL ENDOSCOPY N/A 2/10/2022    EGD ESOPHAGOGASTRODUODENOSCOPY performed by Shagufta Vigil MD at [facility]         HISTORY           Problem Relation Age of Onset    Cancer Brother     Cancer Other         bone    Cancer Daughter         breast     Family Status   Relation Name Status    Mother      Father      Brother      Other son     Denice  (Not Specified)        SOCIAL HISTORY      reports that she quit smoking about 37 years ago. She has never used smokeless tobacco. She reports that she does not drink alcohol and does not use drugs. REVIEW OFSYSTEMS    (2-9 systems for level 4, 10 or more for level 5)   Review of Systems    Except as noted above the remainder of the review of systems was reviewed and negative.      PHYSICAL EXAM    (up to 7 for level 4, 8 or more for level 5)     ED Triage Vitals   BP Temp Temp Source Heart Rate Resp SpO2 Height Weight   22 1302 22 1303 22 1303 22 1302 11/02/22 1302 11/02/22 1302 11/02/22 1302 11/02/22 1302   (!) 176/96 96.8 °F (36 °C) Skin 82 20 98 % 5' 4\" (1.626 m) 130 lb (59 kg)     Physical Exam  Constitutional:       Appearance: She is well-developed. HENT:      Head: Normocephalic and atraumatic. Cardiovascular:      Rate and Rhythm: Normal rate and regular rhythm. Pulmonary:      Effort: Pulmonary effort is normal.      Breath sounds: Normal breath sounds. Abdominal:      Palpations: Abdomen is soft. Musculoskeletal:         General: Normal range of motion. Cervical back: Normal range of motion and neck supple. Skin:     General: Skin is warm. Findings: No rash. Neurological:      Mental Status: She is alert and oriented to person, place, and time.    Psychiatric:         Behavior: Behavior normal.               DIAGNOSTIC RESULTS     EKG: All EKG's are interpreted by the Emergency Department Physician who either signs or Co-signs this chart in the absence of a cardiologist.        RADIOLOGY:   Non-plain film images such as CT, Ultrasound and MRI are read by the radiologist. Plain radiographic images arevisualized and preliminarily interpreted by the emergency physician with the below findings:        Interpretation per the Radiologist below, if available at thetime of this note:          ED BEDSIDE ULTRASOUND:   Performed by ED Physician - none    LABS:  Labs Reviewed   CBC WITH AUTO DIFFERENTIAL - Abnormal; Notable for the following components:       Result Value    RBC 3.68 (*)     Hemoglobin 7.9 (*)     Hematocrit 28.6 (*)     MCV 77.7 (*)     MCH 21.5 (*)     MCHC 27.6 (*)     RDW 20.4 (*)     Seg Neutrophils 70 (*)     Lymphocytes 19 (*)     Absolute Lymph # 1.08 (*)     All other components within normal limits   BASIC METABOLIC PANEL - Abnormal; Notable for the following components:    Chloride 108 (*)     All other components within normal limits   TROP/MYOGLOBIN - Abnormal; Notable for the following components:    Troponin, High Sensitivity 21 (*)     All other components within normal limits   TROP/MYOGLOBIN - Abnormal; Notable for the following components:    Troponin, High Sensitivity 19 (*)     All other components within normal limits   BRAIN NATRIURETIC PEPTIDE - Abnormal; Notable for the following components:    Pro-BNP 6,158 (*)     All other components within normal limits   CULTURE, BLOOD 1   CULTURE, BLOOD 2   TROP/MYOGLOBIN   LACTATE, SEPSIS   LACTATE, SEPSIS       All other labs were within normal range or not returned as of this dictation. EMERGENCY DEPARTMENT COURSE and DIFFERENTIAL DIAGNOSIS/MDM:   Vitals:    Vitals:    11/02/22 1447 11/02/22 1502 11/02/22 1517 11/02/22 1532   BP: (!) 187/77 (!) 152/93 (!) 168/78 (!) 174/76   Pulse: 79 76 74 74   Resp: 17 25 20 18   Temp:       TempSrc:       SpO2: 95% (!) 88% 99% 96%   Weight:       Height:         HEARTSCORE:5    Will admit for chf.  Pulse ox 88 percent on room air. Covered with antibiotics due questionable infiltrates and histoyr of cough. Given iv lasix as well. CONSULTS:  IP CONSULT TO HOSPITALIST    PROCEDURES:  Procedures    CRITICAL CARE TIME     Due to the high probability of sudden and clinically significant deterioration in the patient's condition she required highest level of my preparedness to intervene urgently. I provided critical care time including documentation time, medication orders and management, reevaluation, vital sign assessment, ordering and reviewing of of lab tests ordering and reviewing of x-ray studies, and admission orders. Aggregate critical care time is between 35  minutes including only time during which I was engaged in work directly related to her care and did not include time spent treating other patients simultaneously. FINAL IMPRESSION      1.  Congestive heart failure, unspecified HF chronicity, unspecified heart failure type [facility])          DISPOSITION/PLAN   DISPOSITION Decision To Admit 11/02/2022 04:18:23 PM      PATIENTREFERRED TO:   No follow-up provider specified.     DISCHARGE MEDICATIONS:     New Prescriptions    No medications on file           (Please note that portions of this note were completed with a voice recognition program.  Efforts were made to edit thedictations but occasionally words are mis-transcribed.)    SHEKHAR Lima PA-C  11/02/22 1320

## 2022-11-02 NOTE — H&P
[facility]  Office: [address], DO, Veronica Laila, DO, Amrita Castorena, DO, Heber Toneyjasonbasil Blood, DO, Eagle Rg MD, Yazan Andrade MD, Annette Robertson MD, Paty Murry MD,  Venu Arredondo MD, Karla Fisher MD, Isha Lucia, DO, Cheyanne Jay MD,  Bing Riddle MD, Solange Wallace MD, Christy Gracia DO, Bernice Patrick MD, Pooja Dupree MD, Barb Gallardo, DO, Darnell Murray MD, Artem Newell MD, Lucila Barton MD, Benjamin Giordano MD, Ric Carvalho DO, Louis Castillo MD, Qiana Coon MD, Donnie Galloway, [facility],  Otilio Cross, CNP, Rachel Blanton, CNP, Perley Gilford, CNP,  Nury Love, DNP, Quynh Lutz, CNP, Opal De Souza, CNP, Stephanie Gunderson, CNP, Rupal Golden, CNP, Lawyer Steiner, CNP, Kath Gallagher PA-C, Ran Green, CNS, Mahesh Bhatt, [facility], Cletis Hashimoto, CNP, Amanda Silva, CNP, Yvette Meza, [facility]    HISTORY AND PHYSICAL EXAMINATION            Date:   11/2/2022  Patient name:  Gil Whelan  Date of admission:  11/2/2022  1:08 PM  MRN:   [id number]  Account:  [de-identified]  YOB: 1935  PCP:    Deepa Aleman DO  Room:   [place] [address]  Code Status:    Full    Chief Complaint:     Chief Complaint   Patient presents with    Other     Visiting wound care nurse said pt's HR sounded irregular with stethoscope and told pt it was best to be seen in ED. Chest Pain     \"Discomfort not pain: 2/10. Does not radiate. Headache     Light headed as well, no photosensitivity, no issues with sound making HA worse. Took tylenol approximately 0900am, helped with HA but not chest discomfort. History Obtained From:     patient, electronic medical record    History of Present Illness:     Patient presents to the emergency room today with complaints of chest pain, shortness of breath, bilateral lower leg swelling, cough and headache.  Patient states that her symptoms originally started a few days ago. She thought that she would be able to tolerate the symptoms and treat them at home, but was unfortunately unable to. Of note, patient was recently discharged from [facility] where she was treated after experiencing a fall (where she underwent a laceration repair of her left forearm). Patient states that she was in the bathroom, was washing her hands, turned around and was on the floor. She does not recall the reason for the fall. Patient was also treated for EMERY, hyperkalemia, chest pain and shortness of breath during that admission. During that admission, it is seen that the patient had been instructed to stop taking her bumex (along with her tikosyn, colace, multivitamin, potassium and pepcid). Patient has a significant past medical history of CHF, COPD, GERD, hyperlipidemia, hypertension and atrial fibrillation. Patient states that over the past few days her shortness of breath, weakness, cough and leg swelling has continuously worsened. Her shortness of breath is mildly improved after a dose of IV Lasix in the emergency room. Patient continues to feel weak and her legs continue to be edematous. Patient denies any recent fevers, chills, nausea, vomiting and diarrhea. Throughout the emergency room evaluation it was noted that the patient's chloride level is 108.  proBNP is 6158. Troponin 21 and 19. Hemoglobin 7.9. CT head shows chronic microvascular disease without acute intracranial abnormality. Chest x-ray shows: Trace effusions with scattered pulmonary infiltrates.     Past Medical History:     Past Medical History:   Diagnosis Date    Blood circulation, collateral     CHF (congestive heart failure) (HCC)     COPD (chronic obstructive pulmonary disease) (HCC)     GERD (gastroesophageal reflux disease)     Hyperlipidemia     Hypertension         Past Surgical History:     Past Surgical History:   Procedure Laterality Date    BLADDER SUSPENSION 2001    CHOLECYSTECTOMY      COLONOSCOPY N/A 2/10/2022    COLONOSCOPY WITH BIOPSY performed by Leyda Adamson MD at [address] (CERVIX STATUS UNKNOWN)  ovaries left    JOINT REPLACEMENT  left total shoulder; 1990    UPPER GASTROINTESTINAL ENDOSCOPY N/A 2/10/2022    EGD ESOPHAGOGASTRODUODENOSCOPY performed by Leyda Adamson MD at [facility]        Medications Prior to Admission:     Prior to Admission medications    Medication Sig Start Date End Date Taking?  Authorizing Provider   amiodarone (CORDARONE) 200 MG tablet Take 1 tablet by mouth daily 10/31/22   Nick Leblanc, DO   apixaban (ELIQUIS) 5 MG TABS tablet Take 1 tablet by mouth 2 times daily 10/31/22   Nick Leblanc, DO   ferrous sulfate (FE TABS 325) 325 (65 Fe) MG EC tablet Take 1 tablet by mouth daily (with breakfast) 10/31/22   Nick Leblanc, DO   lisinopril (PRINIVIL;ZESTRIL) 20 MG tablet Take 1 tablet by mouth daily 10/31/22   Nick Leblanc, DO   acetaminophen (TYLENOL) 500 MG tablet Take 1 tablet by mouth every 6 hours as needed for Pain 10/31/22   Nick Leblanc, DO   calcium-vitamin D (OSCAL-500) 500-200 MG-UNIT per tablet Take 1 tablet by mouth 2 times daily 10/31/22   Nick Leblanc, DO   metoprolol tartrate (LOPRESSOR) 25 MG tablet Take 2 tablets by mouth 2 times daily 10/31/22   Nick Leblanc, DO   pantoprazole (PROTONIX) 40 MG tablet Take 1 tablet by mouth daily Take one tab every morning before breakfast 10/31/22   Nick Leblanc, DO   pravastatin (PRAVACHOL) 40 MG tablet TAKE 1 TABLET DAILY  Patient taking differently: 20 mg TAKE 1 TABLET DAILY 8/21/22   Nick Leblanc, DO   Multiple Vitamin (MULTIVITAMIN) TABS  7/11/22   Historical Provider, MD   Compression Stockings MISC by Does not apply route 2/17/22   Aniceto Castro, DO   Multiple Vitamins-Minerals (THERAPEUTIC MULTIVITAMIN-MINERALS) tablet Take 1 tablet by mouth daily    Historical Provider, MD Allergies:     Codeine, Bactrim [sulfamethoxazole-trimethoprim], Naproxen, Norvasc [amlodipine besylate], Sertraline hcl, Toradol [ketorolac tromethamine], Tramadol, Magnesium-containing compounds, Morphine, and Percocet [oxycodone-acetaminophen]    Social History:     Tobacco:    reports that she quit smoking about 37 years ago. She has never used smokeless tobacco.  Alcohol:      reports no history of alcohol use. Drug Use:  reports no history of drug use. Family History:     Family History   Problem Relation Age of Onset    Cancer Brother     Cancer Other         bone    Cancer Daughter         breast       Review of Systems:     Positive and Negative as described in HPI. Review of Systems   Constitutional:  Negative for activity change, chills, fatigue and fever. HENT:  Positive for congestion. Respiratory:  Positive for cough and shortness of breath. Negative for chest tightness. Cardiovascular:  Positive for chest pain and leg swelling. Negative for palpitations. Gastrointestinal:  Negative for abdominal pain, constipation, diarrhea, nausea and vomiting. Endocrine: Negative for cold intolerance and polyuria. Genitourinary:  Negative for difficulty urinating. Musculoskeletal:  Positive for gait problem (weakness). Skin:  Positive for wound (left FA). Neurological:  Positive for weakness and headaches. Negative for dizziness and numbness. Psychiatric/Behavioral:  Negative for confusion. Physical Exam:   BP (!) 170/77   Pulse 75   Temp 96.8 °F (36 °C) (Skin)   Resp 18   Ht 5' 4\" (1.626 m)   Wt 130 lb (59 kg)   SpO2 97%   BMI 22.31 kg/m²   Temp (24hrs), Av.8 °F (36 °C), Min:96.8 °F (36 °C), Max:96.8 °F (36 °C)    No results for input(s): POCGLU in the last 72 hours. No intake or output data in the 24 hours ending 22 [id number]    Physical Exam  Vitals and nursing note reviewed. Constitutional:       General: She is not in acute distress.      Appearance: Normal appearance. She is not ill-appearing. HENT:      Head: Normocephalic. Mouth/Throat:      Mouth: Mucous membranes are moist.   Eyes:      Pupils: Pupils are equal, round, and reactive to light. Cardiovascular:      Rate and Rhythm: Normal rate. Rhythm irregular. Pulses: Normal pulses. Heart sounds: Normal heart sounds. No murmur heard. No friction rub. No gallop. Pulmonary:      Effort: Pulmonary effort is normal. No respiratory distress. Breath sounds: Rhonchi present. No wheezing or rales. Abdominal:      General: Bowel sounds are normal. There is no distension. Palpations: Abdomen is soft. Tenderness: There is no abdominal tenderness. There is no guarding. Musculoskeletal:         General: Normal range of motion. Cervical back: Normal range of motion. Right lower le+ Edema present. Left lower le+ Edema present. Skin:     General: Skin is warm and dry. Capillary Refill: Capillary refill takes less than 2 seconds. Neurological:      General: No focal deficit present. Mental Status: She is alert and oriented to person, place, and time. Motor: Weakness (slight) present. Psychiatric:         Mood and Affect: Mood normal. Affect is tearful. Behavior: Behavior normal.         Thought Content:  Thought content normal.         Judgment: Judgment normal.       Investigations:      Laboratory Testing:  Recent Results (from the past 24 hour(s))   EKG 12 Lead    Collection Time: 22 12:54 PM   Result Value Ref Range    Ventricular Rate 82 BPM    QRS Duration 136 ms    Q-T Interval 402 ms    QTc Calculation (Bazett) 469 ms    R Axis 15 degrees    T Axis -7 degrees   CBC with Auto Differential    Collection Time: 22  1:34 PM   Result Value Ref Range    WBC 5.7 3.5 - 11.3 k/uL    RBC 3.68 (L) 3.95 - 5.11 m/uL    Hemoglobin 7.9 (L) 11.9 - 15.1 g/dL    Hematocrit 28.6 (L) 36.3 - 47.1 %    MCV 77.7 (L) 82.6 - 102.9 fL    MCH 21.5 (L) 25.2 - 33.5 pg    MCHC 27.6 (L) 28.4 - 34.8 g/dL    RDW 20.4 (H) 11.8 - 14.4 %    Platelets 059 317 - 738 k/uL    MPV 9.8 8.1 - 13.5 fL    NRBC Automated 0.0 0.0 per 100 WBC    Seg Neutrophils 70 (H) 36 - 65 %    Lymphocytes 19 (L) 24 - 43 %    Monocytes 9 3 - 12 %    Eosinophils % 1 1 - 4 %    Basophils 1 0 - 2 %    Immature Granulocytes 0 0 %    Segs Absolute 3.99 1.50 - 8.10 k/uL    Absolute Lymph # 1.08 (L) 1.10 - 3.70 k/uL    Absolute Mono # 0.51 0.10 - 1.20 k/uL    Absolute Eos # 0.06 0.00 - 0.44 k/uL    Basophils Absolute 0.06 0.00 - 0.20 k/uL    Absolute Immature Granulocyte 0.00 0.00 - 0.30 k/uL    Morphology ANISOCYTOSIS PRESENT     Morphology HYPOCHROMIA PRESENT     Morphology 1+ ACANTHOCYTES    Basic Metabolic Panel    Collection Time: 11/02/22  1:34 PM   Result Value Ref Range    Glucose 92 70 - 99 mg/dL    BUN 14 8 - 23 mg/dL    Creatinine 0.72 0.50 - 0.90 mg/dL    Est, Glom Filt Rate >60 >60 mL/min/1.73m2    Bun/Cre Ratio 19 9 - 20    Calcium 9.0 8.6 - 10.4 mg/dL    Sodium 141 135 - 144 mmol/L    Potassium 4.3 3.7 - 5.3 mmol/L    Chloride 108 (H) 98 - 107 mmol/L    CO2 23 20 - 31 mmol/L    Anion Gap 10 9 - 17 mmol/L   TROP/MYOGLOBIN    Collection Time: 11/02/22  1:34 PM   Result Value Ref Range    Troponin, High Sensitivity 21 (H) 0 - 14 ng/L    Myoglobin 34 25 - 58 ng/mL   TROP/MYOGLOBIN    Collection Time: 11/02/22  3:17 PM   Result Value Ref Range    Troponin, High Sensitivity 19 (H) 0 - 14 ng/L    Myoglobin 35 25 - 58 ng/mL   Brain Natriuretic Peptide    Collection Time: 11/02/22  3:17 PM   Result Value Ref Range    Pro-BNP 6,158 (H) <300 pg/mL   Lactate, Sepsis    Collection Time: 11/02/22  4:41 PM   Result Value Ref Range    Lactic Acid, Sepsis 1.3 0.5 - 1.9 mmol/L       Imaging/Diagnostics:  CT HEAD WO CONTRAST    Result Date: 11/2/2022  Chronic microvascular disease without acute intracranial abnormality.      XR CHEST PORTABLE    Result Date: 11/2/2022  Trace effusions with scattered pulmonary infiltrates. CT abdomen and pelvis with contrast    Result Date: 10/7/2022  Clinical history: Lost balance, fell, blunt abdominal trauma and pain. Technique: Spiral CT of the abdomen and pelvis was performed after the intravenous administration of contrast material. Sagittal and coronal reformatted imaging was performed. All CT scans at this facility use dose modulation, iterative reconstruction, and/or weight based dosing when appropriate to reduce radiation dose to as low as reasonably achievable. Comparisons: 8/14/2018. Findings: CT ABDOMEN: Large hiatal hernia is now present. See separately dictated CT chest report for complete discussion of findings above the diaphragm. There is no pneumoperitoneum. The liver, spleen, pancreas, and adrenal glands are unremarkable. Right kidney appears unremarkable. Cortical defect again noted left kidney likely reflecting sequelae of remote insult such as infection or infarction. Dense atherosclerotic aortoiliac and visceral vascular calcifications are present without aneurysm. No dilated bowel loops. No ascites. No retroperitoneal hemorrhage. CT PELVIS: Some sigmoid diverticulosis is present without pericolonic soft tissue stranding nor abscess to suggest acute diverticulitis. Anastomotic suture line again noted at the right colon. She is status post hysterectomy. Changes of bladder suspension again seen. Multilevel degenerative disc disease noted throughout the lumbar spine. No acute fracture is identified. IMPRESSION: 1. No acute finding in the abdomen or pelvis. 2. Interval development of large hernia. Workstation:[id number] Finalized by Wilma Maguire MD on 10/7/2022 11:52 PM    CT cervical spine without contrast    Result Date: 10/7/2022  CT Cervical Spine History: Neck trauma, fall Technique: Axial images from the skull base to the lung apices were obtained followed by sagittal and coronal reconstructed images. Automated exposure control was used.  Findings: No fracture or abnormal alignment is appreciated. The odontoid is intact. Normal craniocervical junction. The occipital condyles are unremarkable. Spinous processes have a normal appearance and alignment. Chronic, stable degenerative retrolisthesis of C3 on C4. Diffuse facet hypertrophy. Impression: * No fracture or acute process. All CT scans at this facility use dose modulation, iterative reconstruction, and/or weight based dosing when appropriate to reduce radiation dose to as low as reasonably achievable. Approved by Resident: Blake Grigsby DO on 10/7/2022 11:32 PM Vini PADILLA MD have personally reviewed the image(s) and agree with and/or edited the report Workstation:[id number] Finalized by Vini Johnston MD on 10/7/2022 11:58 PM    CT chest with contrast    Result Date: 10/7/2022  Clinical history: Lost balance and fell, trauma to chest, pain. Technique: Spiral CT of the chest was performed after the intravenous administration of contrast material. Sagittal and coronal reformatted imaging was performed. All CT scans at this facility use dose modulation, iterative reconstruction, and/or weight based dosing when appropriate to reduce radiation dose to as low as reasonably achievable. Comparisons: 2/15/2014. [facility] Findings: There is now thickening of the interlobular septae which is new when compared with the prior exam. This may represent interstitial lung disease, also could represent acute interstitial edema. Areas of parenchymal scarring and subsegmental atelectasis are present in the lung. There is no pleural effusion nor pneumothorax. Large hiatal hernia again seen. There is no mediastinal nor hilar lymphadenopathy. Dense coronary artery calcifications again noted. No mediastinal hematoma. Evaluation the upper chest remains somewhat limited due to dense beam hardening and streak artifact from humeral prosthesis. Anterior wedge compression fracture at T8 is stable.  More subtle anterior wedge compression fracture at T5 is stable. No new acute vertebral body compression fractures. Limited images through the upper abdomen reveal no specific abnormality. IMPRESSION: 1. There is some new thickening of interlobular septae which is new when compared with prior exam. This may reflect development of interstitial lung disease but can also be seen with acute interstitial edema. Clinical correlation recommended. 2. Otherwise no acute finding in the chest. Workstation:[id number] Finalized by Bere Stevenson MD on 10/7/2022 11:43 PM    Ultrasound retroperitoneal complete    Result Date: 10/8/2022  RENAL ULTRASOUND HISTORY: Source of bleeding COMPARISON: Renal ultrasound 6/16/2022 FINDINGS: Right kidney: - Length: 9.6 cm. - Appearance: Normal cortical thickness and echogenicity. - Calculi: None. - Collecting system: Not dilated. Left kidney: Suboptimal visualization. - Length: 9.1 cm. - Appearance: Normal cortical thickness and echogenicity. - Calculi: None. - Collecting system: Not dilated. Urinary bladder: Normal. Bilateral jets visualized. Other findings: None. IMPRESSION: Unremarkable renal ultrasound. If there is persistent concern for retroperitoneal bleed, CT recommended. Workstation:[id number] Finalized by Richi Vargas MD on 10/8/2022 4:17 PM    CT thoracic reconstruction    Result Date: 10/7/2022  CLINICAL HISTORY: Status post fall with back trauma and pain. TECHNIQUE: Spiral CT of the thoracic spine was performed without contrast material. Sagittal and coronal reformatted imaging was performed. All CT scans at this facility use dose modulation, iterative reconstruction, and/or weight based dosing when appropriate to reduce radiation dose to as low as reasonably achievable. COMPARISONS: CTA chest images from 2/15/2014. FINDINGS: Severe anterior wedge compression deformity at T8 appears healed and was present on the prior exam. Subtle superior endplate fracture at T5 appears unchanged.  No new acute vertebral body compression fracture is identified. No paraspinous soft tissue/hemorrhage. Visualized posterior ribs appear intact. IMPRESSION: 1. Old, healed vertebral body compression fractures at T5 and T8. No acute fracture nor other acute finding. Workstation:[id number] Finalized by Jeanmarie Gonzalez MD on 10/7/2022 11:47 PM    CT lumbar reconstruction    Result Date: 10/7/2022  CLINICAL HISTORY: Status post fall, back trauma and pain. TECHNIQUE: Spiral CT of the lumbar spine was performed without contrast material. Sagittal and coronal reformatted imaging was performed. All CT scans at this facility use dose modulation, iterative reconstruction, and/or weight based dosing when appropriate to reduce radiation dose to as low as reasonably achievable. COMPARISONS: CT abdomen pelvis 8/14/2018. FINDINGS: Degenerative disc disease is noted at every level within the lumbar spine including loss of disc space height and multilevel vacuum disc phenomenon. There is no acute fracture at any level. Screw/anchors again noted anteriorly at the inferior aspect of L4 and superior aspect of S1. No destructive bone lesion. There is partial sacralization of L5 on the right side. No vertebral body malalignment within the lumbar spine. IMPRESSION: No fracture nor traumatic malalignment lumbar spine. Workstation:[id number] Finalized by Jeanmarie Gonzalez MD on 10/7/2022 11:55 PM    Last Echo  Echo limited W/O contrast with doppler    Result Date: 10/10/2022   Left Ventricle: There is mild increased wall thickness/hypertrophy. Systolic function is normal with an ejection fraction of 55-60%. Pericardium: There is no pericardial effusion. Aortic Valve: There is trace to mild regurgitation. There is no evidence of aortic valve stenosis. Tricuspid Valve: There is mild regurgitation. There is no evidence of tricuspid valve stenosis. This is a limted 2D echocardiogram and the study was technically difficult, the patient was uncooperative.      Assessment : Hospital Problems             Last Modified POA    * (Principal) Acute on chronic diastolic heart failure ([address] 11/2/2022 Yes    Fall 11/2/2022 Yes    Abrasion of multiple sites of left upper arm 11/2/2022 Yes    Hyperlipidemia (Chronic) 11/2/2022 Yes    Primary hypertension 11/2/2022 Yes    Gastroesophageal reflux disease without esophagitis 11/2/2022 Yes    Atypical chest pain 11/2/2022 Yes    Paroxysmal atrial fibrillation (HCC) (Chronic) 11/2/2022 Yes    Chronic anticoagulation 11/2/2022 Yes    Iron deficiency anemia 11/2/2022 Yes    (HFpEF) heart failure with preserved ejection fraction ([address] 11/2/2022 Yes       Plan:     Patient status inpatient in the  Progressive Unit/Step down    Acute on chronic diastolic heart failure  Consult cardiology  IV Bumex-with plans to transition back over to oral   Chest x-ray in the morning  Continuous pulse oximetry monitoring  Fall with abrasion of left upper arm  PT/OT  Hypertension  Continue home medications with holding parameters  Atypical chest pain  As needed pain medication  Atrial fibrillation-on chronic anticoagulation  Continue home medications  Iron deficiency anemia  Continue to monitor labs  Adult diet  Monitor a.m. labs    Consultations:   IP CONSULT TO HOSPITALIST  IP CONSULT TO HEART FAILURE NURSE/COORDINATOR  IP CONSULT TO DIETITIAN  IP CONSULT TO CARDIOLOGY    Patient is admitted as inpatient status because of co-morbidities listed above, severity of signs and symptoms as outlined, requirement for current medical therapies and most importantly because of direct risk to patient if care not provided in a hospital setting. Expected length of stay > 48 hours. On this date 11/2/2022 I have spent 29 minutes reviewing previous notes, test results and face to face with the patient discussing the diagnosis and importance of compliance with the treatment plan as well as documenting on the day of the visit.  At least 50% of the time documented was spent with the patient to provide counseling and/or coordination of care.       MALVIN Grimm - CNP  11/2/2022  5:34 PM    Copy sent to Dr. Bradford Viveros, DO

## 2022-11-02 NOTE — ED NOTES
Pt to ED from home accompanied by granddaughter with c/o headache, chest pain, swollen legs, and hypertension. Pt states her home nurse told her to come in, pt has a home nurse as a part of her discharge plan. She was discharged two weeks ago from [facility] following a fall at home. Pt has a cough that is audible during assessment- dry. Swelling is 2+ nonpitting- no calve tenderness. Hx of pulmonary emboli- pt taking eloquis.       Berta Howe RN  11/02/22 [id number]

## 2022-11-03 ENCOUNTER — APPOINTMENT (OUTPATIENT)
Dept: GENERAL RADIOLOGY | Age: 87
DRG: 291 | End: 2022-11-03
Payer: MEDICARE

## 2022-11-03 LAB
ABSOLUTE EOS #: 0.17 K/UL (ref 0–0.44)
ABSOLUTE IMMATURE GRANULOCYTE: 0 K/UL (ref 0–0.3)
ABSOLUTE LYMPH #: 0.97 K/UL (ref 1.1–3.7)
ABSOLUTE MONO #: 0.51 K/UL (ref 0.1–1.2)
ANION GAP SERPL CALCULATED.3IONS-SCNC: 11 MMOL/L (ref 9–17)
BASOPHILS # BLD: 1 % (ref 0–2)
BASOPHILS ABSOLUTE: 0.06 K/UL (ref 0–0.2)
BUN BLDV-MCNC: 13 MG/DL (ref 8–23)
BUN/CREAT BLD: 19 (ref 9–20)
CALCIUM SERPL-MCNC: 8.6 MG/DL (ref 8.6–10.4)
CHLORIDE BLD-SCNC: 108 MMOL/L (ref 98–107)
CO2: 22 MMOL/L (ref 20–31)
CREAT SERPL-MCNC: 0.69 MG/DL (ref 0.5–0.9)
EOSINOPHILS RELATIVE PERCENT: 3 % (ref 1–4)
GFR SERPL CREATININE-BSD FRML MDRD: >60 ML/MIN/1.73M2
GLUCOSE BLD-MCNC: 84 MG/DL (ref 70–99)
HCT VFR BLD CALC: 27.5 % (ref 36.3–47.1)
HEMOGLOBIN: 7.6 G/DL (ref 11.9–15.1)
IMMATURE GRANULOCYTES: 0 %
IRON SATURATION: 5 % (ref 20–55)
IRON: 16 UG/DL (ref 37–145)
LYMPHOCYTES # BLD: 17 % (ref 24–43)
MAGNESIUM: 1.7 MG/DL (ref 1.6–2.6)
MCH RBC QN AUTO: 21.4 PG (ref 25.2–33.5)
MCHC RBC AUTO-ENTMCNC: 27.6 G/DL (ref 28.4–34.8)
MCV RBC AUTO: 77.5 FL (ref 82.6–102.9)
MONOCYTES # BLD: 9 % (ref 3–12)
MORPHOLOGY: ABNORMAL
MORPHOLOGY: ABNORMAL
NRBC AUTOMATED: 0 PER 100 WBC
PDW BLD-RTO: 20 % (ref 11.8–14.4)
PLATELET # BLD: 175 K/UL (ref 138–453)
PMV BLD AUTO: 9.4 FL (ref 8.1–13.5)
POTASSIUM SERPL-SCNC: 3.8 MMOL/L (ref 3.7–5.3)
RBC # BLD: 3.55 M/UL (ref 3.95–5.11)
SARS-COV-2, RAPID: NOT DETECTED
SEG NEUTROPHILS: 70 % (ref 36–65)
SEGMENTED NEUTROPHILS ABSOLUTE COUNT: 3.99 K/UL (ref 1.5–8.1)
SODIUM BLD-SCNC: 141 MMOL/L (ref 135–144)
SPECIMEN DESCRIPTION: NORMAL
TOTAL IRON BINDING CAPACITY: 331 UG/DL (ref 250–450)
UNSATURATED IRON BINDING CAPACITY: 315 UG/DL (ref 112–347)
WBC # BLD: 5.7 K/UL (ref 3.5–11.3)

## 2022-11-03 PROCEDURE — 6370000000 HC RX 637 (ALT 250 FOR IP): Performed by: NURSE PRACTITIONER

## 2022-11-03 PROCEDURE — 97112 NEUROMUSCULAR REEDUCATION: CPT

## 2022-11-03 PROCEDURE — 97116 GAIT TRAINING THERAPY: CPT

## 2022-11-03 PROCEDURE — 83540 ASSAY OF IRON: CPT

## 2022-11-03 PROCEDURE — 83550 IRON BINDING TEST: CPT

## 2022-11-03 PROCEDURE — 97535 SELF CARE MNGMENT TRAINING: CPT

## 2022-11-03 PROCEDURE — 99232 SBSQ HOSP IP/OBS MODERATE 35: CPT | Performed by: NURSE PRACTITIONER

## 2022-11-03 PROCEDURE — G0378 HOSPITAL OBSERVATION PER HR: HCPCS

## 2022-11-03 PROCEDURE — 87635 SARS-COV-2 COVID-19 AMP PRB: CPT

## 2022-11-03 PROCEDURE — 71045 X-RAY EXAM CHEST 1 VIEW: CPT

## 2022-11-03 PROCEDURE — 97166 OT EVAL MOD COMPLEX 45 MIN: CPT

## 2022-11-03 PROCEDURE — 2500000003 HC RX 250 WO HCPCS: Performed by: NURSE PRACTITIONER

## 2022-11-03 PROCEDURE — 83735 ASSAY OF MAGNESIUM: CPT

## 2022-11-03 PROCEDURE — 6370000000 HC RX 637 (ALT 250 FOR IP): Performed by: INTERNAL MEDICINE

## 2022-11-03 PROCEDURE — 2580000003 HC RX 258: Performed by: NURSE PRACTITIONER

## 2022-11-03 PROCEDURE — 97530 THERAPEUTIC ACTIVITIES: CPT

## 2022-11-03 PROCEDURE — 36415 COLL VENOUS BLD VENIPUNCTURE: CPT

## 2022-11-03 PROCEDURE — 2060000000 HC ICU INTERMEDIATE R&B

## 2022-11-03 PROCEDURE — 80048 BASIC METABOLIC PNL TOTAL CA: CPT

## 2022-11-03 PROCEDURE — 85025 COMPLETE CBC W/AUTO DIFF WBC: CPT

## 2022-11-03 PROCEDURE — 96375 TX/PRO/DX INJ NEW DRUG ADDON: CPT

## 2022-11-03 PROCEDURE — 97162 PT EVAL MOD COMPLEX 30 MIN: CPT

## 2022-11-03 PROCEDURE — 96376 TX/PRO/DX INJ SAME DRUG ADON: CPT

## 2022-11-03 RX ORDER — PRAVASTATIN SODIUM 40 MG
40 TABLET ORAL DAILY
Status: DISCONTINUED | OUTPATIENT
Start: 2022-11-03 | End: 2022-11-04 | Stop reason: HOSPADM

## 2022-11-03 RX ORDER — BUMETANIDE 0.25 MG/ML
1 INJECTION, SOLUTION INTRAMUSCULAR; INTRAVENOUS 2 TIMES DAILY
Status: DISCONTINUED | OUTPATIENT
Start: 2022-11-03 | End: 2022-11-04 | Stop reason: HOSPADM

## 2022-11-03 RX ORDER — LISINOPRIL 10 MG/1
10 TABLET ORAL DAILY
Status: DISCONTINUED | OUTPATIENT
Start: 2022-11-04 | End: 2022-11-04 | Stop reason: HOSPADM

## 2022-11-03 RX ORDER — SPIRONOLACTONE 25 MG/1
50 TABLET ORAL DAILY
Status: DISCONTINUED | OUTPATIENT
Start: 2022-11-03 | End: 2022-11-04 | Stop reason: HOSPADM

## 2022-11-03 RX ORDER — BENZONATATE 100 MG/1
100 CAPSULE ORAL 3 TIMES DAILY PRN
Status: DISCONTINUED | OUTPATIENT
Start: 2022-11-03 | End: 2022-11-04 | Stop reason: HOSPADM

## 2022-11-03 RX ORDER — GUAIFENESIN 600 MG/1
1200 TABLET, EXTENDED RELEASE ORAL 2 TIMES DAILY
Status: DISCONTINUED | OUTPATIENT
Start: 2022-11-03 | End: 2022-11-04 | Stop reason: HOSPADM

## 2022-11-03 RX ADMIN — SODIUM CHLORIDE, PRESERVATIVE FREE 10 ML: 5 INJECTION INTRAVENOUS at 19:46

## 2022-11-03 RX ADMIN — DOCUSATE SODIUM 100 MG: 100 CAPSULE, LIQUID FILLED ORAL at 07:47

## 2022-11-03 RX ADMIN — ACETAMINOPHEN 650 MG: 325 TABLET, FILM COATED ORAL at 05:01

## 2022-11-03 RX ADMIN — PANTOPRAZOLE SODIUM 40 MG: 40 TABLET, DELAYED RELEASE ORAL at 05:48

## 2022-11-03 RX ADMIN — FERROUS SULFATE TAB EC 325 MG (65 MG FE EQUIVALENT) 325 MG: 325 (65 FE) TABLET DELAYED RESPONSE at 07:39

## 2022-11-03 RX ADMIN — METOPROLOL TARTRATE 50 MG: 50 TABLET, FILM COATED ORAL at 21:16

## 2022-11-03 RX ADMIN — BENZONATATE 100 MG: 100 CAPSULE ORAL at 21:27

## 2022-11-03 RX ADMIN — BUMETANIDE 1 MG: 0.25 INJECTION INTRAMUSCULAR; INTRAVENOUS at 18:15

## 2022-11-03 RX ADMIN — METOPROLOL TARTRATE 50 MG: 50 TABLET, FILM COATED ORAL at 07:39

## 2022-11-03 RX ADMIN — PRAVASTATIN SODIUM 40 MG: 40 TABLET ORAL at 21:16

## 2022-11-03 RX ADMIN — BENZONATATE 100 MG: 100 CAPSULE ORAL at 05:48

## 2022-11-03 RX ADMIN — APIXABAN 2.5 MG: 2.5 TABLET, FILM COATED ORAL at 21:16

## 2022-11-03 RX ADMIN — Medication 2 TABLET: at 07:39

## 2022-11-03 RX ADMIN — AMIODARONE HYDROCHLORIDE 200 MG: 200 TABLET ORAL at 07:39

## 2022-11-03 RX ADMIN — GUAIFENESIN 600 MG: 600 TABLET, EXTENDED RELEASE ORAL at 07:39

## 2022-11-03 RX ADMIN — SODIUM CHLORIDE, PRESERVATIVE FREE 10 ML: 5 INJECTION INTRAVENOUS at 07:38

## 2022-11-03 RX ADMIN — Medication 2 TABLET: at 21:17

## 2022-11-03 RX ADMIN — GUAIFENESIN 1200 MG: 600 TABLET, EXTENDED RELEASE ORAL at 21:16

## 2022-11-03 RX ADMIN — BENZONATATE 100 MG: 100 CAPSULE ORAL at 14:25

## 2022-11-03 RX ADMIN — POLYETHYLENE GLYCOL 3350 17 G: 17 POWDER, FOR SOLUTION ORAL at 08:02

## 2022-11-03 RX ADMIN — BUMETANIDE 1 MG: 0.25 INJECTION INTRAMUSCULAR; INTRAVENOUS at 07:38

## 2022-11-03 RX ADMIN — Medication 1 TABLET: at 07:39

## 2022-11-03 RX ADMIN — SPIRONOLACTONE 50 MG: 25 TABLET ORAL at 14:25

## 2022-11-03 RX ADMIN — LISINOPRIL 20 MG: 20 TABLET ORAL at 07:39

## 2022-11-03 RX ADMIN — APIXABAN 2.5 MG: 2.5 TABLET, FILM COATED ORAL at 07:39

## 2022-11-03 ASSESSMENT — PAIN SCALES - GENERAL
PAINLEVEL_OUTOF10: 3
PAINLEVEL_OUTOF10: 4
PAINLEVEL_OUTOF10: 3
PAINLEVEL_OUTOF10: 0
PAINLEVEL_OUTOF10: 0

## 2022-11-03 ASSESSMENT — PAIN DESCRIPTION - LOCATION
LOCATION: HEAD
LOCATION: HEAD

## 2022-11-03 ASSESSMENT — PAIN - FUNCTIONAL ASSESSMENT: PAIN_FUNCTIONAL_ASSESSMENT: ACTIVITIES ARE NOT PREVENTED

## 2022-11-03 ASSESSMENT — PAIN DESCRIPTION - DESCRIPTORS
DESCRIPTORS: ACHING
DESCRIPTORS: ACHING

## 2022-11-03 NOTE — CONSULTS
Date of service:  11/03/2022     Reason for Consult:  Congestive heart failure    Requesting Physician: Braulio Gunn DO    CHIEF COMPLAINT:   shortness of breath and worsening legs edema    HISTORY OF PRESENT ILLNESS:      The patient is a 82 y.o. female  who is known to our group. She had prior history of  paroxysmal atrial fibrillation , chronic diastolic heart failure,   valvular heart disease including severe tricuspid regurgitation, mild mitral regurgitation, mild aortic insufficiency associated with moderate pulmonary hypertension 51 mm of mercury on echo February 2022, Hypertension, hyperlipidemia,history of anemia and prior GI bleed in February of 2022, and hiatal hernia    She  was recently in [facility] after she fell and sustained laceration of left forearm which was repaired. At that time also she was treated for acute renal insufficiency, hyperkalemia, chest pain and shortness of breath. She came yesterday because of worsening shortness of breath, cough and legs edema associated with generalized weakness. No fever or chills or sore throat. She denies any dizziness or palpitations. She denies orthopnea or paroxysmal nocturnal dyspnea. Patient states that she never been diagnosed with COPD and she never been on oxygen at home. On arrival to the ED blood pressure was elevated 176/96 with heart rate 82 and respiratory rate 20, O2 saturation 98% in room air. In ED her BNP was elevated at 6158, troponin 21, 19  And 21, hemoglobin 7.9. chest x-ray showed trace pleural effusions with scattered pulmonary infiltrates    She was started on Bumex IV and she has been urinating well. Her urine out put was not recorded accurately because she has been flushing some of it. In any case the patient states that her shortness of breath is much better and actually she is lying flat in the bed without problem. Also legs edema is much better.       The patient states that she lives alone but she has home health care nurse and her granddaughter usually shops grocery for her. She states that she relies in her meals mainly on the pre prepared meals which in general are high in salt. The patient does not watch her weight on daily basis. Past Medical History:    Past Medical History:   Diagnosis Date    Blood circulation, collateral     CHF (congestive heart failure) (HCC)     COPD (chronic obstructive pulmonary disease) (HCC)     GERD (gastroesophageal reflux disease)     Hyperlipidemia     Hypertension      Past Surgical History:    Past Surgical History:   Procedure Laterality Date    BLADDER SUSPENSION  2001    CHOLECYSTECTOMY      COLONOSCOPY N/A 2/10/2022    COLONOSCOPY WITH BIOPSY performed by Hansel Lyle MD at [facility]    HYSTERECTOMY ([facility])  ovaries left    JOINT REPLACEMENT  left total shoulder; 1990    UPPER GASTROINTESTINAL ENDOSCOPY N/A 2/10/2022    EGD ESOPHAGOGASTRODUODENOSCOPY performed by Hansel Lyle MD at [address] [place] [address] Medications:  Prior to Admission medications    Medication Sig Start Date End Date Taking?  Authorizing Provider   amiodarone (CORDARONE) 200 MG tablet Take 1 tablet by mouth daily 10/31/22   Floyd Hernandez, DO   apixaban (ELIQUIS) 5 MG TABS tablet Take 1 tablet by mouth 2 times daily 10/31/22   Floyd Hernandez, DO   ferrous sulfate (FE TABS 325) 325 (65 Fe) MG EC tablet Take 1 tablet by mouth daily (with breakfast) 10/31/22   Floyd Hernandez, DO   lisinopril (PRINIVIL;ZESTRIL) 20 MG tablet Take 1 tablet by mouth daily 10/31/22   Floyd Hernandez, DO   acetaminophen (TYLENOL) 500 MG tablet Take 1 tablet by mouth every 6 hours as needed for Pain 10/31/22   Floyd Hernandez, DO   calcium-vitamin D (OSCAL-500) 500-200 MG-UNIT per tablet Take 1 tablet by mouth 2 times daily 10/31/22   Floyd Hernandez DO   metoprolol tartrate (LOPRESSOR) 25 MG tablet Take 2 tablets by mouth 2 times daily 10/31/22   Posey Signs Tom Lovett, DO   pantoprazole (PROTONIX) 40 MG tablet Take 1 tablet by mouth daily Take one tab every morning before breakfast 10/31/22   Nora Burkitt, DO   pravastatin (PRAVACHOL) 40 MG tablet TAKE 1 TABLET DAILY  Patient taking differently: 40 mg TAKE 1 TABLET DAILY 8/21/22   Nora Burkitt, DO   Multiple Vitamin (MULTIVITAMIN) TABS  7/11/22   Historical Provider, MD   Compression Stockings MISC by Does not apply route 2/17/22   Nimco Angela, DO   Multiple Vitamins-Minerals (THERAPEUTIC MULTIVITAMIN-MINERALS) tablet Take 1 tablet by mouth daily    Historical Provider, MD     Current Medications:    Current Facility-Administered Medications   Medication Dose Route Frequency Provider Last Rate Last Admin    benzonatate (TESSALON) capsule 100 mg  100 mg Oral TID PRN Maher Matter MALVIN Ramesh - CNP   100 mg at 11/03/22 0548    bumetanide (BUMEX) injection 1 mg  1 mg IntraVENous BID MALVIN Escoto - NP        sodium chloride flush 0.9 % injection 5-40 mL  5-40 mL IntraVENous 2 times per day Aldona Grams, PA-C        sodium chloride flush 0.9 % injection 5-40 mL  5-40 mL IntraVENous PRN Aldona Grams, PA-C        0.9 % sodium chloride infusion   IntraVENous PRN Aldona Grams, PA-C 10 mL/hr at 11/03/22 0614 Rate Verify at 11/03/22 7383    amiodarone (CORDARONE) tablet 200 mg  200 mg Oral Daily Evelia Hinton APRN - CNP   200 mg at 11/03/22 0739    calcium carb-cholecalciferol 250-125 MG-UNIT per tab 2 tablet  2 tablet Oral BID Evelia Hinton APRN - CNP   2 tablet at 11/03/22 0739    ferrous sulfate (FE TABS 325) EC tablet 325 mg  325 mg Oral Daily with breakfast Evelia Hinton APRN - CNP   325 mg at 11/03/22 0739    lisinopril (PRINIVIL;ZESTRIL) tablet 20 mg  20 mg Oral Daily Evelia Hinton, APRN - CNP   20 mg at 11/03/22 0739    metoprolol tartrate (LOPRESSOR) tablet 50 mg  50 mg Oral BID Evelia Hinton APRN - CNP   50 mg at 11/03/22 9525 therapeutic multivitamin-minerals 1 tablet  1 tablet Oral Daily Sheldon Hwang APRN - CNP   1 tablet at 11/03/22 0739    pantoprazole (PROTONIX) tablet 40 mg  40 mg Oral Daily Sheldon Hwang, APRN - CNP   40 mg at 11/03/22 0548    pravastatin (PRAVACHOL) tablet 20 mg  20 mg Oral Daily Sheldon Hwang, APRN - CNP   20 mg at 11/02/22 2010    sodium chloride flush 0.9 % injection 5-40 mL  5-40 mL IntraVENous 2 times per day Sheldon Hwang APRN - CNP   10 mL at 11/03/22 0738    ondansetron (ZOFRAN-ODT) disintegrating tablet 4 mg  4 mg Oral Q8H PRN MALVIN Walker - FRANCESCA        Or    ondansetron (ZOFRAN) injection 4 mg  4 mg IntraVENous Q6H PRN Sheldon Hwang, APRN - CNP        polyethylene glycol (GLYCOLAX) packet 17 g  17 g Oral Daily PRN Sheldon Hwang, APRN - CNP   17 g at 11/03/22 0802    acetaminophen (TYLENOL) tablet 650 mg  650 mg Oral Q6H PRN Sheldon Hwang, APRN - CNP   650 mg at 11/03/22 0501    Or    acetaminophen (TYLENOL) suppository 650 mg  650 mg Rectal Q6H PRN Sheldon Hwang, APRN - CNP        potassium chloride (KLOR-CON M) extended release tablet 40 mEq  40 mEq Oral PRN Sheldon Hwang APRN - CNP        Or    potassium bicarb-citric acid (EFFER-K) effervescent tablet 40 mEq  40 mEq Oral PRN MALVIN Walker - FRANCESCA        Or    potassium chloride 10 mEq/100 mL IVPB (Peripheral Line)  10 mEq IntraVENous PRN Sheldon Hwang, APRN - CNP        guaiFENesin [facility]) extended release tablet 600 mg  600 mg Oral BID Sheldon Hwang APRN - CNP   600 mg at 11/03/22 6842    docusate sodium (COLACE) capsule 100 mg  100 mg Oral BID Sheldon Hwang APRN - CNP   100 mg at 11/03/22 0747    apixaban (ELIQUIS) tablet 2.5 mg  2.5 mg Oral BID Pamela Perez, APRN - CNP   2.5 mg at 11/03/22 9043     Allergies:  Codeine, Bactrim [sulfamethoxazole-trimethoprim], Naproxen, Norvasc [amlodipine besylate], Sertraline hcl, Toradol [ketorolac tromethamine], Tramadol, Magnesium-containing compounds, Morphine, and Percocet [oxycodone-acetaminophen]    Social History:    Social History     Socioeconomic History    Marital status:      Spouse name: Not on file    Number of children: Not on file    Years of education: Not on file    Highest education level: Not on file   Occupational History    Not on file   Tobacco Use    Smoking status: Former     Types: Cigarettes     Quit date: 1984     Years since quittin.9    Smokeless tobacco: Never   Substance and Sexual Activity    Alcohol use: No    Drug use: No    Sexual activity: Not on file   Other Topics Concern    Not on file   Social History Narrative    Not on file     Social Determinants of Health     Financial Resource Strain: Low Risk     Difficulty of Paying Living Expenses: Not hard at all   Food Insecurity: No Food Insecurity    Worried About [employer] [employer] in the Last Year: Never true    [employer] in the Last Year: Never true   Transportation Needs: No Transportation Needs    Lack of Transportation (Medical): No    Lack of Transportation (Non-Medical): No   Physical Activity: Inactive    Days of Exercise per Week: 0 days    Minutes of Exercise per Session: 0 min   Stress: Not on file   Social Connections: Not on file   Intimate Partner Violence: Not on file   Housing Stability: Not on file     Family History:   Family History   Problem Relation Age of Onset    Cancer Brother     Cancer Other         bone    Cancer Daughter         breast       REVIEW OF SYSTEMS   Positive and Negative as described in HPI.     CONSTITUTIONAL:  negative for fevers, chills, sweats, fatigue, weight loss  HEENT:  negative for vision, hearing changes, runny nose, throat pain  RESPIRATORY:   she denies congestion, wheezing  CARDIOVASCULAR:  negative for chest pain, palpitations  GASTROINTESTINAL:  negative for nausea, vomiting, diarrhea, constipation, change in bowel habits, abdominal pain   GENITOURINARY:  negative for difficulty of urination, burning with urination, frequency   INTEGUMENT:  negative for rash, skin lesions, easy bruising   HEMATOLOGIC/LYMPHATIC:  negative for  bleeding or excessive bruising  ALLERGIC/IMMUNOLOGIC:  negative for urticaria , itching  ENDOCRINE:  negative increase in drinking, increase in urination, hot or cold intolerance  MUSCULOSKELETAL:  negative joint pains, muscle aches, swelling of joints  NEUROLOGICAL:  negative for headaches, dizziness, lightheadedness, numbness, pain, tingling extremities  BEHAVIOR/PSYCH:  negative for depression, anxiety    PHYSICAL EXAM:    Vitals:    VITALS:  /82   Pulse 71   Temp 98.2 °F (36.8 °C) (Oral)   Resp 16   Ht 5' 4\" (1.626 m)   Wt 130 lb (59 kg)   SpO2 94%   BMI 22.31 kg/m²   24HR INTAKE/OUTPUT:    Intake/Output Summary (Last 24 hours) at 11/3/2022 1320  Last data filed at 11/3/2022 1207  Gross per 24 hour   Intake 1312.48 ml   Output 700 ml   Net 612.48 ml       CONSTITUTIONAL:  awake, alert, cooperative, no apparent distress, and appears stated age  EYES: Pupils equal, round and reactive to light, extra ocular muscles intact, sclera clear, conjunctiva normal  ENT:  normocepalic, without obvious abnormality  NECK:  supple, symmetrical, trachea midline, no carotid bruit ,   No  JVD  BACK:  symmetric  LUNGS: Non-labored, good air exchange, clear to auscultation bilaterally, no crackles or wheezing  CARDIOVASCULAR:  Normal apical impulse, regular rate and rhythm, normal S1 and S2,  no murmur noted, no rub. ABDOMEN:  No scars, normal bowel sounds, soft, non-distended, non-tender, no masses palpated, no hepatosplenomegally, no bruit. MUSCULOSKELETAL:  there is no redness, warmth, or swelling of the joints     Mild-to-moderate legs edema bilaterally however the skin appears to be shrinking indicating that the legs edema was much worse  NEUROLOGIC:  Awake, alert, oriented to name, place and time.   SKIN:  no bruising or bleeding, normal skin color, texture, turgor and no jaundice    DATA:      Labs:  Recent Labs     11/02/22  1334 11/02/22  1517 11/02/22  1902   MYOGLOBIN 34 35 37   TROPHS 21* 19* 21*     Warfarin PT/INR:  Lab Results   Component Value Date/Time    PROTIME 11.1 02/16/2022 03:45 PM    INR 1.0 02/16/2022 03:45 PM     CBC:  Lab Results   Component Value Date/Time    WBC 5.7 11/03/2022 05:28 AM    RBC 3.55 11/03/2022 05:28 AM    HGB 7.6 11/03/2022 05:28 AM    HCT 27.5 11/03/2022 05:28 AM    MCV 77.5 11/03/2022 05:28 AM    MCH 21.4 11/03/2022 05:28 AM    MCHC 27.6 11/03/2022 05:28 AM    RDW 20.0 11/03/2022 05:28 AM     11/03/2022 05:28 AM    MPV 9.4 11/03/2022 05:28 AM     CMP:  Lab Results   Component Value Date/Time     11/03/2022 05:28 AM    K 3.8 11/03/2022 05:28 AM     11/03/2022 05:28 AM    CO2 22 11/03/2022 05:28 AM    BUN 13 11/03/2022 05:28 AM    CREATININE 0.69 11/03/2022 05:28 AM    GFRAA >60 02/16/2022 03:45 PM    LABGLOM >60 11/03/2022 05:28 AM    GLUCOSE 84 11/03/2022 05:28 AM    GLUCOSE 109 09/14/2016 09:54 AM    CALCIUM 8.6 11/03/2022 05:28 AM     Magnesium:    Lab Results   Component Value Date/Time    MG 1.7 11/03/2022 05:28 AM     PTT:    Lab Results   Component Value Date/Time    APTT >120.0 02/17/2022 05:39 AM     TSH:    Lab Results   Component Value Date/Time    TSH 1.47 02/08/2022 05:14 AM     BNP:   Recent Labs     11/02/22  1517   PROBNP 6,158*     BMP:  Lab Results   Component Value Date/Time     11/03/2022 05:28 AM    K 3.8 11/03/2022 05:28 AM     11/03/2022 05:28 AM    CO2 22 11/03/2022 05:28 AM    BUN 13 11/03/2022 05:28 AM    LABALBU 3.9 04/22/2022 12:00 AM    CREATININE 0.69 11/03/2022 05:28 AM    CALCIUM 8.6 11/03/2022 05:28 AM    GFRAA >60 02/16/2022 03:45 PM    LABGLOM >60 11/03/2022 05:28 AM    GLUCOSE 84 11/03/2022 05:28 AM    GLUCOSE 109 09/14/2016 09:54 AM     LIVER PROFILE:No results for input(s): AST, ALT, LABALBU, ALKPHOS, BILITOT, BILIDIR, IBILI, PROT, GLOB, ALBUMIN in the last 72 hours.   FLP: mild mitral regurgitation    Moderate pulmonary hypertension per echo February 2022    History of hypertension     History of hyperlipidemia     Anemia, history of GI bleed . Hemoglobin on admission 7.9 stable    Colon  large polyp, surgical pathology showed tubular adenoma no malignancy    Large hiatal hernia noted on abdominal CT        RECOMMENDATIONS:     Continue current medications    Agree on starting her on diuretics  Bumex 1 mg IV b.I.d.with close monitoring of her fluid balance and renal function and electrolytes    Add Aldactone and reduce lisinopril to help with diuresing    The patient need to wear support stockings during daytime. I do not expect to be able to get rid of all of her edema with diuretic without developing renal dysfunction because the origin of this edema is the right heart. The patient also should follow low-salt diet and elevate her legs while she is sitting.   All the above was discussed in details with her    Continue amiodarone and Eliquis    Management plan was discussed with patient  and her nurse        Electronically signed by Kenney Smith MD on 11/3/2022 at 1:20 PM     CC: Baldev Ross DO

## 2022-11-03 NOTE — PROGRESS NOTES
Nutrition Education    Educated on CHF  Learners: Patient  Readiness: Acceptance  Method: Handout- Low Sodium Nutrition Therapy  Response: Verbalizes Understanding  Contact name and number provided.             Lavonne SANTOS, RDN, LDN  Lead Clinical Dietitian  RD Office Phone [phone]

## 2022-11-03 NOTE — ACP (ADVANCE CARE PLANNING)
Advance Care Planning     Advance Care Planning Activator (Inpatient)  Conversation Note      Date of ACP Conversation: 11/3/2022     Conversation Conducted with: Patient with Decision Making Capacity    ACP Activator: Raghav Nagel RN        Health Care Decision Maker: self     Current Designated Health Care Decision Maker: self     Primary Decision Maker: Pancho [facility] [phone]  Click here to complete Healthcare Decision Makers including section of the Healthcare Decision Maker Relationship (ie \"Primary\")  Today we documented Decision Maker(s). The patient will provide ACP documents. Care Preferences    Ventilation: \"If you were in your present state of health and suddenly became very ill and were unable to breathe on your own, what would your preference be about the use of a ventilator (breathing machine) if it were available to you? \"      Would the patient desire the use of ventilator (breathing machine)?: yes    \"If your health worsens and it becomes clear that your chance of recovery is unlikely, what would your preference be about the use of a ventilator (breathing machine) if it were available to you? \"     Would the patient desire the use of ventilator (breathing machine)?: No      Resuscitation  \"CPR works best to restart the heart when there is a sudden event, like a heart attack, in someone who is otherwise healthy. Unfortunately, CPR does not typically restart the heart for people who have serious health conditions or who are very sick. \"    \"In the event your heart stopped as a result of an underlying serious health condition, would you want attempts to be made to restart your heart (answer \"yes\" for attempt to resuscitate) or would you prefer a natural death (answer \"no\" for do not attempt to resuscitate)? \" no       [] Yes   [] No   Educated Patient / Noel Kendell regarding differences between Advance Directives and portable DNR orders.     Length of ACP Conversation in minutes: 10     Conversation Outcomes:  [x] ACP discussion completed  [] Existing advance directive reviewed with patient; no changes to patient's previously recorded wishes  [] New Advance Directive completed  [] Portable Do Not Rescitate prepared for Provider review and signature  [] POLST/POST/MOLST/MOST prepared for Provider review and signature      Follow-up plan:    [] Schedule follow-up conversation to continue planning  [x] Referred individual to Provider for additional questions/concerns   [] Advised patient/agent/surrogate to review completed ACP document and update if needed with changes in condition, patient preferences or care setting    [] This note routed to one or more involved healthcare providers        Pt does have HCPOA granddaughter Tramaine Valderrama her dtr lives in [place].

## 2022-11-03 NOTE — PROGRESS NOTES
[facility]  Office: [address], DO, Seralalit Stephens, DO, Freddy Alberto, DO, Beto Gunn, DO, Jah Gan MD, Tavia Medrano MD, Valencia Adorno MD, Rufus Clark MD,  Aurora Vickers MD, Jeffrey Waldron MD, Aaron Wood, DO, Daniela Yen MD,  Rodrick Gonzalez MD, Nito Metcalf MD, Shar Marshall DO, Yanique Montilla MD, Qasim Johnson MD, Sharan Lehman, DO, Mike Mendosa MD, Reji Serrano MD, Markos Michelle MD, Vi Gómez MD, Roshan Pena DO, Yesenia Wallace MD, Iker Askew MD, Romayne Muscat, CNP,  Kim Mckinley, CNP, Juanpablo Price, CNP, Shayan Rosenberg, CNP,  Jose Mike, DNP, Pedro Greenwood, CNP, Johnnie Del Rosario, CNP, Chilango Chu, CNP, Dawna Walton, CNP, Lea Mckeon, CNP, Sharifa Brantley PA-C, Keanu Mora, CNS, Edyta Cheek, DNP, Tamara Cruz, CNP, Yohana Pizarro, CNP, Ezequiel Rivera, CNP         [facility]    Progress Note    11/3/2022    10:16 AM    Name:   Dona Louis  MRN:     [id number]     [place]:      [de-identified]   Room:   [address] [place] [address] Day:  1  Admit Date:  11/2/2022  1:08 PM    PCP:   Russell Mcneil DO  Code Status:  Full Code    Subjective:     C/C:   Chief Complaint   Patient presents with    Other     Visiting wound care nurse said pt's HR sounded irregular with stethoscope and told pt it was best to be seen in ED. Chest Pain     \"Discomfort not pain: 2/10. Does not radiate. Headache     Light headed as well, no photosensitivity, no issues with sound making HA worse. Took tylenol approximately 0900am, helped with HA but not chest discomfort. Interval History Status: improved. Patient reports she is feeling better compared to yesterday and has much less short of breath.   Vital signs are stable, no new complaints    Brief History:   Per my partner  Patient presents with complaints of chest pain, shortness of breath, bilateral lower leg swelling, cough and headache that started a few days ago. Of note, patient was recently discharged from [facility] where she was treated after experiencing a fall (where she underwent a laceration repair of her left forearm). Patient was also treated for EMERY, hyperkalemia, chest pain and shortness of breath during that admission. During that admission, it is seen that the patient had been instructed to stop taking her bumex (along with her tikosyn, colace, multivitamin, potassium and pepcid). Initially patient's chloride level is 108.  proBNP is 6158. Troponin 21 and 19. Hemoglobin 7. 9. Chest x-ray shows: Trace effusions with scattered pulmonary infiltrates. Review of Systems:     Constitutional:  negative for chills, fevers, sweats  Respiratory:  negative for cough, wheezing. Positive for dyspnea on exertion, shortness of breath,(improving)  Cardiovascular:  negative for chest pain, chest pressure/discomfort, potation's. Positive for lower extremity edema,  Gastrointestinal:  negative for abdominal pain, constipation, diarrhea, nausea, vomiting  Neurological:  negative for dizziness, headache  Positive for wound  Positive for generalized weakness  Medications: Allergies: Allergies   Allergen Reactions    Codeine Nausea Only     dizziness    Bactrim [Sulfamethoxazole-Trimethoprim] Hives    Naproxen      On allergy list ? Unsure of reaction    Norvasc [Amlodipine Besylate]      Edema-she thinks      Sertraline Hcl      unsure    Toradol [Ketorolac Tromethamine]      unsure    Tramadol      unsure    Magnesium-Containing Compounds Diarrhea     Mag ox 500 ?     Morphine Nausea And Vomiting    Percocet [Oxycodone-Acetaminophen] Nausea And Vomiting       Current Meds:   Scheduled Meds:    sodium chloride flush  5-40 mL IntraVENous 2 times per day    amiodarone  200 mg Oral Daily    calcium carb-cholecalciferol  2 tablet Oral BID    ferrous sulfate  325 mg Oral Daily with breakfast    lisinopril  20 mg Oral Daily    metoprolol tartrate  50 mg Oral BID    therapeutic multivitamin-minerals  1 tablet Oral Daily    pantoprazole  40 mg Oral Daily    pravastatin  20 mg Oral Daily    sodium chloride flush  5-40 mL IntraVENous 2 times per day    bumetanide  1 mg IntraVENous Daily    guaiFENesin  600 mg Oral BID    docusate sodium  100 mg Oral BID    apixaban  2.5 mg Oral BID     Continuous Infusions:    sodium chloride 10 mL/hr at 22 0614     PRN Meds: benzonatate, sodium chloride flush, sodium chloride, ondansetron **OR** ondansetron, polyethylene glycol, acetaminophen **OR** acetaminophen, potassium chloride **OR** potassium alternative oral replacement **OR** potassium chloride    Data:     Past Medical History:   has a past medical history of Blood circulation, collateral, CHF (congestive heart failure) ([facility] COPD (chronic obstructive pulmonary disease) ([facility] GERD (gastroesophageal reflux disease), Hyperlipidemia, and Hypertension. Social History:   reports that she quit smoking about 37 years ago. She has never used smokeless tobacco. She reports that she does not drink alcohol and does not use drugs. Family History:   Family History   Problem Relation Age of Onset    Cancer Brother     Cancer Other         bone    Cancer Daughter         breast       Vitals:  BP (!) 162/70   Pulse 90   Temp 98.1 °F (36.7 °C) (Oral)   Resp 16   Ht 5' 4\" (1.626 m)   Wt 130 lb (59 kg)   SpO2 95%   BMI 22.31 kg/m²   Temp (24hrs), Av.9 °F (36.6 °C), Min:96.8 °F (36 °C), Max:98.4 °F (36.9 °C)    No results for input(s): POCGLU in the last 72 hours. I/O (24Hr):     Intake/Output Summary (Last 24 hours) at 11/3/2022 1016  Last data filed at 11/3/2022 0943  Gross per 24 hour   Intake 1312.48 ml   Output 100 ml   Net 1212.48 ml       Labs:  Hematology:  Recent Labs     22  1334 22  0528   WBC 5.7 5.7   RBC 3.68* 3.55*   HGB 7.9* 7.6*   HCT 28.6* 27.5*   MCV 77.7* 77.5*   MCH 21.5* 21.4*   MCHC 27.6* 27.6*   RDW 20.4* 20.0*    175   MPV 9.8 9.4     Chemistry:  Recent Labs     11/02/22  1334 11/02/22  1517 11/02/22  1902 11/03/22  0528     --   --  141   K 4.3  --   --  3.8   *  --   --  108*   CO2 23  --   --  22   GLUCOSE 92  --   --  84   BUN 14  --   --  13   CREATININE 0.72  --   --  0.69   MG  --   --   --  1.7   ANIONGAP 10  --   --  11   LABGLOM >60  --   --  >60   CALCIUM 9.0  --   --  8.6   PROBNP  --  6,158*  --   --    TROPHS 21* 19* 21*  --    MYOGLOBIN 34 35 37  --    No results for input(s): PROT, LABALBU, LABA1C, A7LPVZR, L1ZLVNO, FT4, TSH, AST, ALT, LDH, GGT, ALKPHOS, LABGGT, BILITOT, BILIDIR, AMMONIA, AMYLASE, LIPASE, LACTATE, CHOL, HDL, LDLCHOLESTEROL, CHOLHDLRATIO, TRIG, VLDL, VOR21BV, PHENYTOIN, PHENYF, URICACID, POCGLU in the last 72 hours. ABG:No results found for: POCPH, PHART, PH, POCPCO2, XBH9NIL, PCO2, POCPO2, PO2ART, PO2, POCHCO3, TDT4GDC, HCO3, NBEA, PBEA, BEART, BE, THGBART, THB, TQF8XTC, YLCG7PPZ, L3CJBHRH, O2SAT, FIO2  Lab Results   Component Value Date/Time    SPECIAL RT WRIST,12ML 11/02/2022 04:41 PM     Lab Results   Component Value Date/Time    CULTURE NO GROWTH 12 HOURS 11/02/2022 04:41 PM       Radiology:  CT HEAD WO CONTRAST    Result Date: 11/2/2022  Chronic microvascular disease without acute intracranial abnormality. XR CHEST PORTABLE    Result Date: 11/3/2022  Cardiomegaly and chronic pulmonary change with small right basilar effusion. XR CHEST PORTABLE    Result Date: 11/2/2022  Trace effusions with scattered pulmonary infiltrates.        Physical Examination:        General appearance:  alert, cooperative and no distress  Mental Status:  oriented to person, place and time and normal affect  Lungs: Bibasilar crackles, normal effort at rest, positive dyspnea with exertion  Heart: Irregularly irregular, no murmur  Abdomen:  soft, nontender, nondistended, normal bowel sounds, no masses, hepatomegaly, splenomegaly  Extremities:1-2+bilateral lower extremity pitting edema, no redness, tenderness in the calves  Skin:  no gross lesions, rashes, induration  Laceration to left anterior arm, healing well  Positive generalized weakness  Assessment:        Hospital Problems             Last Modified POA    * (Principal) Acute on chronic diastolic heart failure ([address] 11/2/2022 Yes    Fall 11/2/2022 Yes    Abrasion of multiple sites of left upper arm 11/2/2022 Yes    Hyperlipidemia (Chronic) 11/2/2022 Yes    Primary hypertension 11/2/2022 Yes    Gastroesophageal reflux disease without esophagitis 11/2/2022 Yes    Atypical chest pain 11/2/2022 Yes    Paroxysmal atrial fibrillation ([address] (Chronic) 11/2/2022 Yes    Chronic anticoagulation 11/2/2022 Yes    Iron deficiency anemia 11/2/2022 Yes    (HFpEF) heart failure with preserved ejection fraction ([facility] 11/2/2022 Yes       Plan:        Acute on chronic diastolic CHF: Continue IV diuresis, will increase IV Bumex to twice daily, monitor intake and output, daily weights, 1500 mL fluid restriction.   Await further recommendations from cardiology  Monitor labs, replace electrolytes as needed  Iron deficiency anemia-check iron studies, continue oral ferrous sulfate from home meds  Telemetry monitoring  Switch continuous pulse ox to spot check  Continue PT/OT  Monitor and control blood pressure-improved with current cardiac meds  Plan discussed with patient and staff    MALVIN Bynum NP  11/3/2022  10:16 AM

## 2022-11-03 NOTE — PLAN OF CARE
Problem: Discharge Planning  Goal: Discharge to home or other facility with appropriate resources  Outcome: Progressing  Flowsheets (Taken 11/2/2022 2010 by Jeovanny Linder)  Discharge to home or other facility with appropriate resources: Identify barriers to discharge with patient and caregiver     Problem: Skin/Tissue Integrity  Goal: Absence of new skin breakdown  Description: 1. Monitor for areas of redness and/or skin breakdown  2. Assess vascular access sites hourly  3. Every 4-6 hours minimum:  Change oxygen saturation probe site  4. Every 4-6 hours:  If on nasal continuous positive airway pressure, respiratory therapy assess nares and determine need for appliance change or resting period.   11/3/2022 1009 by Hardy Abrams RN  Outcome: Progressing     Problem: Safety - Adult  Goal: Free from fall injury  11/3/2022 1009 by Hardy Abrams RN  Outcome: Progressing     Problem: ABCDS Injury Assessment  Goal: Absence of physical injury  Outcome: Progressing     Problem: Infection - Adult  Goal: Absence of infection at discharge  11/3/2022 1009 by Hardy Abrams RN  Outcome: Progressing

## 2022-11-03 NOTE — PROGRESS NOTES
Transitions of Care Pharmacy Service   Medication Review    The patient's list of current home medications has been reviewed. Source(s) of information: Patient/ Surescripts/ EPIC    Based on information provided by the above source(s), I have updated the patient's home med list as described below. Please review the ACTION REQUESTED section of this note below for any discrepancies on current hospital orders. I changed or updated the following medications on the patient's home medication list:  Removed Multivitamins - duplicate     Added none     Adjusted   Pravachol from 20mg to 40mg PO daily   Other Notes none         PROVIDER ACTION REQUESTED  Medications that need to be addressed by a physician/nurse practitioner:    Medication Action Requested   Pravachol     Please adjust to 40mg as appropriate         Please feel free to call me with any questions about this encounter. Thank you.     Monika Baltazar [facility]   Transitions of Care Pharmacy Service  Phone:  [phone]  Fax: [phone]      Electronically signed by Monika Baltazar [facility] on 11/3/2022 at 10:35 AM           Medications Prior to Admission: amiodarone (CORDARONE) 200 MG tablet, Take 1 tablet by mouth daily  apixaban (ELIQUIS) 5 MG TABS tablet, Take 1 tablet by mouth 2 times daily  ferrous sulfate (FE TABS 325) 325 (65 Fe) MG EC tablet, Take 1 tablet by mouth daily (with breakfast)  lisinopril (PRINIVIL;ZESTRIL) 20 MG tablet, Take 1 tablet by mouth daily  acetaminophen (TYLENOL) 500 MG tablet, Take 1 tablet by mouth every 6 hours as needed for Pain  calcium-vitamin D (OSCAL-500) 500-200 MG-UNIT per tablet, Take 1 tablet by mouth 2 times daily  metoprolol tartrate (LOPRESSOR) 25 MG tablet, Take 2 tablets by mouth 2 times daily  pantoprazole (PROTONIX) 40 MG tablet, Take 1 tablet by mouth daily Take one tab every morning before breakfast  pravastatin (PRAVACHOL) 40 MG tablet, TAKE 1 TABLET DAILY (Patient taking differently: 40 mg TAKE 1 TABLET DAILY)  Multiple Vitamin (MULTIVITAMIN) TABS,   Compression Stockings MISC, by Does not apply route  Multiple Vitamins-Minerals (THERAPEUTIC MULTIVITAMIN-MINERALS) tablet, Take 1 tablet by mouth daily

## 2022-11-03 NOTE — PROGRESS NOTES
Occupational Therapy  Facility/Department: [facility] PROGRESSIVE CARE  Occupational Therapy Initial Assessment    Name: Elias Villagran  : 1935  MRN: [id number]  Date of Service: 11/3/2022    Discharge Recommendations:  Patient would benefit from continued therapy after discharge      Due to recent hospitalization and medical condition, pt would benefit from additional intermittent skilled therapy at time of discharge. Please refer to the AM-PAC score for current functional status. RN reports patient is medically stable for therapy treatment this date. Chart reviewed prior to treatment and patient is agreeable for therapy. All lines intact and patient positioned comfortably at end of treatment. All patient needs addressed prior to ending therapy session. Patient Diagnosis(es): The encounter diagnosis was Congestive heart failure, unspecified HF chronicity, unspecified heart failure type ([address] Past Medical History:  has a past medical history of Blood circulation, collateral, CHF (congestive heart failure) ([address] COPD (chronic obstructive pulmonary disease) ([address] GERD (gastroesophageal reflux disease), Hyperlipidemia, and Hypertension. Past Surgical History:  has a past surgical history that includes joint replacement (left total shoulder; ); bladder suspension (); Hysterectomy (ovaries left); Cholecystectomy; Upper gastrointestinal endoscopy (N/A, 2/10/2022); and Colonoscopy (N/A, 2/10/2022). Per H&P: Patient presents to the emergency room today with complaints of chest pain, shortness of breath, bilateral lower leg swelling, cough and headache. Patient states that her symptoms originally started a few days ago. She thought that she would be able to tolerate the symptoms and treat them at home, but was unfortunately unable to.  Of note, patient was recently discharged from [facility] where she was treated after experiencing a fall (where she underwent a laceration repair of her left forearm). Patient states that she was in the bathroom, was washing her hands, turned around and was on the floor. She does not recall the reason for the fall. Patient was also treated for EMERY, hyperkalemia, chest pain and shortness of breath during that admission. During that admission, it is seen that the patient had been instructed to stop taking her bumex (along with her tikosyn, colace, multivitamin, potassium and pepcid). Patient has a significant past medical history of CHF, COPD, GERD, hyperlipidemia, hypertension and atrial fibrillation. Patient states that over the past few days her shortness of breath, weakness, cough and leg swelling has continuously worsened. Her shortness of breath is mildly improved after a dose of IV Lasix in the emergency room. Patient continues to feel weak and her legs continue to be edematous. Patient denies any recent fevers, chills, nausea, vomiting and diarrhea. Assessment   Performance deficits / Impairments: Decreased functional mobility ; Decreased strength;Decreased endurance;Decreased safe awareness;Decreased ADL status; Decreased sensation;Decreased balance;Decreased cognition  Assessment: Pt is limited by weakness, cognitive deficits, and dec activity tolerance. Skilled OT is indicated to increase overall IND and safety with self-care and functional tasks to return to PLOF. Prognosis: Good  Decision Making: Medium Complexity  REQUIRES OT FOLLOW-UP: Yes  Activity Tolerance  Activity Tolerance: Patient Tolerated treatment well        Plan   Occupational Therapy Plan  Times Per Week: 4-5x per week, 1-2x per day as casandra     Restrictions  Restrictions/Precautions  Restrictions/Precautions: General Precautions, Fall Risk  Position Activity Restriction  Other position/activity restrictions:  Up with assist, orthostatic BP, heels off bed at all times, telemetry, ext catheter, LUE IV    Subjective   General  Chart Reviewed: Yes  Patient assessed for rehabilitation services?: Yes  Family / Caregiver Present: No  Subjective  Subjective: pt pleasant and cooperative for OT eval     Social/Functional History  Social/Functional History  Lives With: Alone  Type of Home: Trailer  Home Layout: One level  Home Access: Stairs to enter with rails  Entrance Stairs - Number of Steps: 4  Entrance Stairs - Rails: Both  Bathroom Shower/Tub: Tub/Shower unit, Shower chair with back  Bathroom Toilet: Standard  Bathroom Equipment: Shower chair, Toilet raiser  Home Equipment: Atlee Max, rolling  Has the patient had two or more falls in the past year or any fall with injury in the past year?: Yes (pt reports 2 falls in the past month, not sure if it's meds, if she gets up too quickly(says it just \"hits me\"))  Receives Help From: Family, Home health, Neighbor  ADL Assistance: Needs assistance (granddaughter assists pt in/out of shower)  Homemaking Assistance: Needs assistance (granddaughter does shopping & laundry, gets delivered meals & can do light meal prep)  Ambulation Assistance: Independent (uses R/walker)  Transfer Assistance: Independent  Active : Yes  Patient's  Info: granddaughter Deloris Escalona  Occupation: Retired  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       Objective           Observation/Palpation  Observation: ext catheter, LUE IV; Pt has fragile skin with multiple scattered bruising & bandage at left forearm; Othostatics checked: Supine /68, MAP 88, pulse 69; Seated /72, MAP 84, pulse 80 ; Standing 141/69, MAP 87, pulse 85  Edema: mild at Derrick ankles    Safety Devices  Type of Devices: Call light within reach; Chair alarm in place;Gait belt; Heels elevated for pressure relief;Patient at risk for falls; Left in chair    Bed Mobility Training  Bed Mobility Training: Yes  Overall Level of Assistance: Contact-guard assistance;Minimum assistance  Interventions: Verbal cues; Tactile cues (Mod cues for proper bed mob tech, use of bed rails, assist with lines and use of BUE to scoot fully to EOB. HOB flat)  Supine to Sit: Contact-guard assistance;Minimum assistance  Sit to Supine: Other (comment) (pt in bedside chair upon exit)    Pt is at risk for skin breakdown. Pt educated on pressure relief measures and was able to perform them with verbal cueing and repeat back education. Patient positioned appropriately after treatment with all high risk areas elevated or propped. Air mattress is inflated to appropriate level. Pt reports being comfortable at end of treatment. Transfer Training  Transfer Training: Yes  Overall Level of Assistance: Contact-guard assistance;Minimum assistance (pt with poor eccentric control and required MAX education/demo on proper hand placement on stable surface. Pt practiced STS transfer with proper tech 2x.)  Interventions: Verbal cues; Safety awareness training;Weight shifting training/pressure relief; Tactile cues;Demonstration (MAX cues for proper hand placement on stable surface, controlled sit<>stand, upright posture, pacing, pursed lip breathing, squaring self/AD to surface, assist with lines, all to inc safety/reduce fall risk)  Sit to Stand: Contact-guard assistance;Minimum assistance  Stand to Sit: Contact-guard assistance;Minimum assistance    Functional Mobility  Overall Level of Assistance: Contact-guard assistance (pt completed functional mob from bed to toilet, to sink, to bedside chair. Pt stood ~4-5 mins total)  Interventions: Verbal cues; Safety awareness training;Weight shifting training/pressure relief; Tactile cues (Mod cues for RW safety/mgmt, upright posture, pacing, pursed lip breathing, scanning, assist with lines, all to inc safety/reduce fall risk)  Assistive Device: Gait belt;Walker, rolling       AROM: Within functional limits  PROM: Within functional limits  Strength: Generally decreased, functional (4/5)  Coordination: Within functional limits  Tone: Normal    ADL  Feeding: Modified independent   Grooming: Contact guard assistance  Grooming Skilled Clinical Factors: pt stood at sink with CGA for standing balance, to wash her hands  UE Bathing: Stand by assistance  LE Bathing: Moderate assistance  UE Dressing: Minimal assistance  UE Dressing Skilled Clinical Factors: to adjust hosp gown  LE Dressing: Moderate assistance  Toileting: Maximum assistance  Toileting Skilled Clinical Factors: Mod A for brief mgmt and MAX A for ketty care       Activity Tolerance  Activity Tolerance: Patient limited by fatigue        Vision  Vision: Impaired  Vision Exceptions: Wears glasses at all times (pt denies vision changes or any eye sx)  Hearing  Hearing: Within functional limits    Cognition  Overall Cognitive Status: Exceptions  Arousal/Alertness: Appropriate responses to stimuli  Following Commands: Follows multistep commands consistently  Attention Span: Appears intact  Memory: Decreased short term memory  Safety Judgement: Decreased awareness of need for assistance;Decreased awareness of need for safety  Problem Solving: Assistance required to generate solutions;Assistance required to correct errors made;Assistance required to implement solutions;Assistance required to identify errors made  Insights: Decreased awareness of deficits  Initiation: Does not require cues  Sequencing: Requires cues for some  Orientation  Overall Orientation Status: Within Functional Limits                    Education Given To: Patient  Education Provided: Role of Therapy; ADL Adaptive Strategies; Plan of Care;Transfer Training; Fall Prevention Strategies; Home Exercise Program;Energy Conservation  Education Provided Comments: OT POC, purpose of OT in acute care, call light/fall prevention, safety in function, transfer tech, RW safety/mgmt  Education Method: Demonstration;Verbal  Barriers to Learning: Cognition  Education Outcome: Demonstrated understanding;Continued education needed            Pt

## 2022-11-03 NOTE — CARE COORDINATION
11/03/22 0956   Service Assessment   Patient Orientation Alert and Oriented   Cognition Alert   History Provided By Patient   Primary Caregiver Self   Support Systems Children;Friends/Neighbors;Home Care Staff  (granddasherwin Mercado)   PCP Verified by CM Yes  (10/17/22)   Last Visit to PCP Within last 3 months   Prior Functional Level Assistance with the following:;Shopping;Housework; Mobility   Current Functional Level Assistance with the following:;Housework; Shopping;Mobility   Can patient return to prior living arrangement Unknown at present   Ability to make needs known: Fair   Family able to assist with home care needs: Yes   Financial Resources Medicare  ([employer] Medicare)   Community Resources ECF/Home Care  (Current with [facility])   Social/Functional History   Lives With Alone   Type of Home Trailer   Home Layout One level   Home Access Stairs to enter with rails   Entrance Stairs - Number of Steps 4   Entrance Stairs - Rails Both   Bathroom Shower/Tub Tub/Shower unit; Shower chair with back   [address] [facility]   Home Equipment Cane;Walker, standard   Receives Help From [employer];Neighbor   ADL [facility] Needs assistance  (walks with walker)   Transfer Assistance Independent   Active  No   Patient's  Info granddaughter Tyra Mercado   Occupation Retired   Discharge Planning   Type of Residence [employer]  (Lot #[id number])   [address] Prior To Admission [facility]; Home Care   Current DME Prior to [facility]; [address]   DME Ordered? No   Type of Home Care Services PT;OT;Nursing Services;Meals on Wheels   Patient expects to be discharged to: Unknown  (Home with [facility] vs snf)   One/Two Story Residence One story   History of falls?  [address] [facility] Discharge Transition of Care Consult (CM Consult) Home Health;SNF   Services At/After Discharge Ghulam Barone (SNF); Home Health;Meals on wheels;DME;Nursing services       Pt was at [facility] 10/7-10/12 after a fall at home. Jeanmarie Denney is a home med. She was discharged home with [facility] and she stayed with her granddaughter Francisca Gautam for a few weeks. She is now in her own home-alone with [facility]-spoke with Karol Ayoub confirmed she is current. Pt is independent, does ambulate with walker. Non . Granddaughter does assist with driving/groceries/any other needs. Neighbor checks on her. Her dtr lives in [facility]. Discussed possibility of snf-pending PT/OT recs. Pt may consider and would need to speak with granddaughter. She has been to snf before-in [place] (about 1 year ago) does not remember  name.

## 2022-11-03 NOTE — DISCHARGE INSTR - COC
Continuity of Care Form    Patient Name: Cielo Jackson   :  1935  MRN:  [id number]    Admit date:  2022  Discharge date:      Code Status Order: Full Code   Advance Directives:     Admitting Physician:  Stacy Gunn DO  PCP: Alex Nathan DO    Discharging Nurse: [facility] Unit/Room#: 1026/1026-01  Discharging Unit Phone Number: [phone]    Emergency Contact:   Extended Emergency Contact Information  Primary Emergency Contact: Nicole Blackman  Address: [address] [place] [address], [address] [place] [address] Phone: [phone]  Relation: Grandchild    Past Surgical History:  Past Surgical History:   Procedure Laterality Date    BLADDER SUSPENSION      CHOLECYSTECTOMY      COLONOSCOPY N/A 2/10/2022    COLONOSCOPY WITH BIOPSY performed by Jaison See MD at [place] [address] ([address] [place] [address])  ovaries left    JOINT REPLACEMENT  left total shoulder;     UPPER GASTROINTESTINAL ENDOSCOPY N/A 2/10/2022    EGD ESOPHAGOGASTRODUODENOSCOPY performed by Jaison See MD at [facility]       Immunization History:   Immunization History   Administered Date(s) Administered    COVID-19, PFIZER PURPLE top, DILUTE for use, (age 15 y+), 30mcg/0.3mL 2021, 2021    Influenza Virus Vaccine 10/22/2005, 10/23/2008, 2010, 2011, 10/04/2012, 2013, 2014, 2015, 2016, 10/03/2016, 11/15/2017, 2018, 2020    Influenza, AFLURIA (age 1 yrs+), FLUZONE, (age 10 mo+), MDV, 0.5mL 2016, 11/15/2017, 2018    Influenza, FLUAD, (age 72 y+), Adjuvanted, 0.5mL 2020    Influenza, Intradermal, Preservative free 10/13/2009    Influenza, Triv, inactivated, subunit, adjuvanted, IM (Fluad 65 yrs and older) 2019    Pneumococcal Conjugate 13-valent (Ejifvwz38) 10/29/2015    Pneumococcal Polysaccharide (Udfxfrqfv11) 2006, 10/01/2010    Td, unspecified formulation 2011 Tdap (Boostrix, Adacel) 09/20/2019, 02/01/2021, 10/07/2022    Zoster Live (Zostavax) 09/10/2010       Active Problems:  Patient Active Problem List   Diagnosis Code    Hyperlipidemia E78.5    Primary hypertension I10    Anxiety F41.9    Gastroesophageal reflux disease without esophagitis K21.9    Atypical chest pain R07.89    Paroxysmal atrial fibrillation (HCC) I48.0    Asymptomatic varicose veins I83.90    Bilateral edema of lower extremity R60.0    Chronic anticoagulation Z79.01    Former smoker Z87.891    Mild aortic regurgitation I35.1    Mild concentric left ventricular hypertrophy (LVH) I51.7    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    Hiatal hernia K44.9    Iron deficiency anemia D50.9    History of Nissen fundoplication W33.180    Closed wedge fracture of thoracic vertebra with routine healing T5,T8 S22.000D    (HFpEF) heart failure with preserved ejection fraction (HCC) I50.30    Severe tricuspid regurgitation I07.1    Pulmonary hypertension (HCC) I27.20    Tubular adenoma:  3 cm transverse colon D36.9    Iron deficiency E61.1    VTE (venous thromboembolism) I82.90    Short-term memory loss R41.3    Anemia D64.9    Polyp of colon K63.5    Fall W19. XXXA    Abrasion of multiple sites of left upper arm S40.812A    Acute on chronic diastolic heart failure (HCC) I50.33       Isolation/Infection:   Isolation            No Isolation          Patient Infection Status       Infection Onset Added Last Indicated Last Indicated By Review Planned Expiration Resolved Resolved By    None active    Resolved    COVID-19 (Rule Out) 11/15/20 11/15/20 11/15/20 Covid-19 Ambulatory (Ordered)   11/18/20 Rule-Out Test Resulted            Nurse Assessment:  Last Vital Signs: BP (!) 162/70   Pulse 90   Temp 98.1 °F (36.7 °C) (Oral)   Resp 16   Ht 5' 4\" (1.626 m)   Wt 130 lb (59 kg)   SpO2 95%   BMI 22.31 kg/m²     Last documented pain score (0-10 scale): Pain Level: 3  Last Weight:   Wt Readings from Last 1 Encounters:   11/02/22 130 lb (59 kg)     Mental Status:  oriented and alert    IV Access:  - None    Nursing Mobility/ADLs:  Walking   Assisted  Transfer  Assisted  Bathing  Assisted  Dressing  Assisted  Toileting  Assisted  Feeding [address] Delivery   whole    Wound Care Documentation and Therapy:        Elimination:  Continence: Bowel: Yes  Bladder: Yes w/ incontinent episodes   Urinary Catheter: None   Colostomy/Ileostomy/Ileal Conduit: No       Date of Last BM: 11/04/2022    Intake/Output Summary (Last 24 hours) at 11/3/2022 0956  Last data filed at 11/3/2022 0943  Gross per 24 hour   Intake 1312.48 ml   Output 100 ml   Net 1212.48 ml     I/O last 3 completed shifts: In: 1312.5 [I.V.:1012.5; IV [id number]:018]  Out: -     Safety Concerns: At Risk for Falls    Impairments/Disabilities:      None    Nutrition Therapy:  Current Nutrition Therapy:   - general    Routes of Feeding: Oral  Liquids: Thin Liquids  Daily Fluid Restriction: yes - amount 1500mls  Last Modified Barium Swallow with Video (Video Swallowing Test): not done    Treatments at the Time of Hospital Discharge:   Respiratory Treatments: n/a  Oxygen Therapy:  is not on home oxygen therapy.   Ventilator:    - No ventilator support    Rehab Therapies: Physical Therapy and Occupational Therapy  Weight Bearing Status/Restrictions: No weight bearing restrictions  Other Medical Equipment (for information only, NOT a DME order):  walker  Other Treatments:   SN   HHA  CHF teaching     Patient's personal belongings (please select all that are sent with patient):  Glasses    RN SIGNATURE:  Electronically signed by Sara Cochran RN on 11/4/22 at 12:47 PM EDT    CASE MANAGEMENT/SOCIAL WORK SECTION    Inpatient Status Date: 11/02/2022    Readmission Risk Assessment Score:  Readmission Risk              Risk of Unplanned Readmission:  15           Discharging to Facility/ Agency   Name: [address] [place] [address]   Address:  [place] [address] [facility] [address]  Phone: [phone]  Home care agency will pull data electronically. / signature: Electronically signed by Tania Marks RN on 11/3/22 at 11:07 AM EDT    PHYSICIAN SECTION    Prognosis: Fair    Condition at Discharge: Stable    Rehab Potential (if transferring to Rehab): Good    Recommended Labs or Other Treatments After Discharge:     Physician Certification: I certify the above information and transfer of Guy Cordova  is necessary for the continuing treatment of the diagnosis listed and that she requires Home Care for greater 30 days.      Update Admission H&P: No change in H&P    PHYSICIAN SIGNATURE:  Electronically signed by Ace Gunn DO on 11/4/22 at 9:58 AM EDT

## 2022-11-03 NOTE — PROGRESS NOTES
Physical Therapy  Facility/Department: [facility] PROGRESSIVE CARE  Physical Therapy Initial Assessment    Name: Elias Villagran  : 1935  MRN: [id number]  Date of Service: 11/3/2022    Discharge Recommendations:  Patient would benefit from continued therapy after discharge    Pt presented to ED on 22 after her visiting wound care nurse said pt's HR sounded irregular with stethoscope and told pt it was best to be seen in ED. Pt admitted for further medical management of Acute on chronic diastolic heart failure      Patient Diagnosis(es): The encounter diagnosis was Congestive heart failure, unspecified HF chronicity, unspecified heart failure type ([address] Past Medical History:  has a past medical history of Blood circulation, collateral, CHF (congestive heart failure) ([address] COPD (chronic obstructive pulmonary disease) ([facility] [address] GERD (gastroesophageal reflux disease), Hyperlipidemia, and Hypertension. Past Surgical History:  has a past surgical history that includes joint replacement (left total shoulder; ); bladder suspension (); Hysterectomy (ovaries left); Cholecystectomy; Upper gastrointestinal endoscopy (N/A, 2/10/2022); and Colonoscopy (N/A, 2/10/2022). Assessment   Body Structures, Functions, Activity Limitations Requiring Skilled Therapeutic Intervention: Decreased functional mobility ; Decreased ADL status; Decreased strength;Decreased safe awareness;Decreased endurance;Decreased posture  Assessment: Pt with deficits of bed mobility, transfers, ambulation, balance, posture, safety awareness and endurance this session,  & is decline compared to prior level of function. With current deficits, Pt at risk for falls & requires continued PT to maximize independence with functional mobility, balance, safety awareness & activity tolerance to improve overall tolerance of ADL's, & Pt ED. Pt currently functioning below baseline with AM-PAC mobility score of 15/24.   Due to recent hospitalization and medical condition, pt would benefit from additional intermittent skilled therapy at time of discharge. Therapy Prognosis: Good  Decision Making: Medium Complexity  Exam: ROM, MMT, functional mobility, activity tolerance, Balance, & [employer] AM-PAC 6 Clicks Basic Mobility  Clinical Presentation: evolving  Requires PT Follow-Up: Yes  Activity Tolerance  Activity Tolerance: Patient limited by fatigue     Plan   Physcial Therapy Plan  General Plan: 5-7 times per week  Current Treatment Recommendations: Strengthening, Balance training, Functional mobility training, Transfer training, ADL/Self-care training, Endurance training, Gait training, Stair training, Safety education & training, Patient/Caregiver education & training, Home exercise program, Positioning  Safety Devices  Type of Devices: Call light within reach, Chair alarm in place, Gait belt, Heels elevated for pressure relief, Patient at risk for falls, Left in chair     Restrictions  Restrictions/Precautions  Restrictions/Precautions: General Precautions, Fall Risk  Position Activity Restriction  Other position/activity restrictions:  Up with assist, orthostatic BP, heels off bed at all times, telemetry, ext catheter, LUE IV, ALARMS     Subjective   General  Patient assessed for rehabilitation services?: Yes  Additional Pertinent Hx: CHF, COPD, GERD, hyperlipidemia, hypertension and atrial fibrillation  Response To Previous Treatment: Not applicable  General Comment  Comments: RN okays PT  Subjective  Subjective: Pt agreeable to PT, c/o back \"discomfort\" rated 10/10         Social/Functional History  Social/Functional History  Lives With: Alone  Type of Home: Trailer  Home Layout: One level  Home Access: Stairs to enter with rails  Entrance Stairs - Number of Steps: 4  Entrance Stairs - Rails: Both  Bathroom Shower/Tub: Tub/Shower unit, Shower chair with back  Bathroom Toilet: Standard  Bathroom Equipment: Shower chair, Toilet raiser  Home Equipment: heidy Smith  Has the patient had two or more falls in the past year or any fall with injury in the past year?: Yes (pt reports 2 falls in the past month, not sure if it's meds, if she gets up too quickly(says it just \"hits me\"))  Receives Help From: Family, Home health, Neighbor  ADL Assistance: Needs assistance (granddaughter assists pt in/out of shower)  Homemaking Assistance: Needs assistance (granddaughter does shopping & laundry, gets delivered meals & can do light meal prep)  Ambulation Assistance: Independent (uses R/walker)  Transfer Assistance: Independent  Active : Yes  Patient's  Info: granddaughter Marianela Brown  Occupation: Retired  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  Leisure & Hobbies: running errands  [address]: Impaired  Vision Exceptions: Wears glasses at all times (pt denies vision changes or any eye sx)  Hearing  Hearing: Within functional limits    Cognition   Orientation  Overall Orientation Status: Within Functional Limits  Cognition  Overall Cognitive Status: Exceptions  Arousal/Alertness: Appropriate responses to stimuli  Following Commands:  Follows multistep commands consistently  Attention Span: Appears intact  Memory: Decreased short term memory  Safety Judgement: Decreased awareness of need for assistance;Decreased awareness of need for safety  Problem Solving: Assistance required to generate solutions;Assistance required to correct errors made;Assistance required to implement solutions;Assistance required to identify errors made  Insights: Decreased awareness of deficits  Initiation: Does not require cues  Sequencing: Requires cues for some     Objective   Heart Rate: 71  Heart Rate Source: Monitor  BP: 138/82  BP Location: Right upper arm  Patient Position: Supine  MAP (Calculated): 100.67  Resp: 16  SpO2: 94 %  O2 Device: None (Room air)     Observation/Palpation  Posture: Fair  Observation: ext catheter, LUE IV; Pt has fragile skin with multiple scattered bruising & bandage at left forearm; Othostatics checked: Supine /68, MAP 88, pulse 69; Seated /72, MAP 84, pulse 80 ; Standing 141/69, MAP 87, pulse 85  Edema: mild at Derrick ankles  Gross Assessment  Sensation: Intact (pt denies paresthesias)     AROM RLE (degrees)  RLE AROM: WFL  AROM LLE (degrees)  LLE AROM : WFL  AROM RUE (degrees)  RUE General AROM: See OT assessment  AROM LUE (degrees)  LUE General AROM: See OT assessment  Strength RLE  Comment: 4/5 hip  Strength LLE  Comment: 4-/5 hip  Strength RUE  Comment: See OT assessment  Strength LUE  Comment: See OT assessment          Bed mobility  Rolling to Right: Minimal assistance  Supine to Sit: Moderate assistance  Scooting: Minimal assistance  Bed Mobility Comments: MOD verbal instruction/tactile assist for UE hand placement on rail and proper log rolling tech + how to use UB to scoot completely out to EOB with B foot placement to establish safe sitting balance, pursed lip breathing,  & pacing, awareness/assist with line mgt,  with increased time needed all to increase safety & reduce fall risk  Transfers  Sit to Stand: Minimal Assistance  Stand to Sit: Moderate Assistance (poorm eccentric control to sit)  Bed to Chair: Minimal assistance  Stand Pivot Transfers: Minimal Assistance  Lateral Transfers: Minimal Assistance  Comment: MOD VC + tactile assist on correct use of upper body for safe sit/stand + to back all way back to surface with walker until she feels touch behind legs & to ensure she reaches with UB support to arms of chair, AND to slow down & take time for transitions to make sure he has no feeling of spinning or dizzy/unsteady,  Ambulation  Surface: Level tile  Device: Rolling Walker  Assistance: Minimal assistance  Quality of Gait: step to pattern, MOD cues to keep walker close/amb inside base of walker & upright posture for safety/scanning & to slow pace to reduce fall risk  Distance: 25 ft, 20ft  Comments: Pt amb to BR for toileting, MOD Assistance to sit to toilet. Pt stood with MIN Assistance, stood 2 minutes for pericare & to pull up brief with Contact Guard Assistance, amb 5ft to sink for hand hygiene x 3 minutes then ambulated 20 more ft with R/walker & sat to chair with MOD Assistance     Balance  Posture: Fair  Sitting - Static: Good  Sitting - Dynamic: Good;-  Standing - Static: Good;- (R/W)  Standing - Dynamic: Fair;+ (R/W)  Single Leg Stance R Le (R/W)  Single Leg Stance L Le (R/W)  Exercise Treatment: functional mobility practicing safe technique for sit to stand with UB support from arms of chair & consistently reaches with UB support to arms of chair for safe stand to sit(completed 4 reps)  Circulation/Endurance Exercises: ankle pumps x 20  Static Sitting Balance Exercises: seated EOB with Derrick foot placement x 5 minutes with CGA  Dynamic Standing Balance Exercises: Pt completed static standing weight shifts & alternately picking feet up off floor with UB support at R/walker to improve core strength & stability                                       AM-PAC Score  AM-PAC Inpatient Mobility Raw Score : 15 (22)  AM-PAC Inpatient T-Scale Score : 39.45 (22)  Mobility Inpatient CMS 0-100% Score: 57.7 (22)  Mobility Inpatient CMS G-Code Modifier : CK (22)            Goals  Short Term Goals  Time Frame for Short Term Goals: 12 visits  Short Term Goal 1: Inc bed-mobility & transfers to independent to enable pt to safely get in/OOB & chair to return to PLOF & decrease risk for falls  Short Term Goal 2:  Inc gait to amb 200ft or > indep w/ RW to enable pt to return to previous level of independence & able to demonstrate indep/ safe use of RW in functional activities including approaching surfaces and turning to sit  Short Term Goal 3: Pt able to go up/down 4 steps with Derrick rails independently  Short Term Goal 4: Pt able to tolerate 30 min of activity to include 15-20 reps of ex, NMR & functional mobility with device to facilitate activity tolerance to [facility]  Short Term Goal 5: Inc strength to [address] standing balance to good with device to facilitate pt independence for performance of ADL's & functional mobility, & reduce fall risk  Additional Goals?: Yes  Short Term Goal 6: Ed pt on home ex's, safety & energy principles, CHF education including CHF symptom tracker & self check plan worksheet, fall prevention, & issue written pt education       Education  Patient Education  Education Given To: Patient  Education Provided: Role of Therapy;Plan of Care;Precautions;Transfer Training;Energy Conservation; Fall Prevention Strategies  Education Provided Comments: Pt educated on purpose of acute PT eval, importance of continued mobility throughout admission, general safety awareness, fall risk prevention, safe transfers & ambulation w/ RW, circulation ex's, prevention of sedentary complications, and PT POC. Pt demonstrated FAIR carryover  Pt requires continued reinforcement of education.   Education Method: Demonstration;Verbal  Education Outcome: Verbalized understanding;Continued education needed      Therapy Time   Individual Concurrent Group Co-treatment   Time In 1327         Time Out 1416         Minutes 49             Treatment time: 46 minutes  Additional 10 minutes for chart review          [address], PT

## 2022-11-03 NOTE — PLAN OF CARE
Jonathan Cedeno is resting well at this time with no s/s of pain. She reported pain in BLE; declined PRN intervention with assessment. BLE edematous. She has skin tear to L arm from a fall at home; dressing in place. She is A&O with short term forgetfulness. She is rate controlled a-fib on monitor. She received IV ABT in ER that completed running after coming to floor. ABT ran well through PIV with no difficulty and no s/s of adverse reaction noted to therapy. She rec'd lasix in ER and has purwyck in place r/t increased voiding to reduce SOB, as she reports SOB with activity. She has call light in reach and is using appropriately. Safety maintained. Problem: Skin/Tissue Integrity  Goal: Absence of new skin breakdown  Description: 1.   Monitor for areas of redness and/or skin breakdown      Outcome: Progressing     Problem: Safety - Adult  Goal: Free from fall injury  Outcome: Progressing     Problem: Neurosensory - Adult  Goal: Achieves stable or improved neurological status  Outcome: Progressing  Flowsheets (Taken 11/2/2022 2010)  Achieves stable or improved neurological status: Assess for and report changes in neurological status     Problem: Cardiovascular - Adult  Goal: Maintains optimal cardiac output and hemodynamic stability  Outcome: Progressing  Flowsheets (Taken 11/2/2022 2010)  Maintains optimal cardiac output and hemodynamic stability:   Monitor blood pressure and heart rate   Monitor urine output and notify Licensed Independent Practitioner for values outside of normal range   Assess for signs of decreased cardiac output     Problem: Infection - Adult  Goal: Absence of infection at discharge  Outcome: Progressing  Flowsheets (Taken 11/2/2022 2010)  Absence of infection at discharge:   Assess and monitor for signs and symptoms of infection   Monitor lab/diagnostic results

## 2022-11-04 VITALS
WEIGHT: 130 LBS | HEIGHT: 64 IN | SYSTOLIC BLOOD PRESSURE: 128 MMHG | OXYGEN SATURATION: 94 % | HEART RATE: 99 BPM | DIASTOLIC BLOOD PRESSURE: 52 MMHG | RESPIRATION RATE: 18 BRPM | TEMPERATURE: 97.5 F | BODY MASS INDEX: 22.2 KG/M2

## 2022-11-04 LAB
ANION GAP SERPL CALCULATED.3IONS-SCNC: 9 MMOL/L (ref 9–17)
BUN BLDV-MCNC: 13 MG/DL (ref 8–23)
BUN/CREAT BLD: 18 (ref 9–20)
CALCIUM SERPL-MCNC: 8.9 MG/DL (ref 8.6–10.4)
CHLORIDE BLD-SCNC: 104 MMOL/L (ref 98–107)
CO2: 26 MMOL/L (ref 20–31)
CREAT SERPL-MCNC: 0.72 MG/DL (ref 0.5–0.9)
EKG Q-T INTERVAL: 402 MS
EKG QRS DURATION: 136 MS
EKG QTC CALCULATION (BAZETT): 469 MS
EKG R AXIS: 15 DEGREES
EKG T AXIS: -7 DEGREES
EKG VENTRICULAR RATE: 82 BPM
GFR SERPL CREATININE-BSD FRML MDRD: >60 ML/MIN/1.73M2
GLUCOSE BLD-MCNC: 84 MG/DL (ref 70–99)
MAGNESIUM: 1.8 MG/DL (ref 1.6–2.6)
POTASSIUM SERPL-SCNC: 3.7 MMOL/L (ref 3.7–5.3)
SODIUM BLD-SCNC: 139 MMOL/L (ref 135–144)

## 2022-11-04 PROCEDURE — 2500000003 HC RX 250 WO HCPCS: Performed by: NURSE PRACTITIONER

## 2022-11-04 PROCEDURE — 6370000000 HC RX 637 (ALT 250 FOR IP): Performed by: INTERNAL MEDICINE

## 2022-11-04 PROCEDURE — 99239 HOSP IP/OBS DSCHRG MGMT >30: CPT | Performed by: INTERNAL MEDICINE

## 2022-11-04 PROCEDURE — 36415 COLL VENOUS BLD VENIPUNCTURE: CPT

## 2022-11-04 PROCEDURE — 2580000003 HC RX 258: Performed by: PHYSICIAN ASSISTANT

## 2022-11-04 PROCEDURE — 96376 TX/PRO/DX INJ SAME DRUG ADON: CPT

## 2022-11-04 PROCEDURE — 6370000000 HC RX 637 (ALT 250 FOR IP): Performed by: NURSE PRACTITIONER

## 2022-11-04 PROCEDURE — 2580000003 HC RX 258: Performed by: NURSE PRACTITIONER

## 2022-11-04 PROCEDURE — 80048 BASIC METABOLIC PNL TOTAL CA: CPT

## 2022-11-04 PROCEDURE — 93010 ELECTROCARDIOGRAM REPORT: CPT | Performed by: INTERNAL MEDICINE

## 2022-11-04 PROCEDURE — 83735 ASSAY OF MAGNESIUM: CPT

## 2022-11-04 PROCEDURE — G0378 HOSPITAL OBSERVATION PER HR: HCPCS

## 2022-11-04 RX ORDER — SPIRONOLACTONE 50 MG/1
50 TABLET, FILM COATED ORAL DAILY
Qty: 30 TABLET | Refills: 3 | Status: SHIPPED | OUTPATIENT
Start: 2022-11-05

## 2022-11-04 RX ORDER — BUMETANIDE 1 MG/1
2 TABLET ORAL DAILY
Qty: 30 TABLET | Refills: 1 | Status: SHIPPED | OUTPATIENT
Start: 2022-11-05

## 2022-11-04 RX ORDER — LISINOPRIL 20 MG/1
10 TABLET ORAL DAILY
Qty: 30 TABLET | Refills: 0 | Status: SHIPPED
Start: 2022-11-04 | End: 2022-11-09 | Stop reason: DRUGHIGH

## 2022-11-04 RX ADMIN — Medication 2 TABLET: at 08:10

## 2022-11-04 RX ADMIN — AMIODARONE HYDROCHLORIDE 200 MG: 200 TABLET ORAL at 08:10

## 2022-11-04 RX ADMIN — Medication 1 TABLET: at 08:10

## 2022-11-04 RX ADMIN — ACETAMINOPHEN 650 MG: 325 TABLET, FILM COATED ORAL at 00:52

## 2022-11-04 RX ADMIN — SODIUM CHLORIDE, PRESERVATIVE FREE 10 ML: 5 INJECTION INTRAVENOUS at 08:19

## 2022-11-04 RX ADMIN — ACETAMINOPHEN 650 MG: 325 TABLET, FILM COATED ORAL at 08:09

## 2022-11-04 RX ADMIN — POLYETHYLENE GLYCOL 3350 17 G: 17 POWDER, FOR SOLUTION ORAL at 09:48

## 2022-11-04 RX ADMIN — METOPROLOL TARTRATE 50 MG: 50 TABLET, FILM COATED ORAL at 08:10

## 2022-11-04 RX ADMIN — DOCUSATE SODIUM 100 MG: 100 CAPSULE, LIQUID FILLED ORAL at 08:10

## 2022-11-04 RX ADMIN — SODIUM CHLORIDE, PRESERVATIVE FREE 10 ML: 5 INJECTION INTRAVENOUS at 08:11

## 2022-11-04 RX ADMIN — LISINOPRIL 10 MG: 10 TABLET ORAL at 08:10

## 2022-11-04 RX ADMIN — PANTOPRAZOLE SODIUM 40 MG: 40 TABLET, DELAYED RELEASE ORAL at 05:08

## 2022-11-04 RX ADMIN — FERROUS SULFATE TAB EC 325 MG (65 MG FE EQUIVALENT) 325 MG: 325 (65 FE) TABLET DELAYED RESPONSE at 08:10

## 2022-11-04 RX ADMIN — SPIRONOLACTONE 50 MG: 25 TABLET ORAL at 08:09

## 2022-11-04 RX ADMIN — APIXABAN 2.5 MG: 2.5 TABLET, FILM COATED ORAL at 08:10

## 2022-11-04 RX ADMIN — BUMETANIDE 1 MG: 0.25 INJECTION INTRAMUSCULAR; INTRAVENOUS at 08:11

## 2022-11-04 RX ADMIN — GUAIFENESIN 1200 MG: 600 TABLET, EXTENDED RELEASE ORAL at 08:10

## 2022-11-04 ASSESSMENT — PAIN SCALES - GENERAL: PAINLEVEL_OUTOF10: 3

## 2022-11-04 ASSESSMENT — ENCOUNTER SYMPTOMS: SHORTNESS OF BREATH: 0

## 2022-11-04 ASSESSMENT — PAIN DESCRIPTION - LOCATION: LOCATION: ARM

## 2022-11-04 NOTE — PROGRESS NOTES
Pt discharged to home w/ home health care, left via private vehicle w/ grandchild. Belonging gathered and taken with pt, IV removed, discharge instruction given, pt verbalizes understanding. All questions and concerns addressed. Safety maintained.

## 2022-11-04 NOTE — DISCHARGE SUMMARY
[facility]  Office: [address], DO, Yoko Barrera, DO, Trung Phlegm, DO, Isaiasvaleri Julianas[place]ia Blood, DO, Kalyani Steele MD, Noris Vega MD, Pati Eisenberg MD, Arianne Perez MD,  Mya Hines MD, Lulu Crooks MD, Keyonna Hunt, DO, Tad Alves MD,  Dorene Ayala MD, Aden Christian MD, Kendal Wallace, DO, Jason Marcial MD, Karissa Lieberman MD, Larry Arenas, DO, Alonzo Castañeda MD, Eliane Denise MD, Elle Castillo MD, Mary Ann Felder MD, Lola Thao, DO, Lisa Coker MD, Maxi Figueroa MD, Benitez Sanchez, CNP,  Nayla Menon, CNP, Kaitlin Chavez, CNP, Jacob Monique, CNP,  Orlando Samaniego, [facility], Ruben Kemp, CNP, Florence Motley, CNP, Kayla Sandra, CNP, Arely Ronquillo, CNP, Antony Libman, CNP, Karyna Hoang PA-C, Britta Marie, CNS, Terry Cruz, [facility], Jasper Valadez, CNP, Barbara Yun, CNP, [place] [facility]    Discharge Summary     Patient ID: David Dean  :  1935   MRN: [id number]     ACCOUNT:  [de-identified]   Patient's PCP: Josefa Huerta DO  Admit Date: 2022   Discharge Date: 2022     Length of Stay: 2  Code Status:  Full Code  Admitting Physician: Jason Patches Blood, DO  Discharge Physician: Jason Peña Blood, DO     Active Discharge Diagnoses:     Hospital Problem Lists:  Principal Problem:    Acute on chronic diastolic heart failure [facility])  Active Problems:    Fall    Abrasion of multiple sites of left upper arm    Hyperlipidemia    Primary hypertension    Gastroesophageal reflux disease without esophagitis    Atypical chest pain    Paroxysmal atrial fibrillation (HCC)    Chronic anticoagulation    Iron deficiency anemia    (HFpEF) heart failure with preserved ejection fraction ([facility]  Resolved Problems:    * No resolved hospital problems.  *      Admission Condition:  fair     Discharged Condition: stable    Hospital Stay:     Hospital Course: Vera Guerrero is a 82 y.o. female who was admitted for the management of  Acute on chronic diastolic heart failure ([facility] , presented to ER with Other (Visiting wound care nurse said pt's HR sounded irregular with stethoscope and told pt it was best to be seen in ED. ), Chest Pain (\"Discomfort not pain: 2/10. Does not radiate. ), and Headache (Light headed as well, no photosensitivity, no issues with sound making HA worse. Took tylenol approximately 0900am, helped with HA but not chest discomfort.)      Admitted with ble edema and sob-found to have HFpEF and was diuresed with iv bumex. She had recently been at another hospital and had her bumex stopped due to [facility]. Cardio saw patient and agreed with diuresis and added aldactone as well.   She had adequate diuresis, with care not to over-diurese as cardio felt a good portion of her issue was also right sided chf and then she was discharged home    Significant therapeutic interventions: see above    Significant Diagnostic Studies:   Labs / Micro:  CBC:   Lab Results   Component Value Date/Time    WBC 5.7 11/03/2022 05:28 AM    RBC 3.55 11/03/2022 05:28 AM    HGB 7.6 11/03/2022 05:28 AM    HCT 27.5 11/03/2022 05:28 AM    MCV 77.5 11/03/2022 05:28 AM    MCH 21.4 11/03/2022 05:28 AM    MCHC 27.6 11/03/2022 05:28 AM    RDW 20.0 11/03/2022 05:28 AM     11/03/2022 05:28 AM     CMP:    Lab Results   Component Value Date/Time    GLUCOSE 84 11/04/2022 05:01 AM    GLUCOSE 109 09/14/2016 09:54 AM     11/04/2022 05:01 AM    K 3.7 11/04/2022 05:01 AM     11/04/2022 05:01 AM    CO2 26 11/04/2022 05:01 AM    BUN 13 11/04/2022 05:01 AM    CREATININE 0.72 11/04/2022 05:01 AM    ANIONGAP 9 11/04/2022 05:01 AM    ALKPHOS 42 04/22/2022 12:00 AM    ALT 15 04/22/2022 12:00 AM    AST 19 04/22/2022 12:00 AM    BILITOT 0.3 04/22/2022 12:00 AM    LABALBU 3.9 04/22/2022 12:00 AM    ALBUMIN 1.3 02/16/2022 03:45 PM    LABGLOM >60 11/04/2022 05:01 AM    GFRAA >60 02/16/2022 03:45 PM    GFR      02/16/2022 03:45 PM    GFR NOT REPORTED 02/16/2022 03:45 PM    PROT 6.8 02/16/2022 03:45 PM    CALCIUM 8.9 11/04/2022 05:01 AM        Radiology:  CT HEAD WO CONTRAST    Result Date: 11/2/2022  Chronic microvascular disease without acute intracranial abnormality. XR CHEST PORTABLE    Result Date: 11/3/2022  Cardiomegaly and chronic pulmonary change with small right basilar effusion. XR CHEST PORTABLE    Result Date: 11/2/2022  Trace effusions with scattered pulmonary infiltrates. Consultations:    Consults:     Final Specialist Recommendations/Findings:   IP CONSULT TO HOSPITALIST  IP CONSULT TO HEART FAILURE NURSE/COORDINATOR  IP CONSULT TO DIETITIAN  IP CONSULT TO CARDIOLOGY      The patient was seen and examined on day of discharge and this discharge summary is in conjunction with any daily progress note from day of discharge.     Discharge plan:     Disposition: Home with [facility]    Physician Follow Up:     [address] [facility]  [address] [facility], [address] [place] [address]  [phone]    Follow up in 1 week(s)      Salvador Hurtado MD  [address] [facility]  [phone]    Follow up in 2 week(s)         Requiring Further Evaluation/Follow Up POST HOSPITALIZATION/Incidental Findings: chf, monitor renal function and elytes    Diet: cardiac diet    Activity: As tolerated    Instructions to Patient: take medications as prescribed      Discharge Medications:      Medication List        START taking these medications      bumetanide 1 MG tablet  Commonly known as: BUMEX  Take 2 tablets by mouth daily  Start taking on: November 5, 2022     spironolactone 50 MG tablet  Commonly known as: ALDACTONE  Take 1 tablet by mouth daily  Start taking on: November 5, 2022            CHANGE how you take these medications      lisinopril 20 MG tablet  Commonly known as: PRINIVIL;ZESTRIL  Take 0.5 tablets by mouth daily  What changed: how much to take     pravastatin 40 MG tablet  Commonly known as: PRAVACHOL  TAKE 1 TABLET DAILY  What changed: how much to take            CONTINUE taking these medications      acetaminophen 500 MG tablet  Commonly known as: TYLENOL  Take 1 tablet by mouth every 6 hours as needed for Pain     amiodarone 200 MG tablet  Commonly known as: CORDARONE  Take 1 tablet by mouth daily     apixaban 5 MG Tabs tablet  Commonly known as: Eliquis  Take 1 tablet by mouth 2 times daily     calcium-vitamin D 500-200 MG-UNIT per tablet  Commonly known as: OSCAL-500  Take 1 tablet by mouth 2 times daily     Compression Stockings Misc  by Does not apply route     ferrous sulfate 325 (65 Fe) MG EC tablet  Commonly known as: FE TABS 325  Take 1 tablet by mouth daily (with breakfast)     metoprolol tartrate 25 MG tablet  Commonly known as: LOPRESSOR  Take 2 tablets by mouth 2 times daily     Multivitamin Tabs     pantoprazole 40 MG tablet  Commonly known as: PROTONIX  Take 1 tablet by mouth daily Take one tab every morning before breakfast     therapeutic multivitamin-minerals tablet               Where to Get Your Medications        These medications were sent to [facility] [id number] - [address] [facility] [address]  [address] [address]      Phone: [phone]   bumetanide 1 MG tablet  spironolactone 50 MG tablet       Information about where to get these medications is not yet available    Ask your nurse or doctor about these medications  lisinopril 20 MG tablet         No discharge procedures on file. Time Spent on discharge is  33 mins in patient examination, evaluation, counseling as well as medication reconciliation, prescriptions for required medications, discharge plan and follow up.     Electronically signed by   Ace Gunn DO  11/4/2022  10:03 AM      Thank you Dr. Harvey Patel DO for the opportunity to be involved in this patient's care.

## 2022-11-04 NOTE — PLAN OF CARE
Problem: Skin/Tissue Integrity  Goal: Absence of new skin breakdown  Description: 1. Monitor for areas of redness and/or skin breakdown  2. Assess vascular access sites hourly  3. Every 4-6 hours minimum:  Change oxygen saturation probe site  4. Every 4-6 hours:  If on nasal continuous positive airway pressure, respiratory therapy assess nares and determine need for appliance change or resting period.   Outcome: Progressing     Problem: Safety - Adult  Goal: Free from fall injury  Outcome: Progressing     Problem: Cardiovascular - Adult  Goal: Maintains optimal cardiac output and hemodynamic stability  Outcome: Progressing

## 2022-11-04 NOTE — PROGRESS NOTES
[facility] NOTE    Room # 1026/1026-01   Name: Beronica Blue               Reason for visit: Routine    I visited the patient. Admit Date & Time: 11/2/2022  1:08 PM    Assessment:  Beronica Blue is a 82 y.o. female in the hospital because pt. Has heart failure. Upon entering the room pt. Is found resting in bed, lethargic and watching television. Intervention:  I introduced myself and my title as spiritual care provider I offered space for patient  to express feelings, needs, and concerns and provided a ministry presence. Pt. Is very tired but reports that she has good support from her grand dtr that she has been staying with for the last few weeks.  offers active listening, emotional support and prayer at bedside. Outcome:  Pt. Calm and coping. Plan:  Chaplains will remain available to offer spiritual and emotional support as needed. Electronically signed by Shayna Kamara on 11/3/2022 at 8:24 PM.  Jessica      11/03/22 2022   Encounter Summary   Service Provided For: Patient   Referral/Consult From: [address] [facility] Family members   Last Encounter  11/03/22   Complexity of Encounter Low   Begin Time 1900   End Time  1920   Total Time Calculated 20 min   Encounter    Type Initial Screen/Assessment   Spiritual/Emotional needs   Type Spiritual Support   Assessment/Intervention/Outcome   Assessment Calm;Coping; Impaired resilience; Loneliness   Intervention Active listening;Discussed meaning/purpose;Life review/Legacy;Prayer (assurance of)/[place];Sustaining Presence/Ministry of presence   Outcome Comfort;Coping;Expressed feelings, needs, and concerns;Receptive

## 2022-11-04 NOTE — PLAN OF CARE
Problem: Discharge Planning  Goal: Discharge to home or other facility with appropriate resources  11/4/2022 1248 by Ashli Sky RN  Outcome: Progressing     Problem: Skin/Tissue Integrity  Goal: Absence of new skin breakdown  Description: 1. Monitor for areas of redness and/or skin breakdown  2. Assess vascular access sites hourly  3. Every 4-6 hours minimum:  Change oxygen saturation probe site  4. Every 4-6 hours:  If on nasal continuous positive airway pressure, respiratory therapy assess nares and determine need for appliance change or resting period.   11/4/2022 1248 by Ashli Sky RN  Outcome: Progressing     Problem: Safety - Adult  Goal: Free from fall injury  11/4/2022 1248 by Ashli Sky RN  Outcome: Progressing

## 2022-11-04 NOTE — PROGRESS NOTES
[facility]  Office: [address], DO, Pramod Mccoy, DO, Bowen Burgos, DO, Vaiden Tom Blood, DO, Velton Habermann, MD, Samaria Adorno MD, Myriam Contreras MD, Susannah Fox MD,  Ellie Elizabeth MD, Kath Olmos MD, Jessica Rasmussen, DO, Jose Salinas MD,  Nia Cottrell MD, Ruth Quiles MD, Ismael Barber, DO, Stephen Caldwell MD, Lakeisha Desouza MD, Kirti Hernandez, DO, Trista Vu MD, Cristi Chauhan MD, Kee Sommer MD, Grayson Colon MD, Wil Espino, DO, Cher St MD, Chelsea Jorge MD, Grey Seth, CNP,  Rubina Mckeon, CNP, Cande Vela, CNP, Bekah Sharma, CNP,  Malou Tapia, [facility], Alisha Pinon, CNP, Deven Jessica, CNP, García Galindo, CNP, Lottie Sharpe, CNP, Petra Alas, CNP, Leno Renteria, PA-C, Raheem Santos, CNS, Rose Marie Penn, [facility], Toni Drew, CNP, Liliya Ghotra, CNP, Adalgisa Mix, [address]    Progress Note    11/4/2022    9:53 AM    Name:   Wisam Jeffery  MRN:     [id number]     [place]:      [de-identified]   Room:   [address] [place] [address] Day:  2  Admit Date:  11/2/2022  1:08 PM    PCP:   Dawson Ferreira DO  Code Status:  Full Code    Subjective:     C/C:   Chief Complaint   Patient presents with    Other     Visiting wound care nurse said pt's HR sounded irregular with stethoscope and told pt it was best to be seen in ED. Chest Pain     \"Discomfort not pain: 2/10. Does not radiate. Headache     Light headed as well, no photosensitivity, no issues with sound making HA worse. Took tylenol approximately 0900am, helped with HA but not chest discomfort. Interval History Status: improved. Feels better today  Denies cp/sob/n/v  Also states edema is much better    Brief History:     Per my ANURADHA:  \"Patient presents to the emergency room today with complaints of chest pain, shortness of breath, bilateral lower leg swelling, cough and headache.  Patient states that her symptoms originally started a few days ago. She thought that she would be able to tolerate the symptoms and treat them at home, but was unfortunately unable to. Of note, patient was recently discharged from [facility] where she was treated after experiencing a fall (where she underwent a laceration repair of her left forearm). Patient states that she was in the bathroom, was washing her hands, turned around and was on the floor. She does not recall the reason for the fall. Patient was also treated for EMERY, hyperkalemia, chest pain and shortness of breath during that admission. During that admission, it is seen that the patient had been instructed to stop taking her bumex (along with her tikosyn, colace, multivitamin, potassium and pepcid). Patient has a significant past medical history of CHF, COPD, GERD, hyperlipidemia, hypertension and atrial fibrillation. Patient states that over the past few days her shortness of breath, weakness, cough and leg swelling has continuously worsened. Her shortness of breath is mildly improved after a dose of IV Lasix in the emergency room. Patient continues to feel weak and her legs continue to be edematous. Patient denies any recent fevers, chills, nausea, vomiting and diarrhea. \"    Review of Systems:     Constitutional:  negative for chills, fevers, sweats  Respiratory:  negative for cough, dyspnea on exertion, shortness of breath, wheezing  Cardiovascular:  negative for chest pain, chest pressure/discomfort, palpitations  Gastrointestinal:  negative for abdominal pain, constipation, diarrhea, nausea, vomiting  Neurological:  negative for dizziness, headache    Medications: Allergies:     Allergies   Allergen Reactions    Codeine Nausea Only     dizziness    Bactrim [Sulfamethoxazole-Trimethoprim] Hives    Naproxen      On allergy list ? Unsure of reaction    Norvasc [Amlodipine Besylate]      Edema-she thinks      Sertraline Hcl      unsure    Toradol [Ketorolac Tromethamine]      unsure    Tramadol      unsure    Magnesium-Containing Compounds Diarrhea     Mag ox 500 ? Morphine Nausea And Vomiting    Percocet [Oxycodone-Acetaminophen] Nausea And Vomiting       Current Meds:   Scheduled Meds:    bumetanide  1 mg IntraVENous BID    pravastatin  40 mg Oral Daily    lisinopril  10 mg Oral Daily    spironolactone  50 mg Oral Daily    guaiFENesin  1,200 mg Oral BID    sodium chloride flush  5-40 mL IntraVENous 2 times per day    amiodarone  200 mg Oral Daily    calcium carb-cholecalciferol  2 tablet Oral BID    ferrous sulfate  325 mg Oral Daily with breakfast    metoprolol tartrate  50 mg Oral BID    therapeutic multivitamin-minerals  1 tablet Oral Daily    pantoprazole  40 mg Oral Daily    sodium chloride flush  5-40 mL IntraVENous 2 times per day    docusate sodium  100 mg Oral BID    apixaban  2.5 mg Oral BID     Continuous Infusions:    sodium chloride 10 mL/hr at 11/03/22 0614     PRN Meds: benzonatate, sodium chloride flush, sodium chloride, ondansetron **OR** ondansetron, polyethylene glycol, acetaminophen **OR** acetaminophen, potassium chloride **OR** potassium alternative oral replacement **OR** potassium chloride    Data:     Past Medical History:   has a past medical history of Blood circulation, collateral, CHF (congestive heart failure) ([facility] COPD (chronic obstructive pulmonary disease) ([facility] GERD (gastroesophageal reflux disease), Hyperlipidemia, and Hypertension. Social History:   reports that she quit smoking about 37 years ago. She has never used smokeless tobacco. She reports that she does not drink alcohol and does not use drugs.      Family History:   Family History   Problem Relation Age of Onset    Cancer Brother     Cancer Other         bone    Cancer Daughter         breast       Vitals:  BP (!) 167/73   Pulse 59   Temp 98.1 °F (36.7 °C) (Oral)   Resp 16   Ht 5' 4\" (1.626 m)   Wt 130 lb (59 kg)   SpO2 95%   BMI 22.31 kg/m² Temp (24hrs), Av.2 °F (36.8 °C), Min:97.9 °F (36.6 °C), Max:98.4 °F (36.9 °C)    No results for input(s): POCGLU in the last 72 hours. I/O (24Hr): Intake/Output Summary (Last 24 hours) at 2022 0953  Last data filed at 2022 0929  Gross per 24 hour   Intake --   Output 2250 ml   Net -2250 ml       Labs:  Hematology:  Recent Labs     22  1334 22  0528   WBC 5.7 5.7   RBC 3.68* 3.55*   HGB 7.9* 7.6*   HCT 28.6* 27.5*   MCV 77.7* 77.5*   MCH 21.5* 21.4*   MCHC 27.6* 27.6*   RDW 20.4* 20.0*    175   MPV 9.8 9.4     Chemistry:  Recent Labs     22  1334 22  1517 22  1902 22  0528 22  0501     --   --  141 139   K 4.3  --   --  3.8 3.7   *  --   --  108* 104   CO2 23  --   --  22 26   GLUCOSE 92  --   --  84 84   BUN 14  --   --  13 13   CREATININE 0.72  --   --  0.69 0.72   MG  --   --   --  1.7 1.8   ANIONGAP 10  --   --  11 9   LABGLOM >60  --   --  >60 >60   CALCIUM 9.0  --   --  8.6 8.9   PROBNP  --  6,158*  --   --   --    TROPHS 21* 19* 21*  --   --    MYOGLOBIN 34 35 37  --   --    No results for input(s): PROT, LABALBU, LABA1C, J4CTZVS, K5OHPQR, FT4, TSH, AST, ALT, LDH, GGT, ALKPHOS, LABGGT, BILITOT, BILIDIR, AMMONIA, AMYLASE, LIPASE, LACTATE, CHOL, HDL, LDLCHOLESTEROL, CHOLHDLRATIO, TRIG, VLDL, MFH05LL, PHENYTOIN, PHENYF, URICACID, POCGLU in the last 72 hours. ABG:No results found for: POCPH, PHART, PH, POCPCO2, IHK1KOK, PCO2, POCPO2, PO2ART, PO2, POCHCO3, GQK2UMT, HCO3, NBEA, PBEA, BEART, BE, THGBART, THB, IXK4RPR, BCSU4WGO, G8PYPREC, O2SAT, FIO2  Lab Results   Component Value Date/Time    SPECIAL RT WRIST,12ML 2022 04:41 PM     Lab Results   Component Value Date/Time    CULTURE NO GROWTH 1 DAY 2022 04:41 PM       Radiology:  CT HEAD WO CONTRAST    Result Date: 2022  Chronic microvascular disease without acute intracranial abnormality.      XR CHEST PORTABLE    Result Date: 11/3/2022  Cardiomegaly and chronic pulmonary change with small right basilar effusion. XR CHEST PORTABLE    Result Date: 11/2/2022  Trace effusions with scattered pulmonary infiltrates.        Physical Examination:        General appearance:  alert, cooperative and no distress  Mental Status:  oriented to person, place and time and normal affect  Lungs:  clear to auscultation bilaterally, normal effort  Heart:  regular rate and rhythm, no murmur  Abdomen:  soft, nontender, nondistended, normal bowel sounds, no masses, hepatomegaly, splenomegaly  Extremities:  no redness, tenderness in the calves; minimal ble edema  Skin:  no gross lesions, rashes, induration    Assessment:        Hospital Problems             Last Modified POA    * (Principal) Acute on chronic diastolic heart failure ([address] 11/2/2022 Yes    Fall 11/2/2022 Yes    Abrasion of multiple sites of left upper arm 11/2/2022 Yes    Hyperlipidemia (Chronic) 11/2/2022 Yes    Primary hypertension 11/2/2022 Yes    Gastroesophageal reflux disease without esophagitis 11/2/2022 Yes    Atypical chest pain 11/2/2022 Yes    Paroxysmal atrial fibrillation (HCC) (Chronic) 11/2/2022 Yes    Chronic anticoagulation 11/2/2022 Yes    Iron deficiency anemia 11/2/2022 Yes    (HFpEF) heart failure with preserved ejection fraction ([address] 11/2/2022 Yes       Plan:        Change diuretics to po  Dc home with c today  Outpatient chf education    Dinora Gunn DO  11/4/2022  9:53 AM

## 2022-11-04 NOTE — PROGRESS NOTES
Cardiology Progress Note    Date:11/4/2022       Room:[facility]102-  Patient Sheree Jeong     YOB: 1935     Age:87 y.o. Subjective    Subjective:  Symptoms:  Improved. No shortness of breath, chest pain or chest pressure. Diet:  Adequate intake. Activity level: Normal.    Pain:  She reports no pain. Review of Systems   Respiratory:  Negative for shortness of breath. Cardiovascular:  Negative for chest pain. Review of System  CONSTITUTIONAL:  negative for fevers, chills, sweats, fatigue, weight loss  HEENT:  negative for vision, hearing changes, runny nose, throat pain  RESPIRATORY:  negative for shortness of breath, cough, congestion, wheezing  CARDIOVASCULAR:  negative for chest pain, palpitations  GASTROINTESTINAL:  negative for nausea, vomiting, diarrhea, constipation, change in bowel habits, abdominal pain   GENITOURINARY:  negative for difficulty of urination, burning with urination, frequency   INTEGUMENT:  negative for rash, skin lesions, easy bruising   HEMATOLOGIC/LYMPHATIC:  negative for bleeding or easy bruising ALLERGIC/IMMUNOLOGIC:  negative for urticaria , itching  ENDOCRINE:  negative increase in drinking, increase in urination, hot or cold intolerance  MUSCULOSKELETAL:  negative joint pains, muscle aches, swelling of joints  NEUROLOGICAL:  negative for headaches, dizziness, lightheadedness, numbness, pain, tingling extremities  BEHAVIOR/PSYCH:  negative for depression, anxiety    Objective         Vitals   BP (!) 128/52   Pulse 99   Temp 97.5 °F (36.4 °C) (Oral)   Resp 18   Ht 5' 4\" (1.626 m)   Wt 130 lb (59 kg)   SpO2 94%   BMI 22.31 kg/m²    I/O (24Hr): Intake/Output Summary (Last 24 hours) at 11/4/2022 1450  Last data filed at 11/4/2022 3401  Gross per 24 hour   Intake --   Output 1650 ml   Net -1650 ml     Objective:  General Appearance:  Comfortable, in no acute distress and well-appearing.     Vital signs: (most recent): Blood pressure (!) 128/52, pulse 99, temperature 97.5 °F (36.4 °C), temperature source Oral, resp. rate 18, height 5' 4\" (1.626 m), weight 130 lb (59 kg), SpO2 94 %, not currently breastfeeding. Vital signs are normal.    HEENT: Normal HEENT exam.    Lungs:  Normal effort and normal respiratory rate. Breath sounds clear to auscultation. Heart: Normal rate. Irregular rhythm. S1 normal and S2 normal.  Positive for murmur (Systolic murmur at the left lower sternal border 3/6). Abdomen: Abdomen is soft. Bowel sounds are normal.   There is no abdominal tenderness. Extremities: Normal range of motion. There is dependent edema (Trace edema bilaterally with significant varicose veins). Neurological: Patient is alert and oriented to person, place and time. Skin:  Warm and dry. Echo 2/9/2022    Global left ventricular systolic function is normal. Estimated ejection  fraction is 55-60 % . No obvious wall motion abnormality seen. Grade III (severe) left ventricular diastolic dysfunction. Left atrium is severely dilated. Mildly dilated right ventricular cavity. Calcified aortic valve leaflets with sclerosis, no significant stenosis,  mean gradient 10 mm Hg  Mild aortic insufficiency. Mild mitral regurgitation. Severe tricuspid regurgitation. Moderate pulmonary hypertension with an estimated right ventricular systolic  pressure of 51 mmHg. No pericardial effusion.     Current Facility-Administered Medications   Medication Dose Route Frequency Provider Last Rate Last Admin    benzonatate (TESSALON) capsule 100 mg  100 mg Oral TID PRN Haven Ramesh, APRN - CNP   100 mg at 11/03/22 2127    bumetanide (BUMEX) injection 1 mg  1 mg IntraVENous BID Magy Oz, APRN - NP   1 mg at 11/04/22 0811    pravastatin (PRAVACHOL) tablet 40 mg  40 mg Oral Daily Magy Oz, APRN - NP   40 mg at 11/03/22 2116    lisinopril (PRINIVIL;ZESTRIL) tablet 10 mg  10 mg Oral Daily Bienvenido Marrufo MD   10 mg at 11/04/22 3325 spironolactone (ALDACTONE) tablet 50 mg  50 mg Oral Daily Richard Tatum MD   50 mg at 11/04/22 0809    [facility]in [facility]) extended release tablet 1,200 mg  1,200 mg Oral BID MALVIN Martin NP   1,200 mg at 11/04/22 0810    sodium chloride flush 0.9 % injection 5-40 mL  5-40 mL IntraVENous 2 times per day Armando Delaney PA-C   10 mL at 11/04/22 9885    sodium chloride flush 0.9 % injection 5-40 mL  5-40 mL IntraVENous PRN Armando Delaney PA-C        0.9 % sodium chloride infusion   IntraVENous PRN Armando Delaney PA-C 10 mL/hr at 11/03/22 2000 Rate Verify at 11/03/22 8981    amiodarone (CORDARONE) tablet 200 mg  200 mg Oral Daily Orlean Moots, APRN - CNP   200 mg at 11/04/22 0810    calcium carb-cholecalciferol 250-125 MG-UNIT per tab 2 tablet  2 tablet Oral BID Orlean Moots, APRN - CNP   2 tablet at 11/04/22 0810    ferrous sulfate (FE TABS 325) EC tablet 325 mg  325 mg Oral Daily with breakfast Orlean Moots, APRN - CNP   325 mg at 11/04/22 0810    metoprolol tartrate (LOPRESSOR) tablet 50 mg  50 mg Oral BID Orlean Moots, APRN - CNP   50 mg at 11/04/22 0810    therapeutic multivitamin-minerals 1 tablet  1 tablet Oral Daily Orlean Moots, APRN - CNP   1 tablet at 11/04/22 0810    pantoprazole (PROTONIX) tablet 40 mg  40 mg Oral Daily Orlean Moots, APRN - CNP   40 mg at 11/04/22 2068    sodium chloride flush 0.9 % injection 5-40 mL  5-40 mL IntraVENous 2 times per day Orlean Moots, APRN - CNP   10 mL at 11/04/22 0819    ondansetron (ZOFRAN-ODT) disintegrating tablet 4 mg  4 mg Oral Q8H PRN Orlean Moots, APRN - CNP        Or    ondansetron (ZOFRAN) injection 4 mg  4 mg IntraVENous Q6H PRN Orlean Moots, APRN - CNP        polyethylene glycol (GLYCOLAX) packet 17 g  17 g Oral Daily PRN Orlean Moots, APRN - CNP   17 g at 11/04/22 0948    acetaminophen (TYLENOL) tablet 650 mg  650 mg Oral Q6H PRN Orlean Moots, APRN - CNP   650 mg at 11/04/22 0809    Or    acetaminophen (TYLENOL) suppository 650 mg  650 mg Rectal Q6H PRN Sheridan Elaine, APRN - CNP        potassium chloride (KLOR-CON M) extended release tablet 40 mEq  40 mEq Oral PRN Sheridan Chele, APRN - CNP        Or    potassium bicarb-citric acid (EFFER-K) effervescent tablet 40 mEq  40 mEq Oral PRN Sheridan Elaine, APRN - CNP        Or    potassium chloride 10 mEq/100 mL IVPB (Peripheral Line)  10 mEq IntraVENous PRN Sheridan Chele, APRN - CNP        docusate sodium (COLACE) capsule 100 mg  100 mg Oral BID Sheridan Elaine, APRN - CNP   100 mg at 11/04/22 0810    apixaban (ELIQUIS) tablet 2.5 mg  2.5 mg Oral BID Sheridanmaritza Elaine APRN - CNP   2.5 mg at 11/04/22 0810       Labs/Imaging/Diagnostics    Labs:  CBC:  Recent Labs     11/02/22  1334 11/03/22  0528   WBC 5.7 5.7   RBC 3.68* 3.55*   HGB 7.9* 7.6*   HCT 28.6* 27.5*   MCV 77.7* 77.5*   RDW 20.4* 20.0*    175     CHEMISTRIES:  Recent Labs     11/02/22  1334 11/03/22  0528 11/04/22  0501    141 139   K 4.3 3.8 3.7   * 108* 104   CO2 23 22 26   BUN 14 13 13   CREATININE 0.72 0.69 0.72   GLUCOSE 92 84 84   MG  --  1.7 1.8     Lab Results   Component Value Date    INR 1.0 02/16/2022    INR 1.0 09/17/2016    PROTIME 11.1 02/16/2022    PROTIME 10.4 09/17/2016       PT/INR:No results for input(s): PROTIME, INR in the last 72 hours. APTT:No results for input(s): APTT in the last 72 hours. LIVER PROFILE:No results for input(s): AST, ALT, BILIDIR, BILITOT, ALKPHOS in the last 72 hours. Imaging Last 24 Hours:  XR CHEST PORTABLE    Result Date: 11/3/2022  EXAMINATION: ONE XRAY VIEW OF THE CHEST 11/3/2022 5:45 am COMPARISON: Chest radiograph performed 11/02/2022. HISTORY: ORDERING SYSTEM PROVIDED HISTORY: CHF TECHNOLOGIST PROVIDED HISTORY: CHF Reason for Exam: CHF FINDINGS: There is chronic pulmonary change. There is a right basilar effusion. There is no pneumothorax. The heart is enlarged.   The upper abdomen is unremarkable. The extrathoracic soft tissues are unremarkable. Cardiomegaly and chronic pulmonary change with small right basilar effusion. Assessment//Plan           Assessment:   (Worsening legs edema, most likely due to combination of severe tricuspid regurgitation and pulmonary hypertension /right heart failure     Dyspnea on exertion, due to underlying lung disease and chronic diastolic heart failure . Chest CT showed new thickening of interlobular septae may reflect development of interstitial lung disease versus acute interstitial edema     Chronic diastolic heart failure contributing to her shortness of breath      History of paroxysmal atrial fibrillation, currently in sinus rhythm versus ectopic atrial rhythm. She is on amiodarone and Eliquis      Severe tricuspid regurgitation which most likely contributing to the significant legs edema as well as to the elevated BNP     Other valvular heart disease including mild aortic insufficiency and mild mitral regurgitation     Moderate pulmonary hypertension per echo February 2022     History of hypertension      History of hyperlipidemia      Anemia, history of GI bleed . Hemoglobin on admission 7.9 stable     Colon  large polyp, surgical pathology showed tubular adenoma no malignancy     Large hiatal hernia noted on abdominal CT      ). Plan:    (Agree on switching Bumex to p.o. 1 mg p.o. twice daily    Continue lisinopril, Aldactone, and metoprolol    As the patient was again advised strongly regarding following low-salt diet, elevating her legs, wearing support stocking, and monitoring her a.m. Daily weight    Patient can be discharged home from the cardiac point of view on current medications and follow-up in with me in the office in about 2 to 3 weeks    Discussed with patient and with nursing staff).      Electronically signed by Flip Moreno MD on 11/4/22 at 2:50 PM EDT

## 2022-11-07 ENCOUNTER — CARE COORDINATION (OUTPATIENT)
Dept: CASE MANAGEMENT | Age: 87
End: 2022-11-07

## 2022-11-07 DIAGNOSIS — I50.33 ACUTE ON CHRONIC DIASTOLIC HEART FAILURE (HCC): Primary | ICD-10-CM

## 2022-11-07 LAB
CULTURE: NORMAL
CULTURE: NORMAL
Lab: NORMAL
Lab: NORMAL
SPECIMEN DESCRIPTION: NORMAL
SPECIMEN DESCRIPTION: NORMAL

## 2022-11-07 PROCEDURE — 1111F DSCHRG MED/CURRENT MED MERGE: CPT | Performed by: FAMILY MEDICINE

## 2022-11-07 NOTE — CARE COORDINATION
[facility] Care Transitions Initial Follow Up Call    Call within 2 business days of discharge: Yes    Care Transition Nurse contacted the patient by telephone to perform post hospital discharge assessment. Verified name and  with patient as identifiers. Provided introduction to self, and explanation of the Care Transition Nurse role. Patient: Beronica Blue Patient : 1935   MRN: [id number]  Reason for Admission: CHF  Discharge Date: 22 RARS: Readmission Risk Score: 17.1      Last Discharge [address]       Date Complaint Diagnosis Description Type Department Provider    22 Other; Chest Pain; Headache Congestive heart failure, unspecified HF chronicity, unspecified heart failure type [facility]) ED to Hosp-Admission (Discharged) (ADMITTED) [facility] Gunn, DO; Mahesh Whitten. .. Was this an external facility discharge? No Discharge Facility: [facility]    Challenges to be reviewed by the provider   Additional needs identified to be addressed with provider: Yes  Hospital follow up appointment               Method of communication with provider: phone. Was able to contact Saima Barron for initial transitional outreach. She stated that she was doing \"not bad\". She denied shortness of breath, chest pain/pressure/palpitations, swelling was down, no cough or dizziness. She said that she still had some fatigue. Medications reviewed and she stated that she had all her medications. Mathew at discharge and the nurse had her visit today. She had no made her follow ups yet and was willing to have writer assist with scheduling appointments with PCP and cardio. Reviewed daily weights and when to call, low Na+ diet   She has [employer] and talked about possibly requesting low sodium meals. VU. No other questions or concerns. Care Transition Nurse reviewed discharge instructions with patient who verbalized understanding.  The patient was given an opportunity to ask questions and does not have any further questions or concerns at this time. Were discharge instructions available to patient? Yes. Reviewed appropriate site of care based on symptoms and resources available to patient including: PCP  Specialist  Home health  When to call 911. The patient agrees to contact the PCP office for questions related to their healthcare. Advance Care Planning:   Does patient have an Advance Directive:  pt stated that she had [facility] docs. .    Medication reconciliation was performed with patient, who verbalizes understanding of administration of home medications. Medications reviewed, 1111F entered: yes    Was patient discharged with a pulse oximeter? no    Non-face-to-face services provided:  Scheduled appointment with PCP-11/8/22 @ 1:15  Scheduled appointment with Specialist-Dr Wendy Flores cardio 11/21 11:10  Obtained and reviewed discharge summary and/or continuity of care documents  Assessment and support for treatment adherence and medication management-reviewed    Offered patient enrollment in the Remote Patient Monitoring (RPM) program for in-home monitoring:  offered but pt would like to check with grand daughter and insurance for coverage.  .    Care Transitions 24 Hour Call    Do you have a copy of your discharge instructions?: Yes  Do you have all of your prescriptions and are they filled?: Yes  Have you been contacted by a [address]?: No  Have you scheduled your follow up appointment?: No  Post Acute Services: Home Health (Comment: Mathew)  Patient DME: Sarahi rai  Do you have support at home?: Alone  Do you feel like you have everything you need to keep you well at home?: Yes  Are you an active caregiver in your home?: No  Care Transitions Interventions         Follow Up  Future Appointments   Date Time Provider Erick Shepherd   2/1/2023 10:40 AM DO REZA Palencia [facility]   2/14/2023 11:20 AM MALVIN Garcia - CNP Neuro Spec Neurology -       Care Transition Nurse provided contact information. Plan for follow-up call in 3-5 days based on severity of symptoms and risk factors.   Plan for next call: symptom management-follow up on CHF symptoms/if checked to see if would like Anjana Snell RN

## 2022-11-09 ENCOUNTER — OFFICE VISIT (OUTPATIENT)
Dept: FAMILY MEDICINE CLINIC | Age: 87
End: 2022-11-09
Payer: MEDICARE

## 2022-11-09 VITALS
WEIGHT: 139.2 LBS | BODY MASS INDEX: 23.89 KG/M2 | SYSTOLIC BLOOD PRESSURE: 126 MMHG | OXYGEN SATURATION: 98 % | TEMPERATURE: 97.2 F | HEART RATE: 65 BPM | DIASTOLIC BLOOD PRESSURE: 82 MMHG

## 2022-11-09 DIAGNOSIS — I50.33 ACUTE ON CHRONIC DIASTOLIC HEART FAILURE (HCC): ICD-10-CM

## 2022-11-09 DIAGNOSIS — Z09 HOSPITAL DISCHARGE FOLLOW-UP: Primary | ICD-10-CM

## 2022-11-09 DIAGNOSIS — I10 PRIMARY HYPERTENSION: ICD-10-CM

## 2022-11-09 DIAGNOSIS — Z91.81 AT HIGH RISK FOR FALLS: ICD-10-CM

## 2022-11-09 DIAGNOSIS — S60.822A: ICD-10-CM

## 2022-11-09 PROCEDURE — 99213 OFFICE O/P EST LOW 20 MIN: CPT

## 2022-11-09 PROCEDURE — 1123F ACP DISCUSS/DSCN MKR DOCD: CPT

## 2022-11-09 PROCEDURE — 3074F SYST BP LT 130 MM HG: CPT

## 2022-11-09 PROCEDURE — 1111F DSCHRG MED/CURRENT MED MERGE: CPT

## 2022-11-09 PROCEDURE — 10160 PNXR ASPIR ABSC HMTMA BULLA: CPT

## 2022-11-09 PROCEDURE — 3078F DIAST BP <80 MM HG: CPT

## 2022-11-09 RX ORDER — LISINOPRIL 10 MG/1
10 TABLET ORAL DAILY
Qty: 90 TABLET | Refills: 0 | Status: SHIPPED
Start: 2022-11-09 | End: 2022-11-09 | Stop reason: SDUPTHER

## 2022-11-09 RX ORDER — LISINOPRIL 10 MG/1
10 TABLET ORAL DAILY
Qty: 90 TABLET | Refills: 0 | Status: SHIPPED | OUTPATIENT
Start: 2022-11-09

## 2022-11-09 NOTE — PROGRESS NOTES
On the basis of positive falls risk screening, assessment and plan is as follows: encouraged continued use of walker, home safety discussed. Post-Discharge Transitional Care  Follow Up      Remus All   YOB: 1935    Date of Office Visit:  11/9/2022  Date of Hospital Admission: 11/2/22  Date of Hospital Discharge: 11/4/22  Risk of hospital readmission (high >=14%. Medium >=10%) :Readmission Risk Score: 17.1      Care management risk score Rising risk (score 2-5) and Complex Care (Scores >=6): No Risk Score On File     Non face to face  following discharge, date last encounter closed (first attempt may have been earlier): 11/07/2022    Call initiated 2 business days of discharge: Yes    ASSESSMENT/PLAN:   Acute on chronic diastolic heart failure ([facility] [address]  The following orders have not been finalized:  -     Compression Stockings MISC  Primary hypertension  The following orders have not been finalized:  -     lisinopril (PRINIVIL;ZESTRIL) 10 MG tablet  At high risk for falls  Hospital discharge follow-up  -     SD DISCHARGE MEDS RECONCILED W/ CURRENT OUTPATIENT MED LIST      Medical Decision Making: moderate complexity  Return in 1 month (on 12/9/2022). Subjective:   HPI:  Follow up of Hospital problems/diagnosis(es): CHF    Inpatient course: Discharge summary reviewed- see chart. Interval history/Current status:     Since being home pt is doing well, she had has significant improvement in her mobility since being diuresed in the hospital. Home care is coming once weekly at this time. Encouraged to f/u w/ cardiology and referred to CHF clinic to assist in reduction of hospitalization risk and eval/tx.       Patient Active Problem List   Diagnosis    Hyperlipidemia    Primary hypertension    Anxiety    Gastroesophageal reflux disease without esophagitis    Atypical chest pain    Paroxysmal atrial fibrillation (HCC)    Asymptomatic varicose veins    Bilateral edema of lower extremity    Chronic anticoagulation    Former smoker    Mild aortic regurgitation    Mild concentric left ventricular hypertrophy (LVH)    Paraesophageal hernia    Hiatal hernia    Iron deficiency anemia    History of Nissen fundoplication    Closed wedge fracture of thoracic vertebra with routine healing T5,T8    (HFpEF) heart failure with preserved ejection fraction (HCC)    Severe tricuspid regurgitation    Pulmonary hypertension (HCC)    Tubular adenoma:  3 cm transverse colon    Iron deficiency    VTE (venous thromboembolism)    Short-term memory loss    Anemia    Polyp of colon    Fall    Abrasion of multiple sites of left upper arm    Acute on chronic diastolic heart failure ([facility] [address]       Medications listed as ordered at the time of discharge from hospital     Medication List            Accurate as of November 9, 2022  1:34 PM. If you have any questions, ask your nurse or doctor.                 CHANGE how you take these medications      lisinopril 10 MG tablet  Commonly known as: PRINIVIL;ZESTRIL  Take 1 tablet by mouth daily  What changed: medication strength  Changed by: MALVIN Hernandez CNP     metoprolol tartrate 25 MG tablet  Commonly known as: LOPRESSOR  Take 1 tablet by mouth 2 times daily  What changed: how much to take  Changed by: MALVIN Hernandez CNP     pravastatin 40 MG tablet  Commonly known as: PRAVACHOL  TAKE 1 TABLET DAILY  What changed: how much to take            CONTINUE taking these medications      acetaminophen 500 MG tablet  Commonly known as: TYLENOL  Take 1 tablet by mouth every 6 hours as needed for Pain     amiodarone 200 MG tablet  Commonly known as: CORDARONE  Take 1 tablet by mouth daily     apixaban 5 MG Tabs tablet  Commonly known as: Eliquis  Take 1 tablet by mouth 2 times daily     bumetanide 1 MG tablet  Commonly known as: BUMEX  Take 2 tablets by mouth daily     calcium-vitamin D 500-200 MG-UNIT per tablet  Commonly known as: OSCAL-500  Take 1 tablet by mouth 2 times daily     Compression Stockings Misc  by Does not apply route     ferrous sulfate 325 (65 Fe) MG EC tablet  Commonly known as: FE TABS 325  Take 1 tablet by mouth daily (with breakfast)     Multivitamin Tabs     oyster shell calcium w/D 500-200 MG-UNIT Tabs tablet     pantoprazole 40 MG tablet  Commonly known as: PROTONIX  Take 1 tablet by mouth daily Take one tab every morning before breakfast     spironolactone 50 MG tablet  Commonly known as: ALDACTONE  Take 1 tablet by mouth daily     therapeutic multivitamin-minerals tablet               Where to Get Your Medications        Information about where to get these medications is not yet available    Ask your nurse or doctor about these medications  lisinopril 10 MG tablet  metoprolol tartrate 25 MG tablet           Medications marked \"taking\" at this time  Outpatient Medications Marked as Taking for the 11/9/22 encounter (Office Visit) with MALVIN Hayes CNP   Medication Sig Dispense Refill    Calcium Carb-Cholecalciferol (OYSTER SHELL CALCIUM W/D) 500-200 MG-UNIT TABS tablet       lisinopril (PRINIVIL;ZESTRIL) 10 MG tablet Take 1 tablet by mouth daily 90 tablet 0    metoprolol tartrate (LOPRESSOR) 25 MG tablet Take 1 tablet by mouth 2 times daily 60 tablet 5    spironolactone (ALDACTONE) 50 MG tablet Take 1 tablet by mouth daily 30 tablet 3    bumetanide (BUMEX) 1 MG tablet Take 2 tablets by mouth daily 30 tablet 1    amiodarone (CORDARONE) 200 MG tablet Take 1 tablet by mouth daily 30 tablet 0    apixaban (ELIQUIS) 5 MG TABS tablet Take 1 tablet by mouth 2 times daily 60 tablet 1    ferrous sulfate (FE TABS 325) 325 (65 Fe) MG EC tablet Take 1 tablet by mouth daily (with breakfast) 90 tablet 3    acetaminophen (TYLENOL) 500 MG tablet Take 1 tablet by mouth every 6 hours as needed for Pain 120 tablet 1    calcium-vitamin D (OSCAL-500) 500-200 MG-UNIT per tablet Take 1 tablet by mouth 2 times daily 60 tablet 3    pantoprazole (PROTONIX) 40 MG tablet Take 1 tablet by mouth daily Take one tab every morning before breakfast 30 tablet 1    pravastatin (PRAVACHOL) 40 MG tablet TAKE 1 TABLET DAILY (Patient taking differently: 40 mg TAKE 1 TABLET DAILY) 30 tablet 0    Multiple Vitamins-Minerals (THERAPEUTIC MULTIVITAMIN-MINERALS) tablet Take 1 tablet by mouth daily          Medications patient taking as of now reconciled against medications ordered at time of hospital discharge: Yes        Objective:    /82 (Site: Right Upper Arm, Position: Sitting, Cuff Size: Medium Adult)   Pulse 65   Temp 97.2 °F (36.2 °C) (Temporal)   Wt 139 lb 3.2 oz (63.1 kg)   SpO2 98%   BMI 23.89 kg/m²   General Appearance: alert and oriented to person, place and time, well developed and well- nourished, in no acute distress  Skin: warm and dry, no rash or erythema.  Small blister to left forearm s/p fall, I&D'd w/ small amount of serosang fluid expressed  Head: normocephalic and atraumatic  Eyes: pupils equal, round, and reactive to light, extraocular eye movements intact, conjunctivae normal  ENT: tympanic membrane, external ear and ear canal normal bilaterally, nose without deformity, nasal mucosa and turbinates normal without polyps  Neck: supple and non-tender without mass, no thyromegaly or thyroid nodules, no cervical lymphadenopathy  Pulmonary/Chest: clear to auscultation bilaterally- no wheezes, rales or rhonchi, normal air movement, no respiratory distress  Cardiovascular: bradycardic rate, regular rhythm, normal S1 and S2, murmur noted,  rubs, clicks, or gallops, distal pulses intact, no carotid bruits  Abdomen: soft, non-tender, non-distended, normal bowel sounds, no masses or organomegaly  Extremities: no cyanosis, clubbing or edema  Musculoskeletal: normal range of motion, no joint swelling, deformity or tenderness  Neurologic: reflexes normal and symmetric, no cranial nerve deficit, gait, coordination and speech normal      [place] sized blister to distal left forearm, fluctuant, mildly tender. Swabbed w/ betadine and x2 point incisions made w/ 11 blade. Immediate flow of serosang fluid, small amount. Blister reduced well and pain alleviated per pt. Wrapped w/ dry guaze and instructed to keep clean and covered until healed. An electronic signature was used to authenticate this note.   --Martell Cruz, APRCLAUDIA - CNP

## 2022-11-10 ENCOUNTER — CARE COORDINATION (OUTPATIENT)
Dept: CASE MANAGEMENT | Age: 87
End: 2022-11-10

## 2022-11-10 ENCOUNTER — TELEPHONE (OUTPATIENT)
Dept: FAMILY MEDICINE CLINIC | Age: 87
End: 2022-11-10

## 2022-11-10 ENCOUNTER — TELEPHONE (OUTPATIENT)
Dept: PHARMACY | Facility: CLINIC | Age: 87
End: 2022-11-10

## 2022-11-10 NOTE — TELEPHONE ENCOUNTER
Received a referral:  from Care Coordinator to review patients medications. Called patient to schedule a time to speak with a pharmacist over the telephone. Spoke to patient and advised them of the above message. Patient verified understanding and scheduled their appointment: 1/14 at [place] [address].    [facility] free: [address] in place:  No  Recommendation Provided To: Patient/Caregiver: 1 via Telephone  Intervention Detail: Scheduled Appointment  Gap Closed?: Yes   Intervention Accepted By: Patient/Caregiver: 1  Time Spent (min): 15

## 2022-11-10 NOTE — CARE COORDINATION
[facility] Care Transitions Follow Up Call    Care Transition Nurse contacted the patient and family by telephone to follow up after admission on . Verified name and  with patient and family as identifiers. Patient: Guy Cordova  Patient : 1935   MRN: [id number]  Reason for Admission: CHF  Discharge Date: 22 RARS: Readmission Risk Score: 17.1      Needs to be reviewed by the provider   Additional needs identified to be addressed with provider: No  none             Method of communication with provider: none. Was able to contact Jarod aPulino for [employer]. She stated that she was doing \"good, just tired. She denied any shortness of breath,has an occasional cough, no chest pain, swelling or dizziness. Reviewed medication changes that occurred at PCP visit and reminded pt that she is to be on Lisinopril 10 mg and to cut the 20 mg tablets that she has at home. But  reminded that when she gets the refill from pharmacy to check the dosage and if 10 mg tablet to take a whole one. Reviewed daily weights again. Order for CHF clinic added by PCP . She was receptive to having a pharmacy referral for med view and education a dietician referral for diet education. Referrals sent. Asked about RPM and she still was unclear. She requested that writer call her grand daughter, Berta Art was called and provided information on CTN role and what has transpired with CTN and Jarod Paulino. Tyra Mercado did say that she made the appointment for CHF clinic. And did not feel that RPM was appropriate for Ronnynesha Paulino. Contact information provided to Tyra Mercado and encouraged her to call with any questions or concerns. Addressed changes since last contact:   pharm and dietician referral  Discussed follow-up appointments. If no appointment was previously scheduled, appointment scheduling offered: No.   Is follow up appointment scheduled within 7 days of discharge? Yes.     Follow Up  Future Appointments   Date Time Provider Erick Barajasi   11/28/2022  2:30 PM STV CHF CLINIC RM 1 [facility] CHF CLI [facility]   2/1/2023 10:40 AM DO REZA Lynn [facility]   2/14/2023 11:20 AM MALVIN Singh - CNP Neuro Spec Neurology -     [facility] follow up appointment(s): 11/21 cardio    Care Transition Nurse reviewed medical action plan and red flags with patient and family and discussed any barriers to care and/or understanding of plan of care after discharge. Discussed appropriate site of care based on symptoms and resources available to patient including: PCP  Specialist  Home health  When to call 911. The patient and family agrees to contact the PCP office for questions related to their healthcare. Patients top risk factors for readmission: functional cognitive ability and medical condition-CHF  Interventions to address risk factors: Obtained and reviewed discharge summary and/or continuity of care documents    Offered patient enrollment in the Remote Patient Monitoring (RPM) program for in-home monitoring: Patient declined. Care Transitions Subsequent and Final Call    Subsequent and Final Calls  Do you have any ongoing symptoms?: No  Have your medications changed?: Yes  Patient Reports: Metoprolol decreased to 25 mg BID  Do you have any questions related to your medications?: No  Do you currently have any active services?: Yes  Are you currently active with any services?: Home Health  Do you have any needs or concerns that I can assist you with?: No  Care Transitions Interventions    Pharmacist: Completed      Registered Dietician: Completed    Other Interventions:             Care Transition Nurse provided contact information for future needs. Plan for follow-up call in 7-10 days based on severity of symptoms and risk factors.   Plan for next call: symptom management-follow up on any CHF symptoms/ daily weights    Lexi Sheffield RN

## 2022-11-10 NOTE — TELEPHONE ENCOUNTER
----- Message from Alejandro Samaniego RN sent at 11/10/2022 12:58 PM EST -----  Pt would like medication review and education. CHF pt.   Thank you

## 2022-11-10 NOTE — TELEPHONE ENCOUNTER
Patient called wanting to know how long does she have to keep the bandage on from the blister she was seen about?

## 2022-11-11 ENCOUNTER — CARE COORDINATION (OUTPATIENT)
Dept: CARE COORDINATION | Age: 87
End: 2022-11-11

## 2022-11-11 NOTE — CARE COORDINATION
Registered Dietitian Initial Assessment for [address]  November 11, 2022    Initial Referral Reason: CHF    Patient Care Team:  Nir Madrigal DO as PCP - General (Family Medicine)  Nir Madrigal DO as PCP - [facility] Provider  Nahun Mendieta MD as Consulting Physician (General Surgery)  Pretty Brooke RN as Care Transitions Nurse  Dina Hodges RD, LD as Dietitian    Patient Active Problem List   Diagnosis    Hyperlipidemia    Primary hypertension    Anxiety    Gastroesophageal reflux disease without esophagitis    Atypical chest pain    Paroxysmal atrial fibrillation (HCC)    Asymptomatic varicose veins    Bilateral edema of lower extremity    Chronic anticoagulation    Former smoker    Mild aortic regurgitation    Mild concentric left ventricular hypertrophy (LVH)    Paraesophageal hernia    Hiatal hernia    Iron deficiency anemia    History of Nissen fundoplication    Closed wedge fracture of thoracic vertebra with routine healing T5,T8    (HFpEF) heart failure with preserved ejection fraction (HCC)    Severe tricuspid regurgitation    Pulmonary hypertension ([address]    Tubular adenoma:  3 cm transverse colon    Iron deficiency    VTE (venous thromboembolism)    Short-term memory loss    Anemia    Polyp of colon    Fall    Abrasion of multiple sites of left upper arm    Acute on chronic diastolic heart failure (HCC)       Current Outpatient Medications   Medication Sig Dispense Refill    Calcium Carb-Cholecalciferol (OYSTER SHELL CALCIUM W/D) 500-200 MG-UNIT TABS tablet       metoprolol tartrate (LOPRESSOR) 25 MG tablet Take 1 tablet by mouth 2 times daily 60 tablet 5    lisinopril (PRINIVIL;ZESTRIL) 10 MG tablet Take 1 tablet by mouth daily 90 tablet 0    Compression Stockings MISC by Does not apply route 1 each 0    spironolactone (ALDACTONE) 50 MG tablet Take 1 tablet by mouth daily 30 tablet 3    bumetanide (BUMEX) 1 MG tablet Take 2 tablets by mouth daily 30 tablet 1    amiodarone (CORDARONE) 200 MG tablet Take 1 tablet by mouth daily 30 tablet 0    apixaban (ELIQUIS) 5 MG TABS tablet Take 1 tablet by mouth 2 times daily 60 tablet 1    ferrous sulfate (FE TABS 325) 325 (65 Fe) MG EC tablet Take 1 tablet by mouth daily (with breakfast) 90 tablet 3    acetaminophen (TYLENOL) 500 MG tablet Take 1 tablet by mouth every 6 hours as needed for Pain 120 tablet 1    calcium-vitamin D (OSCAL-500) 500-200 MG-UNIT per tablet Take 1 tablet by mouth 2 times daily 60 tablet 3    pantoprazole (PROTONIX) 40 MG tablet Take 1 tablet by mouth daily Take one tab every morning before breakfast 30 tablet 1    pravastatin (PRAVACHOL) 40 MG tablet TAKE 1 TABLET DAILY (Patient taking differently: 40 mg TAKE 1 TABLET DAILY) 30 tablet 0    Multiple Vitamin (MULTIVITAMIN) TABS  (Patient not taking: No sig reported)      Multiple Vitamins-Minerals (THERAPEUTIC MULTIVITAMIN-MINERALS) tablet Take 1 tablet by mouth daily       No current facility-administered medications for this visit.          Visit for:  Obesity/Weight loss  Diabetes:  Hypertension:  Hyperlipidemia:  Other:CHF     Anthropometric Measurements:  HT:5'4  Weight:139  IBW:120 + or - 10%  UBW:135  BMI:23    Biochemical Data, Medical Tests and Procedures:    Lab Results   Component Value Date    LABA1C 5.4 09/17/2016     Lab Results   Component Value Date     09/17/2016       Lab Results   Component Value Date    CHOL 97 02/08/2022    CHOL 140 07/05/2017    CHOL 158 09/17/2016     Lab Results   Component Value Date    TRIG 54 02/08/2022    TRIG 59 07/05/2017    TRIG 45 09/17/2016     Lab Results   Component Value Date    HDL 43 02/08/2022    HDL 58 07/05/2017    HDL 66 09/17/2016     Lab Results   Component Value Date    LDLCALC 70 07/05/2017    LDLCALC 110 09/14/2016    LDLCALC 93 09/02/2015    LDLCHOLESTEROL 43 02/08/2022    LDLCHOLESTEROL 83 09/17/2016     Lab Results   Component Value Date    LABVLDL 9 09/14/2016 LABVLDL 12 09/02/2015    VLDL NOT REPORTED 02/08/2022    VLDL 82 07/05/2017    VLDL NOT REPORTED 09/17/2016     Lab Results   Component Value Date    CHOLHDLRATIO 2.3 02/08/2022    CHOLHDLRATIO 2.4 07/05/2017    CHOLHDLRATIO 2.4 09/17/2016       Lab Results   Component Value Date    WBC 5.7 11/03/2022    HGB 7.6 (L) 11/03/2022    HCT 27.5 (L) 11/03/2022    MCV 77.5 (L) 11/03/2022     11/03/2022       Lab Results   Component Value Date    CREATININE 0.72 11/04/2022    BUN 13 11/04/2022     11/04/2022    K 3.7 11/04/2022     11/04/2022    CO2 26 11/04/2022         NUTRITION DIAGNOSIS    #1 Problem  Food and nutrition-related knowledge deficit       Etiology  related to lack of prior nutrition related education       Signs/Symptoms  as evidenced by referral for nutrition education for CHF    NUTRITION INTERVENTION  Nutrition Prescription:    cardiac diet providing 1600 kcals/day   Protein needs:63gms/d    Patient Goals: 1. Discussed DASH guidelines- lowfat/cholesterol and low sodium. Reviewed reading food labels-what to look for on labels. Encouraged to limit saturated fat, trans fat, cholesterol and sodium intake. 2. Discussed avoiding canned, prepackaged foods, processed meats and cheese. Discussed processed meats in detail to avoid/limit- sausage, riddle, bologna, ham, ect- patient is eating some of these, states will avoid/limit. Goal is <2gm of sodium/day. 3. Discussed shopping the outer rim of the grocery store where fresher foods are found. Discussed seasonings that can be used that do not contain salt. Patient states she does not add salt to foods. 4. Discussed avoiding caffeine. Encouraged water and caffeine free beverages.      Nutrition Intervention Need:  Initial/Brief:  Comprehensive Nutrition Education:X  Coordination of other care during nutrition care:    Monitoring and Evaluation:  Ability to plan meals/snacks:X  Ability to select healthy foods/meals:X  Food and Nutrition Knowledge:X  Self Monitoring:  Physical Activity:  Energy intake:  Fluid/beverage intake:  Fat/chol intake:  Carb intake: Other:sodium intake: X    Plan: 1. Will continue to educate patient on DASH diet guidelines, processed foods to avoid/limit, seasonings and sauces high in sodium to avoid, reading food labels and avoiding/limiting caffeine intake. Patient will be mailed nutrition information on all the above discussed. 2. Will follow up with patient in 2-3 weeks to review nutrition information and answer questions.     SILVINA Lew

## 2022-11-11 NOTE — CARE COORDINATION
Referral from CTN, Rajat Muro, RN-  Pt CHF pt. Would like information on diet. FYI she does get mobile meals  Thank you     Will reach out to patient for consult.   SILVINA Gomes

## 2022-11-11 NOTE — ED PROVIDER NOTES
[address]     Pt Name: Carson Senior  MRN: [id number]  [place] 1935  Date of evaluation: 11/10/22       Carson Senior is a 82 y.o. female who presents with Other (Visiting wound care nurse said pt's HR sounded irregular with stethoscope and told pt it was best to be seen in ED. ), Chest Pain (\"Discomfort not pain: 2/10. Does not radiate. ), and Headache (Light headed as well, no photosensitivity, no issues with sound making HA worse. Took tylenol approximately 0900am, helped with HA but not chest discomfort.)      MDM:     81 yo female with complaints of SOB and weakness. Plan is basic labs and reevaluation. Vitals:   Vitals:    11/03/22 2352 11/04/22 0358 11/04/22 0711 11/04/22 1119   BP: (!) 149/62 (!) 145/59 (!) 167/73 (!) 128/52   Pulse: 53 57 59 99   Resp: 18 17 16 18   Temp: 98.4 °F (36.9 °C) 98.4 °F (36.9 °C) 98.1 °F (36.7 °C) 97.5 °F (36.4 °C)   TempSrc: Oral Oral Oral Oral   SpO2: 94% 94% 95% 94%   Weight:       Height: This visit was performed by both a physician and an APC. I personally evaluated and examined the patient.  I performed all aspects of the MDM as documented     Kuldeep Sepulveda MD  Attending Emergency  Physician                  Migel Mari MD  11/10/22 0723

## 2022-11-14 ENCOUNTER — TELEPHONE (OUTPATIENT)
Dept: PHARMACY | Facility: CLINIC | Age: 87
End: 2022-11-14

## 2022-11-14 NOTE — TELEPHONE ENCOUNTER
CLINICAL PHARMACY NOTE - Medication Review  Patient outreach to review medications - Spoke with patient. SUBJECTIVE/OBJECTIVE:   Yehuda Cowan is a 82 y.o. female referred to a clinical pharmacy specialist by care coordination    Medications:  Current Outpatient Medications   Medication Sig Dispense Refill    metoprolol tartrate (LOPRESSOR) 25 MG tablet Take 1 tablet by mouth 2 times daily 60 tablet 5    lisinopril (PRINIVIL;ZESTRIL) 10 MG tablet Take 1 tablet by mouth daily 90 tablet 0    Compression Stockings MISC by Does not apply route 1 each 0    spironolactone (ALDACTONE) 50 MG tablet Take 1 tablet by mouth daily 30 tablet 3    bumetanide (BUMEX) 1 MG tablet Take 2 tablets by mouth daily 30 tablet 1    amiodarone (CORDARONE) 200 MG tablet Take 1 tablet by mouth daily 30 tablet 0    apixaban (ELIQUIS) 5 MG TABS tablet Take 1 tablet by mouth 2 times daily 60 tablet 1    ferrous sulfate (FE TABS 325) 325 (65 Fe) MG EC tablet Take 1 tablet by mouth daily (with breakfast) 90 tablet 3    acetaminophen (TYLENOL) 500 MG tablet Take 1 tablet by mouth every 6 hours as needed for Pain 120 tablet 1    calcium-vitamin D (OSCAL-500) 500-200 MG-UNIT per tablet Take 1 tablet by mouth 2 times daily 60 tablet 3    pantoprazole (PROTONIX) 40 MG tablet Take 1 tablet by mouth daily Take one tab every morning before breakfast 30 tablet 1    pravastatin (PRAVACHOL) 40 MG tablet TAKE 1 TABLET DAILY 30 tablet 0    Multiple Vitamins-Minerals (THERAPEUTIC MULTIVITAMIN-MINERALS) tablet Take 1 tablet by mouth daily       No current facility-administered medications for this visit. Removed the following:  - Calcium supplement- duplicate  - MVI- duplicate    Allergies:    Allergies   Allergen Reactions    Codeine Nausea Only     dizziness    Bactrim [Sulfamethoxazole-Trimethoprim] Hives    Naproxen      On allergy list ? Unsure of reaction    Norvasc [Amlodipine Besylate]      Edema-she thinks      Sertraline Hcl      unsure Toradol [Ketorolac Tromethamine]      unsure    Tramadol      unsure    Magnesium-Containing Compounds Diarrhea     Mag ox 500 ? Morphine Nausea And Vomiting    Percocet [Oxycodone-Acetaminophen] Nausea And Vomiting       Pertinent Labs/Vitals:  BP Readings from Last 3 Encounters:   22 126/82   22 (!) 128/52   10/17/22 (!) 116/56     No results found for: Ofe Avilez  Lab Results   Component Value Date    LABA1C 5.4 2016    LABA1C 5.6 2014     Lab Results   Component Value Date    CHOL 97 2022    TRIG 54 2022    HDL 43 2022    LDLCHOLESTEROL 43 2022    LDLCALC 70 2017     ALT   Date Value Ref Range Status   2022 15 U/L Final     AST   Date Value Ref Range Status   2022 19 U/L Final     The ASCVD Risk score (Abilio VICKERS, et al., 2019) failed to calculate for the following reasons: The 2019 ASCVD risk score is only valid for ages 36 to 78     Lab Results   Component Value Date    CREATININE 0.72 2022       Estimated Creatinine Clearance: 49 mL/min (based on SCr of 0.72 mg/dL).       Social History:   Social History     Tobacco Use    Smoking status: Former     Types: Cigarettes     Quit date: 1984     Years since quittin.9    Smokeless tobacco: Never   Substance Use Topics    Alcohol use: No       Immunizations:   Most Recent Immunizations   Administered Date(s) Administered    COVID-19, PFIZER PURPLE top, DILUTE for use, (age 15 y+), 30mcg/0.3mL 2021    Influenza Virus Vaccine 2020    Influenza, AFLURIA (age 1 yrs+), FLUZONE, (age 10 mo+), MDV, 0.5mL 2018    Influenza, FLUAD, (age 72 y+), Adjuvanted, 0.5mL 2020    Influenza, Intradermal, Preservative free 10/13/2009    Influenza, Triv, inactivated, subunit, adjuvanted, IM (Fluad 65 yrs and older) 2019    Pneumococcal Conjugate 13-valent ([id number]) 10/29/2015    Pneumococcal Polysaccharide ([id number]) 10/01/2010    Td, unspecified formulation 08/20/2011    Tdap (Boostrix, Adacel) 10/07/2022    Zoster Live (Zostavax) 09/10/2010     Last Echo:  2/2022-  EF was 58%      ASSESSMENT/PLAN:   - General Assessment:     Overall patient reports doing very well. General education provided on all meds. No concerns noted. Reconciled current med list with what she had at home  Adherence: Patient reports adherence to current medication regimen  Cost: Not a concern. Able to afford all medications per pt  Current pharmacy/pharmacies: [facility] mail order  Drug interactions: The following clinically significant interactions were identified via [employer] Interaction Analysis as category D or higher. Spironolactone and lisinopril- increased potassium. Patient having monitored. Frequent labs throughout the year, and she is also on loop diuretic which likely counteracts this  Renal dosing: No renal adjustments necessary. Reminded patient that she has 2 strengths of lisinopril at home most likely. Aware her current dose is 10mg. Will pay close attention to bottles and split the 20mg tabs if needed. Medication monitoring:   Self monitors for unusual bleeding. Aware she should call 911 or see provider immediately if she has concerns  Monitors BP and HR. Aware to call 911 if she experiences chest pain    - Heart Failure:   Current regimen/titration considerations:   ACEI/ARB/Entresto: lisinopril  Beta blocker: metoprolol tart  Loop diuretic: bumex  Aldosterone antagonist: spironolactone  Vasodilators: n/a  Other: n/a      - Upcoming appointments:   Future Appointments   Date Time Provider Erick Shepherd   11/14/2022  1:00 PM SCHEDULE, S CLINICAL PHARMACY [facility] Clin Rx None   11/28/2022  2:30 PM STV CHF CLINIC  1 ST[facility] CHF CLI [facility]   2/1/2023 10:40 AM DO REZA Fernandez   2/14/2023 11:20 AM MLAVIN Waller - CNP Neuro Spec Neurology -       REZA.  Brando Ceballos, PharmD, [facility] [address]  Department, toll free: [phone]      For Pharmacy Admin Tracking Only    Gap Closed?: Yes   Time Spent (min): 30

## 2022-11-15 ENCOUNTER — CARE COORDINATION (OUTPATIENT)
Dept: CASE MANAGEMENT | Age: 87
End: 2022-11-15

## 2022-11-15 NOTE — CARE COORDINATION
[facility] Care Transitions Follow Up Call    Care Transition Nurse contacted the patient by telephone to follow up after admission on 22. Verified name and  with patient as identifiers. Patient: David Dean  Patient : 1935   MRN: [id number]  Reason for Admission: CHF  Discharge Date: 22 RARS: Readmission Risk Score: 17.1      Needs to be reviewed by the provider   Additional needs identified to be addressed with provider: No  none             Method of communication with provider: none. Was able to contact Ariadna Crabtree for transitional outreach. She stated that she was doing \"pretty good\". She denied any chest pain, shortness of breath, cough, increased swelling and forgot about daily weights. Encouraged her to make a sign to remind her of doing her weights. She had no questions/concerns or needs at this time. Addressed changes since last contact:    Discussed follow-up appointments. If no appointment was previously scheduled, appointment scheduling offered: . Is follow up appointment scheduled within 7 days of discharge? .    Follow Up  Future Appointments   Date Time Provider Erick Shepherd   2022  2:30 PM STV CHF CLINIC RM 1 STV CHF CLI [facility]   2023 10:40 AM DO REZA Atkins [facility]   2023 11:20 AM MALVIN Black CNP Neuro Spec Neurology -     [facility] follow up appointment(s):  cardio    Care Transition Nurse reviewed medical action plan and red flags with patient and discussed any barriers to care and/or understanding of plan of care after discharge. Discussed appropriate site of care based on symptoms and resources available to patient including: PCP  Specialist  Home health  When to call 911. The patient agrees to contact the PCP office for questions related to their healthcare.      Patients top risk factors for readmission: functional cognitive ability, lack of knowledge about disease, and medical condition-CHF  Interventions to address risk factors: Obtained and reviewed discharge summary and/or continuity of care documents    Offered patient enrollment in the Remote Patient Monitoring (RPM) program for in-home monitoring:  family declined . Care Transitions Subsequent and Final Call    Subsequent and Final Calls  Do you have any ongoing symptoms?: No  Have your medications changed?: No  Do you have any questions related to your medications?: No  Do you currently have any active services?: Yes  Are you currently active with any services?: Home Health  Do you have any needs or concerns that I can assist you with?: No  Care Transitions Interventions    Pharmacist: Completed      Registered Dietician: Completed    Other Interventions:             Care Transition Nurse provided contact information for future needs. Plan for follow-up call in 7-10 days based on severity of symptoms and risk factors.   Plan for next call: symptom management-follow up on cardio appointment 11/21 and if any CHF symptoms and daily weight    Juan Neely RN

## 2022-11-16 DIAGNOSIS — I48.0 PAROXYSMAL ATRIAL FIBRILLATION (HCC): ICD-10-CM

## 2022-11-16 NOTE — TELEPHONE ENCOUNTER
Jean Carlos Otoole is calling to request a refill on the following medication(s):    Last Visit Date (If Applicable):  32/03/5121    Next Visit Date:    2/1/2023    Medication Request:  Requested Prescriptions     Pending Prescriptions Disp Refills    amiodarone (CORDARONE) 200 MG tablet [Pharmacy Med Name: AMIODARONE  MG TABLET] 30 tablet 0     Sig: TAKE ONE TABLET BY MOUTH DAILY

## 2022-11-17 RX ORDER — AMIODARONE HYDROCHLORIDE 200 MG/1
TABLET ORAL
Qty: 30 TABLET | Refills: 0 | Status: SHIPPED | OUTPATIENT
Start: 2022-11-17

## 2022-11-21 RX ORDER — PRAVASTATIN SODIUM 40 MG
TABLET ORAL
Qty: 90 TABLET | Refills: 3 | Status: SHIPPED | OUTPATIENT
Start: 2022-11-21

## 2022-11-21 NOTE — TELEPHONE ENCOUNTER
Ashlee Cramer is calling to request a refill on the following medication(s):    Last Visit Date (If Applicable):  26/84/3689    Next Visit Date:    2/1/2023    Medication Request:  Requested Prescriptions     Pending Prescriptions Disp Refills    pravastatin (PRAVACHOL) 40 MG tablet [Pharmacy Med Name: PRAVASTATIN TABS 40MG] 90 tablet 3     Sig: TAKE 1 TABLET DAILY

## 2022-11-22 ENCOUNTER — CARE COORDINATION (OUTPATIENT)
Dept: CASE MANAGEMENT | Age: 87
End: 2022-11-22

## 2022-11-23 ENCOUNTER — CARE COORDINATION (OUTPATIENT)
Dept: CASE MANAGEMENT | Age: 87
End: 2022-11-23

## 2022-11-23 NOTE — CARE COORDINATION
[facility] Care Transitions Follow Up Call    Care Transition Nurse contacted the patient by telephone to follow up after admission on 22. Verified name and  with patient as identifiers. Patient: Carson Senior  Patient : 1935   MRN: [id number]  Reason for Admission: CHF  Discharge Date: 22 RARS: Readmission Risk Score: 17.1      Needs to be reviewed by the provider   Additional needs identified to be addressed with provider: No  none             Method of communication with provider: none. Was able to contact Gregory Barr for transitional outreach. She stated that she was doing \"good\". She denied any chest pain/palpitations, increased swelling, or dizziness/lightheadedness. She said that she has fatigue, that she attributes to her age. She said that she is doing daily weights, but forgets to write her weight down. She said that her weight has seemed to be staying the same. Reminded about AUGUSTO diet. No questions or concerns. Addressed changes since last contact:  none  Discussed follow-up appointments. Follow Up  Future Appointments   Date Time Provider Erick Shepherd   2022  2:30 PM STV CHF CLINIC RM 1 STVZ CHF CLI [facility]   2023 10:40 AM DO REZA Snyder  [facility]   2023 11:20 AM MALVIN Thomas - CNP Neuro Spec Neurology -     Non-[facility] follow up appointment(s):     Care Transition Nurse reviewed medical action plan with patient and discussed any barriers to care and/or understanding of plan of care after discharge. Discussed appropriate site of care based on symptoms and resources available to patient including: PCP  Home health  When to call 911. The patient agrees to contact the PCP office for questions related to their healthcare.      Patients top risk factors for readmission: functional cognitive ability, lack of knowledge about disease, and medical condition-CHF  Interventions to address risk factors: Obtained and reviewed discharge summary and/or continuity of care documents    Offered patient enrollment in the Remote Patient Monitoring (RPM) program for in-home monitoring: Patient declined. Care Transitions Subsequent and Final Call    Subsequent and Final Calls  Do you have any ongoing symptoms?: No  Have your medications changed?: No  Do you have any questions related to your medications?: No  Do you currently have any active services?: Yes  Are you currently active with any services?: Home Health  Do you have any needs or concerns that I can assist you with?: No  Care Transitions Interventions    Pharmacist: Completed      Registered Dietician: Completed    Other Interventions:             Care Transition Nurse provided contact information for future needs. Plan for follow-up call in 7-10 days based on severity of symptoms and risk factors.   Plan for next call: symptom management-any s/s of CHF weight    Racheal Rodriguez RN

## 2022-11-27 ENCOUNTER — APPOINTMENT (OUTPATIENT)
Dept: GENERAL RADIOLOGY | Age: 87
End: 2022-11-27
Payer: MEDICARE

## 2022-11-27 ENCOUNTER — TELEPHONE (OUTPATIENT)
Dept: OTHER | Facility: CLINIC | Age: 87
End: 2022-11-27

## 2022-11-27 ENCOUNTER — HOSPITAL ENCOUNTER (EMERGENCY)
Age: 87
Discharge: HOME | End: 2022-11-27
Attending: EMERGENCY MEDICINE
Payer: MEDICARE

## 2022-11-27 VITALS
BODY MASS INDEX: 22.2 KG/M2 | SYSTOLIC BLOOD PRESSURE: 102 MMHG | RESPIRATION RATE: 16 BRPM | WEIGHT: 130 LBS | HEART RATE: 79 BPM | TEMPERATURE: 97.9 F | OXYGEN SATURATION: 97 % | DIASTOLIC BLOOD PRESSURE: 74 MMHG | HEIGHT: 64 IN

## 2022-11-27 DIAGNOSIS — J06.9 VIRAL URI WITH COUGH: Primary | ICD-10-CM

## 2022-11-27 DIAGNOSIS — J18.9 PNEUMONIA OF RIGHT LOWER LOBE DUE TO INFECTIOUS ORGANISM: ICD-10-CM

## 2022-11-27 LAB
FLU A ANTIGEN: NEGATIVE
FLU B ANTIGEN: NEGATIVE
SARS-COV-2, RAPID: NOT DETECTED
SPECIMEN DESCRIPTION: NORMAL

## 2022-11-27 PROCEDURE — 87635 SARS-COV-2 COVID-19 AMP PRB: CPT

## 2022-11-27 PROCEDURE — 87804 INFLUENZA ASSAY W/OPTIC: CPT

## 2022-11-27 PROCEDURE — 99284 EMERGENCY DEPT VISIT MOD MDM: CPT | Performed by: EMERGENCY MEDICINE

## 2022-11-27 PROCEDURE — 71045 X-RAY EXAM CHEST 1 VIEW: CPT

## 2022-11-27 RX ORDER — AZITHROMYCIN 250 MG/1
250 TABLET, FILM COATED ORAL SEE ADMIN INSTRUCTIONS
Qty: 6 TABLET | Refills: 0 | Status: SHIPPED | OUTPATIENT
Start: 2022-11-27 | End: 2022-12-02

## 2022-11-27 RX ORDER — BENZONATATE 100 MG/1
100 CAPSULE ORAL 3 TIMES DAILY PRN
Qty: 21 CAPSULE | Refills: 0 | Status: SHIPPED | OUTPATIENT
Start: 2022-11-27 | End: 2022-12-04

## 2022-11-27 ASSESSMENT — PAIN - FUNCTIONAL ASSESSMENT: PAIN_FUNCTIONAL_ASSESSMENT: 0-10

## 2022-11-27 ASSESSMENT — ENCOUNTER SYMPTOMS
COUGH: 1
NAUSEA: 0
SORE THROAT: 1
SHORTNESS OF BREATH: 0
VOMITING: 0
RHINORRHEA: 0
DIARRHEA: 0
ABDOMINAL PAIN: 0

## 2022-11-27 ASSESSMENT — PAIN SCALES - GENERAL: PAINLEVEL_OUTOF10: 10

## 2022-11-27 NOTE — DISCHARGE INSTRUCTIONS
Your COVID and Influenza tests here were negative. There is a small area in the right lower lobe of your lung that looks concerning for possible early pneumonia.

## 2022-11-27 NOTE — ED NOTES
Patient reports exposure to Covid by family member that was found to be positive that patient was with on Thanksgiving. Patient reports some mild congestion, mild generalized aches and fatigue since exposure, wants checked for Covid. Patient denies headache or fever.       Brisa Cisneros., RN  84/25/10 6487

## 2022-11-27 NOTE — ED PROVIDER NOTES
[facility]  Emergency Department Encounter     Pt Name: Mychal Shabazz  MRN: [id number]  [place] 1935  Date of evaluation: 22  PCP:  Dillon Vital DO    CHIEF COMPLAINT       Chief Complaint   Patient presents with    Concern For COVID-19     Exposure. Cough and weakness since last night. HISTORY OF PRESENT ILLNESS  (Location/Symptom, Timing/Onset, Context/Setting, Quality, Duration, Modifying Factors, Severity.)    Mychal Shabazz is a 82 y.o. female who presents with about 24 hours of cough, chest congestion, nasal congestion, generalized fatigue and weakness. Patient states that she just found out she was exposed to someone who tested positive for COVID-19 on giving is concerned this is may be going on. She has not had any chest pain or shortness of breath. No known fevers. No GI symptoms. She does have history of COPD and CHF and states that this does not feel similar. PAST MEDICAL / SURGICAL / SOCIAL / FAMILY HISTORY    has a past medical history of Blood circulation, collateral, CHF (congestive heart failure) ([facility] [address] COPD (chronic obstructive pulmonary disease) ([facility] [address] GERD (gastroesophageal reflux disease), Hyperlipidemia, and Hypertension. has a past surgical history that includes joint replacement (left total shoulder; ); bladder suspension (); Hysterectomy (ovaries left); Cholecystectomy; Upper gastrointestinal endoscopy (N/A, 2/10/2022); and Colonoscopy (N/A, 2/10/2022). Social History     Socioeconomic History    Marital status:       Spouse name: Not on file    Number of children: Not on file    Years of education: Not on file    Highest education level: Not on file   Occupational History    Not on file   Tobacco Use    Smoking status: Former     Types: Cigarettes     Quit date: 1984     Years since quittin.9    Smokeless tobacco: Never   Substance and Sexual Activity    Alcohol use: No    Drug use: No    Sexual activity: Not on file   Other Topics Concern    Not on file   Social History Narrative    Not on file     Social Determinants of Health     Financial Resource Strain: Low Risk     Difficulty of Paying Living Expenses: Not hard at all   Food Insecurity: No Food Insecurity    Worried About Running Out of Food in the Last Year: Never true    [address] in the Last Year: Never true   Transportation Needs: No Transportation Needs    Lack of Transportation (Medical): No    Lack of Transportation (Non-Medical): No   Physical Activity: Inactive    Days of Exercise per Week: 0 days    Minutes of Exercise per Session: 0 min   Stress: Not on file   Social Connections: Not on file   Intimate Partner Violence: Not on file   Housing Stability: Not on file       Family History   Problem Relation Age of Onset    Cancer Brother     Cancer Other         bone    Cancer Daughter         breast       Allergies:    Codeine, Bactrim [sulfamethoxazole-trimethoprim], Naproxen, Norvasc [amlodipine besylate], Sertraline hcl, Toradol [ketorolac tromethamine], Tramadol, Magnesium-containing compounds, Morphine, and Percocet [oxycodone-acetaminophen]    Home Medications:  Prior to Admission medications    Medication Sig Start Date End Date Taking?  Authorizing Provider   benzonatate (TESSALON PERLES) 100 MG capsule Take 1 capsule by mouth 3 times daily as needed for Cough 11/27/22 12/4/22 Yes Apurva Marion,    azithromycin (ZITHROMAX) 250 MG tablet Take 1 tablet by mouth See Admin Instructions for 5 days 500mg on day 1 followed by 250mg on days 2 - 5 11/27/22 12/2/22 Yes Apurva Marion DO   pravastatin (PRAVACHOL) 40 MG tablet TAKE 1 TABLET DAILY 11/21/22   Cassie Backers, DO   amiodarone (CORDARONE) 200 MG tablet TAKE ONE TABLET BY MOUTH DAILY 11/17/22   Cassie Backers, DO   metoprolol tartrate (LOPRESSOR) 25 MG tablet Take 1 tablet by mouth 2 times daily 11/9/22   MALVIN Ventura CNP   lisinopril (PRINIVIL;ZESTRIL) 10 MG tablet Take 1 tablet by mouth daily 11/9/22   MALVIN Dickerson CNP   Compression Stockings MISC by Does not apply route 11/9/22   MALVIN Dickerson CNP   spironolactone (ALDACTONE) 50 MG tablet Take 1 tablet by mouth daily 11/5/22   Madelyn Dense P Blood, DO   bumetanide (BUMEX) 1 MG tablet Take 2 tablets by mouth daily 11/5/22   Savannah Relic Blood, DO   apixaban (ELIQUIS) 5 MG TABS tablet Take 1 tablet by mouth 2 times daily 10/31/22   [facility], DO   ferrous sulfate (FE TABS 325) 325 (65 Fe) MG EC tablet Take 1 tablet by mouth daily (with breakfast) 10/31/22   [facility], DO   acetaminophen (TYLENOL) 500 MG tablet Take 1 tablet by mouth every 6 hours as needed for Pain 10/31/22   [facility], DO   calcium-vitamin D (OSCAL-500) 500-200 MG-UNIT per tablet Take 1 tablet by mouth 2 times daily 10/31/22   [facility], DO   pantoprazole (PROTONIX) 40 MG tablet Take 1 tablet by mouth daily Take one tab every morning before breakfast 10/31/22   [facility], DO   Multiple Vitamins-Minerals (THERAPEUTIC MULTIVITAMIN-MINERALS) tablet Take 1 tablet by mouth daily    Historical Provider, MD       REVIEW OF SYSTEMS    (2-9 systems for level 4, 10 or more for level 5)    Review of Systems   Constitutional:  Positive for fatigue. HENT:  Positive for congestion and sore throat. Negative for ear pain and rhinorrhea. Respiratory:  Positive for cough. Negative for shortness of breath. Cardiovascular:  Negative for chest pain. Gastrointestinal:  Negative for abdominal pain, diarrhea, nausea and vomiting. Musculoskeletal:  Negative for myalgias. Neurological:  Negative for headaches.      PHYSICAL EXAM   (up to 7 for level 4, 8 or more for level 5)    VITALS:   Vitals:    11/27/22 1334   BP: 102/74   Pulse: 79   Resp: 16   Temp: 97.9 °F (36.6 °C)   TempSrc: Oral   SpO2: 97%   Weight: 130 lb (59 kg)   Height: 5' 4\" (1.626 m)       Physical Exam  Vitals and nursing note reviewed. Constitutional:       General: She is not in acute distress. Appearance: She is well-developed. She is not diaphoretic. HENT:      Head: Normocephalic and atraumatic. Right Ear: External ear normal.      Left Ear: External ear normal.      Nose: Congestion present. Eyes:      Conjunctiva/sclera: Conjunctivae normal.   Cardiovascular:      Rate and Rhythm: Normal rate and regular rhythm. Heart sounds: Normal heart sounds. Pulmonary:      Effort: Pulmonary effort is normal. No respiratory distress. Breath sounds: Normal breath sounds. No wheezing, rhonchi or rales. Musculoskeletal:         General: Normal range of motion. Cervical back: Normal range of motion. Skin:     General: Skin is warm and dry. Neurological:      General: No focal deficit present. Mental Status: She is alert.    Psychiatric:         Behavior: Behavior normal.       DIFFERENTIAL  DIAGNOSIS   PLAN (LABS / IMAGING / EKG):  Orders Placed This Encounter   Procedures    Flu A/B Ag Detection    COVID-19, Rapid    XR CHEST 1 VIEW       MEDICATIONS ORDERED:  Orders Placed This Encounter   Medications    benzonatate (TESSALON PERLES) 100 MG capsule     Sig: Take 1 capsule by mouth 3 times daily as needed for Cough     Dispense:  21 capsule     Refill:  0    azithromycin (ZITHROMAX) 250 MG tablet     Sig: Take 1 tablet by mouth See Admin Instructions for 5 days 500mg on day 1 followed by 250mg on days 2 - 5     Dispense:  6 tablet     Refill:  0     DIAGNOSTIC RESULTS / EMERGENCYDEPARTMENT COURSE / MDM   LABS:  Labs Reviewed   RAPID INFLUENZA A/B ANTIGENS   COVID-19, RAPID       RADIOLOGY:  XR CHEST 1 VIEW    Result Date: 11/27/2022  EXAMINATION: ONE XRAY VIEW OF THE CHEST 11/27/2022 3:23 pm COMPARISON: November 3, 2022 HISTORY: ORDERING SYSTEM PROVIDED HISTORY: cough weakness exposure to COVID-19 TECHNOLOGIST PROVIDED HISTORY: cough weakness exposure to COVID-19 Reason for Exam: concern for return to the ED for new or worsening symptoms. Any changes to existing medications or new prescriptions were reviewed with patient and they expressed understanding of how to correctly take their medications and the possible side effects. PATIENT REFERRED TO:  Oc Love DO  [address]  [address] [facility] [address]  [address] [place] [address] ED  [facility]  [phone]    As needed, If symptoms worsen    DISCHARGE MEDICATIONS:  New Prescriptions    AZITHROMYCIN (ZITHROMAX) 250 MG TABLET    Take 1 tablet by mouth See Admin Instructions for 5 days 500mg on day 1 followed by 250mg on days 2 - 5    BENZONATATE (TESSALON PERLES) 100 MG CAPSULE    Take 1 capsule by mouth 3 times daily as needed for Cough       Apurva Cabrera DO  Emergency Medicine Physician    (Please note that portions of this note were completed with a voice recognition program.  Efforts were made to edit the dictations but occasionally words are mis-transcribed.)        Richie Reaves DO  11/27/22 3614

## 2022-11-28 ENCOUNTER — CARE COORDINATION (OUTPATIENT)
Dept: CASE MANAGEMENT | Age: 87
End: 2022-11-28

## 2022-11-28 ENCOUNTER — TELEPHONE (OUTPATIENT)
Dept: FAMILY MEDICINE CLINIC | Age: 87
End: 2022-11-28

## 2022-11-28 ENCOUNTER — TELEPHONE (OUTPATIENT)
Dept: OTHER | Facility: CLINIC | Age: 87
End: 2022-11-28

## 2022-11-28 DIAGNOSIS — J32.9 SINUSITIS, UNSPECIFIED CHRONICITY, UNSPECIFIED LOCATION: Primary | ICD-10-CM

## 2022-11-28 RX ORDER — AMOXICILLIN 500 MG/1
500 CAPSULE ORAL 2 TIMES DAILY
Qty: 14 CAPSULE | Refills: 0 | Status: SHIPPED | OUTPATIENT
Start: 2022-11-28 | End: 2022-12-05

## 2022-11-28 NOTE — CARE COORDINATION
[facility] ED Follow Up Call    2022    Patient: Nia Kelley Patient : 1935   MRN: [id number]  Reason for Admission: URI/pneumonia  Discharge Date: 22        Care Transitions ED Follow Up    Care Transitions Interventions    Pharmacist: Completed      Registered Dietician: Completed      Do you have any ongoing symptoms?: Yes   Onset of Patient-reported symptoms: Today   Patient-reported symptoms: Congestion, Weakness, Cough, Fatigue   Did you call your PCP prior to going to the ED?: No - Did not call PCP   Are there any other complaints/concerns that you wish to tell your provider?: Pt stated that she was \"a little better\". She said that she continues with the nasal congestion, non productive cough, some slight shortness of breath when she does too much, and has some slight body aches. She denied any fever/chills, swelling and is eating/drinking. She said that the home care nurse was out and was questioning taking the zithromax with her amiodarone. Nurse did call the PCP office and Kian Yepez is waiting on a call back about whether to take the antibiotic or not. Ajay Matthew, her grandchild will be calling to make follow up. Explained to Kian Yepez that Hoa Angulo will send message to her PCP about the antibiotic and whether she should take it. And to wait for the office to call back before taking it.    Are you following your discharge instructions?: Yes   Do you have all of your prescriptions and are they filled?: Yes   Were you discharged with any Home Care or Post Acute Services or do you currently have any active services?: Yes   Post Acute Services: Home Health (Comment: Mathew)         Patient DME: Jo rai   Do you have any needs or concerns that I can assist you with?: No

## 2022-11-28 NOTE — TELEPHONE ENCOUNTER
Pooja Pulido from [facility] is calling to confirm that is ok for Hannah Valencia to take Zithromax due to the counter reaction to Amiodarone. Patient has taken 2 dose of the medication with any problems.     Please advise

## 2022-11-28 NOTE — TELEPHONE ENCOUNTER
RN access attempted to contact patient regarding need for ED follow-up appointment. Attempt was successful. Patient able to contact PCP to schedule. No further help needed at this time.     Spoke with ginny and she will schedule f/u appt for pt

## 2022-11-29 NOTE — TELEPHONE ENCOUNTER
Upon further review, she took azithromycin before being on amiodarone.  I'd rather she stop the zpak and I will send in an alternate antibiotic

## 2022-11-30 ENCOUNTER — CARE COORDINATION (OUTPATIENT)
Dept: CARE COORDINATION | Age: 87
End: 2022-11-30

## 2022-11-30 NOTE — CARE COORDINATION
Nutrition Care Coordinator Follow-Up visit:    Food Recall:eating 2-3 meals/d    Activity Level:  Sedentary:X  Lightly Active: Moderately Active:  Very Active:    Adult BMI:  Underweight (below 18.5)  Normal Weight (18.5-24.9)X  Overweight (25-29. 9)  Obese (30-39. 9)  Morbidly Obese (>40)    Weight Change:down 8# over past month    Plan:  Plan was established with patient:  Increase dietary fiber by consuming whole grains, fruits and vegetables:  Limit dietary cholesterol to >100mg/day:X  Increase water intake:  Avoid added sugar:  Avoid sweetened beverages:  Choose lean meats:X  Limit sodium intake: X    Monitoring: Will monitor weight:  Will monitor adherence to meal plan:X  Will monitor adherence to exercise plan: Will monitor HGA1c:    Handouts Provided :  Low Carb snacking:  Carb counting /individual meal plan:  Portion Control:  Food Labels:  Physical Activity:  Low Fat/Cholesterol:X  Hypo/Hyperglycemia:  Calorie Controlled Meal Plan:  DASH guidelines: X    Goals: Increase water consumption to 8oz. 6-8 times daily:  Manage blood sugars by consuming 3 meals spaced every 4-5 hours with 2-3 snacks daily:  Increase fiber and decrease fat intake by consuming 1-2 fruit servings and 2-3 vegetable servings per day. Increase physical activity by:  Consume less than 2,000mg of sodium/day: discussed  Avoid consumption of sweetened beverages and added sugar by reading food labels:  Monitor blood sugars by using meter to check blood glucose before morning meal and 2 hours after a meal daily:  Decrease risk of coronary heart disease by consuming fish that contains omega-3 fatty acids at least twice a week, avoiding partially hydrogenated oil/trans fats and limiting saturated fat intake by reading food labels:discussed    Patient goals set: 1. Reviewed DASH guidelines- lowfat/cholesterol and low sodium. Reviewed reading food labels-what to look for on labels.   Encouraged to limit saturated fat, trans fat, cholesterol and sodium intake. 2. Reviewed avoiding canned, prepackaged foods, processed meats and cheese. Discussed processed meats in detail to avoid/limit- sausage, riddle, bologna, ham, ect- patient is eating some of these, states will avoid/limit. Goal is <2gm of sodium/day. 3. Reviewed shopping the outer rim of the grocery store where fresher foods are found. Discussed seasonings that can be used that do not contain salt. Patient states she does not add salt to foods. 4. Reviewed avoiding caffeine. Encouraged water and caffeine free beverages. Spoke with patient who relays doing well, did not get nutrition information yet- will resend info. Patient is reading labels to limit sodium intake. Discussed recent weight loss- patient states unsure why her weight is down, denies decreased appetite states eating 3 meals most day. Will send patient Ensure coupons in case she would like to try it to prevent further weight loss. Patient states no questions at this time. Will follow up in 3-4 weeks to review and answer questions.   Walter Chiang

## 2022-12-02 ENCOUNTER — CARE COORDINATION (OUTPATIENT)
Dept: CASE MANAGEMENT | Age: 87
End: 2022-12-02
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
## 2022-12-02 NOTE — CARE COORDINATION
[facility] Care Transitions Follow Up Call    Care Transition Nurse contacted the patient by telephone to follow up after admission on 22. Verified name and  with patient as identifiers. Patient: Guy Cordova  Patient : 1935   MRN: [id number]  Reason for Admission: CHF /  ED URI/pneumonia  Discharge Date: 22 RARS: Readmission Risk Score: 17.1      Needs to be reviewed by the provider   Additional needs identified to be addressed with provider: No  none             Method of communication with provider: none. Was able to contact Jarod Paulino for transitional outreach. She stated that today was not a good day. She said that she continues with nasal and chest congestion, non productive cough, body aches, no increased shortness of breath or swelling. She said that she is weak and tired, but it eating and drinking. She did  the new antibiotic and is taking it. Encouraged her to get plenty of rest and if no relief, maybe she should see the provider VU. She had no further questions or concerns. Addressed changes since last contact:  none  Discussed follow-up appointments. If no appointment was previously scheduled, appointment scheduling offered: Follow Up  Future Appointments   Date Time Provider Erick Shepherd   2022  2:00 PM STV CHF CLINIC RM 1 STVZ CHF CLI [facility]   2023 10:40 AM DO REZA Gonzales  [facility]   2023 11:20 AM MALVIN Parker - CNP Neuro Spec Neurology -     [facility] follow up appointment(s):     Care Transition Nurse reviewed medical action plan and red flags with patient and discussed any barriers to care and/or understanding of plan of care after discharge. Discussed appropriate site of care based on symptoms and resources available to patient including: PCP  Home health  When to call 911. The patient agrees to contact the PCP office for questions related to their healthcare.      Patients top risk factors for readmission: lack of knowledge about disease and medical condition-CHF  Interventions to address risk factors: Obtained and reviewed discharge summary and/or continuity of care documents    Offered patient enrollment in the Remote Patient Monitoring (RPM) program for in-home monitoring: Patient declined. Care Transitions Subsequent and Final Call    Subsequent and Final Calls  Do you have any ongoing symptoms?: Yes  Onset of Patient-reported symptoms: In the past 7 days  Patient-reported symptoms: Congestion, Fatigue, Cough  Have your medications changed?: Yes  Patient Reports: stopped zithromax and started amoxicillin  Do you have any questions related to your medications?: No  Do you currently have any active services?: Yes  Are you currently active with any services?: Home Health  Do you have any needs or concerns that I can assist you with?: No  Care Transitions Interventions    Pharmacist: Completed      Registered Dietician: Completed    Other Interventions:             Care Transition Nurse provided contact information for future needs. Plan for follow-up call in 7-10 days based on severity of symptoms and risk factors. Plan for next call: symptom management-follow up on any shortness of breath, congestion /CHF symptoms.   Final call Hand off to [facility] KENDALL Cabrera RN

## 2022-12-05 ENCOUNTER — CARE COORDINATION (OUTPATIENT)
Dept: CASE MANAGEMENT | Age: 87
End: 2022-12-05

## 2022-12-05 NOTE — CARE COORDINATION
[facility] Care Transitions Follow Up Call    Patient: Aurora Peterson  Patient : 1935   MRN: [id number]  Reason for Admission: CHF /  ED URI/pneumonia  Discharge Date: 22 RARS: Readmission Risk Score: 17.1      Needs to be reviewed by the provider   Additional needs identified to be addressed with provider: No  none             Method of communication with provider: none. Attempt to reach patient for Care Transitions. No answer and no voice mail. Will attempt to contact at a later date/ time.     Follow Up  Future Appointments   Date Time Provider Erick Shepherd   2022  2:00 PM STV CHF CLINIC RM 1 STVZ CHF CLI [facility]   [facility]   2023 11:20 AM MALVIN Lares - CNP Neuro Spec Neurology -          Care Transitions Subsequent and Final Call    Subsequent and Final Calls  Are you currently active with any services?: Home Health  Care Transitions Interventions    Pharmacist: Completed      Registered Dietician: Completed    Other Interventions:             Jose Tinoco RN
Name band;

## 2022-12-06 ENCOUNTER — CARE COORDINATION (OUTPATIENT)
Dept: CASE MANAGEMENT | Age: 87
End: 2022-12-06

## 2022-12-06 NOTE — CARE COORDINATION
[facility] Care Transitions Follow Up Call    Care Transition Nurse contacted the patient by telephone to follow up after admission on 22. Verified name and  with patient as identifiers. Patient: Oriana Collins  Patient : 1935   MRN: [id number]  Reason for Admission: URI/pneumonia  Discharge Date: 22 RARS: Readmission Risk Score: 17.1      Needs to be reviewed by the provider   Additional needs identified to be addressed with provider: No  none             Method of communication with provider: none. Was able to contact Richard Peralta for final outreach. She stated that she still was not over her upper respiratory infection. She said that she is still congested and has sinus drainage. She denied any shortness of breath, swelling, fever/chills, no cough, and no shore throat. She said that she weighs herself, but does not write down her weights and does not remember what she was running. She weighed herself while on the phone and she said that it was 125 lbs. She said that she was eating/drinking. She has an appointment with CHF clinic tomorrow. Informed of final outreach and she was agreeable to [facility] referral.  Hand off to [id number] changes since last contact:  none  Discussed follow-up appointments. If no appointment was previously scheduled, appointment scheduling offered: Follow Up  Future Appointments   Date Time Provider Erick Shepherd   2022  2:00 PM [facility]  [facility]   2023 10:40 AM DO REZA Courtney [facility]   2023 11:20 AM MALVIN Arellano CNP Neuro Spec Neurology -     Non-[facility] follow up appointment(s):     Care Transition Nurse reviewed medical action plan and red flags with patient and discussed any barriers to care and/or understanding of plan of care after discharge.  Discussed appropriate site of care based on symptoms and resources available to patient including: PCP  Specialist  When to call 911. The patient agrees to contact the PCP office for questions related to their healthcare. Patients top risk factors for readmission: functional cognitive ability, lack of knowledge about disease, and medical condition-CHF  Interventions to address risk factors: Obtained and reviewed discharge summary and/or continuity of care documents    Offered patient enrollment in the Remote Patient Monitoring (RPM) program for in-home monitoring:  aashish Xavier felt that Jacob Mead would not be able to do RPM .     Care Transitions Subsequent and Final Call    Subsequent and Final Calls  Do you have any ongoing symptoms?: Yes  Onset of Patient-reported symptoms: In the past 7 days  Patient-reported symptoms: Other  Have your medications changed?: No  Do you have any questions related to your medications?: No  Do you currently have any active services?: Yes  Are you currently active with any services?: Home Health  Do you have any needs or concerns that I can assist you with?: No  Care Transitions Interventions    Pharmacist: Completed      Registered Dietician: Completed    Other Interventions:             Care Transition Nurse provided contact information for future needs. No further follow-up call indicated based on severity of symptoms and risk factors. Plan for next call: referral to ambulatory care manager-Debra ARGUETA  Final call episode ended.     Karo Nelson RN

## 2022-12-07 ENCOUNTER — HOSPITAL ENCOUNTER (OUTPATIENT)
Dept: OTHER | Age: 87
Discharge: HOME | End: 2022-12-07
Payer: MEDICARE

## 2022-12-07 VITALS
RESPIRATION RATE: 20 BRPM | SYSTOLIC BLOOD PRESSURE: 120 MMHG | OXYGEN SATURATION: 99 % | WEIGHT: 123 LBS | HEART RATE: 89 BPM | BODY MASS INDEX: 21.11 KG/M2 | DIASTOLIC BLOOD PRESSURE: 70 MMHG

## 2022-12-07 DIAGNOSIS — I50.32 CHRONIC HEART FAILURE WITH PRESERVED EJECTION FRACTION (HCC): Primary | ICD-10-CM

## 2022-12-07 DIAGNOSIS — I27.20 PULMONARY HYPERTENSION (HCC): ICD-10-CM

## 2022-12-07 PROCEDURE — 99215 OFFICE O/P EST HI 40 MIN: CPT | Performed by: NURSE PRACTITIONER

## 2022-12-07 ASSESSMENT — ENCOUNTER SYMPTOMS
COUGH: 1
EYE DISCHARGE: 0
SHORTNESS OF BREATH: 1
ABDOMINAL PAIN: 0
BLOOD IN STOOL: 0

## 2022-12-07 NOTE — PROGRESS NOTES
CHF Clinic at [facility] [place]    Office: [phone] Fax: [phone] [facility]  [phone]  Dept: [phone]  Loc: [phone]    Jamal Burt is a 82 y.o. female who presents today for CHF evaluation. HPI:     Pt is here with grand dtr Yvonne Araiza. Pt of dr Mary Hoover. Referral per PCP  +  shortness of breath, above baseline , approx 1 month . Past month called ambulance 3 x for sob. Recently had viral URI. She has tested neg x3, for covid,  PNA per ER. Zpak and amoxicillin  from PCP. H/o PE, on eliquis  No PULM  +   fatigue x sev yrs.   +  Edema , went to ER and seen for Diast HF. Edema better now. Denies chest pain   Denies  palpitations   Throat irritated today   No orthopnea , nor PND     Has walker for home     Using cane when outside the house    5 meals/ week from , unknown amount of Na+   Pt was taken off of her bumex after a fall and then had a diastolic chf exacerbation.        Past Medical History:   Diagnosis Date    Blood circulation, collateral     CHF (congestive heart failure) (HCC)     COPD (chronic obstructive pulmonary disease) (HCC)     GERD (gastroesophageal reflux disease)     Hyperlipidemia     Hypertension      Past Surgical History:   Procedure Laterality Date    BLADDER SUSPENSION  2001    CHOLECYSTECTOMY      COLONOSCOPY N/A 2/10/2022    COLONOSCOPY WITH BIOPSY performed by Saima Tidwell MD at [facility] ([address] [place] [address])  ovaries left    JOINT REPLACEMENT  left total shoulder; 1990    UPPER GASTROINTESTINAL ENDOSCOPY N/A 2/10/2022    EGD ESOPHAGOGASTRODUODENOSCOPY performed by Saima Tidwell MD at [facility]       Family History   Problem Relation Age of Onset    Cancer Brother     Cancer Other         bone    Cancer Daughter         breast       Social History     Tobacco Use    Smoking status: Former Types: Cigarettes     Quit date: 1984     Years since quittin.9    Smokeless tobacco: Never   Substance Use Topics    Alcohol use: No      Current Outpatient Medications   Medication Sig Dispense Refill    pravastatin (PRAVACHOL) 40 MG tablet TAKE 1 TABLET DAILY 90 tablet 3    amiodarone (CORDARONE) 200 MG tablet TAKE ONE TABLET BY MOUTH DAILY 30 tablet 0    metoprolol tartrate (LOPRESSOR) 25 MG tablet Take 1 tablet by mouth 2 times daily 60 tablet 5    lisinopril (PRINIVIL;ZESTRIL) 10 MG tablet Take 1 tablet by mouth daily 90 tablet 0    Compression Stockings MISC by Does not apply route 1 each 0    spironolactone (ALDACTONE) 50 MG tablet Take 1 tablet by mouth daily 30 tablet 3    bumetanide (BUMEX) 1 MG tablet Take 2 tablets by mouth daily 30 tablet 1    apixaban (ELIQUIS) 5 MG TABS tablet Take 1 tablet by mouth 2 times daily 60 tablet 1    ferrous sulfate (FE TABS 325) 325 (65 Fe) MG EC tablet Take 1 tablet by mouth daily (with breakfast) 90 tablet 3    acetaminophen (TYLENOL) 500 MG tablet Take 1 tablet by mouth every 6 hours as needed for Pain 120 tablet 1    calcium-vitamin D (OSCAL-500) 500-200 MG-UNIT per tablet Take 1 tablet by mouth 2 times daily 60 tablet 3    pantoprazole (PROTONIX) 40 MG tablet Take 1 tablet by mouth daily Take one tab every morning before breakfast 30 tablet 1    Multiple Vitamins-Minerals (THERAPEUTIC MULTIVITAMIN-MINERALS) tablet Take 1 tablet by mouth daily       No current facility-administered medications for this encounter. Allergies   Allergen Reactions    Codeine Nausea Only     dizziness    Bactrim [Sulfamethoxazole-Trimethoprim] Hives    Naproxen      On allergy list ? Unsure of reaction    Norvasc [Amlodipine Besylate]      Edema-she thinks      Sertraline Hcl      unsure    Toradol [Ketorolac Tromethamine]      unsure    Tramadol      unsure    Magnesium-Containing Compounds Diarrhea     Mag ox 500 ?     Morphine Nausea And Vomiting    Percocet [Oxycodone-Acetaminophen] Nausea And Vomiting         Subjective:      Review of Systems   Constitutional:  Positive for fatigue. Negative for activity change, chills and fever. Eyes:  Negative for discharge and visual disturbance. Respiratory:  Positive for cough (1-2 weeks) and shortness of breath. Cardiovascular:  Negative for chest pain and leg swelling. Gastrointestinal:  Negative for abdominal pain and blood in stool. Endocrine: Negative for cold intolerance and heat intolerance. Genitourinary:  Negative for dysuria and flank pain. Musculoskeletal:  Negative for joint swelling and myalgias. Skin:  Negative for pallor and rash. Neurological:  Negative for dizziness and headaches. Psychiatric/Behavioral:  Negative for hallucinations and suicidal ideas. Objective:     Physical Exam  Vitals and nursing note reviewed. Constitutional:       Comments:      HENT:      Head: Normocephalic and atraumatic. Eyes:      General: No scleral icterus. Conjunctiva/sclera: Conjunctivae normal.   Cardiovascular:      Rate and Rhythm: Normal rate and regular rhythm. Heart sounds: Normal heart sounds. Pulmonary:      Effort: Pulmonary effort is normal.      Breath sounds: No wheezing or rales. Comments: Slightly diminished in bases , effort excellent for 83 yo   Abdominal:      General: Bowel sounds are normal.      Palpations: Abdomen is soft. Musculoskeletal:         General: Normal range of motion. Cervical back: Normal range of motion. Right lower leg: No edema. Left lower leg: No edema. Skin:     General: Skin is warm and dry. Neurological:      Mental Status: She is alert and oriented to person, place, and time. /70   Pulse 89   Resp 20   Wt 123 lb (55.8 kg)   SpO2 99%   BMI 21.11 kg/m²   O2 Device: None (Room air)       Lower Extremity Measurements in cm.    R Calf Circumference (cm): 31 cm  L Calf Circumference (cm): 30.5 cm  R Ankle Circumference (cm): 18 cm  L Ankle Circumference (cm): 18 cm    CBC:   Lab Results   Component Value Date/Time    WBC 5.7 11/03/2022 05:28 AM    RBC 3.55 11/03/2022 05:28 AM    HGB 7.6 11/03/2022 05:28 AM    HCT 27.5 11/03/2022 05:28 AM    MCV 77.5 11/03/2022 05:28 AM    MCH 21.4 11/03/2022 05:28 AM    MCHC 27.6 11/03/2022 05:28 AM    RDW 20.0 11/03/2022 05:28 AM     11/03/2022 05:28 AM    MPV 9.4 11/03/2022 05:28 AM     CMP:    Lab Results   Component Value Date/Time     11/04/2022 05:01 AM    K 3.7 11/04/2022 05:01 AM     11/04/2022 05:01 AM    CO2 26 11/04/2022 05:01 AM    BUN 13 11/04/2022 05:01 AM    CREATININE 0.72 11/04/2022 05:01 AM    GFRAA >60 02/16/2022 03:45 PM    LABGLOM >60 11/04/2022 05:01 AM    GLUCOSE 84 11/04/2022 05:01 AM    GLUCOSE 109 09/14/2016 09:54 AM    PROT 6.8 02/16/2022 03:45 PM    LABALBU 3.9 04/22/2022 12:00 AM    CALCIUM 8.9 11/04/2022 05:01 AM    BILITOT 0.3 04/22/2022 12:00 AM    ALKPHOS 42 04/22/2022 12:00 AM    AST 19 04/22/2022 12:00 AM    ALT 15 04/22/2022 12:00 AM     Lab Results   Component Value Date    LABA1C 5.4 09/17/2016     Summary  Global left ventricular systolic function is normal. Estimated ejection  fraction is 55-60 % . No obvious wall motion abnormality seen. Grade III (severe) left ventricular diastolic dysfunction. Left atrium is severely dilated. Mildly dilated right ventricular cavity. Calcified aortic valve leaflets with sclerosis, no significant stenosis,  mean gradient 10 mm Hg  Mild aortic insufficiency. Mild mitral regurgitation. Severe tricuspid regurgitation. Moderate pulmonary hypertension with an estimated right ventricular systolic  pressure of 51 mmHg. No pericardial effusion. :Assessment      1. Chronic heart failure with preserved ejection fraction ([address]    2. Pulmonary hypertension ([address]        :Plan      1.  Chronic heart failure with preserved ejection fraction ([address]    2. Pulmonary hypertension ([address] Initial weight taken  Initial  leg measurements taken   Euvolemic on exam . 81 yo with Diastolic Dysfunction   HF. On Guideline-Directed Medication Therapy:     ACEI / ARB / ARNi:   Beta - blocker  Diuretic     No Mineralocorticoid receptor antagonists (MRAs), SGLT2        CHF Education completed. Reviewed all educational  material in the new pt education folder. Educational materials  given and explained in detail include: multiple pages on Low Sodium food choices, High Na+ foods to avoid, how to read a food label, Understanding HF as a Chronic Disease (Stoplight/ HF Zones), Weight and Heart Failure Zone log, and illustrated Signs and Symptoms of Heart Failure. Pt to return 3 w as new pt. Family wished to bring pt back in 4 w d/t holiday. Agreeable d/t pt's stable presentation today. No orders of the defined types were placed in this encounter. No orders of the defined types were placed in this encounter. Patientgiven verbal and/or written educational instructions. Follow up as directed. I have reviewed and agree with the nursing documentation. Verbally reviewed medication list with patient; patient verbalized understanding. Discussed 2000mg/day sodium restricted diet; patient verbalized understanding. Moderate daily exercise encouraged as tolerated. Discussed rest breaks as needed; patient verbalized understanding. Patient instructed to weigh self at the same time of each day, using same clothes and same scale; reinforced teaching to monitor for 3-5 lb weight increase over 1-2 days, and to notify the CHF clinic at [phone] or physician office if weight change noted. Patient verbalized understanding. Risks of smoking discussed with the patient if applicable; patient strongly discouraged to smoke. Patient verbalized understanding.      Signs and symptoms of CHF discussed with patient, such as feeling more tired than normal, feeling short of breath, coughing that increases when you lie down, sudden weight gain, swelling of your feet, legs or belly. Patient verbalized understanding to notify the CHF clinic at [phone] or physician office if these symptoms occur. Compliance with plan of care and further disease process causes discussed with patient, patient encouraged to keep all follow up appointments. Patient verbalized understanding. Echocardiogram reviewed. Labs reviewed. Medications reviewed.       Electronically signedby MALVIN Kingsley CNP on 12/7/2022 at 3:33 PM

## 2022-12-08 ENCOUNTER — TELEPHONE (OUTPATIENT)
Dept: FAMILY MEDICINE CLINIC | Age: 87
End: 2022-12-08

## 2022-12-08 RX ORDER — BUMETANIDE 1 MG/1
TABLET ORAL
Qty: 30 TABLET | Refills: 1 | OUTPATIENT
Start: 2022-12-08

## 2022-12-08 RX ORDER — BUMETANIDE 1 MG/1
2 TABLET ORAL DAILY
Qty: 30 TABLET | Refills: 1 | Status: SHIPPED | OUTPATIENT
Start: 2022-12-08

## 2022-12-08 NOTE — TELEPHONE ENCOUNTER
Rest, maintain adequate hydration, and give it some more time.  I would recommend she use Flonase and nasal saline rinses (both over the counter) to help with the congestion

## 2022-12-08 NOTE — TELEPHONE ENCOUNTER
Please send 2 weeks to [facility] until mail order comes in.     Send 90 days to [facility]    Thank you,   Charlene Fatima

## 2022-12-08 NOTE — TELEPHONE ENCOUNTER
Patient still has head congestion, achey all over. She just recently completed Zithromax    Please advise pt.     She was tested 11/28/22 negative for Covid    Please advise pt     Thank you,  Salvador Vidal

## 2022-12-09 ENCOUNTER — CARE COORDINATION (OUTPATIENT)
Dept: CARE COORDINATION | Age: 87
End: 2022-12-09

## 2022-12-09 NOTE — TELEPHONE ENCOUNTER
Patient notified of Dr. Jose Colon message and instructions  Patient understood and will have her friend  the Flonase and Nasal saline rinses    Tracy Martinez

## 2022-12-12 RX ORDER — BENZONATATE 100 MG/1
CAPSULE ORAL
Qty: 21 CAPSULE | OUTPATIENT
Start: 2022-12-12

## 2022-12-12 RX ORDER — AZITHROMYCIN 250 MG/1
TABLET, FILM COATED ORAL
Qty: 6 TABLET | OUTPATIENT
Start: 2022-12-12

## 2022-12-19 ENCOUNTER — CARE COORDINATION (OUTPATIENT)
Dept: CARE COORDINATION | Age: 87
End: 2022-12-19

## 2022-12-20 ENCOUNTER — CARE COORDINATION (OUTPATIENT)
Dept: CARE COORDINATION | Age: 87
End: 2022-12-20

## 2023-01-04 ENCOUNTER — CARE COORDINATION (OUTPATIENT)
Dept: CARE COORDINATION | Age: 88
End: 2023-01-04

## 2023-01-04 NOTE — CARE COORDINATION
ACM reviewed chart for CC outreach, patient currently in rehab post surgery  Will follow for discharge disposition, and CC needs

## 2023-01-19 ENCOUNTER — CARE COORDINATION (OUTPATIENT)
Dept: CARE COORDINATION | Age: 88
End: 2023-01-19

## 2023-01-19 NOTE — CARE COORDINATION
ACM spoke with grand daughter Omar Garcia and informed [facility] that Steffi Pineda is still in SNF and are working on long term at this time, due to several falls and recent hip fracture  ACM will discharge from [address] [place] [address] at this time

## 2023-02-18 ENCOUNTER — HOSPITAL ENCOUNTER (INPATIENT)
Age: 88
LOS: 3 days | Discharge: NURSING, REHAB OR LONG-TERM CARE | DRG: 311 | End: 2023-02-21
Attending: EMERGENCY MEDICINE | Admitting: STUDENT IN AN ORGANIZED HEALTH CARE EDUCATION/TRAINING PROGRAM
Payer: MEDICARE

## 2023-02-18 ENCOUNTER — APPOINTMENT (OUTPATIENT)
Dept: GENERAL RADIOLOGY | Age: 88
DRG: 311 | End: 2023-02-18
Payer: MEDICARE

## 2023-02-18 DIAGNOSIS — R07.9 CHEST PAIN, UNSPECIFIED TYPE: Primary | ICD-10-CM

## 2023-02-18 DIAGNOSIS — R77.8 ELEVATED TROPONIN: ICD-10-CM

## 2023-02-18 LAB
ABSOLUTE EOS #: 0.11 K/UL (ref 0–0.4)
ABSOLUTE LYMPH #: 1.22 K/UL (ref 1–4.8)
ABSOLUTE MONO #: 0.27 K/UL (ref 0.1–0.8)
ANION GAP SERPL CALCULATED.3IONS-SCNC: 14 MMOL/L (ref 9–17)
BASOPHILS # BLD: 1 % (ref 0–2)
BASOPHILS ABSOLUTE: 0.05 K/UL (ref 0–0.2)
BNP SERPL-MCNC: 1860 PG/ML
BUN SERPL-MCNC: 20 MG/DL (ref 8–23)
CALCIUM SERPL-MCNC: 10 MG/DL (ref 8.6–10.4)
CHLORIDE SERPL-SCNC: 99 MMOL/L (ref 98–107)
CO2 SERPL-SCNC: 27 MMOL/L (ref 20–31)
CREAT SERPL-MCNC: 1.01 MG/DL (ref 0.5–0.9)
EOSINOPHILS RELATIVE PERCENT: 2 % (ref 1–4)
GFR SERPL CREATININE-BSD FRML MDRD: 55 ML/MIN/1.73M2
GLUCOSE SERPL-MCNC: 89 MG/DL (ref 70–99)
HCT VFR BLD AUTO: 42.3 % (ref 36–46)
HGB BLD-MCNC: 13.7 G/DL (ref 12–16)
LYMPHOCYTES # BLD: 23 % (ref 24–44)
MCH RBC QN AUTO: 27 PG (ref 26–34)
MCHC RBC AUTO-ENTMCNC: 32.3 G/DL (ref 31–37)
MCV RBC AUTO: 83.6 FL (ref 80–100)
MONOCYTES # BLD: 5 % (ref 1–7)
MORPHOLOGY: ABNORMAL
PARTIAL THROMBOPLASTIN TIME: 26.1 SEC (ref 21.3–31.3)
PARTIAL THROMBOPLASTIN TIME: 59.6 SEC (ref 21.3–31.3)
PDW BLD-RTO: 20.9 % (ref 12.5–15.4)
PLATELET # BLD AUTO: 156 K/UL (ref 140–450)
PMV BLD AUTO: 8.4 FL (ref 6–12)
POTASSIUM SERPL-SCNC: 3.2 MMOL/L (ref 3.7–5.3)
RBC # BLD: 5.06 M/UL (ref 4–5.2)
SEG NEUTROPHILS: 69 % (ref 36–66)
SEGMENTED NEUTROPHILS ABSOLUTE COUNT: 3.65 K/UL (ref 1.8–7.7)
SODIUM SERPL-SCNC: 140 MMOL/L (ref 135–144)
TROPONIN I SERPL DL<=0.01 NG/ML-MCNC: 33 NG/L (ref 0–14)
TROPONIN I SERPL DL<=0.01 NG/ML-MCNC: 35 NG/L (ref 0–14)
TROPONIN I SERPL DL<=0.01 NG/ML-MCNC: 35 NG/L (ref 0–14)
WBC # BLD AUTO: 5.3 K/UL (ref 3.5–11)

## 2023-02-18 PROCEDURE — 96374 THER/PROPH/DIAG INJ IV PUSH: CPT

## 2023-02-18 PROCEDURE — 80048 BASIC METABOLIC PNL TOTAL CA: CPT

## 2023-02-18 PROCEDURE — 36415 COLL VENOUS BLD VENIPUNCTURE: CPT

## 2023-02-18 PROCEDURE — 6370000000 HC RX 637 (ALT 250 FOR IP): Performed by: HOSPITALIST

## 2023-02-18 PROCEDURE — 6360000002 HC RX W HCPCS: Performed by: EMERGENCY MEDICINE

## 2023-02-18 PROCEDURE — 2580000003 HC RX 258: Performed by: HOSPITALIST

## 2023-02-18 PROCEDURE — 83880 ASSAY OF NATRIURETIC PEPTIDE: CPT

## 2023-02-18 PROCEDURE — 94760 N-INVAS EAR/PLS OXIMETRY 1: CPT

## 2023-02-18 PROCEDURE — 85730 THROMBOPLASTIN TIME PARTIAL: CPT

## 2023-02-18 PROCEDURE — 6360000002 HC RX W HCPCS: Performed by: HOSPITALIST

## 2023-02-18 PROCEDURE — 99222 1ST HOSP IP/OBS MODERATE 55: CPT | Performed by: HOSPITALIST

## 2023-02-18 PROCEDURE — 99285 EMERGENCY DEPT VISIT HI MDM: CPT

## 2023-02-18 PROCEDURE — 84484 ASSAY OF TROPONIN QUANT: CPT

## 2023-02-18 PROCEDURE — 71045 X-RAY EXAM CHEST 1 VIEW: CPT

## 2023-02-18 PROCEDURE — 2580000003 HC RX 258: Performed by: EMERGENCY MEDICINE

## 2023-02-18 PROCEDURE — 6370000000 HC RX 637 (ALT 250 FOR IP): Performed by: EMERGENCY MEDICINE

## 2023-02-18 PROCEDURE — 85025 COMPLETE CBC W/AUTO DIFF WBC: CPT

## 2023-02-18 PROCEDURE — 93005 ELECTROCARDIOGRAM TRACING: CPT | Performed by: EMERGENCY MEDICINE

## 2023-02-18 PROCEDURE — 1210000000 HC MED SURG R&B

## 2023-02-18 RX ORDER — HEPARIN SODIUM 10000 [USP'U]/100ML
5-30 INJECTION, SOLUTION INTRAVENOUS CONTINUOUS
Status: DISCONTINUED | OUTPATIENT
Start: 2023-02-18 | End: 2023-02-21 | Stop reason: HOSPADM

## 2023-02-18 RX ORDER — OMEGA-3S/DHA/EPA/FISH OIL/D3 300MG-1000
200 CAPSULE ORAL 2 TIMES DAILY
Status: DISCONTINUED | OUTPATIENT
Start: 2023-02-18 | End: 2023-02-21 | Stop reason: HOSPADM

## 2023-02-18 RX ORDER — LISINOPRIL 10 MG/1
10 TABLET ORAL DAILY
Status: DISCONTINUED | OUTPATIENT
Start: 2023-02-18 | End: 2023-02-19

## 2023-02-18 RX ORDER — 0.9 % SODIUM CHLORIDE 0.9 %
500 INTRAVENOUS SOLUTION INTRAVENOUS ONCE
Status: COMPLETED | OUTPATIENT
Start: 2023-02-18 | End: 2023-02-18

## 2023-02-18 RX ORDER — SODIUM CHLORIDE 9 MG/ML
INJECTION, SOLUTION INTRAVENOUS PRN
Status: DISCONTINUED | OUTPATIENT
Start: 2023-02-18 | End: 2023-02-21 | Stop reason: HOSPADM

## 2023-02-18 RX ORDER — MORPHINE SULFATE 4 MG/ML
4 INJECTION, SOLUTION INTRAMUSCULAR; INTRAVENOUS ONCE
Status: COMPLETED | OUTPATIENT
Start: 2023-02-18 | End: 2023-02-18

## 2023-02-18 RX ORDER — PRAVASTATIN SODIUM 20 MG
40 TABLET ORAL NIGHTLY
Status: DISCONTINUED | OUTPATIENT
Start: 2023-02-18 | End: 2023-02-21 | Stop reason: HOSPADM

## 2023-02-18 RX ORDER — HEPARIN SODIUM 1000 [USP'U]/ML
60 INJECTION, SOLUTION INTRAVENOUS; SUBCUTANEOUS ONCE
Status: COMPLETED | OUTPATIENT
Start: 2023-02-18 | End: 2023-02-18

## 2023-02-18 RX ORDER — LANOLIN ALCOHOL/MO/W.PET/CERES
325 CREAM (GRAM) TOPICAL
Status: DISCONTINUED | OUTPATIENT
Start: 2023-02-19 | End: 2023-02-21 | Stop reason: HOSPADM

## 2023-02-18 RX ORDER — ASPIRIN 81 MG/1
81 TABLET, CHEWABLE ORAL DAILY
Status: DISCONTINUED | OUTPATIENT
Start: 2023-02-19 | End: 2023-02-21 | Stop reason: HOSPADM

## 2023-02-18 RX ORDER — ONDANSETRON 4 MG/1
4 TABLET, ORALLY DISINTEGRATING ORAL EVERY 8 HOURS PRN
Status: DISCONTINUED | OUTPATIENT
Start: 2023-02-18 | End: 2023-02-21 | Stop reason: HOSPADM

## 2023-02-18 RX ORDER — MAGNESIUM SULFATE IN WATER 40 MG/ML
2000 INJECTION, SOLUTION INTRAVENOUS PRN
Status: DISCONTINUED | OUTPATIENT
Start: 2023-02-18 | End: 2023-02-21 | Stop reason: HOSPADM

## 2023-02-18 RX ORDER — AMIODARONE HYDROCHLORIDE 200 MG/1
200 TABLET ORAL DAILY
Status: DISCONTINUED | OUTPATIENT
Start: 2023-02-18 | End: 2023-02-21 | Stop reason: HOSPADM

## 2023-02-18 RX ORDER — SODIUM CHLORIDE 0.9 % (FLUSH) 0.9 %
5-40 SYRINGE (ML) INJECTION PRN
Status: DISCONTINUED | OUTPATIENT
Start: 2023-02-18 | End: 2023-02-21 | Stop reason: HOSPADM

## 2023-02-18 RX ORDER — ACETAMINOPHEN 325 MG/1
650 TABLET ORAL EVERY 6 HOURS PRN
Status: DISCONTINUED | OUTPATIENT
Start: 2023-02-18 | End: 2023-02-21 | Stop reason: HOSPADM

## 2023-02-18 RX ORDER — POTASSIUM CHLORIDE 29.8 MG/ML
20 INJECTION INTRAVENOUS PRN
Status: DISCONTINUED | OUTPATIENT
Start: 2023-02-18 | End: 2023-02-21 | Stop reason: HOSPADM

## 2023-02-18 RX ORDER — ONDANSETRON 2 MG/ML
4 INJECTION INTRAMUSCULAR; INTRAVENOUS EVERY 6 HOURS PRN
Status: DISCONTINUED | OUTPATIENT
Start: 2023-02-18 | End: 2023-02-21 | Stop reason: HOSPADM

## 2023-02-18 RX ORDER — M-VIT,TX,IRON,MINS/CALC/FOLIC 27MG-0.4MG
1 TABLET ORAL DAILY
Status: DISCONTINUED | OUTPATIENT
Start: 2023-02-18 | End: 2023-02-21 | Stop reason: HOSPADM

## 2023-02-18 RX ORDER — NITROGLYCERIN 0.4 MG/1
0.4 TABLET SUBLINGUAL ONCE
Status: COMPLETED | OUTPATIENT
Start: 2023-02-18 | End: 2023-02-18

## 2023-02-18 RX ORDER — HEPARIN SODIUM 1000 [USP'U]/ML
60 INJECTION, SOLUTION INTRAVENOUS; SUBCUTANEOUS PRN
Status: DISCONTINUED | OUTPATIENT
Start: 2023-02-18 | End: 2023-02-21 | Stop reason: HOSPADM

## 2023-02-18 RX ORDER — POTASSIUM CHLORIDE 7.45 MG/ML
10 INJECTION INTRAVENOUS PRN
Status: DISCONTINUED | OUTPATIENT
Start: 2023-02-18 | End: 2023-02-21 | Stop reason: HOSPADM

## 2023-02-18 RX ORDER — HEPARIN SODIUM 1000 [USP'U]/ML
30 INJECTION, SOLUTION INTRAVENOUS; SUBCUTANEOUS PRN
Status: DISCONTINUED | OUTPATIENT
Start: 2023-02-18 | End: 2023-02-21 | Stop reason: HOSPADM

## 2023-02-18 RX ORDER — POLYETHYLENE GLYCOL 3350 17 G/17G
17 POWDER, FOR SOLUTION ORAL DAILY PRN
Status: DISCONTINUED | OUTPATIENT
Start: 2023-02-18 | End: 2023-02-21 | Stop reason: HOSPADM

## 2023-02-18 RX ORDER — CALCIUM CARBONATE 200(500)MG
500 TABLET,CHEWABLE ORAL 2 TIMES DAILY
Status: DISCONTINUED | OUTPATIENT
Start: 2023-02-18 | End: 2023-02-21 | Stop reason: HOSPADM

## 2023-02-18 RX ORDER — BUMETANIDE 1 MG/1
2 TABLET ORAL DAILY
Status: DISCONTINUED | OUTPATIENT
Start: 2023-02-18 | End: 2023-02-19

## 2023-02-18 RX ORDER — PANTOPRAZOLE SODIUM 40 MG/1
40 TABLET, DELAYED RELEASE ORAL DAILY
Status: DISCONTINUED | OUTPATIENT
Start: 2023-02-18 | End: 2023-02-21 | Stop reason: HOSPADM

## 2023-02-18 RX ORDER — SODIUM CHLORIDE 0.9 % (FLUSH) 0.9 %
5-40 SYRINGE (ML) INJECTION EVERY 12 HOURS SCHEDULED
Status: DISCONTINUED | OUTPATIENT
Start: 2023-02-18 | End: 2023-02-21 | Stop reason: HOSPADM

## 2023-02-18 RX ORDER — SPIRONOLACTONE 25 MG/1
50 TABLET ORAL DAILY
Status: DISCONTINUED | OUTPATIENT
Start: 2023-02-18 | End: 2023-02-19

## 2023-02-18 RX ADMIN — NITROGLYCERIN 0.4 MG: 0.4 TABLET SUBLINGUAL at 10:41

## 2023-02-18 RX ADMIN — SODIUM CHLORIDE 500 ML: 9 INJECTION, SOLUTION INTRAVENOUS at 10:40

## 2023-02-18 RX ADMIN — MORPHINE SULFATE 4 MG: 4 INJECTION INTRAVENOUS at 11:42

## 2023-02-18 RX ADMIN — HEPARIN SODIUM 12 UNITS/KG/HR: 10000 INJECTION, SOLUTION INTRAVENOUS at 13:05

## 2023-02-18 RX ADMIN — SODIUM CHLORIDE, PRESERVATIVE FREE 10 ML: 5 INJECTION INTRAVENOUS at 21:29

## 2023-02-18 RX ADMIN — HEPARIN SODIUM 3670 UNITS: 1000 INJECTION INTRAVENOUS; SUBCUTANEOUS at 13:00

## 2023-02-18 RX ADMIN — POTASSIUM CHLORIDE 10 MEQ: 7.46 INJECTION, SOLUTION INTRAVENOUS at 18:55

## 2023-02-18 RX ADMIN — CHOLECALCIFEROL (VITAMIN D3) 10 MCG (400 UNIT) TABLET 200 UNITS: at 21:13

## 2023-02-18 RX ADMIN — ACETAMINOPHEN 650 MG: 325 TABLET ORAL at 13:29

## 2023-02-18 RX ADMIN — PRAVASTATIN SODIUM 40 MG: 20 TABLET ORAL at 21:13

## 2023-02-18 RX ADMIN — CALCIUM CARBONATE (ANTACID) CHEW TAB 500 MG 500 MG: 500 CHEW TAB at 21:14

## 2023-02-18 RX ADMIN — POTASSIUM CHLORIDE 10 MEQ: 7.46 INJECTION, SOLUTION INTRAVENOUS at 21:27

## 2023-02-18 RX ADMIN — POTASSIUM CHLORIDE 10 MEQ: 7.46 INJECTION, SOLUTION INTRAVENOUS at 23:30

## 2023-02-18 ASSESSMENT — PAIN DESCRIPTION - FREQUENCY
FREQUENCY: INTERMITTENT
FREQUENCY: CONTINUOUS

## 2023-02-18 ASSESSMENT — PAIN DESCRIPTION - LOCATION
LOCATION: CHEST
LOCATION: CHEST

## 2023-02-18 ASSESSMENT — PAIN SCALES - GENERAL
PAINLEVEL_OUTOF10: 3
PAINLEVEL_OUTOF10: 10
PAINLEVEL_OUTOF10: 6

## 2023-02-18 ASSESSMENT — PAIN DESCRIPTION - PAIN TYPE
TYPE: ACUTE PAIN
TYPE: ACUTE PAIN

## 2023-02-18 ASSESSMENT — PAIN DESCRIPTION - DESCRIPTORS
DESCRIPTORS: HEAVINESS
DESCRIPTORS: HEAVINESS

## 2023-02-18 NOTE — ED PROVIDER NOTES
[address]      Pt Name: Bertha Pollock  MRN: [id number]  [place] 1935  Date of evaluation: 2/18/2023      CHIEF COMPLAINT       Chief Complaint   Patient presents with    Chest Pain     Arrived via EMS from [facility] with chest pain, SOB beginning this AM. Generalized body aches         HISTORY OF PRESENT ILLNESS      The patient presents with chest pain that started at approximately an hour prior to presentation. She was not having exertion when it started. The pain is in the left side of the chest.  She rates it as a 6 out of 10. She denies diaphoresis and reports only mild dyspnea. She denies fever or cough. Nothing makes her symptoms better or worse. She was brought by ambulance and they gave her aspirin prior to arrival.      REVIEW OF SYSTEMS       All systems reviewed and negative unless noted in HPI. The patient denies fever or constitutional symptoms. Denies vision change. Denies any sore throat or rhinorrhea. Denies any neck pain or stiffness. Chest pain as noted in HPI. No nausea,  vomiting or diarrhea. Denies any dysuria. Denies urinary frequency or hematuria. Denies musculoskeletal injury or pain. Denies any weakness, numbness or focal neurologic deficit. Denies any skin rash or edema. No recent psychiatric issues. Takes Eliquis. Denies any polyuria, polydypsia or history of immunocompromise. PAST MEDICAL HISTORY    has a past medical history of Blood circulation, collateral, CHF (congestive heart failure) ([facility] [address] COPD (chronic obstructive pulmonary disease) ([facility] [address] GERD (gastroesophageal reflux disease), Hyperlipidemia, and Hypertension. SURGICAL HISTORY      has a past surgical history that includes joint replacement (left total shoulder; 1990); bladder suspension (2001); Hysterectomy (ovaries left); Cholecystectomy;  Upper gastrointestinal endoscopy (N/A, 2/10/2022); and Colonoscopy (N/A, 2/10/2022). CURRENT MEDICATIONS       Previous Medications    ACETAMINOPHEN (TYLENOL) 500 MG TABLET    Take 1 tablet by mouth every 6 hours as needed for Pain    AMIODARONE (CORDARONE) 200 MG TABLET    TAKE ONE TABLET BY MOUTH DAILY    APIXABAN (ELIQUIS) 5 MG TABS TABLET    Take 1 tablet by mouth 2 times daily    BUMETANIDE (BUMEX) 1 MG TABLET    Take 2 tablets by mouth daily    CALCIUM-VITAMIN D (OSCAL-500) 500-200 MG-UNIT PER TABLET    Take 1 tablet by mouth 2 times daily    COMPRESSION STOCKINGS MISC    by Does not apply route    FERROUS SULFATE (FE TABS 325) 325 (65 FE) MG EC TABLET    Take 1 tablet by mouth daily (with breakfast)    LISINOPRIL (PRINIVIL;ZESTRIL) 10 MG TABLET    Take 1 tablet by mouth daily    METOPROLOL TARTRATE (LOPRESSOR) 25 MG TABLET    Take 1 tablet by mouth 2 times daily    MULTIPLE VITAMINS-MINERALS (THERAPEUTIC MULTIVITAMIN-MINERALS) TABLET    Take 1 tablet by mouth daily    PANTOPRAZOLE (PROTONIX) 40 MG TABLET    Take 1 tablet by mouth daily Take one tab every morning before breakfast    PRAVASTATIN (PRAVACHOL) 40 MG TABLET    TAKE 1 TABLET DAILY    SPIRONOLACTONE (ALDACTONE) 50 MG TABLET    Take 1 tablet by mouth daily       ALLERGIES     is allergic to codeine, bactrim [sulfamethoxazole-trimethoprim], naproxen, norvasc [amlodipine besylate], sertraline hcl, toradol [ketorolac tromethamine], tramadol, magnesium-containing compounds, morphine, and percocet [oxycodone-acetaminophen]. FAMILY HISTORY     She indicated that her mother is . She indicated that her father is . She indicated that her brother is . She indicated that the status of her daughter is unknown. She indicated that her other is . family history includes Cancer in her brother, daughter, and another family member. SOCIAL HISTORY      reports that she quit smoking about 38 years ago. Her smoking use included cigarettes.  She has never used smokeless tobacco. She reports that she does not drink alcohol and does not use drugs. PHYSICAL EXAM     INITIAL VITALS:  height is 5' 4\" (1.626 m) and weight is 61.2 kg (135 lb). Her oral temperature is 98.2 °F (36.8 °C). Her blood pressure is 135/68 and her pulse is 74. Her respiration is 24 and oxygen saturation is 98%. The patient is alert and oriented, in no apparent distress. HEENT is atraumatic. Pupils are PERRL at 4 mm with normal extraocular motion. Mucous membranes moist.    Neck is supple with no lymphadenopathy. Heart sounds irregular rate and rhythm with no gallops, murmurs, or rubs. No change in pain with palpation or deep breathing. Lungs clear, no wheezes, rales or rhonchi. Abdomen: soft, nontender with no pain to palpation  Musculoskeletal exam shows no evidence of trauma. Normal distal pulses in all extremities. Skin: no rash or edema. Neurological exam reveals cranial nerves 2 through 12 grossly intact. Patient has equal  and normal deep tendon reflexes. Psychiatric: no hallucinations or suicidal ideation. Lymphatics.:  No lymphadenopathy. DIFFERENTIAL DIAGNOSIS/ MDM:     Differential diagnosis considered: AMI, ACS, PE, atypical chest pain    Chronic Conditions affecting care (DM,HTN,CA, etc): A-fib, on blood thinners    Social Determinants of Health affecting care (unable to care for self, lives alone, unemployed, homeless,etc): None      History source(s) (patient,spouse,parent,family,friend,EMS,etc): Patient and EMS    Review of external sources (ECF,Hospital records,EMS report, radiology reports, etc): Previous hospital records, EMS report    Tests considered but not ordered: None      Independent interpretation of tests (eg.  X-ray, CAT scan, Doppler studies, EKG): EKG    Discussion of x-ray results with radiology: No     Consults: See below    Consideration for admission/observation (even if discharged):  Not applicable      Prescription considerations: None      Sepsis considered: Not applicable      Critical Care note written: Not applicable        DIAGNOSTIC RESULTS     EKG: All EKG's are interpreted by the Emergency Department Physician who either signs or Co-signs this chart in the absence of a cardiologist.    Afib 73 with right bundle lash block pattern. Concern for ischemia given downsloping T's in septal leads. Axis 69, , .    RADIOLOGY:   I reviewed the radiologist interpretations:  XR CHEST PORTABLE   Final Result   No focal lung opacity. XR CHEST PORTABLE (Final result)  Result time 02/18/23 11:36:08  Final result by Frantz Torres MD (02/18/23 11:36:08)                Impression:    No focal lung opacity. Narrative:    EXAMINATION:   ONE XRAY VIEW OF THE CHEST     2/18/2023 11:11 am     COMPARISON:   Chest x-ray dated 11/27/2022     HISTORY:   ORDERING SYSTEM PROVIDED HISTORY: chest pain   TECHNOLOGIST PROVIDED HISTORY:   chest pain   Reason for Exam: Chest pain, dizziness. FINDINGS:   No focal lung opacity. No pleural effusion or pneumothorax. Cardiomediastinal silhouette is enlarged. Degenerative disease of the   visualized osseous structures.                  LABS:  Results for orders placed or performed during the hospital encounter of 02/18/23   CBC with Auto Differential   Result Value Ref Range    WBC 5.3 3.5 - 11.0 k/uL    RBC 5.06 4.0 - 5.2 m/uL    Hemoglobin 13.7 12.0 - 16.0 g/dL    Hematocrit 42.3 36 - 46 %    MCV 83.6 80 - 100 fL    MCH 27.0 26 - 34 pg    MCHC 32.3 31 - 37 g/dL    RDW 20.9 (H) 12.5 - 15.4 %    Platelets 280 850 - 152 k/uL    MPV 8.4 6.0 - 12.0 fL    Seg Neutrophils 69 (H) 36 - 66 %    Lymphocytes 23 (L) 24 - 44 %    Monocytes 5 1 - 7 %    Eosinophils % 2 1 - 4 %    Basophils 1 0 - 2 %    Segs Absolute 3.65 1.8 - 7.7 k/uL    Absolute Lymph # 1.22 1.0 - 4.8 k/uL    Absolute Mono # 0.27 0.1 - 0.8 k/uL    Absolute Eos # 0.11 0.0 - 0.4 k/uL    Basophils Absolute 0.05 0.0 - 0.2 k/uL    Morphology ANISOCYTOSIS PRESENT    Basic Metabolic Panel   Result Value Ref Range    Glucose 89 70 - 99 mg/dL    BUN 20 8 - 23 mg/dL    Creatinine 1.01 (H) 0.50 - 0.90 mg/dL    Est, Glom Filt Rate 55 (L) >60 mL/min/1.73m2    Calcium 10.0 8.6 - 10.4 mg/dL    Sodium 140 135 - 144 mmol/L    Potassium 3.2 (L) 3.7 - 5.3 mmol/L    Chloride 99 98 - 107 mmol/L    CO2 27 20 - 31 mmol/L    Anion Gap 14 9 - 17 mmol/L   Brain Natriuretic Peptide   Result Value Ref Range    Pro-BNP 1,860 (H) <300 pg/mL   Troponin   Result Value Ref Range    Troponin, High Sensitivity 33 (H) 0 - 14 ng/L         EMERGENCY DEPARTMENT COURSE:   Vitals:    Vitals:    02/18/23 1024   BP: 135/68   Pulse: 74   Resp: 24   Temp: 98.2 °F (36.8 °C)   TempSrc: Oral   SpO2: 98%   Weight: 61.2 kg (135 lb)   Height: 5' 4\" (1.626 m)     -------------------------  BP: 135/68, Temp: 98.2 °F (36.8 °C), Heart Rate: 74, Resp: 24      Re-evaluation Notes    The patient received aspirin via EMS. I added nitroglycerin. It did not really help her pain. Morphine was added. She continues to have discomfort. The patient has a slightly elevated troponin. I have discussed the case with the cardiologist who would like for her to be heparinized. We will hold the Eliquis. The hospitalist will admit with a consult to cardiology. She is admitted in stable condition. CONSULTS:    3309  Discussed with Dr. Cheryl Dave. Has reviewed EKGs. Consistent with RBBB with no discordant Ts. Could be ischemia. Evaluate troponins. 1147  Per [employer]:  Dr. Cheryl Dave feels pt can be admitted in [address] [place] [address]; trend troponins. [address] [facility],[address]  Discussed with Dr. Bert Caldera. Will admit. Let Dr. Darya Vail know about pt.  [facility]  Dr. Darya Vail texted via [address] [place] [address]. FINAL IMPRESSION      1. Chest pain, unspecified type    2. Elevated troponin          DISPOSITION/PLAN   DISPOSITION Admitted 02/18/2023 11:59:43 AM      Condition on Disposition    stable    PATIENT REFERRED TO:  No follow-up provider specified.     DISCHARGE MEDICATIONS:  New Prescriptions    No medications on file       (Please note that portions of this note were completed with a voice recognition program.  Efforts were made to edit the dictations but occasionally words are mis-transcribed.)    Salena Latham MD,, MD   Attending Emergency Physician          April Marshall MD  02/18/23 1740

## 2023-02-18 NOTE — PLAN OF CARE
Problem: Discharge Planning  Goal: Discharge to home or other facility with appropriate resources  Outcome: Progressing  Flowsheets (Taken 2/18/2023 1401)  Discharge to home or other facility with appropriate resources: Identify barriers to discharge with patient and caregiver     Problem: Pain  Goal: Verbalizes/displays adequate comfort level or baseline comfort level  Outcome: Progressing     Problem: Skin/Tissue Integrity  Goal: Absence of new skin breakdown  Description: 1. Monitor for areas of redness and/or skin breakdown  2. Assess vascular access sites hourly  3. Every 4-6 hours minimum:  Change oxygen saturation probe site  4. Every 4-6 hours:  If on nasal continuous positive airway pressure, respiratory therapy assess nares and determine need for appliance change or resting period.   Outcome: Progressing     Problem: Safety - Adult  Goal: Free from fall injury  Outcome: Progressing     Problem: ABCDS Injury Assessment  Goal: Absence of physical injury  Outcome: Progressing

## 2023-02-18 NOTE — H&P
[facility]  Office: [address], DO, Dandre Krista, DO, Gracie Jessica, DO, Lucila Tru Blood, DO, Chelsey Mixon MD, Johann Riedel, MD, Jonny Gurrola MD, Jose D Crisostomo MD,  Rambo Marie MD, Kavitha Bardales MD, Ros Weber, DO, Doron Major MD,  Dhiraj Emerson MD, Jarrod Zimmerman MD, Bhavesh Sim, DO, Mihir Moise MD, Boni Stafford MD, Kevon Finnegan, DO, Dede De Souza MD, Senait Javier MD, Neno Whiteside MD, Alexander Puente MD, Jocelynn Montoya, DO, Chacha Ceja MD, Audrey Troy MD, Maida James, [facility],  Tracey Rodgers, CNP, Jordana Craig, CNP, Santos Shaikh, CNP,  Karla Acosta, [facility], Rashmi Martinez, CNP, Louis Da Silva, CNP, Beth Casillas, CNP, Warren Chung, CNP, Santana Liang, CNP, Audi Ramey PA-C, Barbara Sepulveda, CNS, Radhika Erickson, CNP, Valentín Gardiner, CNP         [facility].    HISTORY AND PHYSICAL EXAMINATION            Date:   2/18/2023  Patient name:  Arron Vance  Date of admission:  2/18/2023 10:11 AM  MRN:   [id number]  Account:  [de-identified]  YOB: 1935  PCP:    Floyd Hernandez DO  Room:   [facility]443-  Code Status:    Full Code      History Obtained From:     patient, electronic medical record    History of Present Illness:     Arron Vance is a 82 y.o. Non- / non  female who presents with Chest Pain (Arrived via EMS from [facility] with chest pain, SOB beginning this AM. Generalized body aches)   and is admitted to the hospital for the management of Chest pain. This very pleasant 44-year-old female was brought from an extended care facility for chest pain that started early this morning. Interestingly the patient tells me that she does not remember complaining of chest pain. Her mentation is reasonable in the aspect that she knows that the president is Heriberto. She tells me that the year is 2022.   She admits honestly without hesitation that she does not know what building she is in. She remembers her cardiologist name without issue but does not remember her primary care physician's name. Apparently earlier this morning she complained of some left-sided chest pain. In the emergency room she rated it a 6 out of 10. Nothing seemed to make it better and nothing seemed to make it worse. The patient does have a known history of atrial fibrillation and is on Eliquis. She tells me that she does not have any known history of coronary artery disease. She denies ever partaking in a cardiac catheterization. At this point in time she has been found to have minimally elevated troponins. Given her slightly elevated troponin and complaint of chest pain the patient has been admitted and initiated on a heparin drip. Cardiology was contacted/consulted in the emergency room and requested that a heparin drip initiated with serial cardiac enzymes. I am going to order an echocardiogram and further decision making will be made pending enzymes and echocardiogram results. The patient may ultimately require cardiac catheterization depending on her clinical evolution. At present time she is actually pain-free. As mentioned she has no recollection of her chest pain earlier this morning.     Past Medical History:     Past Medical History:   Diagnosis Date    Blood circulation, collateral     CHF (congestive heart failure) (HCC)     COPD (chronic obstructive pulmonary disease) (HCC)     GERD (gastroesophageal reflux disease)     Hyperlipidemia     Hypertension         Past Surgical History:     Past Surgical History:   Procedure Laterality Date    BLADDER SUSPENSION  2001    CHOLECYSTECTOMY      COLONOSCOPY N/A 2/10/2022    COLONOSCOPY WITH BIOPSY performed by Arian Trevizo MD at [facility] ([address] [place] [address])  ovaries left    JOINT REPLACEMENT  left total shoulder; 1990    UPPER GASTROINTESTINAL ENDOSCOPY N/A 2/10/2022    EGD ESOPHAGOGASTRODUODENOSCOPY performed by Saima Tidwell MD at [facility]        Medications Prior to Admission:     Prior to Admission medications    Medication Sig Start Date End Date Taking?  Authorizing Provider   bumetanide (BUMEX) 1 MG tablet Take 2 tablets by mouth daily 12/8/22   [facility]   pravastatin (PRAVACHOL) 40 MG tablet TAKE 1 TABLET DAILY 11/21/22   [facility], DO   amiodarone (CORDARONE) 200 MG tablet TAKE ONE TABLET BY MOUTH DAILY 11/17/22   [facility], DO   metoprolol tartrate (LOPRESSOR) 25 MG tablet Take 1 tablet by mouth 2 times daily 11/9/22   MALVIN Huang CNP   lisinopril (PRINIVIL;ZESTRIL) 10 MG tablet Take 1 tablet by mouth daily 11/9/22   MALVIN Huang CNP   Compression Stockings MISC by Does not apply route 11/9/22   MALVIN Huang CNP   spironolactone (ALDACTONE) 50 MG tablet Take 1 tablet by mouth daily 11/5/22   Catana Gula P Blood, DO   apixaban (ELIQUIS) 5 MG TABS tablet Take 1 tablet by mouth 2 times daily 10/31/22   [facility], DO   ferrous sulfate (FE TABS 325) 325 (65 Fe) MG EC tablet Take 1 tablet by mouth daily (with breakfast) 10/31/22   [facility], DO   acetaminophen (TYLENOL) 500 MG tablet Take 1 tablet by mouth every 6 hours as needed for Pain 10/31/22   [facility], DO   calcium-vitamin D (OSCAL-500) 500-200 MG-UNIT per tablet Take 1 tablet by mouth 2 times daily 10/31/22   [facility], DO   pantoprazole (PROTONIX) 40 MG tablet Take 1 tablet by mouth daily Take one tab every morning before breakfast 10/31/22   [facility], DO   Multiple Vitamins-Minerals (THERAPEUTIC MULTIVITAMIN-MINERALS) tablet Take 1 tablet by mouth daily    Historical Provider, MD        Allergies:     Codeine, Bactrim [sulfamethoxazole-trimethoprim], Naproxen, Norvasc [amlodipine besylate], Sertraline hcl, Toradol [ketorolac tromethamine], Tramadol, Magnesium-containing compounds, Morphine, and Percocet [oxycodone-acetaminophen]    Physical Exam:   BP (!) 129/52   Pulse 63   Temp 98.2 °F (36.8 °C) (Oral)   Resp 24   Ht 5' 4\" (1.626 m)   Wt 135 lb (61.2 kg)   SpO2 100%   BMI 23.17 kg/m²   Temp (24hrs), Av.2 °F (36.8 °C), Min:98.2 °F (36.8 °C), Max:98.2 °F (36.8 °C)    No results for input(s): POCGLU in the last 72 hours.   No intake or output data in the 24 hours ending 23 1402    General Appearance:  alert, well appearing, and in no acute distress  Mental status: oriented to person, place, and time  Head:  normocephalic, atraumatic  Eye: no icterus, redness, pupils equal and reactive, extraocular eye movements intact, conjunctiva clear  Ear: normal external ear, no discharge, hearing intact  Nose:  no drainage noted  Mouth: mucous membranes moist  Neck: supple, no carotid bruits, thyroid not palpable  Lungs: Bilateral equal air entry, clear to ausculation, no wheezing, rales or rhonchi, normal effort  Cardiovascular: normal rate, regular rhythm, no murmur, gallop, rub  Abdomen: Soft, nontender, nondistended, normal bowel sounds, no hepatomegaly or splenomegaly  Neurologic: There are no new focal motor or sensory deficits, normal muscle tone and bulk, no abnormal sensation, normal speech, cranial nerves II through XII grossly intact  Skin: No gross lesions, rashes, bruising or bleeding on exposed skin area  Extremities:  peripheral pulses palpable, no pedal edema or calf pain with palpation  Psych: normal affect     Investigations:      Laboratory Testing:  Recent Results (from the past 24 hour(s))   CBC with Auto Differential    Collection Time: 23 10:30 AM   Result Value Ref Range    WBC 5.3 3.5 - 11.0 k/uL    RBC 5.06 4.0 - 5.2 m/uL    Hemoglobin 13.7 12.0 - 16.0 g/dL    Hematocrit 42.3 36 - 46 %    MCV 83.6 80 - 100 fL    MCH 27.0 26 - 34 pg    MCHC 32.3 31 - 37 g/dL    RDW 20.9 (H) 12.5 - 15.4 %    Platelets 978 401 - 923 k/uL    MPV 8.4 6.0 - 12.0 fL    Seg Neutrophils 69 (H) 36 - 66 % Lymphocytes 23 (L) 24 - 44 %    Monocytes 5 1 - 7 %    Eosinophils % 2 1 - 4 %    Basophils 1 0 - 2 %    Segs Absolute 3.65 1.8 - 7.7 k/uL    Absolute Lymph # 1.22 1.0 - 4.8 k/uL    Absolute Mono # 0.27 0.1 - 0.8 k/uL    Absolute Eos # 0.11 0.0 - 0.4 k/uL    Basophils Absolute 0.05 0.0 - 0.2 k/uL    Morphology ANISOCYTOSIS PRESENT    Basic Metabolic Panel    Collection Time: 02/18/23 10:30 AM   Result Value Ref Range    Glucose 89 70 - 99 mg/dL    BUN 20 8 - 23 mg/dL    Creatinine 1.01 (H) 0.50 - 0.90 mg/dL    Est, Glom Filt Rate 55 (L) >60 mL/min/1.73m2    Calcium 10.0 8.6 - 10.4 mg/dL    Sodium 140 135 - 144 mmol/L    Potassium 3.2 (L) 3.7 - 5.3 mmol/L    Chloride 99 98 - 107 mmol/L    CO2 27 20 - 31 mmol/L    Anion Gap 14 9 - 17 mmol/L   Brain Natriuretic Peptide    Collection Time: 02/18/23 10:30 AM   Result Value Ref Range    Pro-BNP 1,860 (H) <300 pg/mL   Troponin    Collection Time: 02/18/23 10:30 AM   Result Value Ref Range    Troponin, High Sensitivity 33 (H) 0 - 14 ng/L   APTT    Collection Time: 02/18/23 10:30 AM   Result Value Ref Range    PTT 26.1 21.3 - 31.3 sec   Troponin    Collection Time: 02/18/23  1:00 PM   Result Value Ref Range    Troponin, High Sensitivity 35 (H) 0 - 14 ng/L       Imaging/Diagnostics:  XR CHEST PORTABLE    Result Date: 2/18/2023  No focal lung opacity.        Assessment :      Hospital Problems             Last Modified POA    * (Principal) Chest pain 2/18/2023 Yes       Plan:     Chest pain/elevated troponin/unstable angina/suspected non-ST segment elevation myocardial infarction  Heparin drip  Serial enzymes  Cardiology consultation  Check echocardiogram  May benefit from cardiac catheterization depending on clinical evolution  Paroxysmal atrial fibrillation/right bundle branch block  Normal sinus rhythm on exam  Hold Moi, presently on heparin drip  Continue amiodarone  Chronic diastolic CHF  Echocardiogram from approximately 1 year ago demonstrated grade 3 diastolic dysfunction, patient has preserved ejection fraction at 55 to 60%  Continue Bumex/Aldactone  Await repeat echocardiogram  Essential hypertension  Resume Lopressor/lisinopril  Dyslipidemia  Continue Pravachol    Patient is admitted as inpatient status because of co-morbidities listed above, severity of signs and symptoms as outlined, requirement for current medical therapies and most importantly because of direct risk to patient if care not provided in a hospital setting. Expected length of stay > 48 hours.  Patient is admitted in the Med/Surge    Medical Decision Making: Gianluca Walsh DO  2/18/2023  2:02 PM    Copy sent to Dr. Dawson Ferreira DO

## 2023-02-19 LAB
HCT VFR BLD AUTO: 35.5 % (ref 36–46)
HGB BLD-MCNC: 11.2 G/DL (ref 12–16)
MCH RBC QN AUTO: 26.6 PG (ref 26–34)
MCHC RBC AUTO-ENTMCNC: 31.5 G/DL (ref 31–37)
MCV RBC AUTO: 84.4 FL (ref 80–100)
PARTIAL THROMBOPLASTIN TIME: 52.9 SEC (ref 21.3–31.3)
PARTIAL THROMBOPLASTIN TIME: 56.6 SEC (ref 21.3–31.3)
PDW BLD-RTO: 20.9 % (ref 12.5–15.4)
PLATELET # BLD AUTO: 141 K/UL (ref 140–450)
PMV BLD AUTO: 8.6 FL (ref 6–12)
POTASSIUM SERPL-SCNC: 4.1 MMOL/L (ref 3.7–5.3)
RBC # BLD: 4.2 M/UL (ref 4–5.2)
WBC # BLD AUTO: 5.8 K/UL (ref 3.5–11)

## 2023-02-19 PROCEDURE — 6360000002 HC RX W HCPCS: Performed by: HOSPITALIST

## 2023-02-19 PROCEDURE — 2580000003 HC RX 258: Performed by: HOSPITALIST

## 2023-02-19 PROCEDURE — 84132 ASSAY OF SERUM POTASSIUM: CPT

## 2023-02-19 PROCEDURE — 85730 THROMBOPLASTIN TIME PARTIAL: CPT

## 2023-02-19 PROCEDURE — 36415 COLL VENOUS BLD VENIPUNCTURE: CPT

## 2023-02-19 PROCEDURE — 93005 ELECTROCARDIOGRAM TRACING: CPT | Performed by: HOSPITALIST

## 2023-02-19 PROCEDURE — 1210000000 HC MED SURG R&B

## 2023-02-19 PROCEDURE — 99232 SBSQ HOSP IP/OBS MODERATE 35: CPT | Performed by: HOSPITALIST

## 2023-02-19 PROCEDURE — 6370000000 HC RX 637 (ALT 250 FOR IP): Performed by: HOSPITALIST

## 2023-02-19 PROCEDURE — 85027 COMPLETE CBC AUTOMATED: CPT

## 2023-02-19 RX ORDER — HYDROCODONE BITARTRATE AND ACETAMINOPHEN 5; 325 MG/1; MG/1
1 TABLET ORAL EVERY 4 HOURS PRN
Status: DISCONTINUED | OUTPATIENT
Start: 2023-02-19 | End: 2023-02-21 | Stop reason: HOSPADM

## 2023-02-19 RX ORDER — BUMETANIDE 1 MG/1
1 TABLET ORAL DAILY
Status: DISCONTINUED | OUTPATIENT
Start: 2023-02-19 | End: 2023-02-20

## 2023-02-19 RX ORDER — SPIRONOLACTONE 25 MG/1
25 TABLET ORAL DAILY
Status: DISCONTINUED | OUTPATIENT
Start: 2023-02-19 | End: 2023-02-21 | Stop reason: HOSPADM

## 2023-02-19 RX ORDER — HYDROCODONE BITARTRATE AND ACETAMINOPHEN 5; 325 MG/1; MG/1
2 TABLET ORAL EVERY 4 HOURS PRN
Status: DISCONTINUED | OUTPATIENT
Start: 2023-02-19 | End: 2023-02-21 | Stop reason: HOSPADM

## 2023-02-19 RX ORDER — LISINOPRIL 2.5 MG/1
2.5 TABLET ORAL DAILY
Status: DISCONTINUED | OUTPATIENT
Start: 2023-02-19 | End: 2023-02-21

## 2023-02-19 RX ADMIN — CHOLECALCIFEROL (VITAMIN D3) 10 MCG (400 UNIT) TABLET 200 UNITS: at 09:11

## 2023-02-19 RX ADMIN — LISINOPRIL 2.5 MG: 2.5 TABLET ORAL at 09:12

## 2023-02-19 RX ADMIN — AMIODARONE HYDROCHLORIDE 200 MG: 200 TABLET ORAL at 09:12

## 2023-02-19 RX ADMIN — FERROUS SULFATE TAB EC 325 MG (65 MG FE EQUIVALENT) 325 MG: 325 (65 FE) TABLET DELAYED RESPONSE at 09:11

## 2023-02-19 RX ADMIN — CHOLECALCIFEROL (VITAMIN D3) 10 MCG (400 UNIT) TABLET 200 UNITS: at 20:34

## 2023-02-19 RX ADMIN — PRAVASTATIN SODIUM 40 MG: 20 TABLET ORAL at 20:34

## 2023-02-19 RX ADMIN — ASPIRIN 81 MG CHEWABLE TABLET 81 MG: 81 TABLET CHEWABLE at 09:11

## 2023-02-19 RX ADMIN — METOPROLOL TARTRATE 12.5 MG: 25 TABLET, FILM COATED ORAL at 20:34

## 2023-02-19 RX ADMIN — METOPROLOL TARTRATE 12.5 MG: 25 TABLET, FILM COATED ORAL at 09:11

## 2023-02-19 RX ADMIN — ONDANSETRON 4 MG: 2 INJECTION INTRAMUSCULAR; INTRAVENOUS at 22:01

## 2023-02-19 RX ADMIN — SODIUM CHLORIDE, PRESERVATIVE FREE 10 ML: 5 INJECTION INTRAVENOUS at 09:12

## 2023-02-19 RX ADMIN — PANTOPRAZOLE SODIUM 40 MG: 40 TABLET, DELAYED RELEASE ORAL at 09:12

## 2023-02-19 RX ADMIN — BUMETANIDE 1 MG: 1 TABLET ORAL at 09:11

## 2023-02-19 RX ADMIN — MULTIPLE VITAMINS W/ MINERALS TAB 1 TABLET: TAB at 09:11

## 2023-02-19 RX ADMIN — POTASSIUM CHLORIDE 10 MEQ: 7.46 INJECTION, SOLUTION INTRAVENOUS at 01:30

## 2023-02-19 RX ADMIN — SPIRONOLACTONE 25 MG: 25 TABLET, FILM COATED ORAL at 09:11

## 2023-02-19 RX ADMIN — CALCIUM CARBONATE (ANTACID) CHEW TAB 500 MG 500 MG: 500 CHEW TAB at 09:11

## 2023-02-19 RX ADMIN — HYDROCODONE BITARTRATE AND ACETAMINOPHEN 2 TABLET: 5; 325 TABLET ORAL at 09:11

## 2023-02-19 RX ADMIN — HYDROCODONE BITARTRATE AND ACETAMINOPHEN 2 TABLET: 5; 325 TABLET ORAL at 14:54

## 2023-02-19 RX ADMIN — CALCIUM CARBONATE (ANTACID) CHEW TAB 500 MG 500 MG: 500 CHEW TAB at 20:34

## 2023-02-19 RX ADMIN — SODIUM CHLORIDE, PRESERVATIVE FREE 10 ML: 5 INJECTION INTRAVENOUS at 20:37

## 2023-02-19 ASSESSMENT — PAIN SCALES - GENERAL
PAINLEVEL_OUTOF10: 10
PAINLEVEL_OUTOF10: 10
PAINLEVEL_OUTOF10: 9

## 2023-02-19 ASSESSMENT — PAIN DESCRIPTION - ORIENTATION
ORIENTATION: LEFT
ORIENTATION: LEFT

## 2023-02-19 ASSESSMENT — PAIN DESCRIPTION - LOCATION
LOCATION: HIP
LOCATION: HIP

## 2023-02-19 NOTE — PROGRESS NOTES
[facility]  Office: [address], DO, Gwen Anthony, DO, Jeannine Acosta, DO, Stacy Stacy Blood, DO, Jenni Damon MD, Marina Burgos MD, Yvone Phalen, MD, Tip Gu MD,  Homer Hashimoto, MD, Dwayne Joy MD, Ree Chisholm DO, Nalini Bowens MD,  Glenna Fuentes MD, Gely Ortega MD, Al Garcia DO, Rosa Walton MD, Lea Dyer MD, Kelley Carrillo, DO, Jonh Mcduffie MD, Pio Ellis MD, Jamal Rodriguez MD, Feli Ho MD, Kolton Marsh DO, Noel Suresh MD, Pedro Win MD, Reina Escalona, CNP,  Aanstasia Lima, CNP, Cari Chavez, CNP, Sol Sherman, CNP,  Albert Dill, [facility], Asia Garcia, CNP, Harvey Cavanaugh, CNP, Iveth Reyes, CNP, Lisa Reyna, CNP, Nurys Benavides, CNP, Sharri Roth PA-C, Jessica Sharp, CNS, Biju Delcid, [facility], Markie Reyes, [address] [place] [address]    Progress Note    2/19/2023    1:05 PM    Name:   Yehuda Cowan  MRN:     [id number]     Acct:      [de-identified]   Room:   [address] [place] [address] Day:  1  Admit Date:  2/18/2023 10:11 AM    PCP:   Baldev Ross DO  Code Status:  Full Code    Subjective:     Patient seen in follow-up for chest pain, patient states \"I had some pain after eating\"    Patient states that she had some chest pressure after she ate breakfast earlier today. She minimizes it just as she did yesterday. As mentioned in my history and physical the patient denied chest pain to me however rated chest pain a 6 out of 10 to emergency room staff. She had no recollection of why she was brought to the hospital.  She does seem to have some cognitive difficulties but overall is relatively oriented. The patient does tell me that she had some pressure earlier today however this subsequently resolved. Her cardiac enzymes have neither worsened nor improved. Her troponin is slightly higher than it has been in the past but not significantly so.   At this point in time we are awaiting cardiology input along with echocardiogram.  I once again reiterated to the patient that she may benefit from cardiac catheterization. Will await cardiology input. Medications: Allergies: Allergies   Allergen Reactions    Codeine Nausea Only     dizziness    Bactrim [Sulfamethoxazole-Trimethoprim] Hives    Naproxen      On allergy list ? Unsure of reaction    Norvasc [Amlodipine Besylate]      Edema-she thinks      Sertraline Hcl      unsure    Toradol [Ketorolac Tromethamine]      unsure    Tramadol      unsure    Magnesium-Containing Compounds Diarrhea     Mag ox 500 ?     Morphine Nausea And Vomiting    Percocet [Oxycodone-Acetaminophen] Nausea And Vomiting       Current Meds:   Scheduled Meds:    lisinopril  2.5 mg Oral Daily    metoprolol tartrate  12.5 mg Oral BID    spironolactone  25 mg Oral Daily    bumetanide  1 mg Oral Daily    amiodarone  200 mg Oral Daily    ferrous sulfate  325 mg Oral Daily with breakfast    therapeutic multivitamin-minerals  1 tablet Oral Daily    pantoprazole  40 mg Oral Daily    pravastatin  40 mg Oral Nightly    sodium chloride flush  5-40 mL IntraVENous 2 times per day    aspirin  81 mg Oral Daily    calcium carbonate  500 mg Oral BID    And    vitamin D3  200 Units Oral BID     Continuous Infusions:    heparin (PORCINE) Infusion 12 Units/kg/hr (23 1356)    sodium chloride       PRN Meds: HYDROcodone 5 mg - acetaminophen **OR** HYDROcodone 5 mg - acetaminophen, heparin (porcine), heparin (porcine), sodium chloride flush, sodium chloride, ondansetron **OR** ondansetron, acetaminophen **OR** acetaminophen, polyethylene glycol, potassium chloride **OR** potassium chloride, magnesium sulfate    Data:     Vitals:  /77   Pulse 64   Temp 98.3 °F (36.8 °C) (Oral)   Resp 19   Ht 5' 4\" (1.626 m)   Wt 127 lb 6.8 oz (57.8 kg)   SpO2 95%   BMI 21.87 kg/m²   Temp (24hrs), Av.9 °F (36.6 °C), Min:97.5 °F (36.4 °C), Max:98.3 °F (36.8 °C)    No results for input(s): POCGLU in the last 72 hours. I/O (24Hr): No intake or output data in the 24 hours ending 02/19/23 1305    Labs:  Hematology:  Recent Labs     02/18/23  1030 02/19/23  0559   WBC 5.3 5.8   RBC 5.06 4.20   HGB 13.7 11.2*   HCT 42.3 35.5*   MCV 83.6 84.4   MCH 27.0 26.6   MCHC 32.3 31.5   RDW 20.9* 20.9*    141   MPV 8.4 8.6     Chemistry:  Recent Labs     02/18/23  1030 02/18/23  1300 02/18/23  1620 02/19/23  0559     --   --   --    K 3.2*  --   --  4.1   CL 99  --   --   --    CO2 27  --   --   --    GLUCOSE 89  --   --   --    BUN 20  --   --   --    CREATININE 1.01*  --   --   --    ANIONGAP 14  --   --   --    LABGLOM 55*  --   --   --    CALCIUM 10.0  --   --   --    PROBNP 1,860*  --   --   --    TROPHS 33* 35* 35*  --    No results for input(s): PROT, LABALBU, LABA1C, E9MUBFB, A3BBHYZ, FT4, TSH, AST, ALT, LDH, GGT, ALKPHOS, LABGGT, BILITOT, BILIDIR, AMMONIA, AMYLASE, LIPASE, LACTATE, CHOL, HDL, LDLCHOLESTEROL, CHOLHDLRATIO, TRIG, VLDL, CJE46YW, PHENYTOIN, PHENYF, URICACID, POCGLU in the last 72 hours. ABG:No results found for: POCPH, PHART, PH, POCPCO2, KIM1OSJ, PCO2, POCPO2, PO2ART, PO2, POCHCO3, AXZ4YEO, HCO3, NBEA, PBEA, BEART, BE, THGBART, THB, TMR9PSA, GQRV3NEZ, C8MWKRMC, O2SAT, FIO2  Lab Results   Component Value Date/Time    SPECIAL RT WRIST,12ML 11/02/2022 04:41 PM     Lab Results   Component Value Date/Time    CULTURE NO GROWTH 5 DAYS 11/02/2022 04:41 PM       Radiology:  XR CHEST PORTABLE    Result Date: 2/18/2023  No focal lung opacity.        Physical Examination:     General appearance:  alert, cooperative and no distress  Mental Status:  oriented to person, place and time and normal affect  Lungs:  clear to auscultation bilaterally, normal effort  Heart:  regular rate and rhythm, no murmur  Abdomen:  soft, nontender, nondistended, normal bowel sounds, no masses, hepatomegaly, splenomegaly  Extremities:  no edema, redness, tenderness in the calves  Skin:  no gross lesions, rashes, induration    Assessment:     Hospital Problems             Last Modified POA    * (Principal) Chest pain 2/18/2023 Yes       Plan:     Chest pains/pressure, unstable angina, suspected NSTEMI  Patient states that she had pressure earlier today that is subsequently resolved  Serial enzymes unchanged  Continue heparin drip, await cardiology input regarding whether or not patient would benefit from stress testing versus cardiac catheterization  Check echocardiogram  Paroxysmal atrial fibrillation  Patient maintains normal sinus rhythm  Eliquis on hold, presently on heparin drip  Continue amiodarone  Chronic diastolic heart failure  Continue Bumex and Aldactone  Patient has history of grade 3 diastolic dysfunction, await repeat echocardiogram  Essential hypertension  Continue Lopressor/lisinopril  Patient did have some \"low\" blood pressures overnight however blood pressure trend reviewed and overall stable  If patient continues to have low systolic pressures may consider modifying home medication regimen  No changes at this point in time  Dyslipidemia  Continue Pravachol    Medical Decision Making: Rocael Hood DO  2/19/2023  1:05 PM

## 2023-02-19 NOTE — CONSULTS
[facility]  Inpatient Cardiology Consult             Date:   2/19/2023  Patient name: Carson Senior  Date of admission:  2/18/2023 10:11 AM  MRN:   [id number]  YOB: 1935      Reason for consultation:  Chest pain    CHIEF COMPLAINT:  Chest pain     History Obtained From:  Patient and medical record    HISTORY OF PRESENT ILLNESS:      The patient is a 82 y.o woman with h/o COPD, HLP, HTN, diastolic HF and AF, who has followed with [facility]. Yesterday she developed an ill-defined left-sided chest discomfort that began about one hour prior to presenting in the ED. She was not having exertion when it started. The pain is in the left side of the chest.  She rates it as a 6 out of 10. She denies diaphoresis and reports only mild dyspnea. She denies fever or cough. Nothing makes her symptoms better or worse. EKG showed AF, 73 bpm with RBBB and no acute ST or T wave changes. Serial troponins were elevated but unchanged at 33, 35 and 35 ng/L. She has had no further chest pain. Past Medical History:   has a past medical history of Blood circulation, collateral, CHF (congestive heart failure) ([facility] [address] COPD (chronic obstructive pulmonary disease) ([facility] [address] GERD (gastroesophageal reflux disease), Hyperlipidemia, and Hypertension. Past Surgical History:   has a past surgical history that includes joint replacement (left total shoulder; 1990); bladder suspension (2001); Hysterectomy (ovaries left); Cholecystectomy; Upper gastrointestinal endoscopy (N/A, 2/10/2022); and Colonoscopy (N/A, 2/10/2022). Home Medications:    Prior to Admission medications    Medication Sig Start Date End Date Taking?  Authorizing Provider   bumetanide (BUMEX) 1 MG tablet Take 2 tablets by mouth daily 12/8/22   Nick Leblanc,    pravastatin (PRAVACHOL) 40 MG tablet TAKE 1 TABLET DAILY 11/21/22   Nick Leblanc,    amiodarone (CORDARONE) 200 MG tablet TAKE ONE TABLET BY MOUTH DAILY 11/17/22   Wileen Earnest, DO   metoprolol tartrate (LOPRESSOR) 25 MG tablet Take 1 tablet by mouth 2 times daily 11/9/22   MALVIN Deshpande CNP   lisinopril (PRINIVIL;ZESTRIL) 10 MG tablet Take 1 tablet by mouth daily 11/9/22   MALVIN Deshpande CNP   Compression Stockings MISC by Does not apply route 11/9/22   MALVIN Deshpande CNP   spironolactone (ALDACTONE) 50 MG tablet Take 1 tablet by mouth daily 11/5/22   Jeppie Broccoli Blood, DO   apixaban (ELIQUIS) 5 MG TABS tablet Take 1 tablet by mouth 2 times daily 10/31/22   [facility]   ferrous sulfate (FE TABS 325) 325 (65 Fe) MG EC tablet Take 1 tablet by mouth daily (with breakfast) 10/31/22   [facility]   acetaminophen (TYLENOL) 500 MG tablet Take 1 tablet by mouth every 6 hours as needed for Pain 10/31/22   Wileen Earnest, DO   calcium-vitamin D (OSCAL-500) 500-200 MG-UNIT per tablet Take 1 tablet by mouth 2 times daily 10/31/22   [facility]   pantoprazole (PROTONIX) 40 MG tablet Take 1 tablet by mouth daily Take one tab every morning before breakfast 10/31/22   Wileen Earnest, DO   Multiple Vitamins-Minerals (THERAPEUTIC MULTIVITAMIN-MINERALS) tablet Take 1 tablet by mouth daily    Historical Provider, MD       Allergies:  Codeine, Bactrim [sulfamethoxazole-trimethoprim], Naproxen, Norvasc [amlodipine besylate], Sertraline hcl, Toradol [ketorolac tromethamine], Tramadol, Magnesium-containing compounds, Morphine, and Percocet [oxycodone-acetaminophen]    Social History:   reports that she quit smoking about 38 years ago. Her smoking use included cigarettes. She has never used smokeless tobacco. She reports that she does not drink alcohol and does not use drugs. Family History:   Positive for early CAD    REVIEW OF SYSTEMS:    Constitutional: there has been no unanticipated weight loss. There's been No change in energy level, No change in activity level.      Eyes: No visual changes or diplopia. No scleral icterus. ENT: No Headaches, hearing loss or vertigo. No mouth sores or sore throat. Cardiovascular: No problem  Respiratory: No previous reported problems  Gastrointestinal: No abdominal pain, appetite loss, blood in stools. No change in bowel or bladder habits. Genitourinary: No dysuria, trouble voiding, or hematuria. Musculoskeletal:  No gait disturbance, No weakness or joint complaints. Integumentary: No rash or pruritis. Neurological: No headache, diplopia, change in muscle strength, numbness or tingling. No change in gait, balance, coordination, mood, affect, memory, mentation, behavior. Psychiatric: No anxiety, or depression. Endocrine: No temperature intolerance. No excessive thirst, fluid intake, or urination. No tremor. Hematologic/Lymphatic: No abnormal bruising or bleeding, blood clots or swollen lymph nodes. Allergic/Immunologic: No nasal congestion or hives. PHYSICAL EXAM:    Physical Examination:    /77   Pulse 64   Temp 98.3 °F (36.8 °C) (Oral)   Resp 19   Ht 5' 4\" (1.626 m)   Wt 127 lb 6.8 oz (57.8 kg)   SpO2 95%   BMI 21.87 kg/m²    Constitutional and General Appearance: alert, cooperative, no distress and appears stated age  HEENT: PERRL, no cervical lymphadenopathy. No masses palpable. Normal oral mucosa  Respiratory:  Normal excursion and expansion without use of accessory muscles  Resp Auscultation: Good respiratory effort. No for increased work of breathing. On auscultation: clear to auscultation bilaterally  Cardiovascular:   The apical impulse is not displaced  Heart tones are crisp and normal. Irregular S1 and S2. Murmurs: 1/6 systolic murmur at LLSB  Jugular venous pulsation Normal  The carotid upstroke is normal in amplitude and contour without delay or bruit  Peripheral pulses are symmetrical and full   Abdomen:  No masses or tenderness  Bowel sounds present  Extremities:   No Cyanosis or Clubbing   Lower extremity edema: None; Karthikeyan's sign negative   Skin: Warm and dry  Neurological:  Alert and oriented. Moves all extremities well  No abnormalities of mood, affect, memory, mentation, or behavior are noted    DATA:    Diagnostics:      EKG:  AF, 73 bpm; RBBB. 2D ECHO ( 2/9/22)  Global left ventricular systolic function is normal. Estimated ejection fraction is 55-60 % . No obvious wall motion abnormality seen. Grade III (severe) left ventricular diastolic dysfunction. Left atrium is severely dilated. Mildly dilated right ventricular cavity. Calcified aortic valve leaflets with sclerosis, no significant stenosis, mean gradient 10 mm Hg  Mild aortic insufficiency. Mild mitral regurgitation. Severe tricuspid regurgitation. Moderate pulmonary hypertension with an estimated right ventricular systolic pressure of 51 mmHg. No pericardial effusion. Labs:     CBC:   Recent Labs     02/18/23  1030 02/19/23  0559   WBC 5.3 5.8   HGB 13.7 11.2*   HCT 42.3 35.5*    141     BMP:   Recent Labs     02/18/23  1030 02/19/23  0559     --    K 3.2* 4.1   CO2 27  --    BUN 20  --    CREATININE 1.01*  --    LABGLOM 55*  --    GLUCOSE 89  --      BNP: No results for input(s): BNP in the last 72 hours. PT/INR: No results for input(s): PROTIME, INR in the last 72 hours. APTT:  Recent Labs     02/19/23  0120 02/19/23  0559   APTT 56.6* 52.9*     CARDIAC ENZYMES:No results for input(s): CKTOTAL, CKMB, CKMBINDEX, TROPONINI in the last 72 hours. FASTING LIPID PANEL:  Lab Results   Component Value Date/Time    HDL 43 02/08/2022 05:14 AM    LDLCALC 70 07/05/2017 12:00 AM    TRIG 54 02/08/2022 05:14 AM     LIVER PROFILE:No results for input(s): AST, ALT, LABALBU in the last 72 hours.       IMPRESSION:    Atypical chest pain; suspected musculoskeletal strain  Mildly elevated but stable serial troponins; no indication of acute coronary syndrome  Chronic AF, on Eliquis  Essential HTN, controlled  H/o diastolic HF; currently with no CHF on exam  Mild aortic stenosis    Patient Active Problem List   Diagnosis    Hyperlipidemia    Primary hypertension    Anxiety    Gastroesophageal reflux disease without esophagitis    Atypical chest pain    Paroxysmal atrial fibrillation (HCC)    Asymptomatic varicose veins    Bilateral edema of lower extremity    Chronic anticoagulation    Former smoker    Mild aortic regurgitation    Mild concentric left ventricular hypertrophy (LVH)    Paraesophageal hernia    Hiatal hernia    Iron deficiency anemia    History of Nissen fundoplication    Closed wedge fracture of thoracic vertebra with routine healing T5,T8    (HFpEF) heart failure with preserved ejection fraction (HCC)    Severe tricuspid regurgitation    Pulmonary hypertension (HCC)    Tubular adenoma:  3 cm transverse colon    Iron deficiency    VTE (venous thromboembolism)    Short-term memory loss    Anemia    Polyp of colon    Abrasion of multiple sites of left upper arm    Acute on chronic diastolic heart failure (HCC)    Chest pain       RECOMMENDATIONS:  Agree with plans for 2D echo tomorrow; consideration for cardiac catheterization if reduced LVEF and/ or segmental LV wall motion abnormalities.  IV heparin while holding Eliquis  Continue metoprolol and amiodarone  Continue oral Bumex and spironolactone  Continue lisinopril    Discussed with patient and nursing.    Electronically signed by Ritesh Osborne MD on 2/19/2023 at 2:07 PM.  [facility]

## 2023-02-20 ENCOUNTER — APPOINTMENT (OUTPATIENT)
Dept: NON INVASIVE DIAGNOSTICS | Age: 88
DRG: 311 | End: 2023-02-20
Payer: MEDICARE

## 2023-02-20 LAB
EKG ATRIAL RATE: 89 BPM
EKG ATRIAL RATE: 98 BPM
EKG Q-T INTERVAL: 456 MS
EKG Q-T INTERVAL: 470 MS
EKG QRS DURATION: 154 MS
EKG QRS DURATION: 158 MS
EKG QTC CALCULATION (BAZETT): 509 MS
EKG QTC CALCULATION (BAZETT): 517 MS
EKG R AXIS: 29 DEGREES
EKG R AXIS: 69 DEGREES
EKG T AXIS: 17 DEGREES
EKG T AXIS: 39 DEGREES
EKG VENTRICULAR RATE: 73 BPM
EKG VENTRICULAR RATE: 75 BPM
LV EF: 65 %
LVEF MODALITY: NORMAL
PARTIAL THROMBOPLASTIN TIME: 51.9 SEC (ref 21.3–31.3)

## 2023-02-20 PROCEDURE — 6370000000 HC RX 637 (ALT 250 FOR IP): Performed by: HOSPITALIST

## 2023-02-20 PROCEDURE — 85730 THROMBOPLASTIN TIME PARTIAL: CPT

## 2023-02-20 PROCEDURE — 93306 TTE W/DOPPLER COMPLETE: CPT

## 2023-02-20 PROCEDURE — 2000000000 HC ICU R&B

## 2023-02-20 PROCEDURE — 2580000003 HC RX 258: Performed by: HOSPITALIST

## 2023-02-20 PROCEDURE — 36415 COLL VENOUS BLD VENIPUNCTURE: CPT

## 2023-02-20 PROCEDURE — 99232 SBSQ HOSP IP/OBS MODERATE 35: CPT | Performed by: HOSPITALIST

## 2023-02-20 RX ORDER — BUMETANIDE 1 MG/1
0.5 TABLET ORAL DAILY
Status: DISCONTINUED | OUTPATIENT
Start: 2023-02-21 | End: 2023-02-21 | Stop reason: HOSPADM

## 2023-02-20 RX ORDER — MIDODRINE HYDROCHLORIDE 2.5 MG/1
2.5 TABLET ORAL
Status: DISCONTINUED | OUTPATIENT
Start: 2023-02-20 | End: 2023-02-20

## 2023-02-20 RX ORDER — MIDODRINE HYDROCHLORIDE 5 MG/1
5 TABLET ORAL
Status: DISCONTINUED | OUTPATIENT
Start: 2023-02-20 | End: 2023-02-21 | Stop reason: HOSPADM

## 2023-02-20 RX ORDER — 0.9 % SODIUM CHLORIDE 0.9 %
500 INTRAVENOUS SOLUTION INTRAVENOUS ONCE
Status: COMPLETED | OUTPATIENT
Start: 2023-02-20 | End: 2023-02-20

## 2023-02-20 RX ORDER — DOPAMINE HYDROCHLORIDE 160 MG/100ML
1-3 INJECTION, SOLUTION INTRAVENOUS CONTINUOUS
Status: DISCONTINUED | OUTPATIENT
Start: 2023-02-20 | End: 2023-02-21

## 2023-02-20 RX ADMIN — PRAVASTATIN SODIUM 40 MG: 20 TABLET ORAL at 20:26

## 2023-02-20 RX ADMIN — MULTIPLE VITAMINS W/ MINERALS TAB 1 TABLET: TAB at 08:27

## 2023-02-20 RX ADMIN — CHOLECALCIFEROL (VITAMIN D3) 10 MCG (400 UNIT) TABLET 200 UNITS: at 20:26

## 2023-02-20 RX ADMIN — MIDODRINE HYDROCHLORIDE 2.5 MG: 2.5 TABLET ORAL at 13:09

## 2023-02-20 RX ADMIN — LISINOPRIL 2.5 MG: 2.5 TABLET ORAL at 08:27

## 2023-02-20 RX ADMIN — METOPROLOL TARTRATE 12.5 MG: 25 TABLET, FILM COATED ORAL at 08:27

## 2023-02-20 RX ADMIN — CALCIUM CARBONATE (ANTACID) CHEW TAB 500 MG 500 MG: 500 CHEW TAB at 20:26

## 2023-02-20 RX ADMIN — MIDODRINE HYDROCHLORIDE 2.5 MG: 2.5 TABLET ORAL at 17:00

## 2023-02-20 RX ADMIN — FERROUS SULFATE TAB EC 325 MG (65 MG FE EQUIVALENT) 325 MG: 325 (65 FE) TABLET DELAYED RESPONSE at 08:27

## 2023-02-20 RX ADMIN — CALCIUM CARBONATE (ANTACID) CHEW TAB 500 MG 500 MG: 500 CHEW TAB at 08:28

## 2023-02-20 RX ADMIN — AMIODARONE HYDROCHLORIDE 200 MG: 200 TABLET ORAL at 08:27

## 2023-02-20 RX ADMIN — SODIUM CHLORIDE, PRESERVATIVE FREE 20 ML: 5 INJECTION INTRAVENOUS at 20:31

## 2023-02-20 RX ADMIN — ACETAMINOPHEN 650 MG: 325 TABLET ORAL at 00:57

## 2023-02-20 RX ADMIN — MIDODRINE HYDROCHLORIDE 5 MG: 5 TABLET ORAL at 18:02

## 2023-02-20 RX ADMIN — SODIUM CHLORIDE 500 ML: 0.9 INJECTION, SOLUTION INTRAVENOUS at 13:12

## 2023-02-20 RX ADMIN — CHOLECALCIFEROL (VITAMIN D3) 10 MCG (400 UNIT) TABLET 200 UNITS: at 08:27

## 2023-02-20 RX ADMIN — HYDROCODONE BITARTRATE AND ACETAMINOPHEN 2 TABLET: 5; 325 TABLET ORAL at 08:28

## 2023-02-20 RX ADMIN — SODIUM CHLORIDE, PRESERVATIVE FREE 10 ML: 5 INJECTION INTRAVENOUS at 08:28

## 2023-02-20 RX ADMIN — ASPIRIN 81 MG CHEWABLE TABLET 81 MG: 81 TABLET CHEWABLE at 08:28

## 2023-02-20 RX ADMIN — SPIRONOLACTONE 25 MG: 25 TABLET, FILM COATED ORAL at 08:27

## 2023-02-20 RX ADMIN — PANTOPRAZOLE SODIUM 40 MG: 40 TABLET, DELAYED RELEASE ORAL at 08:27

## 2023-02-20 RX ADMIN — BUMETANIDE 1 MG: 1 TABLET ORAL at 08:27

## 2023-02-20 ASSESSMENT — PAIN SCALES - WONG BAKER
WONGBAKER_NUMERICALRESPONSE: 0
WONGBAKER_NUMERICALRESPONSE: 0

## 2023-02-20 ASSESSMENT — PAIN DESCRIPTION - LOCATION
LOCATION: HEAD
LOCATION: BACK

## 2023-02-20 ASSESSMENT — PAIN DESCRIPTION - ORIENTATION: ORIENTATION: MID;RIGHT;LEFT

## 2023-02-20 ASSESSMENT — PAIN DESCRIPTION - DESCRIPTORS
DESCRIPTORS: ACHING
DESCRIPTORS: ACHING;DISCOMFORT;SORE

## 2023-02-20 ASSESSMENT — PAIN - FUNCTIONAL ASSESSMENT
PAIN_FUNCTIONAL_ASSESSMENT: PREVENTS OR INTERFERES WITH MANY ACTIVE NOT PASSIVE ACTIVITIES
PAIN_FUNCTIONAL_ASSESSMENT: ACTIVITIES ARE NOT PREVENTED

## 2023-02-20 ASSESSMENT — PAIN SCALES - GENERAL
PAINLEVEL_OUTOF10: 7
PAINLEVEL_OUTOF10: 3
PAINLEVEL_OUTOF10: 7

## 2023-02-20 NOTE — PLAN OF CARE
Problem: Discharge Planning  Goal: Discharge to home or other facility with appropriate resources  Outcome: Progressing  Flowsheets (Taken 2/20/2023 0800)  Discharge to home or other facility with appropriate resources: Identify barriers to discharge with patient and caregiver     Problem: Pain  Goal: Verbalizes/displays adequate comfort level or baseline comfort level  Outcome: Progressing     Problem: Skin/Tissue Integrity  Goal: Absence of new skin breakdown  Description: 1. Monitor for areas of redness and/or skin breakdown  2. Assess vascular access sites hourly  3. Every 4-6 hours minimum:  Change oxygen saturation probe site  4. Every 4-6 hours:  If on nasal continuous positive airway pressure, respiratory therapy assess nares and determine need for appliance change or resting period.   Outcome: Progressing     Problem: Safety - Adult  Goal: Free from fall injury  Outcome: Progressing     Problem: ABCDS Injury Assessment  Goal: Absence of physical injury  Outcome: Progressing

## 2023-02-20 NOTE — PROGRESS NOTES
Attending notified of pt c/o midsternal non radiating chest discomfort, shortness of breath, lightheadedness, v/s obtained with low bp,

## 2023-02-20 NOTE — PROGRESS NOTES
[facility]  Office: [address], DO, Quiana Alfonso, DO, Benita Mejia, DO, Tomy Horn Blood, DO, Duane Sigala MD, Chava Gold MD, Lorena Araiza MD, Chuy Moctzeuma MD,  Oralia Singh MD, Herbie Hathaway MD, Roosevelt Sotomayor, DO, Chris Freeman MD,  Compa Hansen MD, Lexi Mg MD, Inocente Soulier, DO, Drew Frias MD, Jenny Mccarty MD, Margarita Scherer, DO, Genny Duron MD, Mayur Joe MD, Dominick Cooper MD, Bolivar Gallardo MD, Gideon Peters, DO, Davion Lopez MD, Sourav Shipman MD, Jacques Caicedo, CNP,  Nii Toth CNP, Samson Bradley CNP, Soto Summers, CNP,  Liseth Salazar, [facility], Saimra Gudino, CNP, Polly Gunn, CNP, Gil Mensah CNP, Cosme Cifuentes, CNP, Curtis Dubois, CNP, Vanesa Bello PA-C, Dori Bailey, OLIVER, Rufus Hernandez, CNP, Africa Gao, [facility]    Progress Note    2/20/2023    1:16 PM    Name:   Raphael Yang  MRN:     [id number]     Acct:      [de-identified]   Room:   [address] [place] [address] Day:  2  Admit Date:  2/18/2023 10:11 AM    PCP:   Callie Santos DO  Code Status:  Full Code    Subjective:     Patient seen in follow-up for chest pressure, patient states \"I am fine\"    Patient's symptoms wax and wane throughout the day. Earlier today she told me she was fine and that she belches a lot. Her chest pressure resolves with belching. Much of her symptomology appears to be related to GI etiologies. Echocardiogram is pending. Patient's blood pressure has been low and on admission I aggressively titrated her diuretics and antihypertensive medications. Even with decreasing these medications her blood pressure is running systolic of 83N. I am going to give the patient a fluid bolus. We will decrease Bumex to 0.5 mg.  I am going to start ProAmatine. Will await echocardiogram results, further decision making to follow. Medications: Allergies:     Allergies Allergen Reactions    Codeine Nausea Only     dizziness    Bactrim [Sulfamethoxazole-Trimethoprim] Hives    Naproxen      On allergy list ? Unsure of reaction    Norvasc [Amlodipine Besylate]      Edema-she thinks      Sertraline Hcl      unsure    Toradol [Ketorolac Tromethamine]      unsure    Tramadol      unsure    Magnesium-Containing Compounds Diarrhea     Mag ox 500 ? Morphine Nausea And Vomiting    Percocet [Oxycodone-Acetaminophen] Nausea And Vomiting       Current Meds:   Scheduled Meds:    sodium chloride  500 mL IntraVENous Once    [START ON 2023] bumetanide  0.5 mg Oral Daily    midodrine  2.5 mg Oral TID WC    lisinopril  2.5 mg Oral Daily    metoprolol tartrate  12.5 mg Oral BID    spironolactone  25 mg Oral Daily    amiodarone  200 mg Oral Daily    ferrous sulfate  325 mg Oral Daily with breakfast    therapeutic multivitamin-minerals  1 tablet Oral Daily    pantoprazole  40 mg Oral Daily    pravastatin  40 mg Oral Nightly    sodium chloride flush  5-40 mL IntraVENous 2 times per day    aspirin  81 mg Oral Daily    calcium carbonate  500 mg Oral BID    And    vitamin D3  200 Units Oral BID     Continuous Infusions:    heparin (PORCINE) Infusion 12 Units/kg/hr (23 1356)    sodium chloride       PRN Meds: HYDROcodone 5 mg - acetaminophen **OR** HYDROcodone 5 mg - acetaminophen, heparin (porcine), heparin (porcine), sodium chloride flush, sodium chloride, ondansetron **OR** ondansetron, acetaminophen **OR** acetaminophen, polyethylene glycol, potassium chloride **OR** potassium chloride, magnesium sulfate    Data:     Vitals:  BP (!) 86/40 Comment: manual  Pulse 60   Temp 97.7 °F (36.5 °C) (Oral)   Resp 18   Ht 5' 4\" (1.626 m)   Wt 128 lb 8.5 oz (58.3 kg)   SpO2 96%   BMI 22.06 kg/m²   Temp (24hrs), Av.9 °F (36.6 °C), Min:97.7 °F (36.5 °C), Max:98.1 °F (36.7 °C)    No results for input(s): POCGLU in the last 72 hours. I/O (24Hr):     Intake/Output Summary (Last 24 hours) at 2/20/2023 1316  Last data filed at 2/20/2023 6538  Gross per 24 hour   Intake --   Output 600 ml   Net -600 ml       Labs:  Hematology:  Recent Labs     02/18/23  1030 02/19/23  0559   WBC 5.3 5.8   RBC 5.06 4.20   HGB 13.7 11.2*   HCT 42.3 35.5*   MCV 83.6 84.4   MCH 27.0 26.6   MCHC 32.3 31.5   RDW 20.9* 20.9*    141   MPV 8.4 8.6     Chemistry:  Recent Labs     02/18/23  1030 02/18/23  1300 02/18/23  1620 02/19/23  0559     --   --   --    K 3.2*  --   --  4.1   CL 99  --   --   --    CO2 27  --   --   --    GLUCOSE 89  --   --   --    BUN 20  --   --   --    CREATININE 1.01*  --   --   --    ANIONGAP 14  --   --   --    LABGLOM 55*  --   --   --    CALCIUM 10.0  --   --   --    PROBNP 1,860*  --   --   --    TROPHS 33* 35* 35*  --    No results for input(s): PROT, LABALBU, LABA1C, A7STQRO, X8ZCIBA, FT4, TSH, AST, ALT, LDH, GGT, ALKPHOS, LABGGT, BILITOT, BILIDIR, AMMONIA, AMYLASE, LIPASE, LACTATE, CHOL, HDL, LDLCHOLESTEROL, CHOLHDLRATIO, TRIG, VLDL, VIU65HJ, PHENYTOIN, PHENYF, URICACID, POCGLU in the last 72 hours. ABG:No results found for: POCPH, PHART, PH, POCPCO2, HHN6SYG, PCO2, POCPO2, PO2ART, PO2, POCHCO3, OYB7QOL, HCO3, NBEA, PBEA, BEART, BE, THGBART, THB, MAX1CEP, ZHXY3BIF, S0RPGUAG, O2SAT, FIO2  Lab Results   Component Value Date/Time    SPECIAL RT WRIST,12ML 11/02/2022 04:41 PM     Lab Results   Component Value Date/Time    CULTURE NO GROWTH 5 DAYS 11/02/2022 04:41 PM       Radiology:  XR CHEST PORTABLE    Result Date: 2/18/2023  No focal lung opacity.        Physical Examination:     General appearance:  alert, cooperative and no distress  Mental Status:  oriented to person, place and time and normal affect  Lungs:  clear to auscultation bilaterally, normal effort  Heart:  regular rate and rhythm, no murmur  Abdomen:  soft, nontender, nondistended, normal bowel sounds, no masses, hepatomegaly, splenomegaly  Extremities:  no edema, redness, tenderness in the calves  Skin:  no gross lesions, rashes, induration    Assessment:     Hospital Problems             Last Modified POA    * (Principal) Chest pain 2/18/2023 Yes       Plan:     Chest pain/pressure  Patient states that her symptoms improved with belching  History of Nissen fundoplication  Echocardiogram pending  Outpatient stress per cardiology  Hypotension  Initiate ProAmatine  Decrease Bumex to 0.5 mg  Continue Aldactone  Lisinopril/Lopressor dose and titrate  Paroxysmal atrial fibrillation  Patient maintains normal sinus rhythm, presently on heparin drip  We will resume Eliquis pending echocardiogram results  Essential hypertension  Patient's blood pressure has been problematic with periodic drops  Medications have been de-escalated aggressively  Await echocardiogram  Patient's chest pain may be due to hypotension and cardiac ischemia secondary to poor perfusion  Dyslipidemia  Continue statin    Medical Decision Making: Rocael Rangel DO  2/20/2023  1:16 PM

## 2023-02-20 NOTE — PROGRESS NOTES
[facility]   Progress Note                   Date:   2/20/2023  Patient name: Shaggy Black  Date of admission:  2/18/2023 10:11 AM  MRN:   [id number]  YOB: 1935  PCP: Anjana Diaz DO    Reason for Admission: Chest pain [R07.9]  Elevated troponin [R77.8]  Chest pain, unspecified type [R07.9]    Subjective:       Clinical Changes / Abnormalities: Pt seen and examined in the room. Pt denies any CP or SOb. Remains afib, rate stable. PT states that she is going home today       Medications:   Scheduled Meds:   lisinopril  2.5 mg Oral Daily    metoprolol tartrate  12.5 mg Oral BID    spironolactone  25 mg Oral Daily    bumetanide  1 mg Oral Daily    amiodarone  200 mg Oral Daily    ferrous sulfate  325 mg Oral Daily with breakfast    therapeutic multivitamin-minerals  1 tablet Oral Daily    pantoprazole  40 mg Oral Daily    pravastatin  40 mg Oral Nightly    sodium chloride flush  5-40 mL IntraVENous 2 times per day    aspirin  81 mg Oral Daily    calcium carbonate  500 mg Oral BID    And    vitamin D3  200 Units Oral BID     Continuous Infusions:   heparin (PORCINE) Infusion 12 Units/kg/hr (02/18/23 1356)    sodium chloride       CBC:   Recent Labs     02/18/23  1030 02/19/23  0559   WBC 5.3 5.8   HGB 13.7 11.2*    141     BMP:    Recent Labs     02/18/23  1030 02/19/23  0559     --    K 3.2* 4.1   CL 99  --    CO2 27  --    BUN 20  --    CREATININE 1.01*  --    GLUCOSE 89  --      Hepatic: No results for input(s): AST, ALT, ALB, BILITOT, ALKPHOS in the last 72 hours. Troponin:   Recent Labs     02/18/23  1030 02/18/23  1300 02/18/23  1620   TROPHS 33* 35* 35*     BNP: No results for input(s): BNP in the last 72 hours. Lipids: No results for input(s): CHOL, HDL in the last 72 hours. Invalid input(s): LDLCALCU  INR: No results for input(s): INR in the last 72 hours.     Objective:   Vitals: BP (!) 113/51   Pulse 56   Temp 97.7 °F (36.5 °C) (Oral)   Resp 16 Ht 5' 4\" (1.626 m)   Wt 128 lb 8.5 oz (58.3 kg)   SpO2 96%   BMI 22.06 kg/m²   General appearance: alert and cooperative with exam  HEENT: Head: Normocephalic, no lesions, without obvious abnormality. Neck: no JVD, trachea midline, no adenopathy  Lungs: Clear to auscultation  Heart: irregular rate and rhythm, s1/s2 auscultated, no murmurs, afib  Abdomen: soft, non-tender, bowel sounds active  Extremities: no edema  Neurologic: not done         EKG:  AF, 73 bpm; RBBB. 2D ECHO ( 2/9/22)  Global left ventricular systolic function is normal. Estimated ejection fraction is 55-60 % . No obvious wall motion abnormality seen. Grade III (severe) left ventricular diastolic dysfunction. Left atrium is severely dilated. Mildly dilated right ventricular cavity. Calcified aortic valve leaflets with sclerosis, no significant stenosis, mean gradient 10 mm Hg  Mild aortic insufficiency. Mild mitral regurgitation. Severe tricuspid regurgitation. Moderate pulmonary hypertension with an estimated right ventricular systolic pressure of 51 mmHg. No pericardial effusion.   Assessment / Acute Cardiac Problems:   Atypical chest pain; suspected musculoskeletal strain  Mildly elevated but stable serial troponins; no indication of acute coronary syndrome  Chronic AF, on Eliquis  Essential HTN, controlled  H/o diastolic HF; currently with no CHF on exam  Mild aortic stenosis    Patient Active Problem List:     Hyperlipidemia     Primary hypertension     Anxiety     Gastroesophageal reflux disease without esophagitis     Atypical chest pain     Paroxysmal atrial fibrillation (HCC)     Asymptomatic varicose veins     Bilateral edema of lower extremity     Chronic anticoagulation     Former smoker     Mild aortic regurgitation     Mild concentric left ventricular hypertrophy (LVH)     Paraesophageal hernia     Hiatal hernia     Iron deficiency anemia     History of Nissen fundoplication     Closed wedge fracture of thoracic vertebra with routine healing T5,T8     (HFpEF) heart failure with preserved ejection fraction (HCC)     Severe tricuspid regurgitation     Pulmonary hypertension (HCC)     Tubular adenoma:  3 cm transverse colon     Iron deficiency     VTE (venous thromboembolism)     Short-term memory loss     Anemia     Polyp of colon     Abrasion of multiple sites of left upper arm     Acute on chronic diastolic heart failure (HCC)     Chest pain      Plan of Treatment:   Afib. On heparin. Restart on eliquis on discharge. Continue BB and amio   Continue Bumex and aldactone   Await ECHO results.   If low risk, can be discharged and follow up as outpt for stress test.      Electronically signed by MALVIN Yip CNP on 2/20/2023 at 8:49 AM  [address]  [phone]

## 2023-02-20 NOTE — DISCHARGE INSTR - COC
Continuity of Care Form    Patient Name: Hiral Bro   :  1935  MRN:  [id number]    Admit date:  2023  Discharge date:  2023      Code Status Order: Full Code   Advance Directives:     Admitting Physician:  Becca Randall DO  PCP: Oc Love DO    Discharging Nurse: [facility] Unit/Room#: 724/766-01  Discharging Unit Phone Number: [phone]    Emergency Contact:   Extended Emergency Contact Information  Primary Emergency Contact: Kelly Deng  Address: [address] [place] [address], [address] Phone: [phone]  Relation: Grandchild  Secondary Emergency Contact: [facility] Phone: [phone]  Relation: Grandchild    Past Surgical History:  Past Surgical History:   Procedure Laterality Date    BLADDER SUSPENSION      CHOLECYSTECTOMY      COLONOSCOPY N/A 2/10/2022    COLONOSCOPY WITH BIOPSY performed by Ta Laird MD at [place] [address] ([address] [place] [address])  ovaries left    JOINT REPLACEMENT  left total shoulder;     UPPER GASTROINTESTINAL ENDOSCOPY N/A 2/10/2022    EGD ESOPHAGOGASTRODUODENOSCOPY performed by Ta Laird MD at [facility]       Immunization History:   Immunization History   Administered Date(s) Administered    COVID-19, PFIZER PURPLE top, DILUTE for use, (age 15 y+), 30mcg/0.3mL 2021, 2021    Influenza Virus Vaccine 10/22/2005, 10/23/2008, 2010, 2011, 10/04/2012, 2013, 2014, 2015, 2016, 10/03/2016, 11/15/2017, 2018, 2020    Influenza, AFLURIA (age 1 yrs+), FLUZONE, (age 10 mo+), MDV, 0.5mL 2016, 11/15/2017, 2018    Influenza, FLUAD, (age 72 y+), Adjuvanted, 0.5mL 2020    Influenza, Intradermal, Preservative free 10/13/2009    Influenza, Triv, inactivated, subunit, adjuvanted, IM (Fluad 65 yrs and older) 2019    Pneumococcal Conjugate 13-valent ([id number]) 10/29/2015    Pneumococcal Polysaccharide ([id number]) 11/02/2006, 10/01/2010    Td, unspecified formulation 08/20/2011    Tdap (Boostrix, Adacel) 09/20/2019, 02/01/2021, 10/07/2022    Zoster Live (Zostavax) 09/10/2010       Active Problems:  Patient Active Problem List   Diagnosis Code    Hyperlipidemia E78.5    Primary hypertension I10    Anxiety F41.9    Gastroesophageal reflux disease without esophagitis K21.9    Atypical chest pain R07.89    Paroxysmal atrial fibrillation (HCC) I48.0    Asymptomatic varicose veins I83.90    Bilateral edema of lower extremity R60.0    Chronic anticoagulation Z79.01    Former smoker Z87.891    Mild aortic regurgitation I35.1    Mild concentric left ventricular hypertrophy (LVH) I51.7    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    Hiatal hernia K44.9    Iron deficiency anemia D50.9    History of Nissen fundoplication V76.937    Closed wedge fracture of thoracic vertebra with routine healing T5,T8 S22.000D    (HFpEF) heart failure with preserved ejection fraction (HCC) I50.30    Severe tricuspid regurgitation I07.1    Pulmonary hypertension (HCC) I27.20    Tubular adenoma:  3 cm transverse colon D36.9    Iron deficiency E61.1    VTE (venous thromboembolism) I82.90    Short-term memory loss R41.3    Anemia D64.9    Polyp of colon K63.5    Abrasion of multiple sites of left upper arm S40.812A    Acute on chronic diastolic heart failure (HCC) I50.33    Chest pain R07.9       Isolation/Infection:   Isolation            No Isolation          Patient Infection Status       Infection Onset Added Last Indicated Last Indicated By Review Planned Expiration Resolved Resolved By    None active    Resolved    COVID-19 (Rule Out) 11/27/22 11/27/22 11/27/22 COVID-19, Rapid (Ordered)   11/27/22 Rule-Out Test Resulted    COVID-19 (Rule Out) 11/03/22 11/03/22 11/03/22 COVID-19, Rapid (Ordered)   11/03/22 Rule-Out Test Resulted    COVID-19 (Rule Out) 11/15/20 11/15/20 11/15/20 Covid-19 Ambulatory (Ordered)   11/18/20 Rule-Out Test Resulted            Nurse Assessment:  Last Vital Signs: BP (!) 113/51   Pulse 56   Temp 97.7 °F (36.5 °C) (Oral)   Resp 18   Ht 5' 4\" (1.626 m)   Wt 128 lb 8.5 oz (58.3 kg)   SpO2 96%   BMI 22.06 kg/m²     Last documented pain score (0-10 scale): Pain Level: 3  Last Weight:   Wt Readings from Last 1 Encounters:   02/20/23 128 lb 8.5 oz (58.3 kg)     Mental Status:  oriented and alert    IV Access:  - None    Nursing Mobility/ADLs:  Walking   Assisted  Transfer  Assisted  Bathing  Assisted  Dressing  Assisted  Toileting  Assisted  Feeding  Independent  Med Admin  Independent  Med Delivery   whole    Wound Care Documentation and Therapy:        Elimination:  Continence: Bowel: Yes  Bladder: No  Urinary Catheter: None   Colostomy/Ileostomy/Ileal Conduit: No       Date of Last BM: 2/21/2023    Intake/Output Summary (Last 24 hours) at 2/20/2023 1235  Last data filed at 2/20/2023 4520  Gross per 24 hour   Intake --   Output 600 ml   Net -600 ml     I/O last 3 completed shifts:  In: -   Out: 600 [Urine:600]    Safety Concerns: At Risk for Falls    Impairments/Disabilities:      None    Nutrition Therapy:  Current Nutrition Therapy:   - Oral Diet:  General    Routes of Feeding: Oral  Liquids: No restrictions  Daily Fluid Restriction: no  Last Modified Barium Swallow with Video (Video Swallowing Test): not done    Treatments at the Time of Hospital Discharge:   Respiratory Treatments: None  Oxygen Therapy:  is not on home oxygen therapy.   Ventilator:    - No ventilator support    Rehab Therapies: Physical Therapy and Occupational Therapy  Weight Bearing Status/Restrictions: No weight bearing restrictions  Other Medical Equipment (for information only, NOT a DME order):  cane and walker  Other Treatments: none    Patient's personal belongings (please select all that are sent with patient):  Glasses    RN SIGNATURE:  Electronically signed by Farheen Vera RN on 2/21/23 at 11:52 AM EST    CASE MANAGEMENT/SOCIAL WORK SECTION    Inpatient Status Date: ***    Readmission Risk Assessment Score:  Readmission Risk              Risk of Unplanned Readmission:  19           Discharging to Facility/ Agency   Name: Cecille Leo   Address: [address], [facility], [facility].  Phone: [phone]    / signature: Electronically signed by Arin Hirsch RN on 2/20/23 at 12:36 PM EST    PHYSICIAN SECTION    Prognosis: Fair    Condition at Discharge: Stable    Rehab Potential (if transferring to Rehab): Fair    Recommended Labs or Other Treatments After Discharge: PT/OT. Follow-up with cardiology for outpatient stress testing. Physician Certification: I certify the above information and transfer of Jamal Burt  is necessary for the continuing treatment of the diagnosis listed and that she requires [facility] for greater 30 days.      Update Admission H&P: No change in H&P    PHYSICIAN SIGNATURE:  Electronically signed by Nakia Parson MD on 2/21/23 at 11:48 AM EST

## 2023-02-20 NOTE — PLAN OF CARE
Problem: Discharge Planning  Goal: Discharge to home or other facility with appropriate resources  Outcome: Progressing   Patient actively participates in ADLs and decision making regarding plan of care. Problem: Pain  Goal: Verbalizes/displays adequate comfort level or baseline comfort level  Outcome: Progressing   No new signs/symptoms of pain noted, pain rating < 3 on scale 0-10, pain controlled with medication/repositioning. Problem: Skin/Tissue Integrity  Goal: Absence of new skin breakdown  Description: 1. Monitor for areas of redness and/or skin breakdown  2. Assess vascular access sites hourly  3. Every 4-6 hours minimum:  Change oxygen saturation probe site  4. Every 4-6 hours:  If on nasal continuous positive airway pressure, respiratory therapy assess nares and determine need for appliance change or resting period. Outcome: Progressing   No new skin breakdown noted, no new signs/symptoms of infection, continue to monitor lab work including WBC, medications administered per physician orders.

## 2023-02-21 VITALS
WEIGHT: 126.76 LBS | DIASTOLIC BLOOD PRESSURE: 53 MMHG | HEART RATE: 66 BPM | HEIGHT: 64 IN | SYSTOLIC BLOOD PRESSURE: 105 MMHG | TEMPERATURE: 97.7 F | BODY MASS INDEX: 21.64 KG/M2 | RESPIRATION RATE: 16 BRPM | OXYGEN SATURATION: 96 %

## 2023-02-21 LAB — PARTIAL THROMBOPLASTIN TIME: 25.5 SEC (ref 21.3–31.3)

## 2023-02-21 PROCEDURE — 2580000003 HC RX 258: Performed by: HOSPITALIST

## 2023-02-21 PROCEDURE — 99238 HOSP IP/OBS DSCHRG MGMT 30/<: CPT | Performed by: STUDENT IN AN ORGANIZED HEALTH CARE EDUCATION/TRAINING PROGRAM

## 2023-02-21 PROCEDURE — 6360000002 HC RX W HCPCS: Performed by: HOSPITALIST

## 2023-02-21 PROCEDURE — 85730 THROMBOPLASTIN TIME PARTIAL: CPT

## 2023-02-21 PROCEDURE — 6370000000 HC RX 637 (ALT 250 FOR IP): Performed by: HOSPITALIST

## 2023-02-21 PROCEDURE — 36415 COLL VENOUS BLD VENIPUNCTURE: CPT

## 2023-02-21 RX ORDER — BUMETANIDE 1 MG/1
0.5 TABLET ORAL 2 TIMES DAILY
Qty: 30 TABLET | Refills: 1 | Status: SHIPPED | OUTPATIENT
Start: 2023-02-21

## 2023-02-21 RX ORDER — MIDODRINE HYDROCHLORIDE 5 MG/1
5 TABLET ORAL
Qty: 90 TABLET | Refills: 3 | Status: SHIPPED | OUTPATIENT
Start: 2023-02-21

## 2023-02-21 RX ADMIN — PANTOPRAZOLE SODIUM 40 MG: 40 TABLET, DELAYED RELEASE ORAL at 08:42

## 2023-02-21 RX ADMIN — SODIUM CHLORIDE, PRESERVATIVE FREE 10 ML: 5 INJECTION INTRAVENOUS at 08:42

## 2023-02-21 RX ADMIN — MULTIPLE VITAMINS W/ MINERALS TAB 1 TABLET: TAB at 08:42

## 2023-02-21 RX ADMIN — HYDROCODONE BITARTRATE AND ACETAMINOPHEN 2 TABLET: 5; 325 TABLET ORAL at 08:44

## 2023-02-21 RX ADMIN — AMIODARONE HYDROCHLORIDE 200 MG: 200 TABLET ORAL at 08:42

## 2023-02-21 RX ADMIN — CALCIUM CARBONATE (ANTACID) CHEW TAB 500 MG 500 MG: 500 CHEW TAB at 08:42

## 2023-02-21 RX ADMIN — ASPIRIN 81 MG CHEWABLE TABLET 81 MG: 81 TABLET CHEWABLE at 08:42

## 2023-02-21 RX ADMIN — FERROUS SULFATE TAB EC 325 MG (65 MG FE EQUIVALENT) 325 MG: 325 (65 FE) TABLET DELAYED RESPONSE at 08:42

## 2023-02-21 RX ADMIN — CHOLECALCIFEROL (VITAMIN D3) 10 MCG (400 UNIT) TABLET 200 UNITS: at 08:42

## 2023-02-21 RX ADMIN — ONDANSETRON 4 MG: 2 INJECTION INTRAMUSCULAR; INTRAVENOUS at 06:22

## 2023-02-21 RX ADMIN — MIDODRINE HYDROCHLORIDE 5 MG: 5 TABLET ORAL at 08:42

## 2023-02-21 RX ADMIN — SODIUM CHLORIDE, PRESERVATIVE FREE 10 ML: 5 INJECTION INTRAVENOUS at 06:23

## 2023-02-21 RX ADMIN — MIDODRINE HYDROCHLORIDE 5 MG: 5 TABLET ORAL at 12:08

## 2023-02-21 ASSESSMENT — PAIN DESCRIPTION - LOCATION: LOCATION: BACK

## 2023-02-21 ASSESSMENT — PAIN DESCRIPTION - DESCRIPTORS: DESCRIPTORS: ACHING;DISCOMFORT

## 2023-02-21 ASSESSMENT — PAIN DESCRIPTION - ORIENTATION: ORIENTATION: MID;RIGHT;LEFT

## 2023-02-21 ASSESSMENT — PAIN SCALES - GENERAL
PAINLEVEL_OUTOF10: 10
PAINLEVEL_OUTOF10: 6

## 2023-02-21 ASSESSMENT — PAIN SCALES - WONG BAKER: WONGBAKER_NUMERICALRESPONSE: 0

## 2023-02-21 NOTE — DISCHARGE SUMMARY
[facility]  Office: [address], DO, Mira Lombardo, DO, Ap Patymanda, DO, Nader Hillary Blood, DO, Taya Blum MD, Eriberto Springer MD, Abram Garcia MD, Jayant Boyer MD,  Eva Michelle MD, Maurilio Golden MD, Ruby Mccann, DO, Stas Bear MD,  Niraj Swain MD, Julio Polanco MD, Cirilo Hahn, DO, Sena Winston MD, Lera Mcardle, MD, Samantha Fofana, DO, Haritha Beck MD, Tye Bashir MD, Keith Lombard, MD, Sharri Freeman MD, Rosario Laura DO, Mary Bassett MD, Leon Mistry MD, Jovita Espitia, CNP,  Marilyn English, CNP, Kathy Balderas, CNP, Rosa Castellanos, CNP,  Geovanna Caban, [facility], Hussein Martines, CNP, Jovanny Rodney, CNP, Larry Montague, CNP, Jerrell Olivera, CNP, Shayy Piper, CNP, Jhoana Pope PADallasC, Toño Tovar, CNS, Oral Gram, CNP, Cristina De Jesus, CNP         [address] [facility]    Discharge Summary     Patient ID: Ramiro Pandey  :  1935   MRN: [id number]     ACCOUNT:  [de-identified]   Patient's PCP: Bradford Viveros DO  Admit Date: 2023   Discharge Date: 2023     Length of Stay: 3  Code Status:  Full Code  Admitting Physician: Julio Polanco MD  Discharge Physician: Julio Polanco MD     Active Discharge Diagnoses:     Hospital Problem Lists:  Principal Problem:    Chest pain  Active Problems:    Hyperlipidemia    Paroxysmal atrial fibrillation (HCC)    (HFpEF) heart failure with preserved ejection fraction [facility])  Resolved Problems:    * No resolved hospital problems. *      Admission Condition:  fair     Discharged Condition: fair    Hospital Stay:     Hospital Course:  Ramiro Pandey is a 82 y.o. female with a past medical history of paroxsymal atrial fibrillation (on Eliquis), hypertension and hyperlipidemia who presented to the emergency department on 2023 complaining of chest pain.  The patient stated that the chest pain began approximately one-hour prior to presentation at this facility and resolved on arrival to the ED. In the ED, the patient was afebrile and nontoxic appearing. Serial troponin measurements were 35 (baseline 26) and the patient was started on a heparin drip and admitted to internal medicine for further management. Cardiology was consulted on admission and recommended echocardiogram. Echocardiogram was done on 2/20 and was negative for wall motion abnormalities. The patient is discharged to SNF today (2/21) in stable condition. She is instructed to follow-up with cardiology and her primary care physician as scheduled. Significant therapeutic interventions: Heparin infusion; cardiology consultation; echocardiogram     Significant Diagnostic Studies:   Labs / Micro:  BMP:    Lab Results   Component Value Date/Time    GLUCOSE 89 02/18/2023 10:30 AM    GLUCOSE 109 09/14/2016 09:54 AM     02/18/2023 10:30 AM    K 4.1 02/19/2023 05:59 AM    CL 99 02/18/2023 10:30 AM    CO2 27 02/18/2023 10:30 AM    ANIONGAP 14 02/18/2023 10:30 AM    BUN 20 02/18/2023 10:30 AM    CREATININE 1.01 02/18/2023 10:30 AM    BUNCRER 18 11/04/2022 05:01 AM    CALCIUM 10.0 02/18/2023 10:30 AM    LABGLOM 55 02/18/2023 10:30 AM    GFRAA >60 02/16/2022 03:45 PM    GFR      02/16/2022 03:45 PM    GFR NOT REPORTED 02/16/2022 03:45 PM     Radiology:  XR CHEST PORTABLE    Result Date: 2/18/2023  No focal lung opacity. Consultations:    Consults:     Final Specialist Recommendations/Findings:   IP CONSULT TO CARDIOLOGY      The patient was seen and examined on day of discharge and this discharge summary is in conjunction with any daily progress note from day of discharge.     Discharge plan:     Disposition: Home    Physician Follow Up:   [facility]  [address]  [place] [address]  [phone]        [facility], [address] [facility] [facility]  [phone]    Schedule an appointment as soon as possible for a visit in 2 week(s)  Outpatient stress testing       Requiring Further Evaluation/Follow Up POST HOSPITALIZATION/Incidental Findings: None. Diet: regular diet    Activity: As tolerated    Instructions to Patient: Follow-up with cardiology outpatient as scheduled.      Discharge Medications:      Medication List        START taking these medications      midodrine 5 MG tablet  Commonly known as: PROAMATINE  Take 1 tablet by mouth 3 times daily (with meals)            CHANGE how you take these medications      bumetanide 1 MG tablet  Commonly known as: BUMEX  Take 0.5 tablets by mouth 2 times daily  What changed:   how much to take  when to take this            CONTINUE taking these medications      acetaminophen 500 MG tablet  Commonly known as: TYLENOL  Take 1 tablet by mouth every 6 hours as needed for Pain     amiodarone 200 MG tablet  Commonly known as: CORDARONE  TAKE ONE TABLET BY MOUTH DAILY     apixaban 5 MG Tabs tablet  Commonly known as: Eliquis  Take 1 tablet by mouth 2 times daily     calcium-vitamin D 500-200 MG-UNIT per tablet  Commonly known as: OSCAL-500  Take 1 tablet by mouth 2 times daily     Compression Stockings Misc  by Does not apply route     ferrous sulfate 325 (65 Fe) MG EC tablet  Commonly known as: FE TABS 325  Take 1 tablet by mouth daily (with breakfast)     lisinopril 10 MG tablet  Commonly known as: PRINIVIL;ZESTRIL  Take 1 tablet by mouth daily     metoprolol tartrate 25 MG tablet  Commonly known as: LOPRESSOR  Take 1 tablet by mouth 2 times daily     pantoprazole 40 MG tablet  Commonly known as: PROTONIX  Take 1 tablet by mouth daily Take one tab every morning before breakfast     pravastatin 40 MG tablet  Commonly known as: PRAVACHOL  TAKE 1 TABLET DAILY     therapeutic multivitamin-minerals tablet            STOP taking these medications      spironolactone 50 MG tablet  Commonly known as: ALDACTONE               Where to Get Your Medications        These medications were sent to [facility] [address]  [place] [address]      Phone: [phone]   bumetanide 1 MG tablet  midodrine 5 MG tablet         No discharge procedures on file. Time Spent on discharge is  20 mins in patient examination, evaluation, counseling as well as medication reconciliation, prescriptions for required medications, discharge plan and follow up. Electronically signed by   Shannan Collazo MD  2/21/2023  11:49 AM      Thank you Dr. Harvey Patel DO for the opportunity to be involved in this patient's care.

## 2023-02-21 NOTE — PROGRESS NOTES
[facility]   Progress Note                   Date:   2/21/2023  Patient name: Raphael Yang  Date of admission:  2/18/2023 10:11 AM  MRN:   [id number]  YOB: 1935  PCP: Callie Santos DO    Reason for Admission: Chest pain [R07.9]  Elevated troponin [R77.8]  Chest pain, unspecified type [R07.9]    Subjective:       Clinical Changes / Abnormalities: Pt seen and examined in the room. Pt denies any CP or SOb. Remains afib, rate stable. PT states that she is going home today       Medications:   Scheduled Meds:   [Held by provider] bumetanide  0.5 mg Oral Daily    midodrine  5 mg Oral TID WC    [Held by provider] lisinopril  2.5 mg Oral Daily    [Held by provider] metoprolol tartrate  12.5 mg Oral BID    [Held by provider] spironolactone  25 mg Oral Daily    amiodarone  200 mg Oral Daily    ferrous sulfate  325 mg Oral Daily with breakfast    therapeutic multivitamin-minerals  1 tablet Oral Daily    pantoprazole  40 mg Oral Daily    pravastatin  40 mg Oral Nightly    sodium chloride flush  5-40 mL IntraVENous 2 times per day    aspirin  81 mg Oral Daily    calcium carbonate  500 mg Oral BID    And    vitamin D3  200 Units Oral BID     Continuous Infusions:   [Held by provider] heparin (PORCINE) Infusion 12 Units/kg/hr (02/18/23 1356)    sodium chloride       CBC:   Recent Labs     02/18/23  1030 02/19/23  0559   WBC 5.3 5.8   HGB 13.7 11.2*    141       BMP:    Recent Labs     02/18/23  1030 02/19/23  0559     --    K 3.2* 4.1   CL 99  --    CO2 27  --    BUN 20  --    CREATININE 1.01*  --    GLUCOSE 89  --        Hepatic: No results for input(s): AST, ALT, ALB, BILITOT, ALKPHOS in the last 72 hours. Troponin:   Recent Labs     02/18/23  1030 02/18/23  1300 02/18/23  1620   TROPHS 33* 35* 35*       BNP: No results for input(s): BNP in the last 72 hours. Lipids: No results for input(s): CHOL, HDL in the last 72 hours.     Invalid input(s): LDLCALCU  INR: No results for input(s): INR in the last 72 hours. Objective:   Vitals: /66   Pulse 66   Temp 97.7 °F (36.5 °C) (Oral)   Resp 16   Ht 5' 4\" (1.626 m)   Wt 126 lb 12.2 oz (57.5 kg)   SpO2 96%   BMI 21.76 kg/m²   General appearance: alert and cooperative with exam  HEENT: Head: Normocephalic, no lesions, without obvious abnormality. Neck: no JVD, trachea midline, no adenopathy  Lungs: Clear to auscultation  Heart: irregular rate and rhythm, s1/s2 auscultated, no murmurs, afib  Abdomen: soft, non-tender, bowel sounds active  Extremities: no edema  Neurologic: not done         EKG:  AF, 73 bpm; RBBB. 2D ECHO ( 2/9/22)  Global left ventricular systolic function is normal. Estimated ejection fraction is 55-60 % . No obvious wall motion abnormality seen. Grade III (severe) left ventricular diastolic dysfunction. Left atrium is severely dilated. Mildly dilated right ventricular cavity. Calcified aortic valve leaflets with sclerosis, no significant stenosis, mean gradient 10 mm Hg  Mild aortic insufficiency. Mild mitral regurgitation. Severe tricuspid regurgitation. Moderate pulmonary hypertension with an estimated right ventricular systolic pressure of 51 mmHg. No pericardial effusion. ECHO 2/20/23  Summary   Normal left ventricular size with normal function. Left ventricular ejection fraction 65 %. Mild concentric left ventricular hypertrophy. Left atrium is severely dilated. Right atrium is severely dilated . Aortic valve appears calcified. No doppler AS noted. Aortic insufficiency visually appears mild. Thickened mitral valve leaflets. Trivial mitral regurgitation. Severe, nearly wide open, tricuspid regurgitation. Estimated right ventricular systolic pressure is 52 mmHg, suggests pulm HTN.   E/E' average = 6.2. IVC not well visualized.    Assessment / Acute Cardiac Problems:   Atypical chest pain; suspected musculoskeletal strain  Mildly elevated but stable serial troponins; no indication of acute coronary syndrome  Chronic AF, on Eliquis  Essential HTN, controlled  H/o diastolic HF; currently with no CHF on exam  Mild aortic stenosis    Patient Active Problem List:     Hyperlipidemia     Primary hypertension     Anxiety     Gastroesophageal reflux disease without esophagitis     Atypical chest pain     Paroxysmal atrial fibrillation (HCC)     Asymptomatic varicose veins     Bilateral edema of lower extremity     Chronic anticoagulation     Former smoker     Mild aortic regurgitation     Mild concentric left ventricular hypertrophy (LVH)     Paraesophageal hernia     Hiatal hernia     Iron deficiency anemia     History of Nissen fundoplication     Closed wedge fracture of thoracic vertebra with routine healing T5,T8     (HFpEF) heart failure with preserved ejection fraction (HCC)     Severe tricuspid regurgitation     Pulmonary hypertension (HCC)     Tubular adenoma:  3 cm transverse colon     Iron deficiency     VTE (venous thromboembolism)     Short-term memory loss     Anemia     Polyp of colon     Abrasion of multiple sites of left upper arm     Acute on chronic diastolic heart failure (HCC)     Chest pain      Plan of Treatment:   Afib.  On heparin. Restart on eliquis on discharge.  Continue BB and amio   ECHO reviewed- Continue bumex/aldactone.    D.c lisinopril due to hypotension.  On midodrine   Can be discharged and follow up as outpt for stress test.      Electronically signed by MALVIN GOEL CNP on 2/21/2023 at 8:50 AM  [facility]  [phone]

## 2023-02-21 NOTE — CARE COORDINATION
Transitional Planning    Spoke with [facility], can accept patient back today. Transport ETA [id number], Updated Xi Bonilla, she is agreeable. # for Report [phone]        Discharge [address] [facility]  Clinical Case Management Department  Written by:  Thomas Singh RN    Patient Name: Aisha Patrick  Attending Provider: Ignacio Whiteside MD  Admit Date: 2023 10:11 AM  MRN: [id number]  Account: [de-identified]                     : 1935  Discharge Date: 23      Disposition: long term care    Thomas Singh RN

## 2023-02-21 NOTE — PROGRESS NOTES
Pt c/o nausea after sipping some apple juice, no vomitting, stomach settled with a few bites of junaid cracker and dose of Zofran.

## 2023-02-21 NOTE — PROGRESS NOTES
Pt c/o on/off during night that her Left arm \"really hurt\"  was \"ached\". When writer inquired of pt if arm hurt up into her chest she said, \"No!\" When asked if she fell on that shoulder when she fell on her hip pt reported, \"Yes! \"  Writer encouraged pt. To bend and move arm, pt replied, \"Oh, I've been trying to hold it stiff\".

## 2023-02-21 NOTE — PROGRESS NOTES
Physician Progress Note      Aleida Hudson  CSN #:                  [id number]  :                       1935  ADMIT DATE:       2023 10:11 AM  DISCH DATE:  RESPONDING  PROVIDER #:        Tiffanie Price MD          QUERY TEXT:    Patient admitted with chest pain/Unstable angina. Noted documentation of   suspected NSTEMI in H&P and   IM progress note. Please document if the   diagnosis of NSTEMI has been confirmed, or ruled out after further study. The medical record reflects the following:  Risk Factors: CHF,Afib  Clinical Indicators: Arrived to ED via EMS with Chest pain/SOB. Hx Afib. Slightly elevated troponins at 33 - 35. BNP 1860. Pain resolved after   admission. Another c/o chest pain  after eating rated 6/10. Per H&P '   Chest pain/elevated troponin/unstable angina/suspected non-ST segment   elevation myocardial infarction.'  Per Cardiology consult ' Atypical chest   pain; suspected musculoskeletal strain. Mildly elevated but stable serial   troponins; no indication of acute coronary syndrome.'  Treatment: Heparin gtt, Cardio consult, echo, Bumex. Planned outpatient   cardiac stress test.    Thank you,  Leon Boyle [email or web address]  [email or web address]  office hours  m-f 7-3  Options provided:  -- NSTEMI present as evidenced by  -- NSTEMI was ruled out  -- Other - I will add my own diagnosis  -- Disagree - Not applicable / Not valid  -- Disagree - Clinically unable to determine / Unknown  -- Refer to Clinical Documentation Reviewer    PROVIDER RESPONSE TEXT:    NSTEMI was ruled out after study, pt with Unstable angina.     Query created by: Kaiden Cabrera on 2023 11:54 AM      Electronically signed by:  Tiffanie Price MD 2023 11:58 AM

## 2023-02-23 ENCOUNTER — HOSPITAL ENCOUNTER (EMERGENCY)
Age: 88
Discharge: OTHER INSTITUTION | End: 2023-02-23
Attending: EMERGENCY MEDICINE
Payer: MEDICARE

## 2023-02-23 ENCOUNTER — APPOINTMENT (OUTPATIENT)
Dept: GENERAL RADIOLOGY | Age: 88
End: 2023-02-23
Payer: MEDICARE

## 2023-02-23 VITALS
WEIGHT: 123 LBS | HEART RATE: 72 BPM | HEIGHT: 64 IN | RESPIRATION RATE: 14 BRPM | BODY MASS INDEX: 21 KG/M2 | OXYGEN SATURATION: 95 % | DIASTOLIC BLOOD PRESSURE: 62 MMHG | SYSTOLIC BLOOD PRESSURE: 108 MMHG

## 2023-02-23 DIAGNOSIS — R07.89 OTHER CHEST PAIN: Primary | ICD-10-CM

## 2023-02-23 LAB
ABSOLUTE EOS #: 0.05 K/UL (ref 0–0.4)
ABSOLUTE LYMPH #: 0.7 K/UL (ref 1–4.8)
ABSOLUTE MONO #: 0.49 K/UL (ref 0.1–0.8)
ALBUMIN SERPL-MCNC: 3.6 G/DL (ref 3.5–5.2)
ALBUMIN/GLOBULIN RATIO: 1.2 (ref 1–2.5)
ALP SERPL-CCNC: 93 U/L (ref 35–104)
ALT SERPL-CCNC: 20 U/L (ref 5–33)
ANION GAP SERPL CALCULATED.3IONS-SCNC: 10 MMOL/L (ref 9–17)
AST SERPL-CCNC: 19 U/L
BASOPHILS # BLD: 0 % (ref 0–2)
BASOPHILS ABSOLUTE: 0 K/UL (ref 0–0.2)
BILIRUB SERPL-MCNC: 0.3 MG/DL (ref 0.3–1.2)
BUN SERPL-MCNC: 18 MG/DL (ref 8–23)
CALCIUM SERPL-MCNC: 9.8 MG/DL (ref 8.6–10.4)
CHLORIDE SERPL-SCNC: 100 MMOL/L (ref 98–107)
CO2 SERPL-SCNC: 31 MMOL/L (ref 20–31)
CREAT SERPL-MCNC: 0.75 MG/DL (ref 0.5–0.9)
EOSINOPHILS RELATIVE PERCENT: 1 % (ref 1–4)
GFR SERPL CREATININE-BSD FRML MDRD: >60 ML/MIN/1.73M2
GLUCOSE SERPL-MCNC: 99 MG/DL (ref 70–99)
HCT VFR BLD AUTO: 37.6 % (ref 36–46)
HGB BLD-MCNC: 12.2 G/DL (ref 12–16)
LYMPHOCYTES # BLD: 13 % (ref 24–44)
MCH RBC QN AUTO: 26.8 PG (ref 26–34)
MCHC RBC AUTO-ENTMCNC: 32.3 G/DL (ref 31–37)
MCV RBC AUTO: 82.8 FL (ref 80–100)
MONOCYTES # BLD: 9 % (ref 1–7)
MORPHOLOGY: ABNORMAL
PDW BLD-RTO: 20.2 % (ref 12.5–15.4)
PLATELET # BLD AUTO: 175 K/UL (ref 140–450)
PMV BLD AUTO: 8.3 FL (ref 6–12)
POTASSIUM SERPL-SCNC: 3.7 MMOL/L (ref 3.7–5.3)
PROT SERPL-MCNC: 6.5 G/DL (ref 6.4–8.3)
RBC # BLD: 4.54 M/UL (ref 4–5.2)
SEG NEUTROPHILS: 77 % (ref 36–66)
SEGMENTED NEUTROPHILS ABSOLUTE COUNT: 4.16 K/UL (ref 1.8–7.7)
SODIUM SERPL-SCNC: 141 MMOL/L (ref 135–144)
TROPONIN I SERPL DL<=0.01 NG/ML-MCNC: 27 NG/L (ref 0–14)
TROPONIN I SERPL DL<=0.01 NG/ML-MCNC: 28 NG/L (ref 0–14)
WBC # BLD AUTO: 5.4 K/UL (ref 3.5–11)

## 2023-02-23 PROCEDURE — 71045 X-RAY EXAM CHEST 1 VIEW: CPT

## 2023-02-23 PROCEDURE — 93005 ELECTROCARDIOGRAM TRACING: CPT | Performed by: EMERGENCY MEDICINE

## 2023-02-23 PROCEDURE — 84484 ASSAY OF TROPONIN QUANT: CPT

## 2023-02-23 PROCEDURE — 99285 EMERGENCY DEPT VISIT HI MDM: CPT

## 2023-02-23 PROCEDURE — 85025 COMPLETE CBC W/AUTO DIFF WBC: CPT

## 2023-02-23 PROCEDURE — 80053 COMPREHEN METABOLIC PANEL: CPT

## 2023-02-23 PROCEDURE — 36415 COLL VENOUS BLD VENIPUNCTURE: CPT

## 2023-02-23 ASSESSMENT — ENCOUNTER SYMPTOMS
SHORTNESS OF BREATH: 0
COUGH: 0
SORE THROAT: 0
VOMITING: 0
WHEEZING: 0
BACK PAIN: 0
NAUSEA: 0
DIARRHEA: 0
ABDOMINAL PAIN: 0

## 2023-02-23 ASSESSMENT — PAIN - FUNCTIONAL ASSESSMENT
PAIN_FUNCTIONAL_ASSESSMENT: 0-10
PAIN_FUNCTIONAL_ASSESSMENT: 0-10

## 2023-02-23 ASSESSMENT — PAIN SCALES - GENERAL
PAINLEVEL_OUTOF10: 10
PAINLEVEL_OUTOF10: 0

## 2023-02-23 ASSESSMENT — PAIN DESCRIPTION - LOCATION: LOCATION: CHEST

## 2023-02-23 ASSESSMENT — PAIN DESCRIPTION - PAIN TYPE: TYPE: ACUTE PAIN

## 2023-02-23 NOTE — DISCHARGE INSTRUCTIONS
Return to the emergency department symptoms worsen or persist.  Follow-up with your family doctor in 1 to 2 days for recheck.

## 2023-02-23 NOTE — ED NOTES
Patient asking RN when she can go home. States Glynn Scale never complained of chest pain and doesn't understand why she was sent here\". Patient denies chest pain altogether. Dr Adri Shah notified.     .Electronically signed by Ana Laura Kuo RN on 2/23/2023 at 3:51 PM      Ana Laura Kuo, [address] [place] [address]  02/23/23 [id number]

## 2023-02-23 NOTE — ED PROVIDER NOTES
[address] [facility] Emergency Department  [id number] [facility] [address] [facility] [address]  Phone: [phone]  Fax: [phone]    eMERGENCY dEPARTMENT eNCOUnter        Pt Name: Vera Guerrero  MRN: [id number]  [place] 1935  Date of evaluation: 2/23/23    CHIEF COMPLAINT     Chief Complaint   Patient presents with    Chest Pain     325 asa/ 1 nitro PTA       HISTORY OF PRESENT ILLNESS    Vera Guerrero is a 82 y.o. female who presents to the emergency department with chest discomfort. Patient was just admitted here on the 18th and discharged on the 21st.  She saw cardiology and had an echo. Her work-up was negative. They did not feel it necessary for any additional testing. Patient supposedly was sent here from the nursing staff with chest pain for which the patient adamantly denied. There is no fever chills cough congestion chest pain or shortness of breath at the present time. She states she never had it does understand why they actually sent her here after being hospitalized and was told that everything was negative. Patient denies any abdominal pain nausea or vomiting. No recent falls injury or trauma. No leg pain or swelling. REVIEW OF SYSTEMS     Review of Systems   Constitutional:  Negative for chills and fever. HENT:  Negative for congestion, ear pain and sore throat. Respiratory:  Negative for cough, shortness of breath and wheezing. Cardiovascular:  Negative for chest pain, palpitations and leg swelling. Gastrointestinal:  Negative for abdominal pain, diarrhea, nausea and vomiting. Genitourinary:  Negative for dysuria, flank pain, frequency and hematuria. Musculoskeletal:  Negative for back pain. Skin:  Negative for rash. Neurological:  Negative for dizziness, light-headedness, numbness and headaches.      PAST MEDICAL HISTORY    has a past medical history of Blood circulation, collateral, CHF (congestive heart failure) ([facility] [address] COPD (chronic obstructive pulmonary disease) ([facility] [address] GERD (gastroesophageal reflux disease), Hyperlipidemia, and Hypertension. SURGICAL HISTORY      has a past surgical history that includes joint replacement (left total shoulder; 1990); bladder suspension (2001); Hysterectomy (ovaries left); Cholecystectomy; Upper gastrointestinal endoscopy (N/A, 2/10/2022); and Colonoscopy (N/A, 2/10/2022).     CURRENT MEDICATIONS       Discharge Medication List as of 2/23/2023  4:50 PM        CONTINUE these medications which have NOT CHANGED    Details   bumetanide (BUMEX) 1 MG tablet Take 0.5 tablets by mouth 2 times daily, Disp-30 tablet, R-1Normal      midodrine (PROAMATINE) 5 MG tablet Take 1 tablet by mouth 3 times daily (with meals), Disp-90 tablet, R-3Normal      pravastatin (PRAVACHOL) 40 MG tablet TAKE 1 TABLET DAILY, Disp-90 tablet, R-3Normal      amiodarone (CORDARONE) 200 MG tablet TAKE ONE TABLET BY MOUTH DAILY, Disp-30 tablet, R-0Normal      metoprolol tartrate (LOPRESSOR) 25 MG tablet Take 1 tablet by mouth 2 times daily, Disp-60 tablet, R-5Normal      lisinopril (PRINIVIL;ZESTRIL) 10 MG tablet Take 1 tablet by mouth daily, Disp-90 tablet, R-0Normal      Compression Stockings MISC Starting Wed 11/9/2022, Disp-1 each, R-0, Print      apixaban (ELIQUIS) 5 MG TABS tablet Take 1 tablet by mouth 2 times daily, Disp-60 tablet, R-1Normal      ferrous sulfate (FE TABS 325) 325 (65 Fe) MG EC tablet Take 1 tablet by mouth daily (with breakfast), Disp-90 tablet, R-3Normal      acetaminophen (TYLENOL) 500 MG tablet Take 1 tablet by mouth every 6 hours as needed for Pain, Disp-120 tablet, R-1Normal      calcium-vitamin D (OSCAL-500) 500-200 MG-UNIT per tablet Take 1 tablet by mouth 2 times daily, Disp-60 tablet, R-3Normal      pantoprazole (PROTONIX) 40 MG tablet Take 1 tablet by mouth daily Take one tab every morning before breakfast, Disp-30 tablet, R-1Normal      Multiple Vitamins-Minerals (THERAPEUTIC MULTIVITAMIN-MINERALS) tablet Take 1 tablet by mouth dailyHistorical Med             ALLERGIES     is allergic to codeine, bactrim [sulfamethoxazole-trimethoprim], naproxen, norvasc [amlodipine besylate], sertraline hcl, toradol [ketorolac tromethamine], tramadol, magnesium-containing compounds, morphine, and percocet [oxycodone-acetaminophen].    FAMILY HISTORY     She indicated that her mother is . She indicated that her father is . She indicated that her brother is . She indicated that the status of her daughter is unknown. She indicated that her other is .     family history includes Cancer in her brother, daughter, and another family member.    SOCIAL HISTORY      reports that she quit smoking about 38 years ago. Her smoking use included cigarettes. She has never used smokeless tobacco. She reports that she does not drink alcohol and does not use drugs.    PHYSICAL EXAM     INITIAL VITALS:  height is 5' 4\" (1.626 m) and weight is 55.8 kg (123 lb). Her blood pressure is 108/62 and her pulse is 72. Her respiration is 14 and oxygen saturation is 95%.     Physical Exam  Vitals and nursing note reviewed.   Constitutional:       Appearance: Normal appearance.   HENT:      Head: Normocephalic and atraumatic.      Nose: Nose normal.      Mouth/Throat:      Mouth: Mucous membranes are moist.   Eyes:      Extraocular Movements: Extraocular movements intact.      Pupils: Pupils are equal, round, and reactive to light.   Cardiovascular:      Rate and Rhythm: Normal rate and regular rhythm.      Pulses: Normal pulses.      Heart sounds: Normal heart sounds.   Pulmonary:      Effort: Pulmonary effort is normal.      Breath sounds: Normal breath sounds.   Abdominal:      General: Abdomen is flat. Bowel sounds are normal.      Palpations: Abdomen is soft.      Comments: No pulsatile mass   Musculoskeletal:         General: Normal range of motion.      Cervical back: Normal range of motion and neck supple.   Skin:     General: Skin is warm  and dry. Neurological:      General: No focal deficit present. Mental Status: She is alert and oriented to person, place, and time. Mental status is at baseline. Psychiatric:         Mood and Affect: Mood normal.         Behavior: Behavior normal.     DIFFERENTIAL DIAGNOSIS / MDM / EMERGENCY DEPARTMENT COURSE:     Differential diagnosis considered: see below    Chronic Conditions affecting care (DM,HTN,CA, etc): afib    Social Determinants of Health affecting care (unable to care for self, lives alone, unemployed, homeless,etc): nursing staff at [facility]    History source(s) (patient,spouse,parent,family,friend,EMS,etc): patient    Review of external sources (ECF,Hospital records,EMS report, radiology reports, etc): recent admission    Tests considered but not ordered: Not applicable    Independent interpretation of tests (eg.  X-ray, CAT scan, Doppler studies, EKG): see below    Discussion of x-ray results with radiology: none    Consults: none    Consideration for admission/observation (even if discharged): see below    Prescription considerations: none    Sepsis considered: na    Critical Care note written: na           The patient presents with chest pain that is not suggesting in nature of pulmonary embolus, aortic dissection, cardiac ischemia, or other serious etiology. I considered an aortic dissection, but this is unlikely as patient is not complaining of tearing or ripping chest pain that is radiating to the back, the patient has no new neurological abnormalities and pulses are equal to all extremities. Mediastinum is within normal limits. Patient appears comfortable on physical exam and is not in distress. I also thought about a cardiac tamponade, but this is unlikely as patient is hemodynamically stable. Heart sounds are not distant, EKG does not show signs of electrical alternans and there is no JVD.   I also thought about a tension pneumothorax, but this is unlikely given bilateral breath sounds and no signs of hemodynamic instability. I do not feel the patient has a PE. No clinical evidence of DVT. I thought about an esophageal perforation, but history and physical exam does not suggest vomiting, followed by chest pain. No signs of Hamman's crunch on physical exam; again, patient appears comfortable and is well appearing and non toxic. The patient has been instructed to return if the symptpoms change or worsen in any way. Given the extremely low risk of these diagnoses further testing and evaluation for these possibilites are not indicated at this time. The patient appears stable for discharge and has been instructed to return immediately if the symptoms worsen in any way, or in 8-12 hr if not improved for re-evaluation. We also discussed returning to the Emergency Department immediately if new or worsening symptoms occur. We have discussed the symptoms which are most concerning (e.g., worsening pain, shortness of breath, a feeling of passing out, fever, any neurologic symptoms, abdominal pain or vomiting) that necessitate immediate return. The patient understands that at this time there is no evidence for a more malignant underlying process, but the patient also understands that early in the process of an illness or injury, an emergency department workup can be falsely reassuring. Routine discharge counseling was given, and the patient understands that worsening, changing or persistent symptoms should prompt an immediate call or follow up with their primary physician or return to the emergency department. The importance of appropriate follow up was also discussed. I have reviewed the disposition diagnosis with the patient and or their family/guardian. I have answered their questions and given discharge instructions. They voiced understanding of these instructions and did not have any further questions or complaints. The patient was personally seen and evaluated myself.   Patient's cardiac markers x2 were exactly the same as they have been in recently when she was hospitalized here just 2 days ago. I went spoke to the hospitalist who knows the patient quite well this point did not feel was necessary to bring her in for any additional testing. Patient feels comfortable with this plan and is can go back to the nursing care facility.     DIAGNOSTIC RESULTS     LABS:  Results for orders placed or performed during the hospital encounter of 02/23/23   CBC with Auto Differential   Result Value Ref Range    WBC 5.4 3.5 - 11.0 k/uL    RBC 4.54 4.0 - 5.2 m/uL    Hemoglobin 12.2 12.0 - 16.0 g/dL    Hematocrit 37.6 36 - 46 %    MCV 82.8 80 - 100 fL    MCH 26.8 26 - 34 pg    MCHC 32.3 31 - 37 g/dL    RDW 20.2 (H) 12.5 - 15.4 %    Platelets 137 158 - 231 k/uL    MPV 8.3 6.0 - 12.0 fL    Seg Neutrophils 77 (H) 36 - 66 %    Lymphocytes 13 (L) 24 - 44 %    Monocytes 9 (H) 1 - 7 %    Eosinophils % 1 1 - 4 %    Basophils 0 0 - 2 %    Segs Absolute 4.16 1.8 - 7.7 k/uL    Absolute Lymph # 0.70 (L) 1.0 - 4.8 k/uL    Absolute Mono # 0.49 0.1 - 0.8 k/uL    Absolute Eos # 0.05 0.0 - 0.4 k/uL    Basophils Absolute 0.00 0.0 - 0.2 k/uL    Morphology ANISOCYTOSIS PRESENT     Morphology 1+ ACANTHOCYTES     Morphology 1+ SCHISTOCYTES    CMP   Result Value Ref Range    Glucose 99 70 - 99 mg/dL    BUN 18 8 - 23 mg/dL    Creatinine 0.75 0.50 - 0.90 mg/dL    Est, Glom Filt Rate >60 >60 mL/min/1.73m2    Calcium 9.8 8.6 - 10.4 mg/dL    Sodium 141 135 - 144 mmol/L    Potassium 3.7 3.7 - 5.3 mmol/L    Chloride 100 98 - 107 mmol/L    CO2 31 20 - 31 mmol/L    Anion Gap 10 9 - 17 mmol/L    Alkaline Phosphatase 93 35 - 104 U/L    ALT 20 5 - 33 U/L    AST 19 <32 U/L    Total Bilirubin 0.3 0.3 - 1.2 mg/dL    Total Protein 6.5 6.4 - 8.3 g/dL    Albumin 3.6 3.5 - 5.2 g/dL    Albumin/Globulin Ratio 1.2 1.0 - 2.5   Troponin   Result Value Ref Range    Troponin, High Sensitivity 28 (H) 0 - 14 ng/L   Troponin   Result Value Ref Range Troponin, High Sensitivity 27 (H) 0 - 14 ng/L       All other labs were within normal range or not returned as of this dictation. EKG was read interpreted by myself showing A-fib right bundle branch block with a ventricular rate of 75. This is the same EKG changes and findings found from previous EKG dated February 19, 2023. RADIOLOGY:  XR HIP LEFT (2-3 VIEWS)    Result Date: 2/18/2023  XR HIP 2 OR 3 VW LEFT  2/17/2023 12:37 PM CLINICAL INDICATIONS:  Postop pain COMPARISON: 01/13/2023 IMPRESSION: Impression: 1. A left hip prosthesis is stable and well seated. There is no acute fracture or dislocation. Old healed left pubic rami fractures again noted. Electronically signed: Thanh Sahu.    XR CHEST PORTABLE    Result Date: 2/23/2023  EXAMINATION: ONE XRAY VIEW OF THE CHEST 2/23/2023 10:52 am COMPARISON: 02/18/2023 HISTORY: ORDERING SYSTEM PROVIDED HISTORY: cp TECHNOLOGIST PROVIDED HISTORY: cp Reason for Exam: Chest pains FINDINGS: Chronic interstitial opacities are identified, unchanged. No acute infiltrate identified. The cardial pericardial silhouette is stable. Moderate to large hiatal hernia again found. No acute bony abnormality. Left shoulder prosthesis noted. No pneumothorax. No free air. No acute abnormality identified. XR CHEST PORTABLE    Result Date: 2/18/2023  EXAMINATION: ONE XRAY VIEW OF THE CHEST 2/18/2023 11:11 am COMPARISON: Chest x-ray dated 11/27/2022 HISTORY: ORDERING SYSTEM PROVIDED HISTORY: chest pain TECHNOLOGIST PROVIDED HISTORY: chest pain Reason for Exam: Chest pain, dizziness. FINDINGS: No focal lung opacity. No pleural effusion or pneumothorax. Cardiomediastinal silhouette is enlarged. Degenerative disease of the visualized osseous structures. No focal lung opacity. XR CHEST PORTABLE   Final Result   No acute abnormality identified. I have reviewed the disposition diagnosis with the patient and or their family/guardian.   I have answered their questions and givendischarge instructions. They voiced understanding of these instructions and did not have any further questions or complaints. PROCEDURES:  Unless otherwise noted below, none     Procedures    FINAL IMPRESSION      1. Other chest pain          DISPOSITION/PLAN   DISPOSITION Decision To Discharge 02/23/2023 04:24:23 PM      PATIENT REFERRED TO:  No follow-up provider specified.     DISCHARGE MEDICATIONS:  Discharge Medication List as of 2/23/2023  4:50 PM             (Please note that portions of this note were completed with a voice recognition program.  Efforts were made to edit the dictations but occasionally words are mis-transcribed.)    Michael Lewis DO,(electronically signed)  Board Certified Emergency Physician       Michael Lewis DO  02/23/23 [id number]

## 2023-02-24 ENCOUNTER — HOSPITAL ENCOUNTER (OUTPATIENT)
Dept: CT IMAGING | Age: 88
End: 2023-02-24
Payer: MEDICARE

## 2023-02-24 DIAGNOSIS — I26.99 PULMONARY EMBOLISM, UNSPECIFIED CHRONICITY, UNSPECIFIED PULMONARY EMBOLISM TYPE, UNSPECIFIED WHETHER ACUTE COR PULMONALE PRESENT (HCC): ICD-10-CM

## 2023-02-24 LAB
EKG ATRIAL RATE: 81 BPM
EKG Q-T INTERVAL: 472 MS
EKG QRS DURATION: 152 MS
EKG QTC CALCULATION (BAZETT): 527 MS
EKG R AXIS: 4 DEGREES
EKG T AXIS: 14 DEGREES
EKG VENTRICULAR RATE: 75 BPM

## 2023-02-24 PROCEDURE — 71250 CT THORAX DX C-: CPT

## 2023-03-08 ENCOUNTER — OFFICE VISIT (OUTPATIENT)
Dept: NEUROLOGY | Age: 88
End: 2023-03-08
Payer: MEDICARE

## 2023-03-08 VITALS
HEART RATE: 60 BPM | BODY MASS INDEX: 19.84 KG/M2 | SYSTOLIC BLOOD PRESSURE: 95 MMHG | HEIGHT: 64 IN | WEIGHT: 116.2 LBS | DIASTOLIC BLOOD PRESSURE: 48 MMHG

## 2023-03-08 DIAGNOSIS — E55.9 VITAMIN D DEFICIENCY: ICD-10-CM

## 2023-03-08 DIAGNOSIS — R41.3 MEMORY LOSS: ICD-10-CM

## 2023-03-08 DIAGNOSIS — E53.9 VITAMIN B DEFICIENCY: ICD-10-CM

## 2023-03-08 DIAGNOSIS — R41.3 MEMORY DIFFICULTIES: Primary | ICD-10-CM

## 2023-03-08 DIAGNOSIS — R29.6 RECURRENT FALLS: ICD-10-CM

## 2023-03-08 DIAGNOSIS — R07.9 CHEST PAIN, UNSPECIFIED TYPE: ICD-10-CM

## 2023-03-08 PROCEDURE — 1123F ACP DISCUSS/DSCN MKR DOCD: CPT | Performed by: PSYCHIATRY & NEUROLOGY

## 2023-03-08 PROCEDURE — 99203 OFFICE O/P NEW LOW 30 MIN: CPT | Performed by: PSYCHIATRY & NEUROLOGY

## 2023-03-08 PROCEDURE — 3078F DIAST BP <80 MM HG: CPT | Performed by: PSYCHIATRY & NEUROLOGY

## 2023-03-08 PROCEDURE — 3074F SYST BP LT 130 MM HG: CPT | Performed by: PSYCHIATRY & NEUROLOGY

## 2023-03-08 RX ORDER — SPIRONOLACTONE 50 MG/1
50 TABLET, FILM COATED ORAL DAILY
COMMUNITY

## 2023-03-08 NOTE — PROGRESS NOTES
Constitutional [x] Weight loss/gain   [x] Fatigue  [x] Fever/Chills   HEENT [x] Hearing Loss  [x] Visual Disturbance  [] Tinnitus  [x] Eye pain   Respiratory [x] Shortness of Breath  [] Cough  [x] Snoring   Cardiovascular [x] Chest Pain  [x] Palpitations  [x] Lightheaded   GI [] Constipation  [] Diarrhea  [x] Swallowing change  [x] Nausea/vomiting    [x] Urinary Frequency  [x] Urinary Urgency   Musculoskeletal [] Neck pain  [x] Back pain  [x] Muscle pain  [] Restless legs   Dermatologic [] Skin changes   Neurologic [x] Memory loss/confusion  [] Seizures  [x] Trouble walking or imbalance  [x] Dizziness  [x] Sleep disturbance  [x] Weakness  [] Numbness  [x] Tremors  [] Speech Difficulty  [x] Headaches  [x] Light Sensitivity  [] Sound Sensitivity   Endocrinology []Excessive thirst  []Excessive hunger   Psychiatric [x] Anxiety/Depression  [] Hallucination   Allergy/immunology []Hives/environmental allergies   Hematologic/lymph [] Abnormal bleeding  [x] Abnormal bruising

## 2023-03-08 NOTE — PROGRESS NOTES
NEUROLOGY CONSULT  Patient Name:       Stephany Matamoros  :        1935  Clinic Visit Date:    3/8/2023      Dear Dr. Denae Sotomayor, DO     I had the opportunity to see your patient, Ms. Stephany Matamoros in neurology consultation today. As you know she  is a 82 y.o. right handed  female brought to the clinic by her granddaughter for evaluation of memory difficulties. Patient is unable to provide any details. Most of the history with regards to symptoms is obtained from her granddaughter who brought the patient to the clinic. As per granddaughter; patient has been having memory difficulties for the past couple of years and has been getting worse for the past 1-2 yrs. Patient has forgetfulness and has been repeating herself \"over and over again\". Patient gets frustrated easily about this ongoing forgetfulness and it has been occurring on a daily basis. Patient is presently a resident at nursing home. Patient also has been having recurrent falls and patient does not have any recollection of details regarding the falls. Denied dizziness, headaches, vision changes leading to the falls. Her last fall was attributed to \"low blood pressure\". Patient was recently hospitalized at [facility] as patient was complaining of chest pain. As per granddaughter; patient was evaluated thoroughly and this occurred x3 in recent past.    CT head 2022: No evidence for acute intracranial process. Patient was evaluated by cardiology and echocardiogram done and those were reportedly unremarkable. Patient was discharged to skilled nursing facility. Today during this encounter; while evaluating the patient; patient started complaining of chest tightness and chest discomfort. Patient admits to having anxiety about this. Patient's granddaughter asked the patient again and again and patient is not sure whether she needs to be taken to ED.       As her symptoms are progressing; patient is being taken to closest ED at [facility]. All items selected indicate a positive finding. Those items not selected are negative.   Constitutional [] Weight loss/gain   [] Fatigue  [] Fever/Chills   HEENT [] Hearing Loss  [] Visual Disturbance  [] Tinnitus  [] Eye pain   Respiratory [] Shortness of Breath  [] Cough  [] Snoring   Cardiovascular [] Chest Pain  [] Palpitations  [] Lightheaded   GI [] Constipation  [] Diarrhea  [] Swallowing change    [] Urinary Frequency  [] Urinary Urgency   Musculoskeletal [] Neck pain  [] Back pain  [] Muscle pain  [] Restless legs   Dermatologic [] Skin changes   Neurologic [x] Memory loss/confusion  [] Seizures  [] Trouble walking or imbalance  [] Dizziness  [] Weakness  [] Numbness  [] Tremors  [] Speech Difficulty  [] Headaches  [] Light Sensitivity  [] Sound Sensitivity   Endocrinology []Excessive thirst  []Excessive hunger   Psychiatric [] Anxiety/Depression  [] Hallucination   Allergy/immunology []Hives/environmental allergies   Hematologic/lymph [] Abnormal bleeding  [] Abnormal bruising       Current Outpatient Medications on File Prior to Visit   Medication Sig Dispense Refill    bumetanide (BUMEX) 1 MG tablet Take 0.5 tablets by mouth 2 times daily 30 tablet 1    midodrine (PROAMATINE) 5 MG tablet Take 1 tablet by mouth 3 times daily (with meals) 90 tablet 3    pravastatin (PRAVACHOL) 40 MG tablet TAKE 1 TABLET DAILY 90 tablet 3    amiodarone (CORDARONE) 200 MG tablet TAKE ONE TABLET BY MOUTH DAILY 30 tablet 0    metoprolol tartrate (LOPRESSOR) 25 MG tablet Take 1 tablet by mouth 2 times daily 60 tablet 5    lisinopril (PRINIVIL;ZESTRIL) 10 MG tablet Take 1 tablet by mouth daily 90 tablet 0    Compression Stockings MISC by Does not apply route 1 each 0    apixaban (ELIQUIS) 5 MG TABS tablet Take 1 tablet by mouth 2 times daily 60 tablet 1    ferrous sulfate (FE TABS 325) 325 (65 Fe) MG EC tablet Take 1 tablet by mouth daily (with breakfast) 90 tablet 3    acetaminophen (TYLENOL) 500 MG tablet Take 1 tablet by mouth every 6 hours as needed for Pain 120 tablet 1    calcium-vitamin D (OSCAL-500) 500-200 MG-UNIT per tablet Take 1 tablet by mouth 2 times daily 60 tablet 3    pantoprazole (PROTONIX) 40 MG tablet Take 1 tablet by mouth daily Take one tab every morning before breakfast 30 tablet 1    Multiple Vitamins-Minerals (THERAPEUTIC MULTIVITAMIN-MINERALS) tablet Take 1 tablet by mouth daily       No current facility-administered medications on file prior to visit. Allergies: Marnie Porter is allergic to codeine, bactrim [sulfamethoxazole-trimethoprim], naproxen, norvasc [amlodipine besylate], sertraline hcl, toradol [ketorolac tromethamine], tramadol, magnesium-containing compounds, morphine, and percocet [oxycodone-acetaminophen]. Past Medical History:   Diagnosis Date    Blood circulation, collateral     CHF (congestive heart failure) (HCC)     COPD (chronic obstructive pulmonary disease) (HCC)     GERD (gastroesophageal reflux disease)     Hyperlipidemia     Hypertension        Past Surgical History:   Procedure Laterality Date    BLADDER SUSPENSION  2001    CHOLECYSTECTOMY      COLONOSCOPY N/A 2/10/2022    COLONOSCOPY WITH BIOPSY performed by Ramiro Knight MD at [place] [address] ([address] [place] [address])  ovaries left    JOINT REPLACEMENT  left total shoulder; 1990    UPPER GASTROINTESTINAL ENDOSCOPY N/A 2/10/2022    EGD ESOPHAGOGASTRODUODENOSCOPY performed by Ramiro Knight MD at [facility]     Social History: Marnei Porter  reports that she quit smoking about 38 years ago. Her smoking use included cigarettes. She has never used smokeless tobacco. She reports that she does not drink alcohol and does not use drugs.     Family History   Problem Relation Age of Onset    Cancer Brother     Cancer Other         bone    Cancer Daughter         breast     On exam:  Blood pressure (!) 95/48, pulse 60, height 5' 4\" (1.626 m), weight 116 lb 3.2 oz (52.7 kg), not currently breastfeeding. Patient appears alert and awake and oriented to self and place but not to the month and year. She was able to follow simple commands only. Pupils are reactive. Face appears symmetric. She is moving all extremities. Sensory exam is intact to the light touch and pinprick in all extremities. No tremors noted. Gait exam not performed. Lab Results   Component Value Date    TSH 1.47 02/08/2022       Lab Results   Component Value Date    LABA1C 5.4 09/17/2016           Impression and Plan: Ms. Ginette Jolley is a 82 y.o. female   Brought into the clinic by her granddaughter for evaluation of ongoing memory difficulties and recurrent falls of unclear etiology  During this encounter; patient started complaining of chest tightness/discomfort with anxiety  Patient is being transferred to the nearest ED for further evaluation. Patient's granddaughter will call our office in the next 2-3 days to update us and also to get follow-up visit regarding evaluation of ongoing memory difficulties. EEG, MRI brain, vitamin B12, etc. are being done to evaluate for reversible etiology for memory difficulties. This note was partially created using voice recognition software and is inherently subject to errors including those of syntax and \"sound alike\" substitutions which may escape proofreading. In such instances, original meaning may be extrapolated by contextual derivation.   Dee Esparza MD 3/8/2023 3:34 PM

## 2023-03-14 NOTE — ANESTHESIA POSTPROCEDURE EVALUATION
Department of Anesthesiology  Postprocedure Note    Patient: Carmen Clayton  MRN: [id number]  YOB: 1935  Date of evaluation: 2/11/2022  Time:  8:06 AM     Procedure Summary     Date: 02/10/22 Room / Location: [address]    Anesthesia Start: 8419 Anesthesia Stop: 7293    Procedures:       EGD ESOPHAGOGASTRODUODENOSCOPY (N/A )      COLONOSCOPY WITH BIOPSY (N/A ) Diagnosis: (anemia)    Surgeons: Yesenia Bates MD Responsible Provider: Ernestina Lorenzo MD    Anesthesia Type: MAC ASA Status: 3          Anesthesia Type: MAC    Jose Phase I: Jose Score: 9    Jose Phase II: Jose Score: 9    Last vitals: Reviewed and per EMR flowsheets.    POST-OP ANESTHESIA NOTE       BP (!) 140/53   Pulse 72   Temp 98 °F (36.7 °C) (Oral)   Resp 18   Ht 5' 4\" (1.626 m)   Wt 169 lb 8.5 oz (76.9 kg)   SpO2 97%   BMI 29.10 kg/m²    Pain Assessment: 0-10  Pain Level: 6      Anesthesia Post Evaluation    Patient location during evaluation: PACU  Patient participation: complete - patient participated  Level of consciousness: awake  Pain score: 6  Airway patency: patent  Nausea & Vomiting: no vomiting and no nausea  Complications: no  Cardiovascular status: hemodynamically stable  Respiratory status: acceptable  Hydration status: stable 2.58

## 2023-03-16 ENCOUNTER — HOSPITAL ENCOUNTER (OUTPATIENT)
Dept: NEUROLOGY | Age: 88
Discharge: HOME | End: 2023-03-16
Payer: MEDICARE

## 2023-03-16 DIAGNOSIS — R41.3 MEMORY DIFFICULTIES: ICD-10-CM

## 2023-03-16 DIAGNOSIS — R41.3 MEMORY LOSS: ICD-10-CM

## 2023-03-16 DIAGNOSIS — R29.6 RECURRENT FALLS: ICD-10-CM

## 2023-03-16 PROCEDURE — 95816 EEG AWAKE AND DROWSY: CPT

## 2023-03-19 NOTE — PROCEDURES
[id number] [facility] [address]                [address] [place] [address]                          ELECTROENCEPHALOGRAM REPORT    PATIENT NAME: Carolina Bernard                   :        1935  MED REC NO:   [id number]                             ROOM:  ACCOUNT NO:   [de-identified]                           ADMIT DATE: 2023  PROVIDER:     Dorothea Dumont    DATE OF EE2023    HISTORY:  This is an 51-year-old female with memory difficulties for the  past 1-2 years. CT head unremarkable. MEDICATIONS:  Include midodrine, pravastatin, amiodarone, metoprolol,  lisinopril, apixaban, and spironolactone. DESCRIPTION OF THE PROCEDURE:  Electrodes were applied using paste in  positions dictated by the International 10-20 system of placement. Reviewing montages included both referential and bipolar derivations. In addition to EEG data, EKG and eye movements were recorded. This is a  routine recording. This test was performed on 2023. DESCRIPTION OF ACTIVITIES:  At the onset of the study, the patient is  awake and during wakefulness, there are occasional runs of 9-10 Hz 20-30  microvolts symmetric posterior alpha rhythm which attenuated  symmetrically with eye opening. Blink artifacts are noted occurring  symmetrically. Electrocardiogram montage revealed normal sinus rhythm. Photic stimulation did not induce posterior driving responses. Hyperventilation is not performed. This study did not demonstrate any  epileptiform discharges and electrographic seizures. ELECTRODIAGNOSTIC INTERPRETATION:  This EEG performed during wakefulness  and drowsiness is essentially unremarkable. No epileptiform discharges  and no electrographic seizures noted.         Dev Posey    D: 2023 11:51:19       T: 2023 11:53:42     [id number]  Job#: [id number]     Doc#: [id number]    CC:

## 2023-03-23 DIAGNOSIS — E53.9 VITAMIN B DEFICIENCY: ICD-10-CM

## 2023-03-23 DIAGNOSIS — E55.9 VITAMIN D DEFICIENCY: ICD-10-CM

## 2023-03-23 DIAGNOSIS — R41.3 MEMORY DIFFICULTIES: ICD-10-CM

## 2023-03-30 ENCOUNTER — HOSPITAL ENCOUNTER (OUTPATIENT)
Dept: MRI IMAGING | Age: 88
Discharge: HOME | End: 2023-04-01
Payer: MEDICARE

## 2023-03-30 DIAGNOSIS — R29.6 RECURRENT FALLS: ICD-10-CM

## 2023-03-30 DIAGNOSIS — R41.3 MEMORY LOSS: ICD-10-CM

## 2023-03-30 PROCEDURE — 70551 MRI BRAIN STEM W/O DYE: CPT

## 2023-04-11 DIAGNOSIS — E55.9 VITAMIN D DEFICIENCY: Primary | ICD-10-CM

## 2023-04-20 DIAGNOSIS — E55.9 VITAMIN D DEFICIENCY: ICD-10-CM

## 2023-04-25 ENCOUNTER — OFFICE VISIT (OUTPATIENT)
Dept: NEUROLOGY | Age: 88
End: 2023-04-25
Payer: MEDICARE

## 2023-04-25 VITALS
HEART RATE: 75 BPM | HEIGHT: 64 IN | DIASTOLIC BLOOD PRESSURE: 64 MMHG | BODY MASS INDEX: 21.34 KG/M2 | WEIGHT: 125 LBS | SYSTOLIC BLOOD PRESSURE: 142 MMHG

## 2023-04-25 DIAGNOSIS — G30.1 MILD LATE ONSET ALZHEIMER'S DEMENTIA WITHOUT BEHAVIORAL DISTURBANCE, PSYCHOTIC DISTURBANCE, MOOD DISTURBANCE, OR ANXIETY (HCC): Primary | ICD-10-CM

## 2023-04-25 DIAGNOSIS — F02.A0 MILD LATE ONSET ALZHEIMER'S DEMENTIA WITHOUT BEHAVIORAL DISTURBANCE, PSYCHOTIC DISTURBANCE, MOOD DISTURBANCE, OR ANXIETY (HCC): Primary | ICD-10-CM

## 2023-04-25 DIAGNOSIS — R41.3 MEMORY DIFFICULTIES: ICD-10-CM

## 2023-04-25 PROCEDURE — 3078F DIAST BP <80 MM HG: CPT | Performed by: PSYCHIATRY & NEUROLOGY

## 2023-04-25 PROCEDURE — 99215 OFFICE O/P EST HI 40 MIN: CPT | Performed by: PSYCHIATRY & NEUROLOGY

## 2023-04-25 PROCEDURE — 1123F ACP DISCUSS/DSCN MKR DOCD: CPT | Performed by: PSYCHIATRY & NEUROLOGY

## 2023-04-25 PROCEDURE — 3077F SYST BP >= 140 MM HG: CPT | Performed by: PSYCHIATRY & NEUROLOGY

## 2023-04-25 RX ORDER — POTASSIUM CHLORIDE 750 MG/1
TABLET, EXTENDED RELEASE ORAL
COMMUNITY
Start: 2023-03-24 | End: 2023-04-25

## 2023-04-25 RX ORDER — SERTRALINE HYDROCHLORIDE 25 MG/1
25 TABLET, FILM COATED ORAL DAILY
COMMUNITY
Start: 2023-03-09 | End: 2023-04-25

## 2023-04-25 RX ORDER — LOSARTAN POTASSIUM 25 MG/1
25 TABLET ORAL DAILY
COMMUNITY

## 2023-04-25 RX ORDER — TRAMADOL HYDROCHLORIDE 50 MG/1
50 TABLET ORAL 2 TIMES DAILY PRN
COMMUNITY
Start: 2023-04-10

## 2023-04-25 RX ORDER — POTASSIUM CHLORIDE 750 MG/1
10 CAPSULE, EXTENDED RELEASE ORAL DAILY
COMMUNITY

## 2023-04-25 RX ORDER — CHOLECALCIFEROL (VITAMIN D3) 1250 MCG
1 CAPSULE ORAL
COMMUNITY

## 2023-04-25 RX ORDER — DONEPEZIL HYDROCHLORIDE 5 MG/1
TABLET, FILM COATED ORAL
Qty: 30 TABLET | Refills: 0 | Status: SHIPPED | OUTPATIENT
Start: 2023-04-26 | End: 2023-05-25

## 2023-04-25 RX ORDER — LIDOCAINE 4 G/G
1 PATCH TOPICAL DAILY
COMMUNITY

## 2023-04-25 RX ORDER — DONEPEZIL HYDROCHLORIDE 10 MG/1
TABLET, FILM COATED ORAL
Qty: 90 TABLET | Refills: 1 | Status: SHIPPED | OUTPATIENT
Start: 2023-05-25

## 2023-04-25 NOTE — PROGRESS NOTES
NEUROLOGY FOLLOW UP VISIT  Patient Name:       Ajit Martinez  :        1935  Clinic Visit Date:    2023        Dear Dr. Thu Blanton, DO     I saw Ms. Ajit Martinez in the office today in continuation of neurologic care. As you know she  is a 82 y.o. right handed  female brought to the clinic by her granddaughter for evaluation of memory difficulties. During initial consultation on 3/8/23; patient was c/o chest pain for which patient was immediately sent to ED for further eval. But patient refused the transfer and patient was sent back to nursing home. Reason: patient's symptoms improved. Patient visit; patient had MRI brain and EEG study. Patient is unable to provide any details. Most of the history with regards to symptoms is obtained from her granddaughter who brought the patient to the clinic. As per granddaughter; patient has been having memory difficulties for the past couple of years and has been getting worse for the past 1-2 yrs. Patient has forgetfulness and has been repeating herself \"over and over again\". Patient gets frustrated easily about this ongoing forgetfulness and it has been occurring on a daily basis. Patient is presently a resident at nursing home. Patient also has been having recurrent falls and patient does not have any recollection of details regarding the falls. Denied dizziness, headaches, vision changes leading to the falls. Her last fall was attributed to \"low blood pressure\". Patient was recently hospitalized at [facility] facility as patient was complaining of chest pain. As per granddaughter; patient was evaluated thoroughly and this occurred x3 in recent past.    CT head 2022: No evidence for acute intracranial process. Patient was evaluated by cardiology and echocardiogram done and those were reportedly unremarkable. Patient was discharged to skilled nursing facility. All items selected indicate a positive finding.

## 2023-04-26 RX ORDER — ASCORBIC ACID 500 MG
500 TABLET ORAL DAILY
COMMUNITY

## 2023-05-31 ENCOUNTER — HOSPITAL ENCOUNTER (EMERGENCY)
Age: 88
Discharge: HOME | End: 2023-05-31
Attending: EMERGENCY MEDICINE
Payer: MEDICARE

## 2023-05-31 ENCOUNTER — TELEPHONE (OUTPATIENT)
Dept: ORTHOPEDIC SURGERY | Age: 88
End: 2023-05-31

## 2023-05-31 ENCOUNTER — APPOINTMENT (OUTPATIENT)
Dept: GENERAL RADIOLOGY | Age: 88
End: 2023-05-31
Payer: MEDICARE

## 2023-05-31 ENCOUNTER — APPOINTMENT (OUTPATIENT)
Dept: CT IMAGING | Age: 88
End: 2023-05-31
Payer: MEDICARE

## 2023-05-31 VITALS
HEART RATE: 74 BPM | BODY MASS INDEX: 19.12 KG/M2 | OXYGEN SATURATION: 98 % | DIASTOLIC BLOOD PRESSURE: 72 MMHG | TEMPERATURE: 98.2 F | SYSTOLIC BLOOD PRESSURE: 160 MMHG | HEIGHT: 64 IN | RESPIRATION RATE: 18 BRPM | WEIGHT: 112 LBS

## 2023-05-31 DIAGNOSIS — S42.201A CLOSED FRACTURE OF PROXIMAL END OF RIGHT HUMERUS, UNSPECIFIED FRACTURE MORPHOLOGY, INITIAL ENCOUNTER: ICD-10-CM
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
## 2023-05-31 LAB
ANION GAP SERPL CALCULATED.3IONS-SCNC: 11 MMOL/L (ref 9–17)
BASOPHILS # BLD: 0 K/UL (ref 0–0.2)
BASOPHILS NFR BLD: 1 % (ref 0–2)
BUN SERPL-MCNC: 13 MG/DL (ref 8–23)
CALCIUM SERPL-MCNC: 9.7 MG/DL (ref 8.6–10.4)
CHLORIDE SERPL-SCNC: 103 MMOL/L (ref 98–107)
CO2 SERPL-SCNC: 29 MMOL/L (ref 20–31)
CREAT SERPL-MCNC: 0.74 MG/DL (ref 0.5–0.9)
EOSINOPHIL # BLD: 0.2 K/UL (ref 0–0.4)
EOSINOPHILS RELATIVE PERCENT: 4 % (ref 1–4)
ERYTHROCYTE [DISTWIDTH] IN BLOOD BY AUTOMATED COUNT: 16 % (ref 12.5–15.4)
GFR SERPL CREATININE-BSD FRML MDRD: >60 ML/MIN/1.73M2
GLUCOSE SERPL-MCNC: 98 MG/DL (ref 70–99)
HCT VFR BLD AUTO: 34.9 % (ref 36–46)
HGB BLD-MCNC: 11.4 G/DL (ref 12–16)
INR PPP: 1
LYMPHOCYTES # BLD: 20 % (ref 24–44)
LYMPHOCYTES NFR BLD: 1 K/UL (ref 1–4.8)
MCH RBC QN AUTO: 27 PG (ref 26–34)
MCHC RBC AUTO-ENTMCNC: 32.7 G/DL (ref 31–37)
MCV RBC AUTO: 82.6 FL (ref 80–100)
MONOCYTES NFR BLD: 0.4 K/UL (ref 0.1–1.2)
MONOCYTES NFR BLD: 8 % (ref 2–11)
NEUTROPHILS NFR BLD: 67 % (ref 36–66)
NEUTS SEG NFR BLD: 3.2 K/UL (ref 1.8–7.7)
PARTIAL THROMBOPLASTIN TIME: 30.4 SEC (ref 21.3–31.3)
PLATELET # BLD AUTO: 192 K/UL (ref 140–450)
PMV BLD AUTO: 7.4 FL (ref 6–12)
POTASSIUM SERPL-SCNC: 3.3 MMOL/L (ref 3.7–5.3)
PROTHROMBIN TIME: 11 SEC (ref 9.4–12.6)
RBC # BLD AUTO: 4.22 M/UL (ref 4–5.2)
SODIUM SERPL-SCNC: 143 MMOL/L (ref 135–144)
WBC OTHER # BLD: 4.7 K/UL (ref 3.5–11)

## 2023-05-31 PROCEDURE — 85025 COMPLETE CBC W/AUTO DIFF WBC: CPT

## 2023-05-31 PROCEDURE — 73090 X-RAY EXAM OF FOREARM: CPT

## 2023-05-31 PROCEDURE — 73060 X-RAY EXAM OF HUMERUS: CPT

## 2023-05-31 PROCEDURE — 72125 CT NECK SPINE W/O DYE: CPT

## 2023-05-31 PROCEDURE — 36415 COLL VENOUS BLD VENIPUNCTURE: CPT

## 2023-05-31 PROCEDURE — 6370000000 HC RX 637 (ALT 250 FOR IP): Performed by: EMERGENCY MEDICINE

## 2023-05-31 PROCEDURE — 73030 X-RAY EXAM OF SHOULDER: CPT

## 2023-05-31 PROCEDURE — 80048 BASIC METABOLIC PNL TOTAL CA: CPT

## 2023-05-31 PROCEDURE — 85610 PROTHROMBIN TIME: CPT

## 2023-05-31 PROCEDURE — 70450 CT HEAD/BRAIN W/O DYE: CPT

## 2023-05-31 PROCEDURE — 99284 EMERGENCY DEPT VISIT MOD MDM: CPT

## 2023-05-31 PROCEDURE — 85730 THROMBOPLASTIN TIME PARTIAL: CPT

## 2023-05-31 RX ORDER — HYDROCODONE BITARTRATE AND ACETAMINOPHEN 5; 325 MG/1; MG/1
1 TABLET ORAL ONCE
Status: COMPLETED | OUTPATIENT
Start: 2023-05-31 | End: 2023-05-31

## 2023-05-31 RX ORDER — HYDROCODONE BITARTRATE AND ACETAMINOPHEN 5; 325 MG/1; MG/1
1 TABLET ORAL EVERY 4 HOURS PRN
Qty: 18 TABLET | Refills: 0 | Status: SHIPPED | OUTPATIENT
Start: 2023-05-31 | End: 2023-05-31 | Stop reason: SDUPTHER

## 2023-05-31 RX ORDER — MORPHINE SULFATE 2 MG/ML
2 INJECTION, SOLUTION INTRAMUSCULAR; INTRAVENOUS ONCE
Status: DISCONTINUED | OUTPATIENT
Start: 2023-05-31 | End: 2023-05-31

## 2023-05-31 RX ORDER — HYDROCODONE BITARTRATE AND ACETAMINOPHEN 5; 325 MG/1; MG/1
1 TABLET ORAL EVERY 4 HOURS PRN
Qty: 18 TABLET | Refills: 0 | Status: SHIPPED | OUTPATIENT
Start: 2023-05-31 | End: 2023-06-03

## 2023-05-31 RX ADMIN — HYDROCODONE BITARTRATE AND ACETAMINOPHEN 1 TABLET: 5; 325 TABLET ORAL at 12:10

## 2023-05-31 ASSESSMENT — PAIN - FUNCTIONAL ASSESSMENT: PAIN_FUNCTIONAL_ASSESSMENT: 0-10

## 2023-05-31 ASSESSMENT — PAIN SCALES - GENERAL: PAINLEVEL_OUTOF10: 7

## 2023-05-31 NOTE — DISCHARGE INSTRUCTIONS
Take medications as prescribed  Maintain sling until follow-up do not remove  Call for appointment today  Return immediately if any worsening symptoms or any other concerns    Tell us how we did visit: [email or web address]   and let us know about your experience

## 2023-05-31 NOTE — ED PROVIDER NOTES
Glucose 98 70 - 99 mg/dL    BUN 13 8 - 23 mg/dL    Creatinine 0.74 0.50 - 0.90 mg/dL    Est, Glom Filt Rate >60 >60 mL/min/1.73m2    Calcium 9.7 8.6 - 10.4 mg/dL    Sodium 143 135 - 144 mmol/L    Potassium 3.3 (L) 3.7 - 5.3 mmol/L    Chloride 103 98 - 107 mmol/L    CO2 29 20 - 31 mmol/L    Anion Gap 11 9 - 17 mmol/L   Protime-INR   Result Value Ref Range    Protime 11.0 9.4 - 12.6 sec    INR 1.0    APTT   Result Value Ref Range    PTT 30.4 21.3 - 31.3 sec       Not indicated unless otherwise documented above    RADIOLOGY:   I reviewed the radiologist interpretations:    XR SHOULDER RIGHT (MIN 2 VIEWS)   Final Result   Right shoulder:      Acute fracture through the surgical neck of the proximal right humerus. Right humerus:      Acute fracture through the surgical neck of the proximal right humerus. Underlying diffuse osteopenia. Right forearm:      1. Diffuse osteopenia and degenerative changes as above. 2. No acute osseous abnormality. XR RADIUS ULNA RIGHT (2 VIEWS)   Final Result   Right shoulder:      Acute fracture through the surgical neck of the proximal right humerus. Right humerus:      Acute fracture through the surgical neck of the proximal right humerus. Underlying diffuse osteopenia. Right forearm:      1. Diffuse osteopenia and degenerative changes as above. 2. No acute osseous abnormality. XR HUMERUS RIGHT (MIN 2 VIEWS)   Final Result   Right shoulder:      Acute fracture through the surgical neck of the proximal right humerus. Right humerus:      Acute fracture through the surgical neck of the proximal right humerus. Underlying diffuse osteopenia. Right forearm:      1. Diffuse osteopenia and degenerative changes as above. 2. No acute osseous abnormality. CT HEAD WO CONTRAST   Final Result   No acute intracranial abnormality. Chronic small vessel ischemic disease, age appropriate cortical atrophy, and   arteriosclerosis.

## 2023-05-31 NOTE — ED NOTES
Transport set up via [employer] back to [employer].       Ray Phillips RN  05/31/23 5892

## 2023-05-31 NOTE — TELEPHONE ENCOUNTER
Spoke with Ziggy Briceno and scheduled appointment  with Dr. Tali Bocanegra on 6/1/23. Ziggy Bricneo stated that she would try and get transportation and if not would call office back.  Office phone number provided

## 2023-05-31 NOTE — TELEPHONE ENCOUNTER
Pt was in [facility] 5/31/23 for Closed fracture of proximal end of right humerus, unspecified fracture morphology. Discharge instructions state to see Dr. Crhistina Clark in one day. Please call [facility] to schedule pt.

## 2023-06-01 ENCOUNTER — OFFICE VISIT (OUTPATIENT)
Dept: ORTHOPEDIC SURGERY | Age: 88
End: 2023-06-01

## 2023-06-01 VITALS — WEIGHT: 112 LBS | BODY MASS INDEX: 19.12 KG/M2 | HEIGHT: 64 IN

## 2023-06-01 DIAGNOSIS — S42.201A CLOSED FRACTURE OF PROXIMAL END OF RIGHT HUMERUS, UNSPECIFIED FRACTURE MORPHOLOGY, INITIAL ENCOUNTER: Primary | ICD-10-CM

## 2023-06-01 NOTE — PROGRESS NOTES
[address] [facility]  [address] [place] [address] [address]  Dept: [phone]    Orthopaedic Trauma New Patient      CHIEF COMPLAINT:    Chief Complaint   Patient presents with    Follow-Up from Hospital     Right humerus       HISTORY OF PRESENT ILLNESS:    The patient is a 82 y.o. RHD female who is being seen as a new patient for right proximal humerus fracture sustained on 5/30/2023 after a fall from standing height when she tripped at her assisted living facility. Patient evaluated at [facility] ED on 5/31/2023 and placed in sling and instructed to follow-up at our clinic patient reports hitting her head with no loss of consciousness. Patient and was with walker at baseline and alternates with her wheelchair. Patient denies any numbness, tingling, weakness. Patient has Afib treated with Eliquis. Patient has no other orthopedic complaints at this time.       Past Medical History:    Past Medical History:   Diagnosis Date    Blood circulation, collateral     CHF (congestive heart failure) (HCC)     COPD (chronic obstructive pulmonary disease) (HCC)     GERD (gastroesophageal reflux disease)     Hyperlipidemia     Hypertension        Past Surgical History:    Past Surgical History:   Procedure Laterality Date    BLADDER SUSPENSION  2001    CHOLECYSTECTOMY      COLONOSCOPY N/A 02/10/2022    COLONOSCOPY WITH BIOPSY performed by Rogre Carpenter MD at [facility] ([address] [place] [address])  ovaries left    JOINT REPLACEMENT  left total shoulder; 1990    JOINT REPLACEMENT Left 12/2022    hip    UPPER GASTROINTESTINAL ENDOSCOPY N/A 02/10/2022    EGD ESOPHAGOGASTRODUODENOSCOPY performed by Roger Carpenter MD at \[facility]\"" Endoscopy       Current Medications:   Current Outpatient Medications   Medication Sig Dispense Refill    HYDROcodone-acetaminophen (NORCO) 5-325 MG per tablet Take 1 tablet by mouth every 4 hours as needed for Pain for up to

## 2023-07-13 ENCOUNTER — OFFICE VISIT (OUTPATIENT)
Dept: ORTHOPEDIC SURGERY | Age: 88
End: 2023-07-13

## 2023-07-13 VITALS — WEIGHT: 111.11 LBS | BODY MASS INDEX: 18.97 KG/M2 | HEIGHT: 64 IN

## 2023-07-13 DIAGNOSIS — S42.201D CLOSED FRACTURE OF PROXIMAL END OF RIGHT HUMERUS WITH ROUTINE HEALING, UNSPECIFIED FRACTURE MORPHOLOGY, SUBSEQUENT ENCOUNTER: Primary | ICD-10-CM

## 2023-07-13 NOTE — PROGRESS NOTES
of passive abduction. 50 degrees of external rotation. Full range of motion of the elbow. Compartments soft. 2+ rad pulse. Median/Radial/Ulnar/AIN/PIN motor intact. Median/Radial/Ulnar nerve SILT. Radiology:  Imaging studies from today were independently reviewed and read as listed below. Any relevant images obtained prior to today's visit were also independently interpreted. History: Right proximal humerus fracture     Comparison: 6/15/2023    Findings: 3 views of the right shoulder (AP, scap Y, lateral) in a skeletally mature patient showing re-demonstration of right  proximal humerus fracture with increased bony callus formation compared to previous imaging. Improved shoulder alignment compared to previous imaging. No change in fracture alignment or further displacement. No new fractures or dislocations identified. Impression: Stable right proximal humerus fracture with routine healing. Assessment:   82y.o. year old female with right proximal humerus fracture; DOI: 5/30/2023. Plan:   - Reviewed x-rays with the patient. - Overall patient is doing well with non-operative treatment. At this time, she may begin activities as tolerated with no formal restrictions. Letter provided to facility with PT orders.     - Patient may follow up as needed at this time. Follow up:Return if symptoms worsen or fail to improve. No orders of the defined types were placed in this encounter.          Orders Placed This Encounter   Procedures    XR SHOULDER RIGHT (MIN 2 VIEWS)     Standing Status:   Future     Number of Occurrences:   1     Standing Expiration Date:   7/10/2024       Electronically signed by Cort Prader, APRN - CNP on 7/13/2023 at 9:17 AM    This note is created with the assistance of a speech recognition program.  While intending to generate a document that actually reflects the content of the visit, the document can still have some errors including those of syntax and sound a like

## 2023-07-27 NOTE — ASSESSMENT & PLAN NOTE
Healing well   Dressing changed in office Initiate Treatment: AM\\n-Cleanser face with gentle cleanser \\n-Apply Winlevi \\n-apply clindamycin \\n-apply spf \\n\\nPM-\\n-cleanse face with gentle cleanser\\n-apply Winlevi \\n-apply tazorac (start using three times a week then increase a nigh every 2 weeks until tolerated\\n-wash back/chest with benzoyl peroxide wash in the shower Detail Level: Detailed

## 2023-08-28 ENCOUNTER — HOSPITAL ENCOUNTER (EMERGENCY)
Age: 88
Discharge: NURSING, REHAB OR LONG-TERM CARE | End: 2023-08-29
Attending: EMERGENCY MEDICINE
Payer: MEDICARE

## 2023-08-28 DIAGNOSIS — S40.012A CONTUSION OF LEFT SHOULDER, INITIAL ENCOUNTER: ICD-10-CM

## 2023-08-28 DIAGNOSIS — S70.02XA CONTUSION OF LEFT HIP, INITIAL ENCOUNTER: ICD-10-CM

## 2023-08-28 DIAGNOSIS — S01.81XA LACERATION OF FOREHEAD, INITIAL ENCOUNTER: ICD-10-CM
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
## 2023-08-28 PROCEDURE — 12011 RPR F/E/E/N/L/M 2.5 CM/<: CPT

## 2023-08-28 PROCEDURE — 99284 EMERGENCY DEPT VISIT MOD MDM: CPT

## 2023-08-28 RX ORDER — ZINC SULFATE 50(220)MG
50 CAPSULE ORAL DAILY
COMMUNITY

## 2023-08-28 RX ORDER — LANOLIN ALCOHOL/MO/W.PET/CERES
5 CREAM (GRAM) TOPICAL DAILY
COMMUNITY

## 2023-08-28 RX ORDER — SENNA AND DOCUSATE SODIUM 50; 8.6 MG/1; MG/1
2 TABLET, FILM COATED ORAL DAILY
COMMUNITY

## 2023-08-28 RX ORDER — FAMOTIDINE 20 MG/1
20 TABLET, FILM COATED ORAL 2 TIMES DAILY
COMMUNITY

## 2023-08-28 RX ORDER — BISACODYL 10 MG
10 SUPPOSITORY, RECTAL RECTAL DAILY
COMMUNITY

## 2023-08-28 RX ORDER — ONDANSETRON 4 MG/1
4 TABLET, FILM COATED ORAL EVERY 8 HOURS PRN
COMMUNITY

## 2023-08-28 RX ORDER — MIRTAZAPINE 15 MG/1
7.5 TABLET, FILM COATED ORAL NIGHTLY
COMMUNITY

## 2023-08-28 RX ORDER — ACETAMINOPHEN 325 MG/1
650 TABLET ORAL ONCE
Status: COMPLETED | OUTPATIENT
Start: 2023-08-28 | End: 2023-08-29

## 2023-08-28 ASSESSMENT — PAIN - FUNCTIONAL ASSESSMENT: PAIN_FUNCTIONAL_ASSESSMENT: 0-10

## 2023-08-28 ASSESSMENT — PAIN SCALES - GENERAL: PAINLEVEL_OUTOF10: 8

## 2023-08-29 ENCOUNTER — APPOINTMENT (OUTPATIENT)
Dept: CT IMAGING | Age: 88
End: 2023-08-29
Payer: MEDICARE

## 2023-08-29 ENCOUNTER — APPOINTMENT (OUTPATIENT)
Dept: GENERAL RADIOLOGY | Age: 88
End: 2023-08-29
Payer: MEDICARE

## 2023-08-29 VITALS
BODY MASS INDEX: 23.05 KG/M2 | SYSTOLIC BLOOD PRESSURE: 188 MMHG | OXYGEN SATURATION: 95 % | WEIGHT: 135 LBS | HEIGHT: 64 IN | DIASTOLIC BLOOD PRESSURE: 80 MMHG | TEMPERATURE: 98 F | HEART RATE: 94 BPM | RESPIRATION RATE: 16 BRPM

## 2023-08-29 PROCEDURE — 71046 X-RAY EXAM CHEST 2 VIEWS: CPT

## 2023-08-29 PROCEDURE — 73030 X-RAY EXAM OF SHOULDER: CPT

## 2023-08-29 PROCEDURE — 73502 X-RAY EXAM HIP UNI 2-3 VIEWS: CPT

## 2023-08-29 PROCEDURE — 70450 CT HEAD/BRAIN W/O DYE: CPT

## 2023-08-29 PROCEDURE — 73700 CT LOWER EXTREMITY W/O DYE: CPT

## 2023-08-29 PROCEDURE — 72220 X-RAY EXAM SACRUM TAILBONE: CPT

## 2023-08-29 PROCEDURE — 72125 CT NECK SPINE W/O DYE: CPT

## 2023-08-29 PROCEDURE — 6370000000 HC RX 637 (ALT 250 FOR IP): Performed by: EMERGENCY MEDICINE

## 2023-08-29 RX ORDER — ACETAMINOPHEN 500 MG
1000 TABLET ORAL EVERY 8 HOURS PRN
Qty: 30 TABLET | Refills: 0 | Status: SHIPPED | OUTPATIENT
Start: 2023-08-29

## 2023-08-29 RX ADMIN — ACETAMINOPHEN 650 MG: 325 TABLET ORAL at 00:18

## 2023-08-29 ASSESSMENT — PAIN SCALES - GENERAL: PAINLEVEL_OUTOF10: 8

## 2023-08-29 ASSESSMENT — ENCOUNTER SYMPTOMS
BACK PAIN: 0
NAUSEA: 0
VOMITING: 0

## 2023-08-29 NOTE — ED TRIAGE NOTES
Pt stated she was trying to get up into her wheelchair tonight and she fell and hit her head, left shoulder, and left knee. Pt denies LOC. Pt is on blood thinners. Pt states she has a headache but denies any blurred vision.

## 2023-08-29 NOTE — ED NOTES
Writer attempted to call report at this time to [place]. Writer was sent to [employer]. [facility] ambulette is here to take patient back to [facility] at this time.       Jayro Gaxiola RN  08/29/23 5285

## 2023-08-29 NOTE — ED NOTES
Pt ambulated with a walker, down the bautista to the bathroom with minimal assistance.       Loyd Zacarias RN  08/29/23 3598

## 2023-09-16 ENCOUNTER — APPOINTMENT (OUTPATIENT)
Dept: GENERAL RADIOLOGY | Age: 88
DRG: 177 | End: 2023-09-16
Payer: MEDICARE

## 2023-09-16 ENCOUNTER — APPOINTMENT (OUTPATIENT)
Dept: CT IMAGING | Age: 88
DRG: 177 | End: 2023-09-16
Payer: MEDICARE

## 2023-09-16 ENCOUNTER — HOSPITAL ENCOUNTER (INPATIENT)
Age: 88
LOS: 2 days | DRG: 177 | End: 2023-09-18
Attending: EMERGENCY MEDICINE | Admitting: HOSPITALIST
Payer: MEDICARE

## 2023-09-16 DIAGNOSIS — I50.33 ACUTE ON CHRONIC DIASTOLIC HEART FAILURE (HCC): ICD-10-CM

## 2023-09-16 DIAGNOSIS — I50.9 ACUTE ON CHRONIC CONGESTIVE HEART FAILURE, UNSPECIFIED HEART FAILURE TYPE (HCC): ICD-10-CM

## 2023-09-16 DIAGNOSIS — J18.9 PNEUMONIA DUE TO INFECTIOUS ORGANISM, UNSPECIFIED LATERALITY, UNSPECIFIED PART OF LUNG: ICD-10-CM

## 2023-09-16 DIAGNOSIS — J96.01 ACUTE RESPIRATORY FAILURE WITH HYPOXIA (HCC): Primary | ICD-10-CM

## 2023-09-16 PROBLEM — J15.9 COMMUNITY ACQUIRED BACTERIAL PNEUMONIA: Status: ACTIVE | Noted: 2023-09-16

## 2023-09-16 LAB
ALLEN TEST: POSITIVE
ANION GAP SERPL CALCULATED.3IONS-SCNC: 11 MMOL/L (ref 9–17)
BASOPHILS # BLD: 0 K/UL (ref 0–0.2)
BASOPHILS NFR BLD: 0 % (ref 0–2)
BILIRUB UR QL STRIP: NEGATIVE
BNP SERPL-MCNC: ABNORMAL PG/ML
BUN SERPL-MCNC: 26 MG/DL (ref 8–23)
CALCIUM SERPL-MCNC: 9.2 MG/DL (ref 8.6–10.4)
CHLORIDE SERPL-SCNC: 99 MMOL/L (ref 98–107)
CLARITY UR: CLEAR
CO2 SERPL-SCNC: 28 MMOL/L (ref 20–31)
COLOR UR: YELLOW
COMMENT: NORMAL
CREAT SERPL-MCNC: 0.8 MG/DL (ref 0.5–0.9)
D DIMER PPP FEU-MCNC: 1.26 UG/ML FEU
EOSINOPHIL # BLD: 0 K/UL (ref 0–0.4)
EOSINOPHILS RELATIVE PERCENT: 0 % (ref 1–4)
ERYTHROCYTE [DISTWIDTH] IN BLOOD BY AUTOMATED COUNT: 16.5 % (ref 12.5–15.4)
FIO2: 60
GFR SERPL CREATININE-BSD FRML MDRD: >60 ML/MIN/1.73M2
GLUCOSE SERPL-MCNC: 128 MG/DL (ref 70–99)
GLUCOSE UR STRIP-MCNC: NEGATIVE MG/DL
HCT VFR BLD AUTO: 36.2 % (ref 36–46)
HGB BLD-MCNC: 11.5 G/DL (ref 12–16)
HGB UR QL STRIP.AUTO: NEGATIVE
INR PPP: 1.4
KETONES UR STRIP-MCNC: NEGATIVE MG/DL
LACTATE BLDV-SCNC: 1.9 MMOL/L (ref 0.5–2.2)
LEUKOCYTE ESTERASE UR QL STRIP: NEGATIVE
LYMPHOCYTES NFR BLD: 0.28 K/UL (ref 1–4.8)
LYMPHOCYTES RELATIVE PERCENT: 2 % (ref 24–44)
MCH RBC QN AUTO: 26.7 PG (ref 26–34)
MCHC RBC AUTO-ENTMCNC: 31.9 G/DL (ref 31–37)
MCV RBC AUTO: 83.9 FL (ref 80–100)
MODE: ABNORMAL
MONOCYTES NFR BLD: 0.42 K/UL (ref 0.1–0.8)
MONOCYTES NFR BLD: 3 % (ref 1–7)
MORPHOLOGY: ABNORMAL
NEUTROPHILS NFR BLD: 95 % (ref 36–66)
NEUTS SEG NFR BLD: 13.3 K/UL (ref 1.8–7.7)
NITRITE UR QL STRIP: NEGATIVE
PARTIAL THROMBOPLASTIN TIME: 39.5 SEC (ref 21.3–31.3)
PH UR STRIP: 6 [PH] (ref 5–8)
PLATELET # BLD AUTO: 329 K/UL (ref 140–450)
PMV BLD AUTO: 7.7 FL (ref 6–12)
POC HCO3: 27.4 MMOL/L (ref 21–28)
POC O2 SATURATION: 94.9 % (ref 94–98)
POC PCO2: 37.8 MM HG (ref 35–48)
POC PH: 7.47 (ref 7.35–7.45)
POC PO2: 70 MM HG (ref 83–108)
POSITIVE BASE EXCESS, ART: 3.6 MMOL/L (ref 0–3)
POTASSIUM SERPL-SCNC: 3.1 MMOL/L (ref 3.7–5.3)
PROT UR STRIP-MCNC: NEGATIVE MG/DL
PROTHROMBIN TIME: 15.3 SEC (ref 9.4–12.6)
RBC # BLD AUTO: 4.31 M/UL (ref 4–5.2)
SAMPLE SITE: ABNORMAL
SARS-COV-2 RDRP RESP QL NAA+PROBE: NOT DETECTED
SODIUM SERPL-SCNC: 138 MMOL/L (ref 135–144)
SP GR UR STRIP: 1.01 (ref 1–1.03)
SPECIMEN DESCRIPTION: NORMAL
TROPONIN I SERPL HS-MCNC: 32 NG/L (ref 0–14)
UROBILINOGEN UR STRIP-ACNC: NORMAL EU/DL (ref 0–1)
WBC OTHER # BLD: 14 K/UL (ref 3.5–11)

## 2023-09-16 PROCEDURE — 80048 BASIC METABOLIC PNL TOTAL CA: CPT

## 2023-09-16 PROCEDURE — 6370000000 HC RX 637 (ALT 250 FOR IP): Performed by: HOSPITALIST

## 2023-09-16 PROCEDURE — 94761 N-INVAS EAR/PLS OXIMETRY MLT: CPT

## 2023-09-16 PROCEDURE — 83880 ASSAY OF NATRIURETIC PEPTIDE: CPT

## 2023-09-16 PROCEDURE — 93005 ELECTROCARDIOGRAM TRACING: CPT | Performed by: EMERGENCY MEDICINE

## 2023-09-16 PROCEDURE — 81003 URINALYSIS AUTO W/O SCOPE: CPT

## 2023-09-16 PROCEDURE — 94660 CPAP INITIATION&MGMT: CPT

## 2023-09-16 PROCEDURE — 83605 ASSAY OF LACTIC ACID: CPT

## 2023-09-16 PROCEDURE — 2580000003 HC RX 258: Performed by: EMERGENCY MEDICINE

## 2023-09-16 PROCEDURE — 36415 COLL VENOUS BLD VENIPUNCTURE: CPT

## 2023-09-16 PROCEDURE — 85610 PROTHROMBIN TIME: CPT

## 2023-09-16 PROCEDURE — 84484 ASSAY OF TROPONIN QUANT: CPT

## 2023-09-16 PROCEDURE — 87635 SARS-COV-2 COVID-19 AMP PRB: CPT

## 2023-09-16 PROCEDURE — 85730 THROMBOPLASTIN TIME PARTIAL: CPT

## 2023-09-16 PROCEDURE — 96374 THER/PROPH/DIAG INJ IV PUSH: CPT

## 2023-09-16 PROCEDURE — 99285 EMERGENCY DEPT VISIT HI MDM: CPT

## 2023-09-16 PROCEDURE — 1210000000 HC MED SURG R&B

## 2023-09-16 PROCEDURE — 85379 FIBRIN DEGRADATION QUANT: CPT

## 2023-09-16 PROCEDURE — 71260 CT THORAX DX C+: CPT

## 2023-09-16 PROCEDURE — 87040 BLOOD CULTURE FOR BACTERIA: CPT

## 2023-09-16 PROCEDURE — 5A09457 ASSISTANCE WITH RESPIRATORY VENTILATION, 24-96 CONSECUTIVE HOURS, CONTINUOUS POSITIVE AIRWAY PRESSURE: ICD-10-PCS | Performed by: HOSPITALIST

## 2023-09-16 PROCEDURE — 99222 1ST HOSP IP/OBS MODERATE 55: CPT | Performed by: HOSPITALIST

## 2023-09-16 PROCEDURE — 85025 COMPLETE CBC W/AUTO DIFF WBC: CPT

## 2023-09-16 PROCEDURE — 2580000003 HC RX 258: Performed by: HOSPITALIST

## 2023-09-16 PROCEDURE — 36600 WITHDRAWAL OF ARTERIAL BLOOD: CPT

## 2023-09-16 PROCEDURE — 2500000003 HC RX 250 WO HCPCS: Performed by: EMERGENCY MEDICINE

## 2023-09-16 PROCEDURE — 71045 X-RAY EXAM CHEST 1 VIEW: CPT

## 2023-09-16 PROCEDURE — 2700000000 HC OXYGEN THERAPY PER DAY

## 2023-09-16 PROCEDURE — 6360000004 HC RX CONTRAST MEDICATION: Performed by: EMERGENCY MEDICINE

## 2023-09-16 PROCEDURE — 82803 BLOOD GASES ANY COMBINATION: CPT

## 2023-09-16 PROCEDURE — 6360000002 HC RX W HCPCS: Performed by: EMERGENCY MEDICINE

## 2023-09-16 RX ORDER — LANOLIN ALCOHOL/MO/W.PET/CERES
325 CREAM (GRAM) TOPICAL
Status: DISCONTINUED | OUTPATIENT
Start: 2023-09-17 | End: 2023-09-18

## 2023-09-16 RX ORDER — LIDOCAINE 4 G/G
1 PATCH TOPICAL DAILY
Status: DISCONTINUED | OUTPATIENT
Start: 2023-09-16 | End: 2023-09-18 | Stop reason: HOSPADM

## 2023-09-16 RX ORDER — RIVASTIGMINE 4.6 MG/24H
1 PATCH, EXTENDED RELEASE TRANSDERMAL DAILY
Status: DISCONTINUED | OUTPATIENT
Start: 2023-09-16 | End: 2023-09-18

## 2023-09-16 RX ORDER — POTASSIUM CHLORIDE 20 MEQ/1
40 TABLET, EXTENDED RELEASE ORAL PRN
Status: DISCONTINUED | OUTPATIENT
Start: 2023-09-16 | End: 2023-09-18

## 2023-09-16 RX ORDER — SODIUM CHLORIDE 0.9 % (FLUSH) 0.9 %
5-40 SYRINGE (ML) INJECTION PRN
Status: DISCONTINUED | OUTPATIENT
Start: 2023-09-16 | End: 2023-09-18 | Stop reason: HOSPADM

## 2023-09-16 RX ORDER — ONDANSETRON 2 MG/ML
4 INJECTION INTRAMUSCULAR; INTRAVENOUS EVERY 6 HOURS PRN
Status: DISCONTINUED | OUTPATIENT
Start: 2023-09-16 | End: 2023-09-18 | Stop reason: HOSPADM

## 2023-09-16 RX ORDER — MIRTAZAPINE 15 MG/1
7.5 TABLET, FILM COATED ORAL NIGHTLY
Status: DISCONTINUED | OUTPATIENT
Start: 2023-09-16 | End: 2023-09-18

## 2023-09-16 RX ORDER — SOY PROTEIN
1 POWDER (GRAM) ORAL DAILY
Status: DISCONTINUED | OUTPATIENT
Start: 2023-09-16 | End: 2023-09-16 | Stop reason: RX

## 2023-09-16 RX ORDER — PRAVASTATIN SODIUM 20 MG
40 TABLET ORAL DAILY
Status: DISCONTINUED | OUTPATIENT
Start: 2023-09-16 | End: 2023-09-18

## 2023-09-16 RX ORDER — SENNA AND DOCUSATE SODIUM 50; 8.6 MG/1; MG/1
2 TABLET, FILM COATED ORAL DAILY
Status: DISCONTINUED | OUTPATIENT
Start: 2023-09-16 | End: 2023-09-18

## 2023-09-16 RX ORDER — SODIUM CHLORIDE 9 MG/ML
INJECTION, SOLUTION INTRAVENOUS PRN
Status: DISCONTINUED | OUTPATIENT
Start: 2023-09-16 | End: 2023-09-18 | Stop reason: HOSPADM

## 2023-09-16 RX ORDER — POTASSIUM CHLORIDE 750 MG/1
20 CAPSULE, EXTENDED RELEASE ORAL DAILY
Status: DISCONTINUED | OUTPATIENT
Start: 2023-09-16 | End: 2023-09-18

## 2023-09-16 RX ORDER — ACETAMINOPHEN 650 MG/1
650 SUPPOSITORY RECTAL EVERY 6 HOURS PRN
Status: DISCONTINUED | OUTPATIENT
Start: 2023-09-16 | End: 2023-09-18 | Stop reason: HOSPADM

## 2023-09-16 RX ORDER — 0.9 % SODIUM CHLORIDE 0.9 %
80 INTRAVENOUS SOLUTION INTRAVENOUS ONCE
Status: DISCONTINUED | OUTPATIENT
Start: 2023-09-16 | End: 2023-09-18 | Stop reason: HOSPADM

## 2023-09-16 RX ORDER — FAMOTIDINE 20 MG/1
20 TABLET, FILM COATED ORAL DAILY
Status: DISCONTINUED | OUTPATIENT
Start: 2023-09-16 | End: 2023-09-18

## 2023-09-16 RX ORDER — PANTOPRAZOLE SODIUM 40 MG/1
40 TABLET, DELAYED RELEASE ORAL DAILY
Status: DISCONTINUED | OUTPATIENT
Start: 2023-09-16 | End: 2023-09-18

## 2023-09-16 RX ORDER — ALBUTEROL SULFATE 2.5 MG/3ML
2.5 SOLUTION RESPIRATORY (INHALATION)
Status: DISCONTINUED | OUTPATIENT
Start: 2023-09-16 | End: 2023-09-18 | Stop reason: HOSPADM

## 2023-09-16 RX ORDER — UREA 10 %
5 LOTION (ML) TOPICAL DAILY
Status: DISCONTINUED | OUTPATIENT
Start: 2023-09-16 | End: 2023-09-18

## 2023-09-16 RX ORDER — AMIODARONE HYDROCHLORIDE 200 MG/1
200 TABLET ORAL DAILY
Status: DISCONTINUED | OUTPATIENT
Start: 2023-09-16 | End: 2023-09-18

## 2023-09-16 RX ORDER — BUMETANIDE 0.25 MG/ML
0.5 INJECTION INTRAMUSCULAR; INTRAVENOUS ONCE
Status: COMPLETED | OUTPATIENT
Start: 2023-09-16 | End: 2023-09-16

## 2023-09-16 RX ORDER — SODIUM CHLORIDE 0.9 % (FLUSH) 0.9 %
5-40 SYRINGE (ML) INJECTION EVERY 12 HOURS SCHEDULED
Status: DISCONTINUED | OUTPATIENT
Start: 2023-09-16 | End: 2023-09-18 | Stop reason: HOSPADM

## 2023-09-16 RX ORDER — CALCIUM CARBONATE-CHOLECALCIFEROL TAB 250 MG-125 UNIT 250-125 MG-UNIT
2 TAB ORAL 2 TIMES DAILY
Status: DISCONTINUED | OUTPATIENT
Start: 2023-09-16 | End: 2023-09-16

## 2023-09-16 RX ORDER — BUMETANIDE 1 MG/1
1 TABLET ORAL 2 TIMES DAILY
Status: DISCONTINUED | OUTPATIENT
Start: 2023-09-16 | End: 2023-09-17

## 2023-09-16 RX ORDER — MAGNESIUM SULFATE IN WATER 40 MG/ML
2000 INJECTION, SOLUTION INTRAVENOUS PRN
Status: DISCONTINUED | OUTPATIENT
Start: 2023-09-16 | End: 2023-09-18

## 2023-09-16 RX ORDER — POLYETHYLENE GLYCOL 3350 17 G/17G
17 POWDER, FOR SOLUTION ORAL DAILY PRN
Status: DISCONTINUED | OUTPATIENT
Start: 2023-09-16 | End: 2023-09-18

## 2023-09-16 RX ORDER — M-VIT,TX,IRON,MINS/CALC/FOLIC 27MG-0.4MG
1 TABLET ORAL DAILY
Status: DISCONTINUED | OUTPATIENT
Start: 2023-09-16 | End: 2023-09-18

## 2023-09-16 RX ORDER — ONDANSETRON 4 MG/1
4 TABLET, FILM COATED ORAL EVERY 8 HOURS PRN
Status: DISCONTINUED | OUTPATIENT
Start: 2023-09-16 | End: 2023-09-16 | Stop reason: SDUPTHER

## 2023-09-16 RX ORDER — TRAMADOL HYDROCHLORIDE 50 MG/1
50 TABLET ORAL 2 TIMES DAILY PRN
Status: DISCONTINUED | OUTPATIENT
Start: 2023-09-16 | End: 2023-09-18

## 2023-09-16 RX ORDER — ACETAMINOPHEN 325 MG/1
650 TABLET ORAL EVERY 6 HOURS PRN
Status: DISCONTINUED | OUTPATIENT
Start: 2023-09-16 | End: 2023-09-18 | Stop reason: HOSPADM

## 2023-09-16 RX ORDER — LOSARTAN POTASSIUM 25 MG/1
25 TABLET ORAL DAILY
Status: DISCONTINUED | OUTPATIENT
Start: 2023-09-16 | End: 2023-09-18

## 2023-09-16 RX ORDER — UBIDECARENONE 75 MG
100 CAPSULE ORAL DAILY
Status: DISCONTINUED | OUTPATIENT
Start: 2023-09-16 | End: 2023-09-18

## 2023-09-16 RX ORDER — ASCORBIC ACID 500 MG
500 TABLET ORAL DAILY
Status: DISCONTINUED | OUTPATIENT
Start: 2023-09-16 | End: 2023-09-18

## 2023-09-16 RX ORDER — CHOLECALCIFEROL (VITAMIN D3) 1250 MCG
1 CAPSULE ORAL
Status: DISCONTINUED | OUTPATIENT
Start: 2023-09-16 | End: 2023-09-16 | Stop reason: RX

## 2023-09-16 RX ORDER — SODIUM CHLORIDE 0.9 % (FLUSH) 0.9 %
10 SYRINGE (ML) INJECTION PRN
Status: DISCONTINUED | OUTPATIENT
Start: 2023-09-16 | End: 2023-09-18 | Stop reason: HOSPADM

## 2023-09-16 RX ORDER — ONDANSETRON 4 MG/1
4 TABLET, ORALLY DISINTEGRATING ORAL EVERY 8 HOURS PRN
Status: DISCONTINUED | OUTPATIENT
Start: 2023-09-16 | End: 2023-09-18

## 2023-09-16 RX ORDER — POTASSIUM CHLORIDE 7.45 MG/ML
10 INJECTION INTRAVENOUS PRN
Status: DISCONTINUED | OUTPATIENT
Start: 2023-09-16 | End: 2023-09-18

## 2023-09-16 RX ADMIN — APIXABAN 2.5 MG: 2.5 TABLET, FILM COATED ORAL at 21:30

## 2023-09-16 RX ADMIN — CEFTRIAXONE SODIUM 1000 MG: 1 INJECTION, POWDER, FOR SOLUTION INTRAMUSCULAR; INTRAVENOUS at 16:16

## 2023-09-16 RX ADMIN — Medication 80 ML: at 15:31

## 2023-09-16 RX ADMIN — SODIUM CHLORIDE, PRESERVATIVE FREE 10 ML: 5 INJECTION INTRAVENOUS at 21:33

## 2023-09-16 RX ADMIN — METOPROLOL TARTRATE 25 MG: 25 TABLET, FILM COATED ORAL at 21:29

## 2023-09-16 RX ADMIN — SODIUM CHLORIDE, PRESERVATIVE FREE 10 ML: 5 INJECTION INTRAVENOUS at 15:30

## 2023-09-16 RX ADMIN — Medication 5 MG: at 21:35

## 2023-09-16 RX ADMIN — BUMETANIDE 1 MG: 1 TABLET ORAL at 21:29

## 2023-09-16 RX ADMIN — POTASSIUM CHLORIDE 40 MEQ: 1500 TABLET, EXTENDED RELEASE ORAL at 21:30

## 2023-09-16 RX ADMIN — MIRTAZAPINE 7.5 MG: 15 TABLET, FILM COATED ORAL at 21:30

## 2023-09-16 RX ADMIN — IOPAMIDOL 75 ML: 755 INJECTION, SOLUTION INTRAVENOUS at 15:31

## 2023-09-16 RX ADMIN — BUMETANIDE 0.5 MG: 0.25 INJECTION INTRAMUSCULAR; INTRAVENOUS at 14:20

## 2023-09-16 RX ADMIN — AZITHROMYCIN MONOHYDRATE 500 MG: 500 INJECTION, POWDER, LYOPHILIZED, FOR SOLUTION INTRAVENOUS at 17:05

## 2023-09-16 ASSESSMENT — ENCOUNTER SYMPTOMS
CHEST TIGHTNESS: 0
VOMITING: 0
SORE THROAT: 0
ABDOMINAL DISTENTION: 0
CONSTIPATION: 0
COLOR CHANGE: 0
TROUBLE SWALLOWING: 0
NAUSEA: 0
BACK PAIN: 0
BLOOD IN STOOL: 0
ABDOMINAL PAIN: 0
SHORTNESS OF BREATH: 1
DIARRHEA: 0
WHEEZING: 0
COUGH: 0

## 2023-09-16 ASSESSMENT — PAIN - FUNCTIONAL ASSESSMENT: PAIN_FUNCTIONAL_ASSESSMENT: 0-10

## 2023-09-16 ASSESSMENT — PAIN SCALES - GENERAL
PAINLEVEL_OUTOF10: 0
PAINLEVEL_OUTOF10: 6
PAINLEVEL_OUTOF10: 0

## 2023-09-16 ASSESSMENT — PAIN DESCRIPTION - LOCATION: LOCATION: CHEST

## 2023-09-16 ASSESSMENT — PAIN DESCRIPTION - ORIENTATION: ORIENTATION: MID

## 2023-09-16 NOTE — CONSULTS
Not on file    Highest education level: Not on file   Occupational History    Not on file   Tobacco Use    Smoking status: Former     Types: Cigarettes     Quit date: 1984     Years since quittin.7     Passive exposure: Never    Smokeless tobacco: Never   Vaping Use    Vaping Use: Never used   Substance and Sexual Activity    Alcohol use: No    Drug use: No    Sexual activity: Not on file   Other Topics Concern    Not on file   Social History Narrative    Not on file     Social Determinants of Health     Financial Resource Strain: Not on file   Food Insecurity: Not on file   Transportation Needs: Not on file   Physical Activity: Inactive (2022)    Exercise Vital Sign     Days of Exercise per Week: 0 days     Minutes of Exercise per Session: 0 min   Stress: Not on file   Social Connections: Not on file   Intimate Partner Violence: Not on file   Housing Stability: Not on file       Family History:   Family History   Problem Relation Age of Onset    Cancer Brother     Cancer Other         bone    Cancer Daughter         breast       Review of Systems  Review of Systems   Constitutional:  Negative for appetite change, chills, fatigue and fever. HENT:  Negative for congestion, ear pain, sore throat and trouble swallowing. Respiratory:  Positive for shortness of breath. Negative for cough, chest tightness and wheezing. Cardiovascular:  Negative for chest pain, palpitations and leg swelling. Gastrointestinal:  Negative for abdominal distention, abdominal pain, blood in stool, constipation, diarrhea, nausea and vomiting. Endocrine: Negative for polydipsia and polyuria. Genitourinary:  Negative for difficulty urinating and hematuria. Musculoskeletal:  Negative for arthralgias, back pain and myalgias. Skin:  Negative for color change, rash and wound. Allergic/Immunologic: Negative for environmental allergies. Neurological:  Positive for syncope.  Negative for dizziness, weakness, 02/23/2023 01:49 PM    AST 19 02/23/2023 01:49 PM    PROT 6.5 02/23/2023 01:49 PM    BILITOT 0.3 02/23/2023 01:49 PM    BILIDIR 0.11 09/28/2016 06:48 AM    IBILI 0.50 09/28/2016 06:48 AM    LABALBU 3.6 02/23/2023 01:49 PM       INR  Recent Labs     09/16/23  1342   PROTIME 15.3*   INR 1.4       APTT  Recent Labs     09/16/23  1342   APTT 39.5*       Lactic Acid  Lab Results   Component Value Date/Time    LACTA 1.9 09/16/2023 01:42 PM    LACTA 1.1 09/28/2016 06:48 AM        BNP   No results for input(s): \"BNP\" in the last 72 hours. Assessment:  Acute on chronic hypoxemic respiratory failure-currently stable on salter initially requiring BiPAP support, prescribed 3 L nasal cannula as needed  Suspected aspiration pneumonia  Bilateral pleural effusion  Question underlying COVID-19  Understanding atrial fibrillation  Acute on chronic heart failure with ejection fraction (55-60%)-very significant cardiomegaly on CT imaging but no real evidence of volume overload  GERD  Paraesophageal hernia  Hypertension  Hyperlipidemia  Chronic lower extremity edema  History of SBO secondary to incarcerated ventral hernia  History of DVT/pulmonary embolism-on chronic Eliquis  Patient has been vaccinated against COVID-19  COVID-19 testing is pending  Former smoker-less than 10-pack-year, quit more than 30 years ago  No history of underlying pulmonary disease-did see Dr. Ayanna Cadena pulmonary as part of her preoperative evaluation back in May 2022.   Patient had normal pulmonary function testing with the exception of a mildly decreased DLCO at 73% this was felt to be due to the patient's anemia was 10.6 at the time  CODE STATUS-DNR CCA, DO NOT INTUBATE       Plan:  Agree with present management  We will check for COVID-19  I spoke with the staff and reported that as long as she is tolerating on the Salter it is okay to monitor her off of BiPAP but if she starts showing signs of decompensation and the need to place her back on

## 2023-09-16 NOTE — ED NOTES
Oral care provided; resp at bedside to assess;adjust bipap mask. Noted sl redness to bridge nose/sides of nose - per resp was present on admission finding. Lab in to draw blood cultures.      Bari Mir RN  09/16/23 0538

## 2023-09-16 NOTE — ED NOTES
Adult Non-Invasive Positive Pressure Ventilation for Acute Respiratory Distress  Patient & Family Education Note     Patient: Douglas Ruff  Age: 82 y.o. The patient and/or family has been educated on the following items and have verbalized understanding and agreement:    [x]Patient and/or family have been educated on the purpose and advantages of NIV and have verbalized understanding and agreement. [x]Patient and/or family is in agreement that the patient will be NPO (Nothing by Mouth) for the duration of NIV use. [x]Patient and/or  family is in agreement that NIV will not be routinely disrupted except to complete oral care. [x]Patient and/or family have been educated on the level of care, vital signs frequency and monitoring requirements for NIV and are in agreement. [x]Patient and/or family have been educated on reporting any nausea, chest discomfort, sudden increase in shortness of breath, or a severe headache to the staff immediately.

## 2023-09-16 NOTE — ED NOTES
Pt awake and alert - informed that granddaughter Khang Fox called from [facility] re: pt update. Granddaughter informing writer pt had COVID + 4-6 weeks ago and tolerated well. States will get confused upon admissions but typically will not try to get out of bed; just \"sees things\". She states she will try to drive tomorrow/Sun to see pt.      Marti Zhang RN  09/16/23 0307

## 2023-09-16 NOTE — H&P
[facility]  Office: [address], DO, Eden Homans, DO, Rosario Gimenez, DO, Alessio Gunn, DO, Monica Santana MD, Radha Greco MD, Deysi Epperson MD, Darryle Brakeman, MD,  Emely Art MD, Chantal Rodriguez MD, Shira Sanders, DO, Indra Hernandes MD,  Wil Marie MD, Blanca Holbrook MD, Smita Abrams, DO, Faheem Somers MD,  Krysta Finn, DO, Raquel Bosworth, MD, Mohsen Martini MD, Hwoard Gerardo MD, Ward Espinoza MD,  Durenda Fabry, MD, Alice Jessica MD, Chichi Cho MD, García Sinha MD, Oli Castellano DO, Mare Aggarwal MD,  Rupa Johnston MD, Ryan Jaeger MD, Eliza Titus, CNP,  Dasha Hidalgo, CNP,, Radha Malik, CNP,  Janak Elise [facility], Fredy Mcneil, CNP, Yoko Giordano, CNP, Mohini Gotti CNP, Miya Modi, CNP, Arlene Ferrera, CNP, Sammy Steiner, CNP, Stefanie Le, CNS, Wendy Sousa, [facility], Hannah [place], [address] [place] [address] Drive    HISTORY AND PHYSICAL EXAMINATION            Date:   9/16/2023  Patient name:  Douglas Ruff  Date of admission:  9/16/2023  1:11 PM  MRN:   [id number]  Account:  [de-identified]  YOB: 1935  PCP:    Jared Dailey DO  Room:   [address] [place] [address]  Code Status:    Full Code      History Obtained From:     patient, electronic medical record    History of Present Illness:     Douglas Ruff is a 82 y.o. Non- / non  female who presents with Shortness of Breath (Pt from [facility] in [place] with SOB. Per staff/EMS pt has been deteriorating over past 2 days requiring oxygen. Pt arrives on NRB with SpO2 @88%, Dr Marion Mao at bedside upon arrival, EKG being done, and pt placed on bipap per RT. )   and is admitted to the hospital for the management of Community acquired bacterial pneumonia. This very pleasant 53-year-old female was brought from [facility] over due to worsening shortness of breath. The patient to be hypoxic.   The patient

## 2023-09-16 NOTE — ED PROVIDER NOTES
[address]      Pt Name: Brittnee Montelongo  MRN: [id number]  [facility] 1935  Date of evaluation: 9/16/2023      CHIEF COMPLAINT       Chief Complaint   Patient presents with    Shortness of Breath     Pt from [facility] in [facility] with SOB. Per staff/EMS pt has been deteriorating over past 2 days requiring oxygen. Pt arrives on NRB with SpO2 @88%, Dr Charmaine Minor at bedside upon arrival, EKG being done, and pt placed on bipap per RT. HISTORY OF PRESENT ILLNESS      The patient presents to the emergency department for dyspnea and hypoxia. She is residing in a nursing home. The patient has a history of A-fib and is on amiodarone as well as Eliquis. The rescue squad gave her 2 different breathing treatments including a DuoNeb and also started magnesium and Solu-Medrol. She initially responded to the breathing treatments and her oxygen saturation went up to 95%, but then it drops back down to 88%. She is written for as needed oxygen up to 3 L normally. The patient has a history of CHF and COPD. She tells me she is having some substernal chest pain. REVIEW OF SYSTEMS       All systems reviewed and negative unless noted in HPI. The patient denies fever or constitutional symptoms. Retrosternal chest pain. Patient reports dyspnea and is hypoxic upon arrival.  History of COPD and CHF. No nausea,  vomiting or diarrhea. Denies musculoskeletal injury or pain. Denies any weakness, numbness or focal neurologic deficit. Denies any skin rash or edema. No recent psychiatric issues. Takes Eliquis. Denies any polyuria, polydypsia or history of immunocompromise.        PAST MEDICAL HISTORY    has a past medical history of Blood circulation, collateral, CHF (congestive heart failure) ([address]), Community acquired bacterial pneumonia, COPD (chronic obstructive pulmonary disease) ([address]), GERD (gastroesophageal reflux disease), Hyperlipidemia, and Filt Rate >60 >60 mL/min/1.73m2    Calcium 9.2 8.6 - 10.4 mg/dL   Brain Natriuretic Peptide   Result Value Ref Range    Pro-BNP 18,600 (H) <300 pg/mL   Troponin   Result Value Ref Range    Troponin, High Sensitivity 32 (H) 0 - 14 ng/L   Protime-INR   Result Value Ref Range    Protime 15.3 (H) 9.4 - 12.6 sec    INR 1.4    APTT   Result Value Ref Range    PTT 39.5 (H) 21.3 - 31.3 sec   D-Dimer, Quantitative   Result Value Ref Range    D-Dimer, Quant 1.26 ug/mL FEU   Lactic Acid   Result Value Ref Range    Lactic Acid 1.9 0.5 - 2.2 mmol/L   Urinalysis with Reflex to Culture    Specimen: Urine, clean catch   Result Value Ref Range    Color, UA Yellow Yellow    Turbidity UA Clear Clear    Glucose, Ur NEGATIVE NEGATIVE mg/dL    Bilirubin Urine NEGATIVE NEGATIVE    Ketones, Urine NEGATIVE NEGATIVE mg/dL    Specific Gravity, UA 1.015 1.005 - 1.030    Urine Hgb NEGATIVE NEGATIVE    pH, UA 6.0 5.0 - 8.0    Protein, UA NEGATIVE NEGATIVE mg/dL    Urobilinogen, Urine Normal 0.0 - 1.0 EU/dL    Nitrite, Urine NEGATIVE NEGATIVE    Leukocyte Esterase, Urine NEGATIVE NEGATIVE    Comment       Microscopic exam not performed based on chemical results unless requested in original order. Comment          Comment       Utilizing a urinalysis as the only screening method to exclude a potential uropathogen can be unreliable in many patient populations. Rapid screening tests are less sensitive than culture and if UTI is a clinical possibility, culture should be considered despite a negative urinalysis.      Arterial Blood Gas, POC   Result Value Ref Range    POC pH 7.468 (H) 7.350 - 7.450    POC pCO2 37.8 35.0 - 48.0 mm Hg    POC PO2 70.0 (L) 83.0 - 108.0 mm Hg    POC HCO3 27.4 21.0 - 28.0 mmol/L    Positive Base Excess, Art 3.6 (H) 0.0 - 3.0 mmol/L    POC O2 SAT 94.9 94.0 - 98.0 %    Brad Test POSITIVE     Sample Site Right Radial Artery     Mode Bi-Level Ventilation     FIO2 60.0    EKG 12 Lead   Result Value Ref Range    Ventricular

## 2023-09-16 NOTE — ED NOTES
Report called to Nelida Gil RN - transported to floor accompanied by RT.       Marti Zhang RN  09/16/23 3109

## 2023-09-16 NOTE — ED NOTES
Pt over in CT accompanied by RT and RN - upon contrast test the IV which had been verified in place w/ blood return becoming infiltrated.  IV dc's and warm compress applied to edematous area Left [facility]     Ruby Cintron RN  09/16/23 8155

## 2023-09-17 LAB
ALLEN TEST: POSITIVE
ANION GAP SERPL CALCULATED.3IONS-SCNC: 11 MMOL/L (ref 9–17)
BUN SERPL-MCNC: 25 MG/DL (ref 8–23)
CALCIUM SERPL-MCNC: 9.3 MG/DL (ref 8.6–10.4)
CHLORIDE SERPL-SCNC: 102 MMOL/L (ref 98–107)
CO2 SERPL-SCNC: 30 MMOL/L (ref 20–31)
CREAT SERPL-MCNC: 0.8 MG/DL (ref 0.5–0.9)
FIO2: 100
GFR SERPL CREATININE-BSD FRML MDRD: >60 ML/MIN/1.73M2
GLUCOSE SERPL-MCNC: 126 MG/DL (ref 70–99)
O2 DELIVERY DEVICE: ABNORMAL
POC HCO3: 28.9 MMOL/L (ref 21–28)
POC O2 SATURATION: 91.7 % (ref 94–98)
POC PCO2: 36.3 MM HG (ref 35–48)
POC PH: 7.51 (ref 7.35–7.45)
POC PO2: 56.5 MM HG (ref 83–108)
POSITIVE BASE EXCESS, ART: 5.7 MMOL/L (ref 0–3)
POTASSIUM SERPL-SCNC: 3.7 MMOL/L (ref 3.7–5.3)
SAMPLE SITE: ABNORMAL
SODIUM SERPL-SCNC: 143 MMOL/L (ref 135–144)

## 2023-09-17 PROCEDURE — 6370000000 HC RX 637 (ALT 250 FOR IP): Performed by: HOSPITALIST

## 2023-09-17 PROCEDURE — 2000000000 HC ICU R&B

## 2023-09-17 PROCEDURE — 94660 CPAP INITIATION&MGMT: CPT

## 2023-09-17 PROCEDURE — 2500000003 HC RX 250 WO HCPCS: Performed by: STUDENT IN AN ORGANIZED HEALTH CARE EDUCATION/TRAINING PROGRAM

## 2023-09-17 PROCEDURE — 94640 AIRWAY INHALATION TREATMENT: CPT

## 2023-09-17 PROCEDURE — 2580000003 HC RX 258: Performed by: STUDENT IN AN ORGANIZED HEALTH CARE EDUCATION/TRAINING PROGRAM

## 2023-09-17 PROCEDURE — 94761 N-INVAS EAR/PLS OXIMETRY MLT: CPT

## 2023-09-17 PROCEDURE — 36600 WITHDRAWAL OF ARTERIAL BLOOD: CPT

## 2023-09-17 PROCEDURE — 36415 COLL VENOUS BLD VENIPUNCTURE: CPT

## 2023-09-17 PROCEDURE — 2580000003 HC RX 258: Performed by: HOSPITALIST

## 2023-09-17 PROCEDURE — 6360000002 HC RX W HCPCS: Performed by: HOSPITALIST

## 2023-09-17 PROCEDURE — 2700000000 HC OXYGEN THERAPY PER DAY

## 2023-09-17 PROCEDURE — 2500000003 HC RX 250 WO HCPCS: Performed by: HOSPITALIST

## 2023-09-17 PROCEDURE — 82803 BLOOD GASES ANY COMBINATION: CPT

## 2023-09-17 PROCEDURE — 99232 SBSQ HOSP IP/OBS MODERATE 35: CPT | Performed by: HOSPITALIST

## 2023-09-17 PROCEDURE — 80048 BASIC METABOLIC PNL TOTAL CA: CPT

## 2023-09-17 RX ORDER — IPRATROPIUM BROMIDE AND ALBUTEROL SULFATE 2.5; .5 MG/3ML; MG/3ML
1 SOLUTION RESPIRATORY (INHALATION)
Status: DISCONTINUED | OUTPATIENT
Start: 2023-09-17 | End: 2023-09-17

## 2023-09-17 RX ORDER — BUMETANIDE 0.25 MG/ML
1 INJECTION INTRAMUSCULAR; INTRAVENOUS ONCE
Status: COMPLETED | OUTPATIENT
Start: 2023-09-17 | End: 2023-09-17

## 2023-09-17 RX ORDER — LORAZEPAM 2 MG/ML
0.5 INJECTION INTRAMUSCULAR EVERY 4 HOURS PRN
Status: DISCONTINUED | OUTPATIENT
Start: 2023-09-17 | End: 2023-09-18

## 2023-09-17 RX ORDER — MORPHINE SULFATE 2 MG/ML
2 INJECTION, SOLUTION INTRAMUSCULAR; INTRAVENOUS
Status: DISCONTINUED | OUTPATIENT
Start: 2023-09-17 | End: 2023-09-18

## 2023-09-17 RX ORDER — NOREPINEPHRINE BITARTRATE 0.06 MG/ML
1-10 INJECTION, SOLUTION INTRAVENOUS CONTINUOUS
Status: DISCONTINUED | OUTPATIENT
Start: 2023-09-17 | End: 2023-09-17

## 2023-09-17 RX ORDER — OMEGA-3S/DHA/EPA/FISH OIL/D3 300MG-1000
200 CAPSULE ORAL DAILY
Status: DISCONTINUED | OUTPATIENT
Start: 2023-09-17 | End: 2023-09-18

## 2023-09-17 RX ORDER — PHENYLEPHRINE HCL IN 0.9% NACL 50MG/250ML
10-300 PLASTIC BAG, INJECTION (ML) INTRAVENOUS CONTINUOUS
Status: DISCONTINUED | OUTPATIENT
Start: 2023-09-17 | End: 2023-09-17

## 2023-09-17 RX ORDER — CALCIUM CARBONATE 500 MG/1
500 TABLET, CHEWABLE ORAL 2 TIMES DAILY
Status: DISCONTINUED | OUTPATIENT
Start: 2023-09-17 | End: 2023-09-18

## 2023-09-17 RX ORDER — DEXMEDETOMIDINE HYDROCHLORIDE 4 UG/ML
.1-1.5 INJECTION, SOLUTION INTRAVENOUS CONTINUOUS
Status: DISCONTINUED | OUTPATIENT
Start: 2023-09-17 | End: 2023-09-18

## 2023-09-17 RX ORDER — IPRATROPIUM BROMIDE AND ALBUTEROL SULFATE 2.5; .5 MG/3ML; MG/3ML
1 SOLUTION RESPIRATORY (INHALATION)
Status: DISCONTINUED | OUTPATIENT
Start: 2023-09-18 | End: 2023-09-18

## 2023-09-17 RX ORDER — GUAIFENESIN 600 MG/1
600 TABLET, EXTENDED RELEASE ORAL 2 TIMES DAILY
Status: DISCONTINUED | OUTPATIENT
Start: 2023-09-17 | End: 2023-09-18

## 2023-09-17 RX ORDER — MORPHINE SULFATE 4 MG/ML
4 INJECTION, SOLUTION INTRAMUSCULAR; INTRAVENOUS
Status: DISCONTINUED | OUTPATIENT
Start: 2023-09-17 | End: 2023-09-18

## 2023-09-17 RX ORDER — ALPRAZOLAM 0.5 MG/1
0.5 TABLET ORAL 3 TIMES DAILY PRN
Status: DISCONTINUED | OUTPATIENT
Start: 2023-09-17 | End: 2023-09-18

## 2023-09-17 RX ADMIN — Medication 500 MG: at 08:35

## 2023-09-17 RX ADMIN — ALBUTEROL SULFATE 2.5 MG: 2.5 SOLUTION RESPIRATORY (INHALATION) at 09:10

## 2023-09-17 RX ADMIN — FERROUS SULFATE TAB EC 325 MG (65 MG FE EQUIVALENT) 325 MG: 325 (65 FE) TABLET DELAYED RESPONSE at 08:37

## 2023-09-17 RX ADMIN — AMIODARONE HYDROCHLORIDE 200 MG: 200 TABLET ORAL at 08:35

## 2023-09-17 RX ADMIN — APIXABAN 2.5 MG: 2.5 TABLET, FILM COATED ORAL at 08:35

## 2023-09-17 RX ADMIN — MORPHINE SULFATE 2 MG: 2 INJECTION, SOLUTION INTRAMUSCULAR; INTRAVENOUS at 21:28

## 2023-09-17 RX ADMIN — TRAMADOL HYDROCHLORIDE 50 MG: 50 TABLET, COATED ORAL at 08:35

## 2023-09-17 RX ADMIN — VITAM B12 100 MCG: 100 TAB at 08:35

## 2023-09-17 RX ADMIN — LOSARTAN POTASSIUM 25 MG: 25 TABLET, FILM COATED ORAL at 08:35

## 2023-09-17 RX ADMIN — SERTRALINE HYDROCHLORIDE 100 MG: 50 TABLET ORAL at 08:35

## 2023-09-17 RX ADMIN — AZITHROMYCIN MONOHYDRATE 500 MG: 500 INJECTION, POWDER, LYOPHILIZED, FOR SOLUTION INTRAVENOUS at 16:44

## 2023-09-17 RX ADMIN — Medication 200 UNITS: at 10:58

## 2023-09-17 RX ADMIN — IPRATROPIUM BROMIDE AND ALBUTEROL SULFATE 1 DOSE: .5; 3 SOLUTION RESPIRATORY (INHALATION) at 15:42

## 2023-09-17 RX ADMIN — BUMETANIDE 1 MG: 0.25 INJECTION INTRAMUSCULAR; INTRAVENOUS at 08:36

## 2023-09-17 RX ADMIN — SENNOSIDES AND DOCUSATE SODIUM 2 TABLET: 50; 8.6 TABLET ORAL at 08:36

## 2023-09-17 RX ADMIN — METOPROLOL TARTRATE 25 MG: 25 TABLET, FILM COATED ORAL at 08:36

## 2023-09-17 RX ADMIN — PRAVASTATIN SODIUM 40 MG: 20 TABLET ORAL at 08:35

## 2023-09-17 RX ADMIN — FAMOTIDINE 20 MG: 20 TABLET, FILM COATED ORAL at 08:36

## 2023-09-17 RX ADMIN — SODIUM CHLORIDE, PRESERVATIVE FREE 10 ML: 5 INJECTION INTRAVENOUS at 21:28

## 2023-09-17 RX ADMIN — MULTIPLE VITAMINS W/ MINERALS TAB 1 TABLET: TAB at 08:39

## 2023-09-17 RX ADMIN — DEXMEDETOMIDINE HYDROCHLORIDE 0.2 MCG/KG/HR: 400 INJECTION, SOLUTION INTRAVENOUS at 14:07

## 2023-09-17 RX ADMIN — ALPRAZOLAM 0.5 MG: 0.5 TABLET ORAL at 13:01

## 2023-09-17 RX ADMIN — GUAIFENESIN 600 MG: 600 TABLET, EXTENDED RELEASE ORAL at 10:58

## 2023-09-17 RX ADMIN — IPRATROPIUM BROMIDE AND ALBUTEROL SULFATE 1 DOSE: .5; 3 SOLUTION RESPIRATORY (INHALATION) at 20:31

## 2023-09-17 RX ADMIN — CEFTRIAXONE SODIUM 1000 MG: 1 INJECTION, POWDER, FOR SOLUTION INTRAMUSCULAR; INTRAVENOUS at 14:29

## 2023-09-17 RX ADMIN — Medication 5 MCG/MIN: at 17:54

## 2023-09-17 RX ADMIN — ANTACID TABLETS 500 MG: 500 TABLET, CHEWABLE ORAL at 10:58

## 2023-09-17 RX ADMIN — PANTOPRAZOLE SODIUM 40 MG: 40 TABLET, DELAYED RELEASE ORAL at 08:35

## 2023-09-17 RX ADMIN — IPRATROPIUM BROMIDE AND ALBUTEROL SULFATE 1 DOSE: .5; 3 SOLUTION RESPIRATORY (INHALATION) at 11:27

## 2023-09-17 RX ADMIN — SODIUM CHLORIDE, PRESERVATIVE FREE 10 ML: 5 INJECTION INTRAVENOUS at 08:38

## 2023-09-17 RX ADMIN — SODIUM CHLORIDE 10 MCG/MIN: 9 INJECTION, SOLUTION INTRAVENOUS at 18:08

## 2023-09-17 RX ADMIN — POTASSIUM CHLORIDE 20 MEQ: 10 CAPSULE, COATED, EXTENDED RELEASE ORAL at 08:35

## 2023-09-17 ASSESSMENT — PAIN SCALES - GENERAL: PAINLEVEL_OUTOF10: 0

## 2023-09-17 NOTE — PROGRESS NOTES
Dr. Paty Green called about low Bps. Orders received for phenepherine gtt and ativan 0.5 mg q4h PRN for anxiety. Orders received to stop the precedex at this time.

## 2023-09-17 NOTE — PLAN OF CARE
Problem: Respiratory - Adult  Goal: Achieves optimal ventilation and oxygenation  Outcome: Not Progressing   Pt on bipap at night and prn during day. Coughing up thick yellow sputum, high flow 15 lpm 89%.  Dr Ramo Neal visits and will place pt on heated high flow  Problem: Respiratory - Adult  Goal: Achieves optimal ventilation and oxygenation  Outcome: Not Progressing

## 2023-09-17 NOTE — PROGRESS NOTES
Dr. Dickson Ramirez updated by RT on most recent ABG results. Orders received for precedex gtt to be started. Orders placed.  Dr. Montague Ear notified of ABG results as well, palliative care consult placed

## 2023-09-17 NOTE — PLAN OF CARE
Problem: Discharge Planning  Goal: Discharge to home or other facility with appropriate resources  Outcome: Progressing     Problem: Safety - Adult  Goal: Free from fall injury  Outcome: Progressing     Problem: Pain  Goal: Verbalizes/displays adequate comfort level or baseline comfort level  Outcome: Progressing     Problem: Skin/Tissue Integrity  Goal: Absence of new skin breakdown  Description: 1. Monitor for areas of redness and/or skin breakdown  2. Assess vascular access sites hourly  3. Every 4-6 hours minimum:  Change oxygen saturation probe site  4. Every 4-6 hours:  If on nasal continuous positive airway pressure, respiratory therapy assess nares and determine need for appliance change or resting period.   Outcome: Progressing     Problem: ABCDS Injury Assessment  Goal: Absence of physical injury  Outcome: Progressing     Problem: Metabolic/Fluid and Electrolytes - Adult  Goal: Electrolytes maintained within normal limits  Outcome: Progressing  Goal: Hemodynamic stability and optimal renal function maintained  Outcome: Progressing

## 2023-09-17 NOTE — PROGRESS NOTES
Dr. Viviane Elkins called about delay in Phenylphrine gtt.  Orders for levophed 1-10 mcg/min for a SBP above 90 until other medication arrives

## 2023-09-17 NOTE — FLOWSHEET NOTE
09/17/23 0253   Oxygen Therapy   SpO2 (!) 82 %   O2 Device High flow nasal cannula   O2 Flow Rate (L/min) 15 L/min     Noted that patient was consistently desatting on monitor. Attempted to sit patient up, breathe in more, and double checked nasal cannula settings, but patient's SpO2 was sitting at 82-86% consistently. Notified RT to assess patient.

## 2023-09-17 NOTE — PROGRESS NOTES
her n.p.o. and obtain a swallow study. The patient denies any questions or concerns at this point in time. Meanwhile we will continue with antibiotics and titrate therapies as indicated. Medications: Allergies: Allergies   Allergen Reactions    Codeine Nausea Only     dizziness    Bactrim [Sulfamethoxazole-Trimethoprim] Hives    Naproxen      On allergy list ? Unsure of reaction    Norvasc [Amlodipine Besylate]      Edema-she thinks      Sertraline Hcl      unsure    Toradol [Ketorolac Tromethamine]      unsure    Tramadol      unsure    Magnesium-Containing Compounds Diarrhea     Mag ox 500 ?     Morphine Nausea And Vomiting    Percocet [Oxycodone-Acetaminophen] Nausea And Vomiting       Current Meds:   Scheduled Meds:    ipratropium 0.5 mg-albuterol 2.5 mg  1 Dose Inhalation Q4H While awake    guaiFENesin  600 mg Oral BID    calcium carbonate  500 mg Oral BID    And    vitamin D3  200 Units Oral Daily    amiodarone  200 mg Oral Daily    apixaban  2.5 mg Oral BID    vitamin B-12  100 mcg Oral Daily    famotidine  20 mg Oral Daily    ferrous sulfate  325 mg Oral Daily with breakfast    lidocaine  1 patch TransDERmal Daily    losartan  25 mg Oral Daily    melatonin  5 mg Oral Daily    metoprolol tartrate  25 mg Oral BID    mirtazapine  7.5 mg Oral Nightly    therapeutic multivitamin-minerals  1 tablet Oral Daily    pantoprazole  40 mg Oral Daily    potassium chloride  20 mEq Oral Daily    pravastatin  40 mg Oral Daily    rivastigmine  1 patch TransDERmal Daily    sennosides-docusate sodium  2 tablet Oral Daily    sertraline  100 mg Oral Daily    vitamin C  500 mg Oral Daily    sodium chloride flush  5-40 mL IntraVENous 2 times per day    cefTRIAXone (ROCEPHIN) IV  1,000 mg IntraVENous Q24H    And    azithromycin  500 mg IntraVENous Q24H    sodium chloride  80 mL IntraVENous Once     Continuous Infusions:    sodium chloride       PRN Meds: traMADol, sodium chloride flush, sodium chloride, ondansetron **OR** HOURS 09/16/2023 03:10 PM       Radiology:  CT CHEST PULMONARY EMBOLISM W CONTRAST    Result Date: 9/16/2023  No pulmonary embolism. Findings of CHF including cardiomegaly, small pleural effusions, and extensive pulmonary edema. .     XR CHEST PORTABLE    Result Date: 9/16/2023  Radiographic findings most consistent with multilobar pneumonia. Physical Examination:     General appearance:  alert, cooperative and no distress  Mental Status:  oriented to person, place and time and normal affect  Lungs: Coarse bilaterally  Heart:  regular rate and rhythm, no murmur  Abdomen:  soft, nontender, nondistended, normal bowel sounds, no masses, hepatomegaly, splenomegaly  Extremities: 2+ edema bilaterally.   No redness, tenderness in the calves  Skin:  no gross lesions, rashes, induration    Assessment:     Hospital Problems             Last Modified POA    * (Principal) Community acquired bacterial pneumonia 9/16/2023 Yes       Plan:     Acute hypoxic respiratory failure secondary to aspiration pneumonia  Wean oxygen as tolerated  Continue antibiotics  N.p.o. for now, video swallow study  Okay to give meds with applesauce or pudding  Acute on chronic diastolic heart failure  IV Bumex  Check echocardiogram  Essential hypertension  Continue Lopressor  Chronic atrial fibrillation  Continue Lopressor, amiodarone, Eliquis    Medical Decision Making: Rocael Bass DO  9/17/2023  12:04 PM

## 2023-09-17 NOTE — PROGRESS NOTES
(PROTONIX) tablet 40 mg, 40 mg, Oral, Daily, Jordis Hidden, DO, 40 mg at 09/17/23 0835    potassium chloride (MICRO-K) extended release capsule 20 mEq, 20 mEq, Oral, Daily, Jordis Hidden, DO, 20 mEq at 09/17/23 0835    pravastatin (PRAVACHOL) tablet 40 mg, 40 mg, Oral, Daily, Jordis Hidden, DO, 40 mg at 09/17/23 0835    rivastigmine (EXELON) 4.6 MG/24HR 1 patch, 1 patch, TransDERmal, Daily, Jordis Hidden, DO, 1 patch at 09/17/23 6514    sennosides-docusate sodium (SENOKOT-S) 8.6-50 MG tablet 2 tablet, 2 tablet, Oral, Daily, Jordis Hidden, DO, 2 tablet at 09/17/23 0836    sertraline (ZOLOFT) tablet 100 mg, 100 mg, Oral, Daily, Jordis Hidden, DO, 100 mg at 09/17/23 3218    traMADol (ULTRAM) tablet 50 mg, 50 mg, Oral, BID PRN, Jordis Hidden, DO, 50 mg at 09/17/23 6693    ascorbic acid (VITAMIN C) tablet 500 mg, 500 mg, Oral, Daily, Jordis Hidden, DO, 500 mg at 09/17/23 8675    sodium chloride flush 0.9 % injection 5-40 mL, 5-40 mL, IntraVENous, 2 times per day, Jordis Hidden, DO, 10 mL at 09/17/23 9118    sodium chloride flush 0.9 % injection 5-40 mL, 5-40 mL, IntraVENous, PRN, Jordis Hidden, DO    0.9 % sodium chloride infusion, , IntraVENous, PRN, Jordis Hidden, DO    ondansetron (ZOFRAN-ODT) disintegrating tablet 4 mg, 4 mg, Oral, Q8H PRN **OR** ondansetron (ZOFRAN) injection 4 mg, 4 mg, IntraVENous, Q6H PRN, Jordis Hidden, DO    polyethylene glycol (GLYCOLAX) packet 17 g, 17 g, Oral, Daily PRN, Jordis Hidden, DO    acetaminophen (TYLENOL) tablet 650 mg, 650 mg, Oral, Q6H PRN **OR** acetaminophen (TYLENOL) suppository 650 mg, 650 mg, Rectal, Q6H PRN, Jordis Hidden, DO    albuterol (PROVENTIL) (2.5 MG/3ML) 0.083% nebulizer solution 2.5 mg, 2.5 mg, Nebulization, Q2H PRN, Jordis Hidden, DO, 2.5 mg at 09/17/23 0910    cefTRIAXone (ROCEPHIN) 1,000 mg in sodium chloride 0.9 % 50 mL IVPB (mini-bag), 1,000 mg, IntraVENous, Q24H **AND** azithromycin reduce the radiation dose to as low as reasonably achievable. COMPARISON: Correlation with concurrent chest radiograph, CT on 02/24/2023 HISTORY: Acute shortness of breath with elevated D-dimer. FINDINGS: Image quality degraded by motion artifact. Pulmonary Arteries: Pulmonary arteries are adequately opacified for evaluation. No intraluminal filling defect to suggest pulmonary embolism. Main pulmonary artery is normal in caliber. Reflux of contrast into the IVC and hepatic veins could be due to rate of injection or right heart dysfunction. Mediastinum: Cardiomegaly. Extensive systemic and coronary atherosclerotic calcification. No pericardial effusion. Thoracic aorta is normal caliber. No lymphadenopathy. Thyroid and esophagus are unremarkable. Lungs/pleura: Small pleural effusions with adjacent atelectasis. Diffuse ground-glass opacity throughout both lungs compatible with pulmonary edema. Upper Abdomen: Unremarkable. Soft Tissues/Bones: No acute abnormality. Left shoulder arthroplasty. No pulmonary embolism. Findings of CHF including cardiomegaly, small pleural effusions, and extensive pulmonary edema. .     XR CHEST PORTABLE    Result Date: 9/16/2023  EXAMINATION: ONE XRAY VIEW OF THE CHEST 9/16/2023 1:29 pm COMPARISON: None. HISTORY: Reason for Exam: shortness of breath FINDINGS: Diffuse patchy opacities are seen scattered throughout the lungs, new compared to the previous exam of 08/29/2023. Stable cardiomegaly. Trace bilateral pleural effusions are suspected. No pneumothorax. Radiographic findings most consistent with multilobar pneumonia. My reading of film: 3 with radiologist interpretation, clinical exam and findings looking more consistent with a aspiration picture. ASSESSMENT:   Principal Problem:    Community acquired bacterial pneumonia  Resolved Problems:    * No resolved hospital problems.  *      Acute on chronic hypoxemic respiratory failure-failed Salter, failed high flow

## 2023-09-18 VITALS
BODY MASS INDEX: 23.07 KG/M2 | DIASTOLIC BLOOD PRESSURE: 66 MMHG | HEIGHT: 64 IN | TEMPERATURE: 97.9 F | OXYGEN SATURATION: 23 % | SYSTOLIC BLOOD PRESSURE: 125 MMHG | HEART RATE: 21 BPM | WEIGHT: 135.14 LBS

## 2023-09-18 LAB
ANION GAP SERPL CALCULATED.3IONS-SCNC: 10 MMOL/L (ref 9–17)
BUN SERPL-MCNC: 26 MG/DL (ref 8–23)
CALCIUM SERPL-MCNC: 9.1 MG/DL (ref 8.6–10.4)
CHLORIDE SERPL-SCNC: 107 MMOL/L (ref 98–107)
CO2 SERPL-SCNC: 29 MMOL/L (ref 20–31)
CREAT SERPL-MCNC: 0.8 MG/DL (ref 0.5–0.9)
EKG ATRIAL RATE: 79 BPM
EKG Q-T INTERVAL: 492 MS
EKG QRS DURATION: 160 MS
EKG QTC CALCULATION (BAZETT): 560 MS
EKG R AXIS: 142 DEGREES
EKG T AXIS: -2 DEGREES
EKG VENTRICULAR RATE: 78 BPM
GFR SERPL CREATININE-BSD FRML MDRD: >60 ML/MIN/1.73M2
GLUCOSE SERPL-MCNC: 110 MG/DL (ref 70–99)
POTASSIUM SERPL-SCNC: 3.9 MMOL/L (ref 3.7–5.3)
SODIUM SERPL-SCNC: 146 MMOL/L (ref 135–144)

## 2023-09-18 PROCEDURE — 94660 CPAP INITIATION&MGMT: CPT

## 2023-09-18 PROCEDURE — 94761 N-INVAS EAR/PLS OXIMETRY MLT: CPT

## 2023-09-18 PROCEDURE — 94640 AIRWAY INHALATION TREATMENT: CPT

## 2023-09-18 PROCEDURE — 99232 SBSQ HOSP IP/OBS MODERATE 35: CPT | Performed by: HOSPITALIST

## 2023-09-18 PROCEDURE — 6360000002 HC RX W HCPCS: Performed by: HOSPITALIST

## 2023-09-18 PROCEDURE — 80048 BASIC METABOLIC PNL TOTAL CA: CPT

## 2023-09-18 PROCEDURE — 36415 COLL VENOUS BLD VENIPUNCTURE: CPT

## 2023-09-18 PROCEDURE — 2500000003 HC RX 250 WO HCPCS: Performed by: NURSE PRACTITIONER

## 2023-09-18 PROCEDURE — 6370000000 HC RX 637 (ALT 250 FOR IP): Performed by: HOSPITALIST

## 2023-09-18 RX ORDER — MORPHINE SULFATE 2 MG/ML
2 INJECTION, SOLUTION INTRAMUSCULAR; INTRAVENOUS
Status: DISCONTINUED | OUTPATIENT
Start: 2023-09-18 | End: 2023-09-18 | Stop reason: HOSPADM

## 2023-09-18 RX ORDER — GLYCOPYRROLATE 0.2 MG/ML
0.2 INJECTION INTRAMUSCULAR; INTRAVENOUS EVERY 4 HOURS PRN
Status: DISCONTINUED | OUTPATIENT
Start: 2023-09-18 | End: 2023-09-18 | Stop reason: HOSPADM

## 2023-09-18 RX ORDER — MORPHINE SULFATE 4 MG/ML
4 INJECTION, SOLUTION INTRAMUSCULAR; INTRAVENOUS
Status: DISCONTINUED | OUTPATIENT
Start: 2023-09-18 | End: 2023-09-18 | Stop reason: HOSPADM

## 2023-09-18 RX ORDER — LORAZEPAM 2 MG/ML
1 INJECTION INTRAMUSCULAR
Status: DISCONTINUED | OUTPATIENT
Start: 2023-09-18 | End: 2023-09-18 | Stop reason: HOSPADM

## 2023-09-18 RX ADMIN — LORAZEPAM 1 MG: 2 INJECTION INTRAMUSCULAR; INTRAVENOUS at 12:17

## 2023-09-18 RX ADMIN — MORPHINE SULFATE 2 MG: 2 INJECTION, SOLUTION INTRAMUSCULAR; INTRAVENOUS at 09:00

## 2023-09-18 RX ADMIN — IPRATROPIUM BROMIDE AND ALBUTEROL SULFATE 1 DOSE: .5; 2.5 SOLUTION RESPIRATORY (INHALATION) at 08:30

## 2023-09-18 RX ADMIN — MORPHINE SULFATE 2 MG: 2 INJECTION, SOLUTION INTRAMUSCULAR; INTRAVENOUS at 12:17

## 2023-09-18 RX ADMIN — GLYCOPYRROLATE 0.2 MG: 0.2 INJECTION INTRAMUSCULAR; INTRAVENOUS at 12:17

## 2023-09-18 ASSESSMENT — PAIN SCALES - GENERAL: PAINLEVEL_OUTOF10: 6

## 2023-09-18 NOTE — DEATH NOTES
Death Pronouncement Note  Patient's Name: Asha Fairchild   Patient's YOB: 1935  MRN Number: [id number]    Admitting Provider: Salvador Crouch DO  Attending Provider: Salvador Crouch DO    Patient was examined and the following were absent: Pulses, Blood Pressure, and Respiratory effort    Staff declared the patient dead on 09/18/2023 at 12:55pm.    Preliminary Cause of Death:   Asystole secondary to aspiration pneumonia and hypoxic respiratory failure    Electronically signed by Salvador Crouch DO on 9/18/23 at 1:16 PM EDT

## 2023-09-18 NOTE — PROGRESS NOTES
[facility]  Speech Language Pathology    Date: 9/18/2023  Patient Name: Shana Thorpe  YOB: 1935   AGE: 82 y.o. MRN: [id number]        Patient Not Available for Speech Therapy     Due to:  [] Testing  [] Hemodialysis  [] Cancelled by RN  [] Surgery   [] Intubation/Sedation/Pain Medication  [] Medical instability  [x] Other: Spoke with RN, pt. Not appropriate for MBSS at this time. Next scheduled treatment: 9/19 or as appropriate  Completed by: Abilio Lombardo, SLP, M.A.  CCC-SLP

## 2023-09-18 NOTE — PROGRESS NOTES
[facility]   Patient Death Note  DEATH                  Room # 341/341-01   Name: Shannan Lynn            Age: 82 y.o. Gender: female          Shinto: [address] [place] [address] of Latter-day:    Admit Date & Time: 2023  1:11 PM        Actual date of death: 2023   TOD: 6975       Referral:  Unit: [facility] ICU  Nurse: Fabiana Rutherford     SITUATION AT DEATH:  Patient was a DNR-CC. Patient passed peacefully with granddaughter and daughter seated bedside. IS THIS A 'S CASE? No    SPIRITUAL/EMOTIONAL INTERVENTION:  This writer met w/ patient's family in pt's room after being informed by patient's nurse that pt had passed. This writer provided and ministry of presence and comfort to family. This writer actively listened to family as they engaged in life review and celebrated patient's life. Patient's family expressed gratitude for the care patient received at the hospital. During this encounter with patient's family palliative care nurse also entered the room and engaged in conversation with family. At family's request this writer read scripture and offered a brief prayer. DOCTOR SIGNING DEATH CERTIFICATE:  Name: Jaunita Cushing  Phone number: [phone]    Copy of COMPLETED Release of Body Form Received? Yes    Patient's belongings: Belongings with family.  HOME:  Contacted Time 1:40pm  [facility] [place], [place]  Phone Number: [phone]    NEXT OF KIN:  Name: Paige Arevalo  Relationship: grand-daughter  Street Address: [address] [place] [address] Ave: [address] [place] [address]: [place]  Zip code: [address]   Phone Number: [phone]    [address] [place] [address]? No    IF SO, WHAT?   N/a    Electronically signed by Dionisio Dumont on 2023 at 1:57 PM.  [address]   [phone]     23 1358   Encounter Summary   Service Provided For: Patient and family together   Referral/Consult From: Nurse   Support System Children;Family members   Last Encounter  09/18/23   Complexity of Encounter Moderate   Begin Time 1310   End Time  1400   Total Time Calculated 50 min   Spiritual/Emotional needs   Type Spiritual Support   Grief, Loss, and Adjustments   Type Death;Grief and loss   Assessment/Intervention/Outcome   Assessment Calm;Sad;Coping   Intervention Active listening;Sustaining Presence/Ministry of presence;Prayer (assurance of)/[place];Explored/Affirmed feelings, thoughts, concerns; Discussed belief system/Judaism practices/dong;Life review/Legacy   Outcome Coping;Grieving;Engaged in conversation;Expressed Gratitude;Expressed feelings, needs, and concerns

## 2023-09-18 NOTE — PROGRESS NOTES
office and life connections notified of pts TOD. Dr. Hermes Stephenson and Dr. Dorothea Arthur notified of TOD.

## 2023-09-18 NOTE — PROGRESS NOTES
Pts family discussed with RN that they are ready for the patient to come off her oxygen and RN can preform comfort care measures. See MAR for medications given. Bipap removed. Sympathy and comfort provided to the patient and her family at bedside.

## 2023-09-18 NOTE — PROGRESS NOTES
Adult Non-Invasive Positive Pressure Ventilation for Acute Respiratory Distress  Patient & Family Education Note     Patient: Estela Harden  Age: 82 y.o. The patient and/or family has been educated on the following items and have verbalized understanding and agreement:    [x]Patient and/or family have been educated on the purpose and advantages of NIV and have verbalized understanding and agreement. []Patient and/or family is in agreement that the patient will be NPO (Nothing by Mouth) for the duration of NIV use. [x]Patient and/or  family is in agreement that NIV will not be routinely disrupted except to complete oral care. [x]Patient and/or family have been educated on the level of care, vital signs frequency and monitoring requirements for NIV and are in agreement. [x]Patient and/or family have been educated on reporting any nausea, chest discomfort, sudden increase in shortness of breath, or a severe headache to the staff immediately.

## 2023-09-18 NOTE — FLOWSHEET NOTE
[address]  NOTE    Room # 487/031-08   Name: Bianca Salmon              [address] visited the patient and family. Admit Date & Time: 9/16/2023  1:11 PM    Assessment:  Bianca Salmon is a 82 y.o. female who reports being in the hospital because \"shortness of breath. \" Upon entering the room patient was on oxygen, agitated, with granddaughter seated bedside. Shortly after  arrived, patient's daughter from [address] [place] [address] arrived. Intervention:  I introduced myself and my title as  I offered space for family to express feelings, needs, and concerns and provided a ministry presence. Patient's engaged in conversation. Offered words of encouragement and a brief prayer. Outcome:  Patient's family expressed gratitude for visit. Plan:  Chaplains will remain available to offer spiritual and emotional support as needed.   Electronically signed by Wade Dudley on 9/18/2023 at 1:11 AM.  [address]       09/18/23 0109   Encounter Summary   Service Provided For: Patient and family together   Referral/Consult From: Nurse   Support System Family members   Last Encounter  09/18/23   Complexity of Encounter Moderate   Begin Time 0010   End Time  0105   Total Time Calculated 55 min   Spiritual/Emotional needs   Type Spiritual Support   Grief, Loss, and Adjustments   Type Anticipatory Grief;End of Life   Assessment/Intervention/Outcome   Assessment Calm;Coping;Tearful   Intervention Active listening;Sustaining Presence/Ministry of presence;Nurtured Hope;Prayer (assurance of)/[place];Grief Care   Outcome Comfort;Engaged in conversation;Expressed feelings, needs, and concerns;Expressed feelings of [place]ella, Peace and/or Love;Expressed Gratitude   Plan and Referrals   Plan/Referrals Continue Support (comment)

## 2023-09-18 NOTE — PROGRESS NOTES
Pt placed in body bag after post mortem care completed. All belongings sent home with the family except her pants which were bagged and labeled. PIVs removed.

## 2023-09-18 NOTE — DISCHARGE SUMMARY
[facility]  Office: 7900  1826, DO,  Ba, DO, Mary Ro, DO, Milvia Coleman Blood, DO, Marcie Zepeda MD, Severo Lopes, MD, Evangelista Gagnon MD, Ira Ricks MD,  Sylvia Gay MD, Cesar Oro MD, Gabriella Richards DO, Britney Maldonado MD,  Veronica Lucia MD, Pavel Mccallum MD, Magda Fitch DO, Kerri Lei MD,  Felipe Watkins DO, Naima Hudson MD, Edis Salinas MD, Kacey Lam MD, Margoth Auguste MD,  Ethan Bynum MD, Alexandra Blank MD, Nathan Holt MD, Carlos A Bashir MD, Rojelio Yanez DO, Giovany Nava MD,  Joshua Panda MD, Celia Mortimer, MD, River [place], CNP,  Jose F Barros, CNP,, Sena Mckeon, CNP,  Padilla Peck, [facility] ([place], [place]), Cesar Randall, [facility], Nadine Daniel, CNP, Susan Black, CNP, Carleen Rosales, CNP, Ramses Grijalva, CNP, Asya Mckay, CNP, Gideon Hemphill, CNS, Ronny Medel, [facility], Ritu Jones, [address] [place] [address] Drive    Discharge Summary     Patient ID: Froylan Cazares  :  1935   MRN: [id number]     ACCOUNT:  [de-identified]   Patient's PCP: Phuong Thomas DO  Admit Date: 2023   Discharge Date: 2023  Length of Stay: 2  Code Status:  DNR-CC  Admitting Physician: Ana Gardner DO  Discharge Physician: Ana Gardner DO     Active Discharge Diagnoses:     Hospital Problem Lists:  Principal Problem:    Community acquired bacterial pneumonia  Resolved Problems:    * No resolved hospital problems. *      Admission Condition:  fair     Discharged Condition: 9 [facility] Stay:     Hospital Course:  Froylan Cazares is a 82 y.o. female who was admitted for the management of Community acquired bacterial pneumonia , presented to ER with Shortness of Breath (Pt from [facility] in [facility] with SOB. Per staff/EMS pt has been deteriorating over past 2 days requiring oxygen.  Pt arrives on NRB with SpO2 @88%, Dr Jerad Lee at bedside upon arrival, EKG being done, and pt placed on bipap per RT. )    This very pleasant but unfortunate 30-year-old female presented to hospital with shortness of breath. Clinically she suffered an aspiration pneumonia and had developed an acute hypoxic respiratory failure. The patient was treated both for aspiration pneumonia and a possible CHF exacerbation. The patient deteriorated and after meeting with family ultimately the decision was made to de-escalate care. On admission the patient made it clear she did not want CPR or intubation. The patient was transition to a DNR CC and comfort care measures were initiated. The patient ultimately  on 2023 at 12:55 PM.    Significant therapeutic interventions: As above    The patient was seen and examined on day of discharge and this discharge summary is in conjunction with any daily progress note from day of discharge. Discharge plan:     Disposition:       Time spent on discharge is 20 mins in patient examination, evaluation, counseling as well as medication reconciliation, prescriptions for required medications, discharge plan and follow up. Electronically signed by   Dalia Lal DO  2023  1:17 PM      Thank you Dr. Anthony Jean DO for the opportunity to be involved in this patient's care.

## 2023-09-18 NOTE — CONSULTS
reduced; occasional assist; LOC full/confusion  ___50%  Mainly sit/lie; Extensive disease; Can't do any work; Considerable assist; intake normal or reduced; LOC full/confusion  __40%  Mainly in bed; Extensive disease; Mainly assist; intake normal or reduced; LOC full/confusion   ___30%  Bed Bound; Extensive disease; Total care; intake reduced; LOCfull/confusion  ___20%  Bed Bound; Extensive disease; Total care; intake minimal; Drowsy/coma  ___10%  Bed Bound; Extensive disease; Total care; Mouth care only; Drowsy/coma  _x__0       Death        Thank you for allowing Palliative Care to participate in the care of Ms. Lopez . This note has been dictated by dragon, typing errors may be a possibility. The total time I spent in seeing the patient, discussing goals of care, advanced directives, code status and other major issues was more than 60 minutes      Electronically signed by   MALVIN Carreno NP  Palliative Care Team  on 9/18/2023 at 9:28 AM    [facility] Number [phone]    [address] [place] [address] Number [phone]    [address] [place] [address] Number [phone]    Please call with any palliative questions or concerns. Palliative Care Team is available via perfect serve or via phone.

## 2023-09-18 NOTE — PROGRESS NOTES
Adult Non-Invasive Positive Pressure Ventilation for Acute Respiratory Distress  Patient & Family Education Note     Patient: Kodi Wick  Age: 82 y.o. The patient and/or family has been educated on the following items and have verbalized understanding and agreement:    [x]Patient and/or family have been educated on the purpose and advantages of NIV and have verbalized understanding and agreement. [x]Patient and/or family is in agreement that the patient will be NPO (Nothing by Mouth) for the duration of NIV use. [x]Patient and/or  family is in agreement that NIV will not be routinely disrupted except to complete oral care. [x]Patient and/or family have been educated on the level of care, vital signs frequency and monitoring requirements for NIV and are in agreement. [x]Patient and/or family have been educated on reporting any nausea, chest discomfort, sudden increase in shortness of breath, or a severe headache to the staff immediately.

## 2023-09-18 NOTE — PROGRESS NOTES
Pts HR on monitor asystole. RN Shade Romero and XOCHITL Hernandez listened to pts heart for 5 minutes. No heartbeat auscultated. TOD 1255.

## 2023-09-18 NOTE — PROGRESS NOTES
[facility]  PROGRESS NOTE    Room # 718/825-45   Name: Kassie Martinez            Rastafarian: Ishmael Velasquez     Reason for visit: Follow up    I visited the  patient and family . Admit Date & Time: 9/16/2023  1:11 PM    Assessment:  Kassie Martinez is a 82 y.o. female in the hospital because \"pneumonia\". Upon entering the room patient was lying in bed sleeping. Patient's family member was lying on sofa in room sleeping. Intervention: This writer offered brief silent prayer for patient and family    Outcome:  Patient and family remained sleeping     Plan:  Chaplains will remain available to offer spiritual and emotional support as needed.     Electronically signed by Tre Coon on 9/18/2023 at [address]        09/18/23 1112   Encounter Summary   Encounter Overview/Reason  Follow-up   Service Provided For: Patient and family together   Referral/Consult From: [address] Children;Family members   Last Encounter  09/18/23   Complexity of Encounter Low   Begin Time 1030   End Time  1032   Total Time Calculated 2 min   Spiritual/Emotional needs   Type Spiritual Support   Assessment/Intervention/Outcome   Assessment Unable to assess  (patient and family were both sleeping)   Intervention Prayer (assurance of)/[place]   Outcome Did not respond   Plan and Referrals   Plan/Referrals Continue Support (comment)

## 2023-09-21 LAB
MICROORGANISM SPEC CULT: NORMAL
MICROORGANISM SPEC CULT: NORMAL
SERVICE CMNT-IMP: NORMAL
SERVICE CMNT-IMP: NORMAL
SPECIMEN DESCRIPTION: NORMAL
SPECIMEN DESCRIPTION: NORMAL

## 2024-01-25 NOTE — ASSESSMENT & PLAN NOTE
Stable on diuretic  Has cardiology sitting.    [x] Avoid prolonged standing in one place.     Dietary:  [] Diet as tolerated: [x] Calorie Diabetic Diet: [x] No Added Salt:  [x] Increase Protein: [] Other:     Activity:  [x] Activity as tolerated: Elevate extremities with ulcers  [] Patient has no activity restrictions      [] Strict Bedrest   [] Remain off Work      [] May return to full duty work                                     [] Return to work with restrictions     Physician:  [] Dr. Dustin Shah  [] Dr. Wei Saenz  [x] Dr. Polo Oviedo  [] Dr. Martin Zheng      Nurse Case Manger:  Teressa      Wound Care Center Information: Should you experience any significant changes in your wound(s) or have questions about your wound care, please contact the Wound Center at [phone]. Our hours are Monday - Friday 8am - 4:30pm, closed all major holidays. If you need help with your wound outside these hours and cannot wait until we are again available, contact your PCP or go to the hospital emergency room.     PLEASE NOTE: IF YOU ARE UNABLE TO OBTAIN WOUND SUPPLIES, CONTINUE TO USE THE SUPPLIES YOU HAVE AVAILABLE UNTIL YOU ARE ABLE TO REACH US. IT IS MOST IMPORTANT TO KEEP THE WOUND COVERED AT ALL TIMES.

## 2024-08-07 NOTE — PROGRESS NOTES
Pt has been wearing bipap on AVAPS mode per dr Christine Frey order. She did not casandra heated high flow this am. Abg revealed hypoxia  after bipapwas placed. Dr Christine Frey was notified and pallative care is consulted. Pt has been restless most of day with high respiratory rate. Precidex was started.  Family is at bedside BRIEF HOSPITAL COURSE  82 year old female, former smoker, with a PMHx of CHF, COPD, Mitral Valve Stenosis, HTN, HLD, hypothyroidism, who presented as a transfer from [facility] for evaluation for the [place] trial for Mitral Valve Stenosis.  Patient presented to [facility] with complaint of worsening shortness of breath x 1 week.  Admitted for acute on chronic hypoxic respiratory failure, with Pulmonology consulted and patient placed on nebulizers and inhalers.  Hospital course complicated by YADI for which Nephrology was consulted, initially was placed on Lasix for diuresis, but changed to Bumex, then Torsemide. Patient required a right thoracentesis for pleural effusion 24 with 1L removed. Cardiology consulted for Mitral Stenosis, with patient deemed not a surgical candidate for valve replacement and not a candidate for APOLLO trial due to not meeting MR requirement, but considered for eligibility for [place] trial at [facility]. Pt transferred to [facility] to evaluate for mitral stenosis.  LHC showing mild diffuse CAD, no intervention. COSME showing severe MS, mod MR, hyperdynamic LVEF   >75%, no LVOT obstruction noted (previous TTE showing mod LVOT obstruction). Patient consented and under evaluation for [place] Trial. T[place]yne TTE and CTA obtained. Heart Failure team following, GDMT with Torsemide, Aldactone, Lopressor. Remainder of workup completed inpatient. Patient pending discharge home later today with o/p follow up with Dr. Platt and Heart Failure.    SIGNIFICANT RECENT/PAST 24 HR EVENTS  No acute events reported overnight.     SUBJECTIVE  Patient seen and examined on follow up. Pt currently lying in bed in NAD. Denies fevers, chills, HA, dizziness, CP, SOB, abd pain, N/V/D, numbness/tingling in extremities, or any other acute complaints.  +Tolerating diet  +Ambulating with PT and nursing  + Bowel movement since admission  ROS negative x 10 systems except as noted above.    PAST MEDICAL & SURGICAL HISTORY  History of COPD  Asthma  Thyroid nodule  Hyperlipidemia  Hypertension  Hypothyroidism  History of cholecystectomy  History of repair of hip fracture    DAILY REVIEW  Telemetry: Sinus rhythm   Vital Signs Last 24 Hrs  T(C): 36.7 (07 Aug 2024 00:00), Max: 36.8 (06 Aug 2024 07:23)  T(F): 98.1 (07 Aug 2024 00:00), Max: 98.3 (06 Aug 2024 07:23)  HR: 100 (07 Aug 2024 00:00) (64 - 100)  BP: 105/61 (07 Aug 2024 00:00) (96/53 - 127/60)  BP(mean): --  RR: 18 (07 Aug 2024 00:00) (17 - 18)  SpO2: 94% (07 Aug 2024 00:00) (93% - 100%)    Parameters below as of 07 Aug 2024 00:00  Patient On (Oxygen Delivery Method): room air    I&O's Detail    03 Aug 2024 07:01  -  04 Aug 2024 07:00  --------------------------------------------------------  IN:    Oral Fluid: 910 mL  Total IN: 910 mL    OUT:    Voided (mL): 1700 mL  Total OUT: 1700 mL    Total NET: -790 mL    04 Aug 2024 07:01  -  05 Aug 2024 00:17  --------------------------------------------------------  IN:    Oral Fluid: 750 mL  Total IN: 750 mL    OUT:    Voided (mL): 600 mL  Total OUT: 600 mL    Total NET: 150 mL    Last Bowel Movement: 04-Aug-2024 (24 @ 20:42)    Daily     Daily Weight in k.5 (04 Aug 2024 05:37)  Admit Wt: Drug Dosing Weight  Height (cm): 157.5 (03 Aug 2024 04:00)  Weight (kg): 44 (03 Aug 2024 04:00)  BMI (kg/m2): 17.7 (03 Aug 2024 04:00)  BSA (m2): 1.41 (03 Aug 2024 04:00)    LABS                        9.4    9.52  )-----------( 248      ( 06 Aug 2024 04:25 )             29.1   08-06    136  |  95<L>  |  64.5<H>  ----------------------------<  86  4.1   |  29.0  |  1.07    Ca    9.5      06 Aug 2024 04:25  Mg     2.4         TPro  6.5<L>  /  Alb  3.6  /  TBili  0.3<L>  /  DBili  x   /  AST  17  /  ALT  15  /  AlkPhos  68  08-    LIVER FUNCTIONS - ( 04 Aug 2024 04:15 )  Alb: 3.6 g/dL / Pro: 6.5 g/dL / ALK PHOS: 68 U/L / ALT: 15 U/L / AST: 17 U/L / GGT: x           PT/INR - ( 03 Aug 2024 00:55 )   PT: 10.3 sec;   INR: 0.93 ratio      PTT - ( 03 Aug 2024 00:55 )  PTT:32.1 sec    Pro-Brain Natriuretic Peptide: 3761 pg/mL (24 @ 00:55)  Prealbumin, Serum: 22 mg/dL (24 @ 00:55)  P2Y12 Plt Reactivity: 287 PRU (24 @ 00:55)    Culture - Urine (collected 24 @ 08:40)  Source: Catheterized Catheterized  Final Report (24 @ 23:52):    <10,000 CFU/mL Normal Urogenital Lou    Culture - Fungal, Body Fluid (collected 24 @ 15:40)  Source: Pleural Fl Pleural Fluid  Preliminary Report (24 @ 23:03):    Culture is being performed. Fungal cultures are held for 4 weeks.    Culture - Body Fluid with Gram Stain (collected 24 @ 15:40)  Source: Pleural Fl Pleural Fluid  Gram Stain (24 @ 04:07):    polymorphonuclear leukocytes seen    No organisms seen    by cytocentrifuge  Final Report (24 @ 18:52):    No growth at 5 days    MEDICATIONS  Home Medications:  Albuterol (Eqv-ProAir HFA) 90 mcg/inh inhalation aerosol: 1 puff(s) inhaled every 4 to 6 hours as needed for  shortness of breath and/or wheezing (2024 19:51)  atorvastatin 40 mg oral tablet: 1 tab(s) orally once a day (at bedtime) (2024 19:51)  Breztri Aerosphere 160 mcg-9 mcg-4.8 mcg/inh inhalation aerosol: 2 puff(s) inhaled 2 times a day (2024 19:51)  levothyroxine 100 mcg (0.1 mg) oral tablet: 1 tab(s) orally once a day (2024 19:51)  metoprolol succinate 100 mg oral tablet, extended release: 1 tab(s) orally once a day (2024 19:51)    MEDICATIONS  (STANDING):  albuterol/ipratropium for Nebulization 3 milliLiter(s) Nebulizer every 6 hours  atorvastatin 40 milliGRAM(s) Oral at bedtime  budesonide 160 MICROgram(s)/formoterol 4.5 MICROgram(s) Inhaler 2 Puff(s) Inhalation two times a day  heparin   Injectable 5000 Unit(s) SubCutaneous every 12 hours  levothyroxine 100 MICROGram(s) Oral daily  melatonin 5 milliGRAM(s) Oral at bedtime  metoprolol tartrate 25 milliGRAM(s) Oral two times a day  pantoprazole    Tablet 40 milliGRAM(s) Oral before breakfast  sodium chloride 0.9% lock flush 3 milliLiter(s) IV Push every 8 hours  spironolactone 25 milliGRAM(s) Oral daily  torsemide 10 milliGRAM(s) Oral daily    MEDICATIONS  (PRN):  albuterol    90 MICROgram(s) HFA Inhaler 2 Puff(s) Inhalation every 6 hours PRN Bronchospasm    ALLERGIES  No Known Allergies    DIAGNOSTICS  All relevant and available laboratory results, radiology and medications reviewed.  COSME W or WO Ultrasound Enhancing Agent (24 @ 09:17)  CONCLUSIONS:   1. Left ventricular systolic function is hyperdynamic with an ejection fraction visually estimated at >75 %.   2. Normal right ventricular cavity size and normal right ventricular systolic function.   3. 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 Lambl's excrescences also seen on the aortic valve.   4. Trace aortic regurgitation.   5. Prominent Eustachian valve in the right atrium and Chiari network present in the right atrium.   6. Severe mitral valve stenosis.   7. Moderate mitral regurgitation.   8. No pericardial effusion seen.   9. No thrombus seen in the left atrial, left atrial appendage, right atrial or right atrial appendage.  10. Severe calcified and noncalcified atheroscelrotic plaquig in descending aorta. Consider CTA of aorta if clincially indicnated.  11. Agitated saline injection reveals bubbles in the left heart, consistent with a patent foramen ovale.    Cardiac Catheterization (24 @ 11:02)  LM: Short LM, luminal irregularities    LAD: Mild diffuse disease, mid LAD bridging segment, moderate  torutuosity  LCX: Mild diffuse disease   RCA: Mild diffuse disease      Xray Chest 1 View- PORTABLE-Routine (Xray Chest 1 View- PORTABLE-Routine in AM.) (24 @ 06:37)  FINDINGS/  IMPRESSION: Heart size, mediastinal and hilar contours are unchanged and   within normal limits. Coarse calcification of the mitral annulus is   reidentified. Senescent changes with extensive calcification aortic arch   and descending thoracic aorta are redemonstrated. Persistent pulmonary   venous congestion with fluid noted within the right minor fissure as well   as by basilar pleural effusions probably unchanged allowing for   differences in imaging technique. There may be a small loculated   pneumothorax along the inferior lateral aspect of the right lower lung   zone unchanged from prior imaging.    US Duplex Venous Lower Ext Complete, Bilateral (24 @ 20:22)  IMPRESSION:  No evidence of deep venous thrombosis in either lower extremity.    Left popliteal fossa cyst measuring 5.0 x9.0 x 3.0 mm.    CT Chest No Cont (24 @ 18:11)  FINDINGS:  LUNGS AND LARGE AIRWAYS: Breathing motion artifacts limiting evaluation.   Bilateral large pleural effusions are visible which appear grossly   similar to the prior CT however probably slightly increased. Extensive   areas with compression atelectasis adjacent to the pleural effusion as   well as areas with atelectasis in the right middle lobe and lingula are   noted; concurrent pneumonia in the atelectatic lungs cannot be excluded.   Extensive septal thickening is visible mainly affecting the upper lobes   with associated changes of emphysema. The airspace opacity seen on the CT   from 2024 have largely resolved.  PLEURA: No pleural effusion.  VESSELS: Within normal limits.  HEART: Cardiomegaly is noted. Mitral valve calcification.. No pericardial   effusion.  MEDIASTINUM AND MATTHEW: Limited evaluation. Multiple prominent mediastinal   lymph nodes are noted which appear grossly similar to the prior CT..  CHEST WALL AND LOWER NECK: Postoperative changes are visible in the   thyroid bed. Stable appearance of the right lower thyroid; the left-sided   thyroid is not visible and may be surgically absent..  VISUALIZED UPPER ABDOMEN: Limited slices through the upper abdomen show   cholecystectomy. Dilated hepatic veins are noted with dilated IVC..  BONES: Stable wedge-shaped deformity of multiple thoracic vertebral   bodies.    IMPRESSION:  Breathing motion artifacts limiting the evaluation.    Cardiomegaly with bilateral large pleural effusions are visible with   subjacent atelectasis and extensive septal thickening, concerning for   CHF/fluid overload.    Limited evaluation for loculated pleural effusion on this noncontrast CT.    Extensive areas with atelectasis are visible mainly in the right middle   lobe and the lingula; concurrent/developing pneumonia in the atelectatic   lungs cannot be excluded.    The airspace opacity seen on the CT from 2024 have largely resolved.    12 Lead ECG (24 @ 05:55)  Ventricular Rate 55 BPM  Atrial Rate 55 BPM  P-R Interval 186 ms  QRS Duration 94 ms  Q-T Interval 452 ms  QTC Calculation(Bazett) 432 ms  P Axis 68 degrees  R Axis -29 degrees  T Axis 50 degrees  Diagnosis Line Sinus bradycardia  Possible Left atrial enlargement  Borderline ECG    TTE W or WO Ultrasound Enhancing Agent (06.10.24 @ 21:44)  CONCLUSIONS:   1. Technically difficult image quality.   2. There is increased LV mass and concentric hypertrophy.   3. Left ventricular systolic function is hyperdynamic with an ejection fraction of 79 % by Shepard's method ofdisks.   4. Hyperdynamic left ventricular systolic function. Basal septal hypertrophy (measures 1.8 cm) with evidence left ventricular outflow tract obstruction with a resting gradient of 41mmHg and a gradient of 47 mmHg with valsalva, overall consistent with moderate LVOT obstruction.   5. Normal right ventricular cavity size and normal systolic function.   6. The right atrium is normal in size.   7. The left atrium is severely dilated.   8. Interatrial septum is stretched and displaced to the right, consistent with left atrial volume overload.   9. Tricuspid aortic valve with normal leaflet excursion. There is moderate thickening of the aortic valve leaflets.  10. Trace aortic regurgitation.  11. Severe mitral valve leaflet calcification.  12. Peak gradient across the mitral valve is 41mmHg with a mean gradient of 19mmHg, at a heart rate of 78 beats per minute.  13. Severe mitral valve stenosis.  14. Mild mitral regurgitation.  15. Mild to moderate tricuspid regurgitation.  16. The inferior vena cava is normal in size measuring 1.56 cm in diameter, (normal <2.1cm) with normal inspiratory collapse (normal >50%) consistent with normal right atrial pressure (~3, range 0-5mmHg).  17. Estimated pulmonary artery systolic pressure is 47 mmHg, consistent with moderate pulmonary hypertension.  18. Small pericardial effusion noted adjacent to the right atrium.  19. Bilateral pleural effusion noted.    Mitral Valve:  There is diffuse mitral valve thickening of the anterior and posterior leaflets. Peak gradient across the mitral valve is 41mmHg with a mean gradient of 19mmHg, at a heart rate of 78 beats per minute. There is severe leaflet calcification. There is severe mitral valve stenosis. The calculated mitral valve area is 1.4 cm². There is mild mitral regurgitation.    PHYSICAL EXAM  Constitutional: NAD, pleasant elderly female  Neck: supple, trachea midline. No JVD   Respiratory: Breath sounds diminished b/l lower fields to auscultation, no accessory muscle use noted. +fine rales to bases  Cardiovascular: Regular rate, regular rhythm, normal S1, S2; no murmurs or rub   Gastrointestinal: Soft, non-tender, non-distended, + bowel sounds   Extremities: MENDOZA x 4, no peripheral edema, no cyanosis, no clubbing    Vascular: Equal and normal pulses: 2+ peripheral pulses throughout  Neurological: A+O x 3; speech clear and intact; no gross sensory/motor deficits  Psychiatric: calm, normal mood, normal affect   Skin: warm, dry, well perfused, no rashes

## (undated) DEVICE — FORCEPS BX L240CM WRK CHN 2.8MM STD CAP W/ NDL MIC MESH

## (undated) DEVICE — GLOVE ORTHO 8   MSG9480